# Patient Record
Sex: FEMALE | Race: BLACK OR AFRICAN AMERICAN | NOT HISPANIC OR LATINO | Employment: OTHER | URBAN - METROPOLITAN AREA
[De-identification: names, ages, dates, MRNs, and addresses within clinical notes are randomized per-mention and may not be internally consistent; named-entity substitution may affect disease eponyms.]

---

## 2017-01-26 ENCOUNTER — TELEPHONE (OUTPATIENT)
Dept: RHEUMATOLOGY | Facility: CLINIC | Age: 77
End: 2017-01-26

## 2017-01-26 RX ORDER — PREDNISONE 10 MG/1
10 TABLET ORAL DAILY
Qty: 30 TABLET | Refills: 0 | Status: SHIPPED | OUTPATIENT
Start: 2017-01-26 | End: 2017-03-27 | Stop reason: SDUPTHER

## 2017-01-26 NOTE — TELEPHONE ENCOUNTER
Refill for prednisone sent.  Called patient to discuss addition of Arava to control symptoms.  Prednisone is helping.  Will mail handout Arava

## 2017-01-26 NOTE — TELEPHONE ENCOUNTER
----- Message from Gudelia Riddle MA sent at 1/26/2017  9:19 AM CST -----  Contact: self@home      ----- Message -----     From: Abigail Mcmahon     Sent: 1/26/2017   9:00 AM       To: [facility] Staff    Patient needs a refill on prednisone.

## 2017-01-30 ENCOUNTER — TELEPHONE (OUTPATIENT)
Dept: RHEUMATOLOGY | Facility: CLINIC | Age: 77
End: 2017-01-30

## 2017-01-30 DIAGNOSIS — M05.79 RHEUMATOID ARTHRITIS INVOLVING MULTIPLE SITES WITH POSITIVE RHEUMATOID FACTOR: Primary | ICD-10-CM

## 2017-01-30 NOTE — TELEPHONE ENCOUNTER
Staff to inform patient to have labs updated prior to starting leflunomide.  We'll send the prescription once the labs are reviewed.

## 2017-01-31 ENCOUNTER — LAB VISIT (OUTPATIENT)
Dept: LAB | Facility: OTHER | Age: 77
End: 2017-01-31
Attending: INTERNAL MEDICINE
Payer: MEDICARE

## 2017-01-31 DIAGNOSIS — M05.79 RHEUMATOID ARTHRITIS INVOLVING MULTIPLE SITES WITH POSITIVE RHEUMATOID FACTOR: ICD-10-CM

## 2017-01-31 LAB
ALBUMIN SERPL BCP-MCNC: 3.6 G/DL
ALP SERPL-CCNC: 64 U/L
ALT SERPL W/O P-5'-P-CCNC: 11 U/L
ANION GAP SERPL CALC-SCNC: 15 MMOL/L
AST SERPL-CCNC: 16 U/L
BASOPHILS # BLD AUTO: 0.02 K/UL
BASOPHILS NFR BLD: 0.2 %
BILIRUB SERPL-MCNC: 0.5 MG/DL
BUN SERPL-MCNC: 12 MG/DL
CALCIUM SERPL-MCNC: 9.6 MG/DL
CHLORIDE SERPL-SCNC: 100 MMOL/L
CK SERPL-CCNC: 44 U/L
CO2 SERPL-SCNC: 25 MMOL/L
CREAT SERPL-MCNC: 1.3 MG/DL
CRP SERPL-MCNC: 24.2 MG/L
DIFFERENTIAL METHOD: ABNORMAL
EOSINOPHIL # BLD AUTO: 0.3 K/UL
EOSINOPHIL NFR BLD: 3.1 %
ERYTHROCYTE [DISTWIDTH] IN BLOOD BY AUTOMATED COUNT: 13.7 %
ERYTHROCYTE [SEDIMENTATION RATE] IN BLOOD BY WESTERGREN METHOD: 28 MM/HR
EST. GFR  (AFRICAN AMERICAN): 46.4 ML/MIN/1.73 M^2
EST. GFR  (NON AFRICAN AMERICAN): 40.2 ML/MIN/1.73 M^2
GLUCOSE SERPL-MCNC: 114 MG/DL
HCT VFR BLD AUTO: 41 %
HGB BLD-MCNC: 12.5 G/DL
LYMPHOCYTES # BLD AUTO: 3.1 K/UL
LYMPHOCYTES NFR BLD: 29.7 %
MCH RBC QN AUTO: 30 PG
MCHC RBC AUTO-ENTMCNC: 30.5 %
MCV RBC AUTO: 99 FL
MONOCYTES # BLD AUTO: 0.4 K/UL
MONOCYTES NFR BLD: 4.3 %
NEUTROPHILS # BLD AUTO: 6.4 K/UL
NEUTROPHILS NFR BLD: 62.3 %
PLATELET # BLD AUTO: 250 K/UL
PMV BLD AUTO: 10.5 FL
POTASSIUM SERPL-SCNC: 3.9 MMOL/L
PROT SERPL-MCNC: 7.2 G/DL
RBC # BLD AUTO: 4.16 M/UL
SODIUM SERPL-SCNC: 140 MMOL/L
WBC # BLD AUTO: 10.26 K/UL

## 2017-01-31 PROCEDURE — 86480 TB TEST CELL IMMUN MEASURE: CPT

## 2017-01-31 PROCEDURE — 82550 ASSAY OF CK (CPK): CPT

## 2017-01-31 PROCEDURE — 85651 RBC SED RATE NONAUTOMATED: CPT

## 2017-01-31 PROCEDURE — 36415 COLL VENOUS BLD VENIPUNCTURE: CPT

## 2017-01-31 PROCEDURE — 85025 COMPLETE CBC W/AUTO DIFF WBC: CPT

## 2017-01-31 PROCEDURE — 80053 COMPREHEN METABOLIC PANEL: CPT

## 2017-01-31 PROCEDURE — 86140 C-REACTIVE PROTEIN: CPT

## 2017-01-31 PROCEDURE — 82085 ASSAY OF ALDOLASE: CPT

## 2017-02-01 ENCOUNTER — TELEPHONE (OUTPATIENT)
Dept: RHEUMATOLOGY | Facility: CLINIC | Age: 77
End: 2017-02-01

## 2017-02-01 DIAGNOSIS — M05.79 RHEUMATOID ARTHRITIS INVOLVING MULTIPLE SITES WITH POSITIVE RHEUMATOID FACTOR: Primary | ICD-10-CM

## 2017-02-01 LAB
ALDOLASE SERPL-CCNC: 2.9 U/L
MITOGEN NIL: 2.5 IU/ML
NIL: 0.03 IU/ML
TB ANTIGEN NIL: 0 IU/ML
TB ANTIGEN: 0.03 IU/ML
TB GOLD: NEGATIVE

## 2017-02-01 RX ORDER — LEFLUNOMIDE 10 MG/1
10 TABLET ORAL DAILY
Qty: 30 TABLET | Refills: 2 | Status: SHIPPED | OUTPATIENT
Start: 2017-02-01 | End: 2017-04-20 | Stop reason: CLARIF

## 2017-02-01 NOTE — TELEPHONE ENCOUNTER
Labs reviewed.  Will send Arava.  Patient informed.   Labs 3/1/17 [facility] at 11 am

## 2017-02-08 ENCOUNTER — OFFICE VISIT (OUTPATIENT)
Dept: INTERNAL MEDICINE | Facility: CLINIC | Age: 77
End: 2017-02-08
Attending: INTERNAL MEDICINE
Payer: MEDICARE

## 2017-02-08 VITALS
HEART RATE: 108 BPM | HEIGHT: 62 IN | SYSTOLIC BLOOD PRESSURE: 120 MMHG | WEIGHT: 165.56 LBS | DIASTOLIC BLOOD PRESSURE: 70 MMHG | OXYGEN SATURATION: 95 % | BODY MASS INDEX: 30.47 KG/M2

## 2017-02-08 DIAGNOSIS — J10.1 INFLUENZA A: Primary | ICD-10-CM

## 2017-02-08 LAB
CTP QC/QA: YES
FLUAV AG NPH QL: POSITIVE
FLUBV AG NPH QL: NEGATIVE

## 2017-02-08 PROCEDURE — 1125F AMNT PAIN NOTED PAIN PRSNT: CPT | Mod: S$GLB,,, | Performed by: INTERNAL MEDICINE

## 2017-02-08 PROCEDURE — 99999 PR PBB SHADOW E&M-EST. PATIENT-LVL III: CPT | Mod: PBBFAC,,, | Performed by: INTERNAL MEDICINE

## 2017-02-08 PROCEDURE — 1157F ADVNC CARE PLAN IN RCRD: CPT | Mod: S$GLB,,, | Performed by: INTERNAL MEDICINE

## 2017-02-08 PROCEDURE — 87804 INFLUENZA ASSAY W/OPTIC: CPT | Mod: QW,S$GLB,, | Performed by: INTERNAL MEDICINE

## 2017-02-08 PROCEDURE — 99213 OFFICE O/P EST LOW 20 MIN: CPT | Mod: 25,S$GLB,, | Performed by: INTERNAL MEDICINE

## 2017-02-08 PROCEDURE — 1160F RVW MEDS BY RX/DR IN RCRD: CPT | Mod: S$GLB,,, | Performed by: INTERNAL MEDICINE

## 2017-02-08 PROCEDURE — 1159F MED LIST DOCD IN RCRD: CPT | Mod: S$GLB,,, | Performed by: INTERNAL MEDICINE

## 2017-02-08 PROCEDURE — 3078F DIAST BP <80 MM HG: CPT | Mod: S$GLB,,, | Performed by: INTERNAL MEDICINE

## 2017-02-08 PROCEDURE — 3074F SYST BP LT 130 MM HG: CPT | Mod: S$GLB,,, | Performed by: INTERNAL MEDICINE

## 2017-02-08 RX ORDER — FAMOTIDINE 40 MG/1
TABLET, FILM COATED ORAL
Refills: 3 | COMMUNITY
Start: 2016-12-28 | End: 2017-04-20 | Stop reason: CLARIF

## 2017-02-08 RX ORDER — PREDNISONE 10 MG/1
TABLET ORAL
Qty: 36 TABLET | Refills: 0 | Status: SHIPPED | OUTPATIENT
Start: 2017-02-08 | End: 2017-03-28 | Stop reason: SDUPTHER

## 2017-02-08 NOTE — MR AVS SNAPSHOT
[facility]  [address]  [place] [address]  Phone: [phone]  Fax: [phone]                  Betzaida Neumann   2017 1:20 PM   Office Visit    Description:  Female : 1940   Provider:  Moustapha English MD   Department:  [facility]           Reason for Visit     URI           Diagnoses this Visit        Comments    Upper respiratory tract infection, unspecified type    -  Primary            To Do List           Future Appointments        Provider Department Dept Phone    2/15/2017 3:30 PM MD Barry Zavala [facility] - Otorhinolaryngology [phone]    2017 1:15 PM Liudmila Huizar OD [facility] [phone]    3/1/2017 11:00 AM LAB, APPOINTMENT [place] [facility] [phone]    3/24/2017 10:20 AM [facility], DEXA1 Barry [facility] [phone]    3/24/2017 11:30 AM MD Barry Vergara [facility] - Rheumatology [phone]      Goals (5 Years of Data)     None       These Medications        Disp Refills Start End    predniSONE (DELTASONE) 10 MG tablet 36 tablet 0 2017     5 tabs/day for 3 days then 4 tabs/day for 3 days then 3 tabs/day for 3 days    Pharmacy: [facility] [address] [place] [address] [address] AT [facility] [address]ip Ph #: [phone]         [facility]     [facility] Care [facility] -  Assistance  Registered nurses in the [facility] provide clinical advisement, health education, appointment booking, and other advisory services.  Call for this free service at [phone].             Medications           Message regarding Medications     Verify the changes and/or additions to your medication regime listed below are the same as discussed with your clinician today.  If any of these changes or additions are incorrect, please notify your healthcare provider.        START taking these NEW medications        Refills     "predniSONE (DELTASONE) 10 MG tablet 0    Si tabs/day for 3 days then 4 tabs/day for 3 days then 3 tabs/day for 3 days    Class: Normal      STOP taking these medications     ipratropium (ATROVENT) 0.06 % nasal spray SPRAY TWICE IEN BID.           Verify that the below list of medications is an accurate representation of the medications you are currently taking.  If none reported, the list may be blank. If incorrect, please contact your healthcare provider. Carry this list with you in case of emergency.           Current Medications     estradiol (ESTRACE) 2 MG tablet Take 2 mg by mouth once daily.    fluticasone (FLONASE) 50 mcg/actuation nasal spray     montelukast (SINGULAIR) 10 mg tablet     predniSONE (DELTASONE) 10 MG tablet Take 1 tablet (10 mg total) by mouth once daily. Contact office to discuss further treatment plan    PROAIR HFA 90 mcg/actuation inhaler INHALE 2 PUFFS INTO LUNGS Q 4 H PRF WHEEZING OR SOB    ranitidine (ZANTAC) 150 MG tablet TK 1 T PO BID.    famotidine (PEPCID) 40 MG tablet TK 1 T PO  QAM    hydrochlorothiazide (HYDRODIURIL) 25 MG tablet TAKE 1 TABLET BY MOUTH EVERY DAY    leflunomide (ARAVA) 10 MG Tab Take 1 tablet (10 mg total) by mouth once daily.    levocetirizine (XYZAL) 5 MG tablet     predniSONE (DELTASONE) 10 MG tablet 5 tabs/day for 3 days then 4 tabs/day for 3 days then 3 tabs/day for 3 days           Clinical Reference Information           Your Vitals Were     BP Pulse Height Weight SpO2 BMI    120/70 108 5' 2" (1.575 m) 75.1 kg (165 lb 9.1 oz) 95% 30.28 kg/m2      Blood Pressure          Most Recent Value    BP  120/70      Allergies as of 2017     Sulfa (Sulfonamide Antibiotics)      Immunizations Administered on Date of Encounter - 2017     None      Orders Placed During Today's Visit      Normal Orders This Visit    POCT Influenza A/B          2017  2:05 PM - Mary Lou Menjivar MA      Component Results     Component Value Flag Ref Range Units Status    Rapid " Influenza A Ag Positive (A) Negative  Final    Rapid Influenza B Ag Negative  Negative  Final     Acceptable Yes    Final            [facility] Sign-Up     Activating your [facility] account is as easy as 1-2-3!     1) Visit [email or web address], select Sign Up Now, enter this activation code and your date of birth, then select Next.  Activation code not generated  Current Patient Portal Status: Account disabled      2) Create a username and password to use when you visit [facility] in the future and select a security question in case you lose your password and select Next.    3) Enter your e-mail address and click Sign Up!    Additional Information  If you have questions, please e-mail [email or web address] or call [phone] to talk to our [facility] staff. Remember, [facility] is NOT to be used for urgent needs. For medical emergencies, dial 911.         Language Assistance Services     ATTENTION: Language assistance services are available, free of charge. Please call [phone].      ATENCIÓN: Si habla español, tiene a weinberg disposición servicios gratuitos de asistencia lingüística. Llame al [phone].     CHÚ Ý: N?u b?n nói Ti?ng Vi?t, có các d?ch v? h? tr? ngôn ng? mi?n phí dành cho b?n. G?i s? [phone].         [facility] complies with applicable Federal civil rights laws and does not discriminate on the basis of race, color, national origin, age, disability, or sex.

## 2017-02-08 NOTE — PROGRESS NOTES
"Subjective:       Patient ID: Betzaida Neumann is a 76 y.o. female.    Chief Complaint: URI    HPI Comments: Here for urgent visit    3 day hx of nasal congestion, productive cough, sore throat, ear pain, eye pain, myalgias, HA. Taking flonase, singulair, taking a combination decongestant.         Review of Systems    Objective:      Vitals:    02/08/17 1324   BP: 120/70   Pulse: 108   SpO2: 95%   Weight: 75.1 kg (165 lb 9.1 oz)   Height: 5' 2" (1.575 m)      Physical Exam   Constitutional: She is oriented to person, place, and time. She appears well-developed and well-nourished. She does not have a sickly appearance. No distress.   HENT:   Head: Normocephalic and atraumatic.   Right Ear: Tympanic membrane, external ear and ear canal normal.   Left Ear: Tympanic membrane, external ear and ear canal normal.   Nose: No mucosal edema or rhinorrhea.   Mouth/Throat: No oropharyngeal exudate, posterior oropharyngeal edema or posterior oropharyngeal erythema.   Eyes: Conjunctivae and EOM are normal. Right eye exhibits no discharge. Left eye exhibits no discharge. No scleral icterus.   Pulmonary/Chest: Effort normal. No tachypnea. No respiratory distress. She has no decreased breath sounds. She has wheezes (mid expiratory) in the right upper field, the right middle field, the right lower field, the left upper field, the left middle field and the left lower field.   Abdominal: Normal appearance. She exhibits no distension.   Musculoskeletal: She exhibits no edema.   Lymphadenopathy:     She has no cervical adenopathy.   Neurological: She is alert and oriented to person, place, and time.   Skin: Skin is warm and dry. No rash noted. She is not diaphoretic.   Psychiatric: She has a normal mood and affect. Her speech is normal.       Assessment:       1. Influenza A        Plan:       Betzaida was seen today for uri.    Diagnoses and all orders for this visit:    Upper respiratory tract infection, unspecified type  -f/u A +. " Outside of treatment window. O2-95-97% without respiratory distress. ED prompts discussed.   -     POCT Influenza A/B  -     predniSONE (DELTASONE) 10 MG tablet; 5 tabs/day for 3 days then 4 tabs/day for 3 days then 3 tabs/day for 3 days             Side effects of medication(s) were discussed in detail and patient voiced understanding.  Patient will call back for any issues or complications.

## 2017-02-15 ENCOUNTER — CLINICAL SUPPORT (OUTPATIENT)
Dept: AUDIOLOGY | Facility: CLINIC | Age: 77
End: 2017-02-15
Payer: MEDICARE

## 2017-02-15 ENCOUNTER — OFFICE VISIT (OUTPATIENT)
Dept: OTOLARYNGOLOGY | Facility: CLINIC | Age: 77
End: 2017-02-15
Payer: MEDICARE

## 2017-02-15 VITALS
WEIGHT: 169.06 LBS | SYSTOLIC BLOOD PRESSURE: 174 MMHG | DIASTOLIC BLOOD PRESSURE: 90 MMHG | HEART RATE: 82 BPM | HEIGHT: 62 IN | BODY MASS INDEX: 31.11 KG/M2

## 2017-02-15 DIAGNOSIS — H90.3 SENSORINEURAL HEARING LOSS, BILATERAL: Primary | ICD-10-CM

## 2017-02-15 DIAGNOSIS — H90.3 SENSORINEURAL HEARING LOSS OF BOTH EARS: Primary | ICD-10-CM

## 2017-02-15 DIAGNOSIS — J31.0 CHRONIC RHINITIS: ICD-10-CM

## 2017-02-15 PROCEDURE — 1157F ADVNC CARE PLAN IN RCRD: CPT | Mod: S$GLB,,, | Performed by: OTOLARYNGOLOGY

## 2017-02-15 PROCEDURE — 3080F DIAST BP >= 90 MM HG: CPT | Mod: S$GLB,,, | Performed by: OTOLARYNGOLOGY

## 2017-02-15 PROCEDURE — 3077F SYST BP >= 140 MM HG: CPT | Mod: S$GLB,,, | Performed by: OTOLARYNGOLOGY

## 2017-02-15 PROCEDURE — 1160F RVW MEDS BY RX/DR IN RCRD: CPT | Mod: S$GLB,,, | Performed by: OTOLARYNGOLOGY

## 2017-02-15 PROCEDURE — 1126F AMNT PAIN NOTED NONE PRSNT: CPT | Mod: S$GLB,,, | Performed by: OTOLARYNGOLOGY

## 2017-02-15 PROCEDURE — 92567 TYMPANOMETRY: CPT | Mod: S$GLB,,, | Performed by: AUDIOLOGIST-HEARING AID FITTER

## 2017-02-15 PROCEDURE — 99999 PR PBB SHADOW E&M-EST. PATIENT-LVL I: CPT | Mod: PBBFAC,,,

## 2017-02-15 PROCEDURE — 92557 COMPREHENSIVE HEARING TEST: CPT | Mod: S$GLB,,, | Performed by: AUDIOLOGIST-HEARING AID FITTER

## 2017-02-15 PROCEDURE — 1159F MED LIST DOCD IN RCRD: CPT | Mod: S$GLB,,, | Performed by: OTOLARYNGOLOGY

## 2017-02-15 PROCEDURE — 99999 PR PBB SHADOW E&M-EST. PATIENT-LVL III: CPT | Mod: PBBFAC,,, | Performed by: OTOLARYNGOLOGY

## 2017-02-15 PROCEDURE — 99213 OFFICE O/P EST LOW 20 MIN: CPT | Mod: S$GLB,,, | Performed by: OTOLARYNGOLOGY

## 2017-02-15 NOTE — PROGRESS NOTES
"  Subjective:      Betzaida is a 76 y.o. female who comes for follow-up of rhinitis.  No change since last visit, still feels reduced hearing bilaterally.  Denies ear pain, otorrhea, vertigo.  Occasional aural fullness when she has a cold as she does today.  Using flonase to control rhinitis, only mildly congested.    QOL assessment deferred.    The patient's medications, allergies, past medical, surgical, social and family histories were reviewed and updated as appropriate.    A detailed review of systems was obtained with pertinent positives as per the above HPI, and otherwise negative.        Objective:     Visit Vitals    BP (!) 174/90    Pulse 82    Ht 5' 2" (1.575 m)    Wt 76.7 kg (169 lb 1.5 oz)    BMI 30.93 kg/m2          Constitutional:   She appears well-developed. She is cooperative. Normal speech.  No hoarse voice.      Head:  Normocephalic. Salivary glands normal.  Facial strength is normal.      Ears:    Right Ear: No drainage or tenderness. Tympanic membrane is not perforated. Tympanic membrane mobility is normal. No middle ear effusion. No decreased hearing is noted.   Left Ear: No drainage or tenderness. Tympanic membrane is not perforated. Tympanic membrane mobility is normal.  No middle ear effusion. No decreased hearing is noted.     Nose:  No mucosal edema, rhinorrhea, septal deviation or polyps. No epistaxis. Turbinates normal, no turbinate masses and no turbinate hypertrophy.  Right sinus exhibits no maxillary sinus tenderness and no frontal sinus tenderness. Left sinus exhibits no maxillary sinus tenderness and no frontal sinus tenderness.     Mouth/Throat  Oropharynx clear and moist without lesions or asymmetry and normal uvula midline. She does not have dentures. Normal dentition. No oral lesions or mucous membrane lesions. No oropharyngeal exudate or posterior oropharyngeal erythema. Mirror exam not performed due to patient tolerance.  Mirror exam not performed due to patient tolerance.  "     Neck:  Neck normal without thyromegaly masses, asymmetry, normal tracheal structure, crepitus, and tenderness, thyroid normal, trachea normal and no adenopathy. Normal range of motion present.     She has no cervical adenopathy.     Cardiovascular:   Regular rhythm.      Pulmonary/Chest:   Effort normal.     Psychiatric:   She has a normal mood and affect. Her speech is normal and behavior is normal.     Neurological:   No cranial nerve deficit.     Skin:   No rash noted.       Procedure    None        Data Reviewed    WBC (K/uL)   Date Value   01/31/2017 10.26     Eosinophil% (%)   Date Value   01/31/2017 3.1     Eos # (K/uL)   Date Value   01/31/2017 0.3     Platelets (K/uL)   Date Value   01/31/2017 250     Glucose (mg/dL)   Date Value   01/31/2017 114 (H)     No results found for: IGE     I independently reviewed the tracings of the complete audiometric evaluation performed today.  I reviewed the audiogram with the patient as well.  Pertinent findings include binaural sloping HF sensorineural hearing loss with normal tymps.          Assessment:     1. Sensorineural hearing loss of both ears    2. Chronic rhinitis         Plan:     She is medically cleared for a hearing aid evaluation.  She is interested in this option.  Continue flonase per allergist.  Return if symptoms worsen or fail to improve.

## 2017-02-15 NOTE — PROGRESS NOTES
Betzaida Neumann was seen in the clinic today for a hearing evaluation.      Audiological testing revealed a mild to severe sensorineural hearing loss in the right ear and a moderate to moderately severe sensorineural hearing loss in the left ear. A speech reception threshold was obtained at 40 dBHL in both ears. Speech discrimination was 76%, bilaterally.    Tympanometry revealed rounded Type A tympanograms in both ears.    Recommendations:  1. Otologic evaluation  2. Annual hearing evaluation  3. Hearing Aid Consult

## 2017-02-21 ENCOUNTER — TELEPHONE (OUTPATIENT)
Dept: OPTOMETRY | Facility: CLINIC | Age: 77
End: 2017-02-21

## 2017-02-22 ENCOUNTER — OFFICE VISIT (OUTPATIENT)
Dept: OPTOMETRY | Facility: CLINIC | Age: 77
End: 2017-02-22
Payer: MEDICARE

## 2017-02-22 DIAGNOSIS — H25.11 NUCLEAR SCLEROSIS, RIGHT: Primary | ICD-10-CM

## 2017-02-22 PROCEDURE — 99499 UNLISTED E&M SERVICE: CPT | Mod: S$GLB,,, | Performed by: OPTOMETRIST

## 2017-02-22 PROCEDURE — 99999 PR PBB SHADOW E&M-EST. PATIENT-LVL II: CPT | Mod: PBBFAC,,, | Performed by: OPTOMETRIST

## 2017-02-22 PROCEDURE — 92004 COMPRE OPH EXAM NEW PT 1/>: CPT | Mod: S$GLB,,, | Performed by: OPTOMETRIST

## 2017-02-22 NOTE — PROGRESS NOTES
HPI     Pt states: says she was told in 2014 she had a cataract in her right eye,   pt is s/p pciol os 2015 by Dr. Cedeño. Unsure if she has had any changes   in her vision. Floaters ou  PATIENT DENIES FLASHES,  PAIN OR DIPLOPIA     PATIENT'S LAST DFE WAS APPROXIMATELY 2015       Last edited by Jamia Hampton, PCT on 2/22/2017  1:18 PM.     ROS     Negative for: Constitutional, Gastrointestinal, Neurological, Skin,   Genitourinary, Musculoskeletal, HENT, Endocrine, Cardiovascular, Eyes,   Respiratory, Psychiatric, Allergic/Imm, Heme/Lymph    Last edited by Liudmila Huizar, OD on 2/22/2017  3:03 PM. (History)        Assessment /Plan     For exam results, see Encounter Report.    Nuclear sclerosis, right            1.  Educated on cataracts and affects on vision.  Consult Dr. Sena for cataract surgery

## 2017-03-01 ENCOUNTER — LAB VISIT (OUTPATIENT)
Dept: LAB | Facility: HOSPITAL | Age: 77
End: 2017-03-01
Attending: INTERNAL MEDICINE
Payer: MEDICARE

## 2017-03-01 ENCOUNTER — CLINICAL SUPPORT (OUTPATIENT)
Dept: AUDIOLOGY | Facility: CLINIC | Age: 77
End: 2017-03-01

## 2017-03-01 DIAGNOSIS — H90.3 SENSORINEURAL HEARING LOSS, BILATERAL: Primary | ICD-10-CM

## 2017-03-01 DIAGNOSIS — M05.79 RHEUMATOID ARTHRITIS INVOLVING MULTIPLE SITES WITH POSITIVE RHEUMATOID FACTOR: ICD-10-CM

## 2017-03-01 LAB
ALBUMIN SERPL BCP-MCNC: 3.4 G/DL
ALP SERPL-CCNC: 64 U/L
ALT SERPL W/O P-5'-P-CCNC: 13 U/L
ANION GAP SERPL CALC-SCNC: 9 MMOL/L
AST SERPL-CCNC: 15 U/L
BASOPHILS # BLD AUTO: 0.03 K/UL
BASOPHILS NFR BLD: 0.3 %
BILIRUB SERPL-MCNC: 0.5 MG/DL
BUN SERPL-MCNC: 12 MG/DL
CALCIUM SERPL-MCNC: 9.3 MG/DL
CHLORIDE SERPL-SCNC: 102 MMOL/L
CO2 SERPL-SCNC: 29 MMOL/L
CREAT SERPL-MCNC: 1.2 MG/DL
CRP SERPL-MCNC: 23.3 MG/L
DIFFERENTIAL METHOD: ABNORMAL
EOSINOPHIL # BLD AUTO: 0.4 K/UL
EOSINOPHIL NFR BLD: 4.4 %
ERYTHROCYTE [DISTWIDTH] IN BLOOD BY AUTOMATED COUNT: 13.5 %
ERYTHROCYTE [SEDIMENTATION RATE] IN BLOOD BY WESTERGREN METHOD: 44 MM/HR
EST. GFR  (AFRICAN AMERICAN): 51.1 ML/MIN/1.73 M^2
EST. GFR  (NON AFRICAN AMERICAN): 44.3 ML/MIN/1.73 M^2
GLUCOSE SERPL-MCNC: 93 MG/DL
HCT VFR BLD AUTO: 39 %
HGB BLD-MCNC: 12.3 G/DL
LYMPHOCYTES # BLD AUTO: 4.3 K/UL
LYMPHOCYTES NFR BLD: 46 %
MCH RBC QN AUTO: 29.5 PG
MCHC RBC AUTO-ENTMCNC: 31.5 %
MCV RBC AUTO: 94 FL
MONOCYTES # BLD AUTO: 0.5 K/UL
MONOCYTES NFR BLD: 5.7 %
NEUTROPHILS # BLD AUTO: 4.1 K/UL
NEUTROPHILS NFR BLD: 43.3 %
PLATELET # BLD AUTO: 279 K/UL
PMV BLD AUTO: 9.4 FL
POTASSIUM SERPL-SCNC: 4 MMOL/L
PROT SERPL-MCNC: 7.4 G/DL
RBC # BLD AUTO: 4.17 M/UL
SODIUM SERPL-SCNC: 140 MMOL/L
WBC # BLD AUTO: 9.41 K/UL

## 2017-03-01 PROCEDURE — 86140 C-REACTIVE PROTEIN: CPT

## 2017-03-01 PROCEDURE — 36415 COLL VENOUS BLD VENIPUNCTURE: CPT

## 2017-03-01 PROCEDURE — 85025 COMPLETE CBC W/AUTO DIFF WBC: CPT

## 2017-03-01 PROCEDURE — 80053 COMPREHEN METABOLIC PANEL: CPT

## 2017-03-01 PROCEDURE — 99499 UNLISTED E&M SERVICE: CPT | Mod: S$GLB,,, | Performed by: OTOLARYNGOLOGY

## 2017-03-01 PROCEDURE — 85651 RBC SED RATE NONAUTOMATED: CPT

## 2017-03-01 NOTE — PROGRESS NOTES
Mrs. Neumann was seen for a hearing aid consultation.  We discussed styles and technology in hearing aids.  She was interested in a  in the ear model since it looked liked it was high tech (bluetooth).  She has to check with her insurance company to see what may be covered in her insurance plan.  She will call when she is ready to order.

## 2017-03-07 ENCOUNTER — TELEPHONE (OUTPATIENT)
Dept: RHEUMATOLOGY | Facility: CLINIC | Age: 77
End: 2017-03-07

## 2017-03-07 NOTE — TELEPHONE ENCOUNTER
Inflammatory markers still elevated.  Patient started Arava 10 mg one month ago.  Unable to reach patient by phone.  Left message.  Will consider increase to 20 mg Arava.

## 2017-03-08 ENCOUNTER — TELEPHONE (OUTPATIENT)
Dept: RHEUMATOLOGY | Facility: CLINIC | Age: 77
End: 2017-03-08

## 2017-03-08 NOTE — TELEPHONE ENCOUNTER
Patient reports having flu in February. Patient only took Arava for week.  Patient reports Arava upset her stomach and she since ulcers. Will repeat labs on 3/24 and consider other options.

## 2017-03-08 NOTE — TELEPHONE ENCOUNTER
----- Message from Gudelia Riddle MA sent at 3/8/2017  8:08 AM CST -----  Contact: self/home      ----- Message -----     From: Indu Marx     Sent: 3/7/2017   4:51 PM       To: [facility] Staff    Pt was returning your call.

## 2017-03-14 ENCOUNTER — TELEPHONE (OUTPATIENT)
Dept: OPHTHALMOLOGY | Facility: CLINIC | Age: 77
End: 2017-03-14

## 2017-03-14 ENCOUNTER — OFFICE VISIT (OUTPATIENT)
Dept: OPHTHALMOLOGY | Facility: CLINIC | Age: 77
End: 2017-03-14
Attending: OPHTHALMOLOGY
Payer: MEDICARE

## 2017-03-14 DIAGNOSIS — H25.12 NUCLEAR SCLEROTIC CATARACT OF LEFT EYE: Primary | ICD-10-CM

## 2017-03-14 DIAGNOSIS — H25.11 NUCLEAR SCLEROSIS, RIGHT: Primary | ICD-10-CM

## 2017-03-14 PROCEDURE — 92014 COMPRE OPH EXAM EST PT 1/>: CPT | Mod: S$GLB,,, | Performed by: OPHTHALMOLOGY

## 2017-03-14 PROCEDURE — 99499 UNLISTED E&M SERVICE: CPT | Mod: S$GLB,,, | Performed by: OPHTHALMOLOGY

## 2017-03-14 PROCEDURE — 99999 PR PBB SHADOW E&M-EST. PATIENT-LVL II: CPT | Mod: PBBFAC,,, | Performed by: OPHTHALMOLOGY

## 2017-03-14 RX ORDER — LIDOCAINE HYDROCHLORIDE 10 MG/ML
1 INJECTION, SOLUTION EPIDURAL; INFILTRATION; INTRACAUDAL; PERINEURAL ONCE
Status: CANCELLED | OUTPATIENT
Start: 2017-03-14 | End: 2017-03-14

## 2017-03-14 RX ORDER — TETRACAINE HYDROCHLORIDE 5 MG/ML
1 SOLUTION OPHTHALMIC
Status: CANCELLED | OUTPATIENT
Start: 2017-03-14

## 2017-03-14 RX ORDER — MOXIFLOXACIN 5 MG/ML
1 SOLUTION/ DROPS OPHTHALMIC
Status: CANCELLED | OUTPATIENT
Start: 2017-03-14

## 2017-03-14 RX ORDER — TROPICAMIDE 10 MG/ML
1 SOLUTION/ DROPS OPHTHALMIC
Status: CANCELLED | OUTPATIENT
Start: 2017-03-14

## 2017-03-14 RX ORDER — PHENYLEPHRINE HYDROCHLORIDE 25 MG/ML
1 SOLUTION/ DROPS OPHTHALMIC
Status: CANCELLED | OUTPATIENT
Start: 2017-03-14

## 2017-03-14 NOTE — MR AVS SNAPSHOT
[facility]  [address]  [place] [address]  Phone: [phone]  Fax: [phone]                  Betzaida Neumann   3/14/2017 1:00 PM   Office Visit    Description:  Female : 1940   Provider:  Shania Sena MD   Department:  [facility]           Reason for Visit     Eye Problem           Diagnoses this Visit        Comments    Nuclear sclerosis, right    -  Primary            To Do List           Future Appointments        Provider Department Dept Phone    3/24/2017 10:20 AM [facility], DEXA1 Barry [facility] [phone]    3/24/2017 11:30 AM MD Barry Vergara [facility] - Rheumatology [phone]      Goals (5 Years of Data)     None      [facility]     [facility] Nurse Care Line -  Assistance  Registered nurses in the [facility] Call Center provide clinical advisement, health education, appointment booking, and other advisory services.  Call for this free service at [phone].             Medications           Message regarding Medications     Verify the changes and/or additions to your medication regime listed below are the same as discussed with your clinician today.  If any of these changes or additions are incorrect, please notify your healthcare provider.             Verify that the below list of medications is an accurate representation of the medications you are currently taking.  If none reported, the list may be blank. If incorrect, please contact your healthcare provider. Carry this list with you in case of emergency.           Current Medications     estradiol (ESTRACE) 2 MG tablet Take 2 mg by mouth once daily.    famotidine (PEPCID) 40 MG tablet TK 1 T PO  QAM    fluticasone (FLONASE) 50 mcg/actuation nasal spray     hydrochlorothiazide (HYDRODIURIL) 25 MG tablet TAKE 1 TABLET BY MOUTH EVERY DAY    leflunomide (ARAVA) 10 MG Tab Take 1 tablet (10 mg total) by mouth once daily.    levocetirizine (XYZAL) 5 MG tablet      montelukast (SINGULAIR) 10 mg tablet     predniSONE (DELTASONE) 10 MG tablet Take 1 tablet (10 mg total) by mouth once daily. Contact office to discuss further treatment plan    predniSONE (DELTASONE) 10 MG tablet 5 tabs/day for 3 days then 4 tabs/day for 3 days then 3 tabs/day for 3 days    PROAIR HFA 90 mcg/actuation inhaler INHALE 2 PUFFS INTO LUNGS Q 4 H PRF WHEEZING OR SOB    ranitidine (ZANTAC) 150 MG tablet TK 1 T PO BID.           Clinical Reference Information           Allergies as of 3/14/2017     Sulfa (Sulfonamide Antibiotics)      Immunizations Administered on Date of Encounter - 3/14/2017     None      [facility] Sign-Up     Activating your [facility] account is as easy as 1-2-3!     1) Visit [email or web address], select Sign Up Now, enter this activation code and your date of birth, then select Next.  Activation code not generated  Current Patient Portal Status: Account disabled      2) Create a username and password to use when you visit [facility] in the future and select a security question in case you lose your password and select Next.    3) Enter your e-mail address and click Sign Up!    Additional Information  If you have questions, please e-mail [email or web address] or call [phone] to talk to our [facility] staff. Remember, [facility] is NOT to be used for urgent needs. For medical emergencies, dial 911.         Language Assistance Services     ATTENTION: Language assistance services are available, free of charge. Please call [phone].      ATENCIÓN: Si habla español, tiene a weinberg disposición servicios gratuitos de asistencia lingüística. Llame al [phone].     [facility] Ý: N?u b?n nói Ti?ng Vi?t, có các d?ch v? h? tr? ngôn ng? mi?n phí dành cho b?n. G?i s? [phone].         [facility] complies with applicable Federal civil rights laws and does not discriminate on the basis of race, color, national origin, age, disability, or sex.

## 2017-03-14 NOTE — PROGRESS NOTES
HPI     DLS 2/22/17  Dr. Huizar    Referred by Dr. Huizar for cataract eval. States she is having blurred [place]   OD.  After about 30 mins of reading, she has to stop. The words run   together.  The car lights and street lights have halos and rings around   them.  Lots of glare that is worsening.   + Tearing a lot.     S/P PC IOL OD Dr. Cedeño 2 yrs ago.   SN 60WF  20.0D  ON oral Prednisone for Arthritis.        Last edited by Kamala Jeter on 3/14/2017  1:34 PM.         Assessment /Plan     For exam results, see Encounter Report.    Nuclear sclerosis, right      Visually Significant Cataract: Patient reports decreased vision consistent with the clinical amount of lenticular opacity, which reaches the level of visual significance and affects activities of daily living. Risks, benefits, and alternatives to cataract surgery were discussed and the consent reviewed. IOL options were discussed, including ATIOLs and the associated side effects and additional patient cost associated with them.   IOL Selections:   Right eye  IOL: WF 20.0     Left eye  IOL: na    Pt wishes to have right eye done first.

## 2017-03-24 ENCOUNTER — OFFICE VISIT (OUTPATIENT)
Dept: RHEUMATOLOGY | Facility: CLINIC | Age: 77
End: 2017-03-24
Payer: MEDICARE

## 2017-03-24 ENCOUNTER — HOSPITAL ENCOUNTER (OUTPATIENT)
Dept: RADIOLOGY | Facility: CLINIC | Age: 77
Discharge: HOME | End: 2017-03-24
Attending: INTERNAL MEDICINE
Payer: MEDICARE

## 2017-03-24 VITALS
SYSTOLIC BLOOD PRESSURE: 168 MMHG | BODY MASS INDEX: 31.06 KG/M2 | DIASTOLIC BLOOD PRESSURE: 84 MMHG | TEMPERATURE: 98 F | WEIGHT: 168.81 LBS | HEIGHT: 62 IN | HEART RATE: 85 BPM

## 2017-03-24 DIAGNOSIS — R70.0 ELEVATED SED RATE: ICD-10-CM

## 2017-03-24 DIAGNOSIS — R79.82 ELEVATED C-REACTIVE PROTEIN (CRP): ICD-10-CM

## 2017-03-24 DIAGNOSIS — M35.3 PMR (POLYMYALGIA RHEUMATICA): ICD-10-CM

## 2017-03-24 DIAGNOSIS — J32.9 RECURRENT SINUS INFECTIONS: ICD-10-CM

## 2017-03-24 DIAGNOSIS — E66.1 NON MORBID DRUG-INDUCED OBESITY: ICD-10-CM

## 2017-03-24 DIAGNOSIS — M05.79 RHEUMATOID ARTHRITIS INVOLVING MULTIPLE SITES WITH POSITIVE RHEUMATOID FACTOR: Primary | ICD-10-CM

## 2017-03-24 DIAGNOSIS — M79.7 FIBROMYALGIA: ICD-10-CM

## 2017-03-24 DIAGNOSIS — M05.79 RHEUMATOID ARTHRITIS INVOLVING MULTIPLE SITES WITH POSITIVE RHEUMATOID FACTOR: ICD-10-CM

## 2017-03-24 DIAGNOSIS — Z79.52 CURRENT USE OF STEROID MEDICATION: ICD-10-CM

## 2017-03-24 DIAGNOSIS — R53.83 FATIGUE, UNSPECIFIED TYPE: ICD-10-CM

## 2017-03-24 PROCEDURE — 99999 PR PBB SHADOW E&M-EST. PATIENT-LVL III: CPT | Mod: PBBFAC,,, | Performed by: INTERNAL MEDICINE

## 2017-03-24 PROCEDURE — 1159F MED LIST DOCD IN RCRD: CPT | Mod: S$GLB,,, | Performed by: INTERNAL MEDICINE

## 2017-03-24 PROCEDURE — 99214 OFFICE O/P EST MOD 30 MIN: CPT | Mod: S$GLB,,, | Performed by: INTERNAL MEDICINE

## 2017-03-24 PROCEDURE — 1157F ADVNC CARE PLAN IN RCRD: CPT | Mod: S$GLB,,, | Performed by: INTERNAL MEDICINE

## 2017-03-24 PROCEDURE — 3077F SYST BP >= 140 MM HG: CPT | Mod: S$GLB,,, | Performed by: INTERNAL MEDICINE

## 2017-03-24 PROCEDURE — 1160F RVW MEDS BY RX/DR IN RCRD: CPT | Mod: S$GLB,,, | Performed by: INTERNAL MEDICINE

## 2017-03-24 PROCEDURE — 77080 DXA BONE DENSITY AXIAL: CPT | Mod: 26,,, | Performed by: INTERNAL MEDICINE

## 2017-03-24 PROCEDURE — 3079F DIAST BP 80-89 MM HG: CPT | Mod: S$GLB,,, | Performed by: INTERNAL MEDICINE

## 2017-03-24 PROCEDURE — 1125F AMNT PAIN NOTED PAIN PRSNT: CPT | Mod: S$GLB,,, | Performed by: INTERNAL MEDICINE

## 2017-03-24 ASSESSMENT — ROUTINE ASSESSMENT OF PATIENT INDEX DATA (RAPID3)
MDHAQ FUNCTION SCORE: .2
FATIGUE SCORE: 10
TOTAL RAPID3 SCORE: 6.22
PAIN SCORE: 9
AM STIFFNESS SCORE: 1, YES
PATIENT GLOBAL ASSESSMENT SCORE: 9
PSYCHOLOGICAL DISTRESS SCORE: 5.5
WHEN YOU AWAKENED IN THE MORNING OVER THE LAST WEEK, PLEASE INDICATE THE AMOUNT OF TIME IT TAKES UNTIL YOU ARE AS LIMBER AS YOU WILL BE FOR THE DAY: 2 HOURS

## 2017-03-24 NOTE — MR AVS SNAPSHOT
[facility]  [address] [address]  Phone: [phone]  Fax: [phone]                  Betzaida Neumann   3/24/2017 11:30 AM   Office Visit    Description:  Female : 1940   Provider:  Augustina Manzanares MD   Department:  [facility]           Reason for Visit     Rheumatoid Arthritis           Diagnoses this Visit        Comments    Rheumatoid arthritis involving multiple sites with positive rheumatoid factor    -  Primary     PMR (polymyalgia rheumatica)         Fibromyalgia         Elevated C-reactive protein (CRP)         Elevated sed rate         Recurrent sinus infections                To Do List           Future Appointments        Provider Department Dept Phone    3/24/2017 11:30 AM Augustina Manzanares MD [facility] [phone]    3/24/2017 12:10 PM LAB, APPOINTMENT [place] [facility] [phone]    3/24/2017 12:25 PM LAB, APPOINTMENT [place] [facility] [phone]    2017 11:30 AM Augustina Manzanares MD [facility] [phone]      Your Future Surgeries/Procedures     2017   Surgery with Shania Sena MD   [facility]    [address] [place] [id number] [phone]              Goals (5 Years of Data)     None      Follow-Up and Disposition     Return in about 4 months (around 2017).      [facility]     [facility] Care [facility] -  Assistance  Registered nurses in the [facility] provide clinical advisement, health education, appointment booking, and other advisory services.  Call for this free service at [phone].             Medications           Message regarding Medications     Verify the changes and/or additions to your medication regime listed below are the same as discussed with your clinician today.  If any of these changes or additions are  "incorrect, please notify your healthcare provider.             Verify that the below list of medications is an accurate representation of the medications you are currently taking.  If none reported, the list may be blank. If incorrect, please contact your healthcare provider. Carry this list with you in case of emergency.           Current Medications     estradiol (ESTRACE) 2 MG tablet Take 2 mg by mouth once daily.    famotidine (PEPCID) 40 MG tablet TK 1 T PO  QAM    fluticasone (FLONASE) 50 mcg/actuation nasal spray     hydrochlorothiazide (HYDRODIURIL) 25 MG tablet TAKE 1 TABLET BY MOUTH EVERY DAY    leflunomide (ARAVA) 10 MG Tab Take 1 tablet (10 mg total) by mouth once daily.    levocetirizine (XYZAL) 5 MG tablet     montelukast (SINGULAIR) 10 mg tablet     predniSONE (DELTASONE) 10 MG tablet Take 1 tablet (10 mg total) by mouth once daily. Contact office to discuss further treatment plan    predniSONE (DELTASONE) 10 MG tablet 5 tabs/day for 3 days then 4 tabs/day for 3 days then 3 tabs/day for 3 days    PROAIR HFA 90 mcg/actuation inhaler INHALE 2 PUFFS INTO LUNGS Q 4 H PRF WHEEZING OR SOB    ranitidine (ZANTAC) 150 MG tablet TK 1 T PO BID.           Clinical Reference Information           Your Vitals Were     BP Pulse Temp Height Weight BMI    168/84 (BP Location: Left arm, Patient Position: Sitting, BP Method: Automatic) 85 97.6 °F (36.4 °C) (Oral) 5' 2" (1.575 m) 76.6 kg (168 lb 12.8 oz) 30.87 kg/m2      Blood Pressure          Most Recent Value    BP  (!)  168/84      Allergies as of 3/24/2017     Sulfa (Sulfonamide Antibiotics)      Immunizations Administered on Date of Encounter - 3/24/2017     None      Orders Placed During Today's Visit     Future Labs/Procedures Expected by Expires    Aldolase  3/24/2017 3/24/2018    ANTI-NEUTROPHILIC CYTOPLASMIC ANTIBODY  3/24/2017 5/23/2018    C-reactive protein  3/24/2017 3/24/2018    C3 complement  3/24/2017 3/24/2018    C4 complement  3/24/2017 3/24/2018    " CBC auto differential  3/24/2017 3/24/2018    CK  3/24/2017 3/24/2018    Comprehensive metabolic panel  3/24/2017 3/24/2018    Protein / creatinine ratio, urine  3/24/2017 5/23/2018    Urinalysis  3/24/2017 5/23/2018      [facility] Sign-Up     Activating your [facility] account is as easy as 1-2-3!     1) Visit [email or web address], select Sign Up Now, enter this activation code and your date of birth, then select Next.  Activation code not generated  Current Patient Portal Status: Account disabled      2) Create a username and password to use when you visit [facility] in the future and select a security question in case you lose your password and select Next.    3) Enter your e-mail address and click Sign Up!    Additional Information  If you have questions, please e-mail [email or web address] or call [phone] to talk to our [employer] staff. Remember, [employer] is NOT to be used for urgent needs. For medical emergencies, dial 911.         Language Assistance Services     ATTENTION: Language assistance services are available, free of charge. Please call [phone].      ATENCIÓN: Si habla español, tiene a weinberg disposición servicios gratuitos de asistencia lingüística. Llame al [phone].     CHÚ Ý: N?u b?n nói Ti?ng Vi?t, có các d?ch v? h? tr? ngôn ng? mi?n phí dành cho b?n. G?i s? [phone].         [facility] complies with applicable Federal civil rights laws and does not discriminate on the basis of race, color, national origin, age, disability, or sex.

## 2017-03-24 NOTE — PROGRESS NOTES
"Subjective:       Patient ID: Betzaida Neumann is a 77 y.o. female.    Chief Complaint: Rheumatoid Arthritis      HPI:  Betzaida Neumann is a 77 y.o. female with history of joint pain all over since 2008. She has seen several rheumatologists. First was Dr. Lerma who thought she had bursitis.  This she saw Dr. [facility] who diagnosed rheumatoid arthritis and gave her Humira x 3 years which did not help. Humira caused her to feel like a zombie. She then went [facility] list Dr. Woo.   He diagnosed a combination of rheumatoid arthritis, fibromyalgia and PMR.  She recalls taking methotrexate but it didn't help her. Her last rheumatologist was at [facility] and was giving her mainly pain pills.  She has been on prednisone since May 2016 due to ALLERGIES as prescribed by her ALLERGY doctor.   She also took tramadol and percocet but did not help.       Today she reports aching all over. Pain is in the muscles of arms and legs.  Hold Arava due to stomach upset.  Reports sweating at night since 2008.  She denies joint pain.  Pain improves with prednisone.  Has been on it for 1 year.  Currently on 10 mg prednisone which makes pain tolerable.  Highest dose she was on 30 mg prednisone one week.  Pain improves with higher dose and takes it all away.      Nothing worsens pain.     Review of Systems   Constitutional: Positive for fatigue.   HENT: Negative.    Eyes: Negative.    Respiratory: Negative.    Cardiovascular: Negative.    Gastrointestinal: Negative.    Endocrine: Negative.    Genitourinary: Negative.    Musculoskeletal: Positive for myalgias.   Skin: Negative.    Allergic/Immunologic: Negative.    Neurological: Negative.    Hematological: Negative.    Psychiatric/Behavioral: Negative.          Objective:   BP (!) 168/84 (BP Location: Left arm, Patient Position: Sitting, BP Method: Automatic)  Pulse 85  Temp 97.6 °F (36.4 °C) (Oral)   Ht 5' 2" (1.575 m)  Wt 76.6 kg (168 lb 12.8 oz)  BMI 30.87 kg/m2     Physical Exam "   Constitutional: She is oriented to person, place, and time and well-developed, well-nourished, and in no distress.   HENT:   Head: Normocephalic and atraumatic.   Eyes: Conjunctivae and EOM are normal.   Neck: Neck supple.   Cardiovascular: Normal rate, regular rhythm and normal heart sounds.    Pulmonary/Chest: Effort normal and breath sounds normal.   Abdominal: Soft. Bowel sounds are normal.   Neurological: She is alert and oriented to person, place, and time. Gait normal.   Skin: Skin is warm and dry.     Psychiatric: Mood and affect normal.   Musculoskeletal: She exhibits tenderness. She exhibits no edema or deformity.   28 joint count: 0 swollen and 0 tender  2 out of 18 fibromyalgia tender points painful            LABS    Component      Latest Ref Rng & Units 3/1/2017   WBC      3.90 - 12.70 K/uL 9.41   RBC      4.00 - 5.40 M/uL 4.17   Hemoglobin      12.0 - 16.0 g/dL 12.3   Hematocrit      37.0 - 48.5 % 39.0   MCV      82 - 98 fL 94   MCH      27.0 - 31.0 pg 29.5   MCHC      32.0 - 36.0 % 31.5 (L)   RDW      11.5 - 14.5 % 13.5   Platelets      150 - 350 K/uL 279   MPV      9.2 - 12.9 fL 9.4   Gran #      1.8 - 7.7 K/uL 4.1   Lymph #      1.0 - 4.8 K/uL 4.3   Mono #      0.3 - 1.0 K/uL 0.5   Eos #      0.0 - 0.5 K/uL 0.4   Baso #      0.00 - 0.20 K/uL 0.03   Gran%      38.0 - 73.0 % 43.3   Lymph%      18.0 - 48.0 % 46.0   Mono%      4.0 - 15.0 % 5.7   Eosinophil%      0.0 - 8.0 % 4.4   Basophil%      0.0 - 1.9 % 0.3   Differential Method       Automated   Sodium      136 - 145 mmol/L 140   Potassium      3.5 - 5.1 mmol/L 4.0   Chloride      95 - 110 mmol/L 102   CO2      23 - 29 mmol/L 29   Glucose      70 - 110 mg/dL 93   BUN, Bld      8 - 23 mg/dL 12   Creatinine      0.5 - 1.4 mg/dL 1.2   Calcium      8.7 - 10.5 mg/dL 9.3   Total Protein      6.0 - 8.4 g/dL 7.4   Albumin      3.5 - 5.2 g/dL 3.4 (L)   Total Bilirubin      0.1 - 1.0 mg/dL 0.5   Alkaline Phosphatase      55 - 135 U/L 64   AST      10 - 40  U/L 15   ALT      10 - 44 U/L 13   Anion Gap      8 - 16 mmol/L 9   eGFR if African American      >60 mL/min/1.73 m:2 51.1 (A)   eGFR if non African American      >60 mL/min/1.73 m:2 44.3 (A)   CRP      0.0 - 8.2 mg/L 23.3 (H)   Sed Rate      0 - 20 mm/Hr 44 (H)        Assessment:       1. History of rheumatoid arthritis. Treated in the past with Humira and methotrexate without improvement.   Positive rheumatoid factor in our lab and an elevated sedimentation rate. Patient without swelling or joint tenderness on exam today.  2. History of polymyalgia rheumatica. Patient describes mainly all over body ache and notes that prednisone has helped a great deal.  The response to prednisone is consistent with polymyalgia rheumatica.  3. Fibromyalgia.  4. Fatigue  5. Obesity. Patient with 30 pound weight gain since starting prednisone.  6. Recurrent URI now with congestion.  Recurrent sinus infections.  7. Night sweats  Plan:       1. Check labs  2 Hold Arava due to stomach upset  3. Check DEXA scan. Risk of osteoporosis with long-term steroid use discussed. Patient given a calcium diet. Patient also will have a vitamin D level. Patient encouraged to have 1000 mg of calcium a day and 1000 units of vitamin D a day  4. RTO  3 months.

## 2017-03-27 ENCOUNTER — TELEPHONE (OUTPATIENT)
Dept: RHEUMATOLOGY | Facility: CLINIC | Age: 77
End: 2017-03-27

## 2017-03-27 RX ORDER — PREDNISONE 10 MG/1
10 TABLET ORAL DAILY
Qty: 30 TABLET | Refills: 0 | Status: SHIPPED | OUTPATIENT
Start: 2017-03-27 | End: 2017-11-02

## 2017-03-27 RX ORDER — HYDROCHLOROTHIAZIDE 25 MG/1
TABLET ORAL
Qty: 90 TABLET | Refills: 0 | Status: SHIPPED | OUTPATIENT
Start: 2017-03-27 | End: 2017-06-25 | Stop reason: SDUPTHER

## 2017-03-27 NOTE — TELEPHONE ENCOUNTER
Refill sent of prednisone.  Next week decrease to 10 mg alternating with 5 mg on Mon/Wed/Fri.  She will do this for two weeks then contact office.

## 2017-03-27 NOTE — TELEPHONE ENCOUNTER
Pt called requesting a call. Pt states its concerning her health condition. Pt informed of needing an appt to discuss request. Pt informed appt doesn't guarantee letter approval.Pt scheduled accordingly. Patient has no further questions or concerns.

## 2017-03-27 NOTE — TELEPHONE ENCOUNTER
----- Message from Gudelia [facility] MA sent at 3/27/2017 10:27 AM CDT -----      ----- Message -----     From: Juli Veronica     Sent: 3/27/2017  10:21 AM       To: [facility] Staff    _  1st Request  _  2nd Request  _  3rd Request    Please refill the medication(s) listed below. Please call the patient when the prescription(s) is ready for  at the phone number ([phone]  Medication #1predniSONE (DELTASONE) 10 MG tablet    Medication #2      Preferred Pharmacy:[facility] [address] [facility]  Telephone Fax  [phone]

## 2017-03-27 NOTE — TELEPHONE ENCOUNTER
----- Message from Juli Veronica sent at 3/27/2017 10:19 AM CDT -----  _  1st Request  _  2nd Request  _  3rd Request        Who: patient    Why: please call pt concerning getting a letter from your office stating her health condition, she will explain.     What Number to Call Back:[phone]    When to Expect a call back: (Before the end of the day)   -- if the call is after 12:00, the call back will be tomorrow.

## 2017-03-28 ENCOUNTER — OFFICE VISIT (OUTPATIENT)
Dept: INTERNAL MEDICINE | Facility: CLINIC | Age: 77
End: 2017-03-28
Attending: FAMILY MEDICINE
Payer: MEDICARE

## 2017-03-28 VITALS
DIASTOLIC BLOOD PRESSURE: 80 MMHG | SYSTOLIC BLOOD PRESSURE: 160 MMHG | BODY MASS INDEX: 31.16 KG/M2 | WEIGHT: 169.31 LBS | HEART RATE: 110 BPM | HEIGHT: 62 IN

## 2017-03-28 DIAGNOSIS — K21.00 GASTROESOPHAGEAL REFLUX DISEASE WITH ESOPHAGITIS: ICD-10-CM

## 2017-03-28 DIAGNOSIS — J30.9 CHRONIC ALLERGIC RHINITIS: ICD-10-CM

## 2017-03-28 DIAGNOSIS — N18.30 CKD (CHRONIC KIDNEY DISEASE) STAGE 3, GFR 30-59 ML/MIN: ICD-10-CM

## 2017-03-28 DIAGNOSIS — M35.3 PMR (POLYMYALGIA RHEUMATICA): Primary | ICD-10-CM

## 2017-03-28 DIAGNOSIS — I10 HYPERTENSION, ESSENTIAL: ICD-10-CM

## 2017-03-28 PROCEDURE — 99499 UNLISTED E&M SERVICE: CPT | Mod: S$GLB,,, | Performed by: FAMILY MEDICINE

## 2017-03-28 PROCEDURE — 99214 OFFICE O/P EST MOD 30 MIN: CPT | Mod: S$GLB,,, | Performed by: FAMILY MEDICINE

## 2017-03-28 PROCEDURE — 1160F RVW MEDS BY RX/DR IN RCRD: CPT | Mod: S$GLB,,, | Performed by: FAMILY MEDICINE

## 2017-03-28 PROCEDURE — 1126F AMNT PAIN NOTED NONE PRSNT: CPT | Mod: S$GLB,,, | Performed by: FAMILY MEDICINE

## 2017-03-28 PROCEDURE — 1159F MED LIST DOCD IN RCRD: CPT | Mod: S$GLB,,, | Performed by: FAMILY MEDICINE

## 2017-03-28 PROCEDURE — 3079F DIAST BP 80-89 MM HG: CPT | Mod: S$GLB,,, | Performed by: FAMILY MEDICINE

## 2017-03-28 PROCEDURE — 3077F SYST BP >= 140 MM HG: CPT | Mod: S$GLB,,, | Performed by: FAMILY MEDICINE

## 2017-03-28 PROCEDURE — 1157F ADVNC CARE PLAN IN RCRD: CPT | Mod: S$GLB,,, | Performed by: FAMILY MEDICINE

## 2017-03-28 PROCEDURE — 99999 PR PBB SHADOW E&M-EST. PATIENT-LVL III: CPT | Mod: PBBFAC,,, | Performed by: FAMILY MEDICINE

## 2017-03-28 NOTE — PROGRESS NOTES
CHIEF COMPLAINT: Discuss employment options    HISTORY OF PRESENT ILLNESS: The patient is a generally healthy 76 year-old BF.  She continues to be bothered with a lot of pain.  A suggestion has been made that if she would return to work it might get her mind off of her continual pain issues.  She needs a letter detailing her current medical conditions in hopes that the state will be able to find employer that can work with her needed accommodations.  Letter is provided today.    She was seen in [facility].  Her urgent care visit ended up being a diagnosis of polymyalgia rheumatica.  Apparently she's had this in the past.  She is seen with rheumatology.    She changed primary care physicians and specialists as she was unclear as to what is going on with her health situation.  She previously had been told she was on the verge of dialysis or kidney transplant. We could find no evidence of abnormal liver or kidney function only did her blood work recently. She is also curious about some other portions of her blood work. Nephrology seems to feel like she is doing quite well. Baseline creatinine appears to be 1.2 and this is stable.    I also note that she has diffuse complaints of myalgias and arthralgias. She is under the impression that she has either rheumatoid arthritis or fibromyalgia. We recently ruled out  the possibility of a connective tissue disease.    The patient has a history of stable hypertension on current medications.  Patient denies chest pain or shortness of breath today.    REVIEW OF SYSTEMS:  GENERAL: No fever, chills, fatigability or weight loss.  SKIN: No rashes, itching or changes in color or texture of skin.  HEAD: No headaches or recent head trauma.  EYES: Visual acuity fine. No photophobia, ocular pain or diplopia.  EARS: Denies ear pain, discharge or vertigo.  NOSE: No loss of smell, no epistaxis or postnasal drip.  MOUTH & THROAT: No hoarseness or change in voice. No excessive gum  "bleeding.  NODES: Denies swollen glands.  CHEST: Denies COSBY, cyanosis, wheezing, cough and sputum production.  CARDIOVASCULAR: Denies chest pain, PND, orthopnea or reduced exercise tolerance.  ABDOMEN: Appetite fine. No weight loss. Denies diarrhea, abdominal pain, hematemesis or blood in stool.  URINARY: No flank pain, dysuria or hematuria.  PERIPHERAL VASCULAR: No claudication or cyanosis.  MUSCULOSKELETAL: She has diffuse myalgias and arthralgias consistent with PMR.  NEUROLOGIC: No history of seizures, paralysis, alteration of gait or coordination.    SOCIAL HISTORY: The patient does not smoke.  The patient consumes alcohol socially.  The patient is retired.     PHYSICAL EXAMINATION:     Blood pressure (!) 160/80, pulse 110, height 5' 2" (1.575 m), weight 76.8 kg (169 lb 5 oz).    APPEARANCE: Well nourished, well developed, in no acute distress.    HEAD: Normocephalic, atraumatic.  EYES: PERRL. EOMI.  Conjunctivae without injection and  anicteric  EARS: TM's intact. Light reflex normal. No retraction or perforation.    NOSE: Mucosa pink. Airway clear.  MOUTH & THROAT: No tonsillar enlargement. No pharyngeal erythema or exudate. No stridor.  NECK: Supple.   NODES: No cervical, axillary or inguinal lymph node enlargement.  CHEST: Lungs clear to auscultation.  No retractions are noted.  No rales or rhonchi are present.  CARDIOVASCULAR: Normal S1, S2. No rubs, murmurs or gallops.  ABDOMEN: Bowel sounds normal. Not distended. Soft. No tenderness or masses.  No ascites is noted.  MUSCULOSKELETAL:  There is no clubbing, cyanosis, or edema of the extremities x4.  There is full range of motion of the lumbar spine.  There is full range of motion of the extremities x4.  There is no deformity noted.    NEUROLOGIC:       Normal speech development.      Hearing normal.      Normal gait.      Motor and sensory exams grossly normal.      DTR's normal.  PSYCHIATRIC: Patient is alert and oriented x3.  Thought processes are all " normal.  There is no homicidality.  There is no suicidality.  There is no evidence of psychosis.    LABORATORY/RADIOLOGY:   Chart reviewed.      ASSESSMENT:   Hypertension, condition is well controlled on current medication regimen  CK D3  GERD  Chronic ALLERGIC rhinitis  Chronic kidney disease, mild well-controlled with current regimen,   Diffuse myalgias and arthralgias, ruled out a connective tissue disorder previously with blood work     PLAN:   She will return to clinic in 2 months    I gave her a letter discussing what her chronic medical conditions are.  I hope she is able to get an appointment.    She will continue current medications as things appear to be well controlled at this time.

## 2017-03-28 NOTE — MR AVS SNAPSHOT
[facility]  [address]  [place] [address]  Phone: [phone]  Fax: [phone]                  Betzaida Neumann   3/28/2017 2:40 PM   Office Visit    Description:  Female : 1940   Provider:  Ben Leavitt MD   Department:  [facility]                To Do List           Future Appointments        Provider Department Dept Phone    2017 11:30 AM Augustina Manzanares MD [facility] - Rhe[facility] [phone]      Your Future Surgeries/Procedures     2017   Surgery with Shania Sena MD   [facility]    [address] [facility] [id number] [phone]              Goals (5 Years of Data)     None      [facility] On [facility]     [facility] Care Line -  Assistance  Registered nurses in the [facility] provide clinical advisement, health education, appointment booking, and other advisory services.  Call for this free service at [phone].             Medications           Message regarding Medications     Verify the changes and/or additions to your medication regime listed below are the same as discussed with your clinician today.  If any of these changes or additions are incorrect, please notify your healthcare provider.             Verify that the below list of medications is an accurate representation of the medications you are currently taking.  If none reported, the list may be blank. If incorrect, please contact your healthcare provider. Carry this list with you in case of emergency.           Current Medications     estradiol (ESTRACE) 2 MG tablet Take 2 mg by mouth once daily.    famotidine (PEPCID) 40 MG tablet TK 1 T PO  QAM    fluticasone (FLONASE) 50 mcg/actuation nasal spray     hydrochlorothiazide (HYDRODIURIL) 25 MG tablet TAKE 1 TABLET BY MOUTH EVERY DAY.    leflunomide (ARAVA) 10 MG Tab Take 1 tablet (10 mg total) by mouth once daily.     "levocetirizine (XYZAL) 5 MG tablet     montelukast (SINGULAIR) 10 mg tablet     predniSONE (DELTASONE) 10 MG tablet Take 1 tablet (10 mg total) by mouth once daily. Contact office to discuss further treatment plan    PROAIR HFA 90 mcg/actuation inhaler INHALE 2 PUFFS INTO LUNGS Q 4 H PRF WHEEZING OR SOB    ranitidine (ZANTAC) 150 MG tablet TK 1 T PO BID.           Clinical Reference Information           Your Vitals Were     BP Pulse Height Weight BMI    160/80 110 5' 2" (1.575 m) 76.8 kg (169 lb 5 oz) 30.97 kg/m2      Blood Pressure          Most Recent Value    BP  (!)  160/80      Allergies as of 3/28/2017     Sulfa (Sulfonamide Antibiotics)      Immunizations Administered on Date of Encounter - 3/28/2017     None      [facility] Sign-Up     Activating your [facility] account is as easy as 1-2-3!     1) Visit [email or web address], select Sign Up Now, enter this activation code and your date of birth, then select Next.  Activation code not generated  Current Patient Portal Status: Account disabled      2) Create a username and password to use when you visit [facility] in the future and select a security question in case you lose your password and select Next.    3) Enter your e-mail address and click Sign Up!    Additional Information  If you have questions, please e-mail [email or web address] or call [phone] to talk to our [facility] staff. Remember, [facility] is NOT to be used for urgent needs. For medical emergencies, dial 911.         Instructions    Your test results will be communicated to you via: [facility], Telephone or Letter.  If you have not received your test results within one week. Please contact the clinic.           Language Assistance Services     ATTENTION: Language assistance services are available, free of charge. Please call [phone].      ATENCIÓN: Si habla español, tiene a weinberg disposición servicios gratuitos de asistencia lingüística. Llame al [phone].     CHÚ Ý: N?u b?n nói Ti?ng " Vi?t, có các d?ch v? h? tr? ngôn ng? mi?n phí sunshineh cho b?n. G?i s? [phone].         [facility] complies with applicable Federal civil rights laws and does not discriminate on the basis of race, color, national origin, age, disability, or sex.

## 2017-04-19 ENCOUNTER — TELEPHONE (OUTPATIENT)
Dept: OPHTHALMOLOGY | Facility: CLINIC | Age: 77
End: 2017-04-19

## 2017-04-19 ENCOUNTER — TELEPHONE (OUTPATIENT)
Dept: OPTOMETRY | Facility: CLINIC | Age: 77
End: 2017-04-19

## 2017-04-19 NOTE — TELEPHONE ENCOUNTER
----- Message from Lea Lucio sent at 4/19/2017  9:42 AM CDT -----  Contact: Patient   Patient needs a call back in regards to eye drops and upcoming surgery

## 2017-04-19 NOTE — TELEPHONE ENCOUNTER
I spoke to patient.  She had not yet ordered the drops for her upcoming surgery.  I advised her to contact the pharmacy to arrange payment/delivery of medication today.  If she cannot locate the number she will call me back today to get the information.

## 2017-04-20 RX ORDER — FLUTICASONE FUROATE AND VILANTEROL 200; 25 UG/1; UG/1
1 POWDER RESPIRATORY (INHALATION) DAILY
COMMUNITY
End: 2022-11-29

## 2017-04-20 RX ORDER — AZELASTINE HYDROCHLORIDE, FLUTICASONE PROPIONATE 137; 50 UG/1; UG/1
1 SPRAY, METERED NASAL 2 TIMES DAILY
COMMUNITY
End: 2018-08-21

## 2017-04-24 ENCOUNTER — HOSPITAL ENCOUNTER (OUTPATIENT)
Facility: OTHER | Age: 77
Discharge: HOME | End: 2017-04-24
Attending: OPHTHALMOLOGY | Admitting: OPHTHALMOLOGY
Payer: MEDICARE

## 2017-04-24 ENCOUNTER — ANESTHESIA (OUTPATIENT)
Dept: SURGERY | Facility: OTHER | Age: 77
End: 2017-04-24
Payer: MEDICARE

## 2017-04-24 ENCOUNTER — ANESTHESIA EVENT (OUTPATIENT)
Dept: SURGERY | Facility: OTHER | Age: 77
End: 2017-04-24
Payer: MEDICARE

## 2017-04-24 VITALS
SYSTOLIC BLOOD PRESSURE: 160 MMHG | DIASTOLIC BLOOD PRESSURE: 74 MMHG | BODY MASS INDEX: 30.36 KG/M2 | RESPIRATION RATE: 18 BRPM | OXYGEN SATURATION: 98 % | HEART RATE: 82 BPM | HEIGHT: 62 IN | TEMPERATURE: 98 F | WEIGHT: 165 LBS

## 2017-04-24 DIAGNOSIS — H25.11 NUCLEAR SCLEROSIS, RIGHT: ICD-10-CM

## 2017-04-24 PROBLEM — H25.10 NUCLEAR SCLEROSIS: Status: ACTIVE | Noted: 2017-04-24

## 2017-04-24 PROCEDURE — 36000706: Performed by: OPHTHALMOLOGY

## 2017-04-24 PROCEDURE — 66984 XCAPSL CTRC RMVL W/O ECP: CPT | Mod: LT,,, | Performed by: OPHTHALMOLOGY

## 2017-04-24 PROCEDURE — 36000707: Performed by: OPHTHALMOLOGY

## 2017-04-24 PROCEDURE — 25000003 PHARM REV CODE 250: Performed by: OPHTHALMOLOGY

## 2017-04-24 PROCEDURE — 37000009 HC ANESTHESIA EA ADD 15 MINS: Performed by: OPHTHALMOLOGY

## 2017-04-24 PROCEDURE — 37000008 HC ANESTHESIA 1ST 15 MINUTES: Performed by: OPHTHALMOLOGY

## 2017-04-24 PROCEDURE — 71000015 HC POSTOP RECOV 1ST HR: Performed by: OPHTHALMOLOGY

## 2017-04-24 PROCEDURE — 63600175 PHARM REV CODE 636 W HCPCS: Performed by: NURSE ANESTHETIST, CERTIFIED REGISTERED

## 2017-04-24 PROCEDURE — V2632 POST CHMBR INTRAOCULAR LENS: HCPCS | Performed by: OPHTHALMOLOGY

## 2017-04-24 DEVICE — LENS 20.0: Type: IMPLANTABLE DEVICE | Site: EYE | Status: FUNCTIONAL

## 2017-04-24 RX ORDER — MOXIFLOXACIN 5 MG/ML
1 SOLUTION/ DROPS OPHTHALMIC
Status: COMPLETED | OUTPATIENT
Start: 2017-04-24 | End: 2017-04-24

## 2017-04-24 RX ORDER — MIDAZOLAM HYDROCHLORIDE 1 MG/ML
INJECTION INTRAMUSCULAR; INTRAVENOUS
Status: DISCONTINUED | OUTPATIENT
Start: 2017-04-24 | End: 2017-04-24

## 2017-04-24 RX ORDER — LIDOCAINE HYDROCHLORIDE 40 MG/ML
INJECTION, SOLUTION RETROBULBAR
Status: DISCONTINUED | OUTPATIENT
Start: 2017-04-24 | End: 2017-04-24 | Stop reason: HOSPADM

## 2017-04-24 RX ORDER — TETRACAINE HYDROCHLORIDE 5 MG/ML
SOLUTION OPHTHALMIC
Status: DISCONTINUED | OUTPATIENT
Start: 2017-04-24 | End: 2017-04-24 | Stop reason: HOSPADM

## 2017-04-24 RX ORDER — TROPICAMIDE 10 MG/ML
1 SOLUTION/ DROPS OPHTHALMIC
Status: COMPLETED | OUTPATIENT
Start: 2017-04-24 | End: 2017-04-24

## 2017-04-24 RX ORDER — TETRACAINE HYDROCHLORIDE 5 MG/ML
1 SOLUTION OPHTHALMIC
Status: COMPLETED | OUTPATIENT
Start: 2017-04-24 | End: 2017-04-24

## 2017-04-24 RX ORDER — PHENYLEPHRINE HYDROCHLORIDE 25 MG/ML
1 SOLUTION/ DROPS OPHTHALMIC
Status: COMPLETED | OUTPATIENT
Start: 2017-04-24 | End: 2017-04-24

## 2017-04-24 RX ORDER — MOXIFLOXACIN 5 MG/ML
SOLUTION/ DROPS OPHTHALMIC
Status: DISCONTINUED | OUTPATIENT
Start: 2017-04-24 | End: 2017-04-24 | Stop reason: HOSPADM

## 2017-04-24 RX ORDER — PROPARACAINE HYDROCHLORIDE 5 MG/ML
1 SOLUTION/ DROPS OPHTHALMIC
Status: DISCONTINUED | OUTPATIENT
Start: 2017-04-24 | End: 2017-04-24 | Stop reason: HOSPADM

## 2017-04-24 RX ORDER — ACETAMINOPHEN 325 MG/1
650 TABLET ORAL EVERY 4 HOURS PRN
Status: DISCONTINUED | OUTPATIENT
Start: 2017-04-24 | End: 2017-04-24 | Stop reason: HOSPADM

## 2017-04-24 RX ORDER — LIDOCAINE HYDROCHLORIDE 10 MG/ML
1 INJECTION, SOLUTION EPIDURAL; INFILTRATION; INTRACAUDAL; PERINEURAL ONCE
Status: DISCONTINUED | OUTPATIENT
Start: 2017-04-24 | End: 2017-04-24 | Stop reason: HOSPADM

## 2017-04-24 RX ADMIN — PHENYLEPHRINE HYDROCHLORIDE 1 DROP: 25 SOLUTION/ DROPS OPHTHALMIC at 09:04

## 2017-04-24 RX ADMIN — TETRACAINE HYDROCHLORIDE 1 DROP: 5 SOLUTION OPHTHALMIC at 09:04

## 2017-04-24 RX ADMIN — MOXIFLOXACIN HYDROCHLORIDE 1 DROP: 5 SOLUTION/ DROPS OPHTHALMIC at 12:04

## 2017-04-24 RX ADMIN — MOXIFLOXACIN HYDROCHLORIDE 1 DROP: 5 SOLUTION/ DROPS OPHTHALMIC at 09:04

## 2017-04-24 RX ADMIN — TROPICAMIDE 1 DROP: 10 SOLUTION/ DROPS OPHTHALMIC at 09:04

## 2017-04-24 RX ADMIN — MIDAZOLAM HYDROCHLORIDE 2 MG: 1 INJECTION, SOLUTION INTRAMUSCULAR; INTRAVENOUS at 11:04

## 2017-04-24 NOTE — OP NOTE
SURGEON:  Shania Sena M.D.    PREOPERATIVE DIAGNOSIS:    Nuclear Sclerotic Cataract Right Eye    POSTOPERATIVE DIAGNOSIS:    Nuclear Sclerotic Cataract Right Eye    PROCEDURES:    Phacoemulsification with  intraocular lens, Right eye (49019)    DATE OF SURGERY: 04/24/2017    IMPLANT: WF 20.0    ANESTHESIA:  MAC with topical Lidocaine    COMPLICATIONS:  None    ESTIMATED BLOOD LOSS: None    SPECIMENS: None    INDICATIONS:    The patient has a history of painless progressive visual loss and  difficulty with activities of daily living secondary to cataract formation.  After a thorough discussion of the risks, benefits, and alternatives to cataract surgery, including, but not limited to, the rare risks of infection, retinal detachment, hemorrhage, need for additional surgery, loss of vision, and even loss of the eye, the patient voices understanding and desires to proceed.    DESCRIPTION OF PROCEDURE:    The patients IOL calculations were reviewed, and the lens selection confirmed.   After verification and marking of the proper eye in the preop holding area, the patient was brought to the operating room in supine position where the eye was prepped and draped in standard sterile fashion with 5% Betadine and a lid speculum placed in the eye.   Topical 4% Lidocaine was used in addition to the preoperative anesthesia and the procedure was begun by the creation of a paracentesis incision through which viscoelastic was used to fill the anterior chamber.  Next, a keratome blade was used to create a triplanar temporal clear corneal incision and a cystotome and Utrata forceps used to fashion a continuous curvilinear capsulorrhexis.  Hydrodissection was carried out using the Goldman hydrodissection cannula and the nucleus was found to be mobile.  Phacoemulsification of the nucleus was carried out using a quick chop technique, and all remaining epinuclear and cortical material was removed.  The eye was then reformed with  Viscoelastic and the  intraocular lens was implanted into the capsular bag.  All remaining viscoelastics were removed from the eye and at the end of the case the pupil was round, the lens was well-centered within the capsular bag and all wounds were found to be water tight.  Drops of Vigamox and Pred Forte were instilled and a shield was placed over the eye. The patient will follow up with Dr. Sena in the morning.

## 2017-04-24 NOTE — IP AVS SNAPSHOT
[facility] Location ([facility])  [address] [place], [place] [address]  Phone: [phone]           Patient Discharge Instructions   Our goal is to set you up for success. This packet includes information on your condition, medications, and your home care.  It will help you care for yourself to prevent having to return to the hospital.     Please ask your nurse if you have any questions.      There are many details to remember when preparing to leave the hospital. Here is what you will need to do:    1. Take your medicine. If you are prescribed medications, review your Medication List on the following pages. You may have new medications to  at the pharmacy and others that you'll need to stop taking. Review the instructions for how and when to take your medications. Talk with your doctor or nurses if you are unsure of what to do.     2. Go to your follow-up appointments. Specific follow-up information is listed in the following pages. Your may be contacted by a nurse or clinical provider about future appointments. Be sure we have all of the phone numbers to reach you. Please contact your provider's office if you are unable to make an appointment.     3. Watch for warning signs. Your doctor or nurse will give you detailed warning signs to watch for and when to call for assistance. These instructions may also include educational information about your condition. If you experience any of warning signs to your health, call your doctor.           [facility]  Unless otherwise directed by your provider, please   contact [facility], our nurse care line   that is available for 24/7 assistance.     [phone] (toll-free)     Registered nurses in the [facility]   provide: appointment scheduling, clinical advisement, health education, and other advisory services.                  ** Verify the list of medication(s) below is accurate and up to date. Carry this with you in case of  emergency. If your medications have changed, please notify your healthcare provider.             Medication List      ASK your doctor about these medications        Additional Info                      DYMISTA 137-50 mcg/spray [place] nassal spray   Refills:  0   Dose:  1 spray   Generic drug:  azelastine-fluticasone    Instructions:  1 spray by Each Nare route 2 (two) times daily.     Begin Date    AM    Noon    PM    Bedtime       estradiol 2 MG tablet   Commonly known as:  ESTRACE   Refills:  0   Dose:  2 mg    Instructions:  Take 2 mg by mouth once daily.     Begin Date    AM    Noon    PM    Bedtime       fluticasone-vilanterol 200-25 mcg/dose Dsdv diskus inhaler   Commonly known as:  BREO   Refills:  0   Dose:  1 puff    Instructions:  Inhale 1 puff into the lungs once daily. Controller     Begin Date    AM    Noon    PM    Bedtime       hydrochlorothiazide 25 MG tablet   Commonly known as:  HYDRODIURIL   Quantity:  90 tablet   Refills:  0    Instructions:  TAKE 1 TABLET BY MOUTH EVERY DAY.     Begin Date    AM    Noon    PM    Bedtime       predniSONE 10 MG tablet   Commonly known as:  DELTASONE   Quantity:  30 tablet   Refills:  0   Dose:  10 mg    Instructions:  Take 1 tablet (10 mg total) by mouth once daily. Contact office to discuss further treatment plan     Begin Date    AM    Noon    PM    Bedtime                  Please bring to all follow up appointments:    1. A copy of your discharge instructions.  2. All medicines you are currently taking in their original bottles.  3. Identification and insurance card.    Please arrive 15 minutes ahead of scheduled appointment time.    Please call 24 hours in advance if you must reschedule your appointment and/or time.        Your Scheduled Appointments     Apr 25, 2017  9:15 AM CDT   Post OP with Shania Sena MD   [facility] ([facility])    [address]  [place] [address]   [phone]            Jul 26, 2017 11:30 AM CDT    Established Patient Visit with MD Barry Vergara - Rheumatology ([facility] )    [address]  [facility] [address] [phone]                  Discharge Instructions       Home Care Instructions for Eye Surgeries    1. ACTIVITY:   Limit your activity today. Increase activity gradually. You may return to work or school as directed by your physician.    2. DIET:   Drink plenty of fluids. Resume your normal diet unless instructed otherwise.  3. PAIN:   Expect a moderate amount of pain. If a prescription for pain is not sent home with you, you may take your commonly used pain reliever as directed. If this is not sufficient, call your physician. You may resume any other prescription medication unless otherwise directed by your physician.     4. DRESSING:   Keep your dressing dry and intact.  5. COMMENTS:   No driving, operating heavy machinery or singing legal documents for 24 hours.    No bending forward at the waist.   See attached schedule of eye drops.    Discuss any problem with your physician as soon as it arises. Do not Delay.      EMERGENCY- If you are unable to contact your physician, please go to the nearest Emergency Room.       Anesthesia: Monitored Anesthesia Care (MAC)    Anesthesia Safety  · Have an adult family member or friend drive you home after the procedure.  · For the first 24 hours after your surgery:  ¨ Do not drive or use heavy equipment.  ¨ Do not make important decisions or sign documents.  ¨ Avoid alcohol.  ¨ Have someone stay with you, if possible. They can watch for problems and help keep you safe.          Admission Information     Date & Time Provider Department [facility]    4/24/2017  8:51 AM Shania Sena MD [facility] [id number]      Care Providers     Provider Role Specialty Primary office phone    Shania Sena MD Attending Provider Ophthalmology [phone]    Shania Sena MD Surgeon  Ophthalmology [phone]     "  Your Vitals Were     BP Pulse Temp Resp Height Weight    159/69 (BP Location: Left arm, Patient Position: Sitting, BP Method: Automatic) 89 97.5 °F (36.4 °C) (Oral) 18 5' 2" (1.575 m) 74.8 kg (165 lb)    SpO2 BMI             97% 30.18 kg/m2         Recent Lab Values        1/14/2016                           3:01 PM           A1C 5.2                       Allergies as of 4/24/2017        Reactions    Sulfa (Sulfonamide Antibiotics)       Advance Directives     An advance directive is a document which, in the event you are no longer able to make decisions for yourself, tells your healthcare team what kind of treatment you do or do not want to receive, or who you would like to make those decisions for you.  If you do not currently have an advance directive, [facility] encourages you to create one.  For more information call:  [phone]), [phone] ([phone]),  or log on to [email or web address].        Smoking Cessation     If you would like to quit smoking:   You may be eligible for free services if you are a [place] resident and started smoking cigarettes before September 1, 1988.  Call the Smoking Cessation Trust ([facility]) toll free at (353) 060-4163 or (184) 659-1730.   Call 8-800-QUIT-NOW if you do not meet the above criteria.   Contact us via email: [email or web address]   View our website for more information: [email or web address]        Language Assistance Services     ATTENTION: Language assistance services are available, free of charge. Please call [phone].      ATENCIÓN: Si habla español, tiene a weinberg disposición servicios gratuitos de asistencia lingüística. Llame al [phone].     CHÚ Ý: N?u b?n nói Ti?ng Vi?t, có các d?ch v? h? tr? ngôn ng? mi?n phí dành cho b?n. G?i s? [phone].        Chronic Kindey Disease Education             [facility] Sign-Up     Activating your [facility] account is as easy as 1-2-3!     1) Visit [email or web address], select Sign Up " Now, enter this activation code and your date of birth, then select Next.  Activation code not generated  Current Patient Portal Status: Account disabled      2) Create a username and password to use when you visit [facility] in the future and select a security question in case you lose your password and select Next.    3) Enter your e-mail address and click Sign Up!    Additional Information  If you have questions, please e-mail [email or web address] or call [phone] to talk to our [facility] staff. Remember, [facility] is NOT to be used for urgent needs. For medical emergencies, dial 911.          [facility] complies with applicable Federal civil rights laws and does not discriminate on the basis of race, color, national origin, age, disability, or sex.

## 2017-04-24 NOTE — DISCHARGE SUMMARY
Outcome: Successful outpatient ophthalmic surgical procedure  Preprinted Instructions given to patient.  Regular diet.  Activity: No restrictions  Meds: see Med Rec  Condition: stable  Follow up: 1 day with Dr Sena  Disposition: Home  Diagnosis: s/p eye surgery

## 2017-04-24 NOTE — ANESTHESIA PREPROCEDURE EVALUATION
04/24/2017  Betzaida Neumann is a 77 y.o., female.    Anesthesia Evaluation    I have reviewed the Patient Summary Reports.    I have reviewed the Nursing Notes.   I have reviewed the Medications.     Review of Systems  Cardiovascular:   Exercise tolerance: good Hypertension    Renal/:   Chronic Renal Disease, CRI    Hepatic/GI:   GERD    Musculoskeletal:  Muscle Disorders: Fibromyalgia  Joint Disease:     Neurological:  Neurology Normal        Physical Exam  General:  Obesity                 Anesthesia Plan  Type of Anesthesia, risks & benefits discussed:  Anesthesia Type:  MAC  Patient's Preference:   Intra-op Monitoring Plan:   Intra-op Monitoring Plan Comments:   Post Op Pain Control Plan:   Post Op Pain Control Plan Comments:   Induction:   IV  Beta Blocker:         Informed Consent: Patient understands risks and agrees with Anesthesia plan.  Questions answered. Anesthesia consent signed with patient.  ASA Score: 3     Day of Surgery Review of History & Physical:    H&P update referred to the surgeon.         Ready For Surgery From Anesthesia Perspective.

## 2017-04-24 NOTE — ANESTHESIA POSTPROCEDURE EVALUATION
"Anesthesia Post Evaluation    Patient: Betzaida Neumann    Procedure(s) Performed: Procedure(s) (LRB):  PHACOEMULSIFICATION-ASPIRATION-CATARACT (Right)  INSERTION-INTRAOCULAR LENS (IOL) (Right)    Final Anesthesia Type: MAC  Patient location during evaluation: [facility]  Patient participation: Yes- Able to Participate  Level of consciousness: awake and alert  Post-procedure vital signs: reviewed and stable  Pain management: adequate  Airway patency: patent  PONV status at discharge: No PONV  Anesthetic complications: no      Cardiovascular status: blood pressure returned to baseline  Respiratory status: unassisted, spontaneous ventilation and room air  Hydration status: euvolemic  Follow-up not needed.        Visit Vitals    BP (!) 159/69 (BP Location: Left arm, Patient Position: Sitting, BP Method: Automatic)    Pulse 89    Temp 36.4 °C (97.5 °F) (Oral)    Resp 18    Ht 5' 2" (1.575 m)    Wt 74.8 kg (165 lb)    SpO2 97%    Breastfeeding No    BMI 30.18 kg/m2       Pain/Jeanine Score: Pain Assessment Performed: Yes (4/24/2017  9:00 AM)  Presence of Pain: denies (4/24/2017  9:54 AM)  Jeanine Score: 10 (4/24/2017  9:00 AM)      "

## 2017-04-25 ENCOUNTER — OFFICE VISIT (OUTPATIENT)
Dept: OPHTHALMOLOGY | Facility: CLINIC | Age: 77
End: 2017-04-25
Attending: OPHTHALMOLOGY
Payer: MEDICARE

## 2017-04-25 DIAGNOSIS — Z98.890 POST-OPERATIVE STATE: Primary | ICD-10-CM

## 2017-04-25 DIAGNOSIS — H25.11 NUCLEAR SCLEROSIS, RIGHT: ICD-10-CM

## 2017-04-25 PROCEDURE — 99024 POSTOP FOLLOW-UP VISIT: CPT | Mod: S$GLB,,, | Performed by: OPHTHALMOLOGY

## 2017-04-25 PROCEDURE — 99999 PR PBB SHADOW E&M-EST. PATIENT-LVL I: CPT | Mod: PBBFAC,,, | Performed by: OPHTHALMOLOGY

## 2017-04-25 NOTE — MR AVS SNAPSHOT
[facility]  [address]  [place] [address]  Phone: [phone]  Fax: [phone]                  Betzaida Neumann   2017 9:15 AM   Office Visit    Description:  Female : 1940   Provider:  Shania Sena MD   Department:  [facility]           Reason for Visit     Post-op Evaluation           Diagnoses this Visit        Comments    Post-operative state    -  Primary     Nuclear sclerosis, right                To Do List           Future Appointments        Provider Department Dept Phone    2017 11:30 AM Augustina Manzanares MD [facility] - Rheumatology [phone]      Goals (5 Years of Data)     None      [facility]     [facility] On Call Nurse Care Line -  Assistance  Unless otherwise directed by your provider, please contact [facility], our nurse care line that is available for  assistance.     Registered nurses in the [facility] Center provide: appointment scheduling, clinical advisement, health education, and other advisory services.  Call: [phone] (toll free)               Medications           Message regarding Medications     Verify the changes and/or additions to your medication regime listed below are the same as discussed with your clinician today.  If any of these changes or additions are incorrect, please notify your healthcare provider.             Verify that the below list of medications is an accurate representation of the medications you are currently taking.  If none reported, the list may be blank. If incorrect, please contact your healthcare provider. Carry this list with you in case of emergency.           Current Medications     azelastine-fluticasone (DYMISTA) 137-50 mcg/spray [place] nassal spray 1 spray by Each Nare route 2 (two) times daily.    estradiol (ESTRACE) 2 MG tablet Take 2 mg by mouth once daily.    fluticasone-vilanterol (BREO) 200-25 mcg/dose DsDv diskus inhaler Inhale 1 puff into the lungs  once daily. Controller    hydrochlorothiazide (HYDRODIURIL) 25 MG tablet TAKE 1 TABLET BY MOUTH EVERY DAY.    predniSONE (DELTASONE) 10 MG tablet Take 1 tablet (10 mg total) by mouth once daily. Contact office to discuss further treatment plan           Clinical Reference Information           Allergies as of 4/25/2017     Sulfa (Sulfonamide Antibiotics)      Immunizations Administered on Date of Encounter - 4/25/2017     None      [facility] Sign-Up     Activating your [facility] account is as easy as 1-2-3!     1) Visit [email or web address], select Sign Up Now, enter this activation code and your date of birth, then select Next.  Activation code not generated  Current Patient Portal Status: Account disabled      2) Create a username and password to use when you visit [facility] in the future and select a security question in case you lose your password and select Next.    3) Enter your e-mail address and click Sign Up!    Additional Information  If you have questions, please e-mail [email or web address] or call [phone] to talk to our [facility] staff. Remember, [facility] is NOT to be used for urgent needs. For medical emergencies, dial 911.         Language Assistance Services     ATTENTION: Language assistance services are available, free of charge. Please call [phone].      ATENCIÓN: Si habla caron, tiene a weinberg disposición servicios gratuitos de asistencia lingüística. Llame al [phone].     [facility] Ý: N?u b?n nói Ti?ng Vi?t, có các d?ch v? h? tr? ngôn ng? mi?n phí dành cho b?n. G?i s? [phone].         [facility] complies with applicable Federal civil rights laws and does not discriminate on the basis of race, color, national origin, age, disability, or sex.

## 2017-04-25 NOTE — PROGRESS NOTES
HPI     S/P Phaco w/ IOL OD  4/24/17  Dr. Sena    Pt states she is doing really well. No probs with OD>      Eye meds: P/M/B  TID        Last edited by Kamala Jeter on 4/25/2017  9:24 AM.         Assessment /Plan     For exam results, see Encounter Report.    Post-operative state    Nuclear sclerosis, right      Slit lamp exam:  L/L: nl  K: clear, wound sealed  AC: 1+ cell  Lens: IOL centered and stable    POD1 s/p Phaco/IOL  Appropriate precautions and post op medications reviewed.  Patient instructed to call or come in if symptoms of redness, decreased vision, or pain are experienced.

## 2017-06-09 ENCOUNTER — OFFICE VISIT (OUTPATIENT)
Dept: OPTOMETRY | Facility: CLINIC | Age: 77
End: 2017-06-09
Payer: MEDICARE

## 2017-06-09 DIAGNOSIS — Z98.41 S/P CATARACT EXTRACTION, RIGHT: Primary | ICD-10-CM

## 2017-06-09 PROCEDURE — 99999 PR PBB SHADOW E&M-EST. PATIENT-LVL II: CPT | Mod: PBBFAC,,, | Performed by: OPTOMETRIST

## 2017-06-09 PROCEDURE — 99024 POSTOP FOLLOW-UP VISIT: CPT | Mod: S$GLB,,, | Performed by: OPTOMETRIST

## 2017-06-09 NOTE — PROGRESS NOTES
HPI     Post-op Evaluation    Additional comments: 6 wk phaco OD           Comments   Last eye exam was 4/25/17 with Dr. Sena.  Patient happy with vision since cataract sx OD.  Patient denies diplopia, headaches, flashes/floaters, and pain.    04/24/2017 IMPLANT: WF 20.0 OD       Last edited by Candice Montejo on 6/9/2017 10:08 AM. (History)        ROS     Negative for: Constitutional, Gastrointestinal, Neurological, Skin,   Genitourinary, Musculoskeletal, HENT, Endocrine, Cardiovascular, Eyes,   Respiratory, Psychiatric, Allergic/Imm, Heme/Lymph    Last edited by Liudmila Huizar, OD on 6/9/2017 10:48 AM. (History)        Assessment /Plan     For exam results, see Encounter Report.    S/P cataract extraction, right  -     OCT- Retina            1.  Pt doing well.  Bifocal rx given.  Eye health normal.   RTC 1 year for routine exam.

## 2017-06-23 ENCOUNTER — OFFICE VISIT (OUTPATIENT)
Dept: OPHTHALMOLOGY | Facility: CLINIC | Age: 77
End: 2017-06-23
Payer: MEDICARE

## 2017-06-23 DIAGNOSIS — H26.493 POSTERIOR CAPSULAR OPACIFICATION, BILATERAL: Primary | ICD-10-CM

## 2017-06-23 PROCEDURE — 66821 AFTER CATARACT LASER SURGERY: CPT | Mod: 78,50,S$GLB, | Performed by: OPHTHALMOLOGY

## 2017-06-23 PROCEDURE — 92012 INTRM OPH EXAM EST PATIENT: CPT | Mod: 24,57,S$GLB, | Performed by: OPHTHALMOLOGY

## 2017-06-23 PROCEDURE — 99999 PR PBB SHADOW E&M-EST. PATIENT-LVL II: CPT | Mod: PBBFAC,,, | Performed by: OPHTHALMOLOGY

## 2017-06-23 NOTE — PROGRESS NOTES
HPI     DLS:  6/9/17    S/P Phaco w/ IOL OD  4/24/17  Dr. Sena    Pt here for YAG eval OS per Dr Huizar. Pt states she has difficulty with   glare OU.     No eyedrops    Last edited by Tari Marin MA on 6/23/2017  2:20 PM.   (History)            Assessment /Plan     For exam results, see Encounter Report.    Posterior capsular opacification, bilateral      Visually significant posterior capsular opacity present.  Discussed risks, benefits, and alternatives to laser surgery.  YAG laser capsulotomy Procedure Note:   Informed consent obtained and correct eye(s) verified with patient.  1 drop of topical Proparacaine and Iopidine instilled, and eye(s) dilated with 1% Tropicamide 2.5% Phenylephrine.  YAG laser applied to posterior capsule in cruciate pattern OU  Patient tolerated procedure well. No complications. Follow up in 1 month/PRN.

## 2017-06-26 RX ORDER — HYDROCHLOROTHIAZIDE 25 MG/1
TABLET ORAL
Qty: 90 TABLET | Refills: 0 | Status: SHIPPED | OUTPATIENT
Start: 2017-06-26 | End: 2017-09-24 | Stop reason: SDUPTHER

## 2017-06-26 RX ORDER — PREDNISONE 10 MG/1
TABLET ORAL
Qty: 30 TABLET | Refills: 0 | OUTPATIENT
Start: 2017-06-26

## 2017-06-26 RX ORDER — PREDNISONE 5 MG/1
5 TABLET ORAL DAILY
Qty: 30 TABLET | Refills: 0 | Status: SHIPPED | OUTPATIENT
Start: 2017-06-26 | End: 2017-07-26 | Stop reason: SDUPTHER

## 2017-07-26 ENCOUNTER — OFFICE VISIT (OUTPATIENT)
Dept: RHEUMATOLOGY | Facility: CLINIC | Age: 77
End: 2017-07-26
Payer: MEDICARE

## 2017-07-26 VITALS
TEMPERATURE: 98 F | HEIGHT: 62 IN | HEART RATE: 62 BPM | BODY MASS INDEX: 30.69 KG/M2 | DIASTOLIC BLOOD PRESSURE: 62 MMHG | SYSTOLIC BLOOD PRESSURE: 168 MMHG | WEIGHT: 166.81 LBS

## 2017-07-26 DIAGNOSIS — M05.79 RHEUMATOID ARTHRITIS INVOLVING MULTIPLE SITES WITH POSITIVE RHEUMATOID FACTOR: Primary | ICD-10-CM

## 2017-07-26 DIAGNOSIS — R70.0 ELEVATED SED RATE: ICD-10-CM

## 2017-07-26 DIAGNOSIS — M35.3 PMR (POLYMYALGIA RHEUMATICA): ICD-10-CM

## 2017-07-26 DIAGNOSIS — M79.7 FIBROMYALGIA: ICD-10-CM

## 2017-07-26 DIAGNOSIS — R79.82 ELEVATED C-REACTIVE PROTEIN (CRP): ICD-10-CM

## 2017-07-26 DIAGNOSIS — R53.83 FATIGUE, UNSPECIFIED TYPE: ICD-10-CM

## 2017-07-26 PROCEDURE — 99214 OFFICE O/P EST MOD 30 MIN: CPT | Mod: S$GLB,,, | Performed by: INTERNAL MEDICINE

## 2017-07-26 PROCEDURE — 99999 PR PBB SHADOW E&M-EST. PATIENT-LVL III: CPT | Mod: PBBFAC,,, | Performed by: INTERNAL MEDICINE

## 2017-07-26 PROCEDURE — 1159F MED LIST DOCD IN RCRD: CPT | Mod: S$GLB,,, | Performed by: INTERNAL MEDICINE

## 2017-07-26 PROCEDURE — 1125F AMNT PAIN NOTED PAIN PRSNT: CPT | Mod: S$GLB,,, | Performed by: INTERNAL MEDICINE

## 2017-07-26 PROCEDURE — 99499 UNLISTED E&M SERVICE: CPT | Mod: S$GLB,,, | Performed by: INTERNAL MEDICINE

## 2017-07-26 RX ORDER — PREDNISONE 2.5 MG/1
TABLET ORAL
Qty: 60 TABLET | Refills: 1 | Status: SHIPPED | OUTPATIENT
Start: 2017-07-26 | End: 2017-09-29 | Stop reason: SDUPTHER

## 2017-07-26 ASSESSMENT — ROUTINE ASSESSMENT OF PATIENT INDEX DATA (RAPID3)
PSYCHOLOGICAL DISTRESS SCORE: 4.4
PATIENT GLOBAL ASSESSMENT SCORE: 5
TOTAL RAPID3 SCORE: 3.33
AM STIFFNESS SCORE: 0, NO
PAIN SCORE: 5
MDHAQ FUNCTION SCORE: 0
FATIGUE SCORE: 7.5

## 2017-07-26 NOTE — PROGRESS NOTES
Subjective:       Patient ID: Betzaida Neumann is a 77 y.o. female.    Chief Complaint: Rheumatoid Arthritis      HPI:  Betzaida Neumann is a 77 y.o. female with history of joint pain all over since 2008. She has seen several rheumatologists. First was Dr. Leram who thought she had bursitis.  This she saw Dr. Riddle who diagnosed rheumatoid arthritis and gave her Humira x 3 years which did not help. Humira caused her to feel like a zombie. She then went [facility] list Dr. Woo and Dr. Reeves (last rheumatologist).   He diagnosed a combination of rheumatoid arthritis, fibromyalgia and PMR.  She recalls taking methotrexate but it didn't help her. Her last rheumatologist was at [facility] and was giving her mainly pain pills.  She has been on prednisone since May 2016 due to ALLERGIES as prescribed by her ALLERGY doctor.   She also took tramadol and percocet but did not help.        Today she reports aching all over. Pain is in the muscles of arms and legs.  Hold Arava due to stomach upset.  Reports sweating at night since 2008.  She denies joint pain.  Pain improves with prednisone.  Has been on it for 1 year.  Currently on 5 mg prednisone.  No difference between 10 mg and 5 mg.  Highest dose she was on 30 mg prednisone one week.  Pain improves with higher dose and takes it all away.     In past pain medication fentanyl did not help     Nothing worsens pain.        Review of Systems   Constitutional: Positive for diaphoresis, fatigue and unexpected weight change (gaining).   HENT: Negative.    Eyes: Negative.    Respiratory: Negative.    Cardiovascular: Negative.    Gastrointestinal: Negative.    Endocrine: Negative.    Genitourinary: Negative.    Musculoskeletal: Positive for arthralgias and myalgias.   Skin: Negative.    Allergic/Immunologic: Negative.    Neurological: Negative.    Hematological: Negative.    Psychiatric/Behavioral: Negative.          Objective:   BP (!) 168/62 (BP Location: Right arm, Patient Position:  "Sitting, BP Method: Automatic)   Pulse 62   Temp 98.2 °F (36.8 °C) (Oral)   Ht 5' 2" (1.575 m)   Wt 75.7 kg (166 lb 12.8 oz)   BMI 30.51 kg/m²      Physical Exam   Constitutional: She is oriented to person, place, and time and well-developed, well-nourished, and in no distress.   HENT:   Head: Normocephalic and atraumatic.   Eyes: Conjunctivae and EOM are normal.   Neck: Neck supple.   Cardiovascular: Normal rate, regular rhythm and normal heart sounds.    Pulmonary/Chest: Effort normal and breath sounds normal.   Abdominal: Soft. Bowel sounds are normal.   Neurological: She is alert and oriented to person, place, and time. Gait normal.   Skin: Skin is warm and dry.     Psychiatric: Mood and affect normal.   Musculoskeletal:   28 joint count: 0 swollen and 0 tender  0/18 FM nontender            LABS    Component      Latest Ref Rng & Units 3/24/2017 3/24/2017 3/24/2017          11:45 AM 11:45 AM 11:33 AM   WBC      3.90 - 12.70 K/uL  10.58    RBC      4.00 - 5.40 M/uL  4.17    Hemoglobin      12.0 - 16.0 g/dL  12.5    Hematocrit      37.0 - 48.5 %  39.4    MCV      82 - 98 fL  95    MCH      27.0 - 31.0 pg  30.0    MCHC      32.0 - 36.0 %  31.7 (L)    RDW      11.5 - 14.5 %  14.2    Platelets      150 - 350 K/uL  260    MPV      9.2 - 12.9 fL  9.5    Gran #      1.8 - 7.7 K/uL  6.3    Lymph #      1.0 - 4.8 K/uL  3.2    Mono #      0.3 - 1.0 K/uL  0.5    Eos #      0.0 - 0.5 K/uL  0.5    Baso #      0.00 - 0.20 K/uL  0.05    Gran%      38.0 - 73.0 %  59.8    Lymph%      18.0 - 48.0 %  30.6    Mono%      4.0 - 15.0 %  4.3    Eosinophil%      0.0 - 8.0 %  4.5    Basophil%      0.0 - 1.9 %  0.5    Differential Method        Automated    Sodium      136 - 145 mmol/L 141 141    Potassium      3.5 - 5.1 mmol/L 3.8 3.8    Chloride      95 - 110 mmol/L 102 102    CO2      23 - 29 mmol/L 29 29    Glucose      70 - 110 mg/dL 112 (H) 112 (H)    BUN, Bld      8 - 23 mg/dL 13 13    Creatinine      0.5 - 1.4 mg/dL 1.3 1.3  "   Calcium      8.7 - 10.5 mg/dL 9.7 9.7    Total Protein      6.0 - 8.4 g/dL 7.5 7.5    Albumin      3.5 - 5.2 g/dL 3.6 3.6    Total Bilirubin      0.1 - 1.0 mg/dL 0.4 0.4    Alkaline Phosphatase      55 - 135 U/L 66 66    AST      10 - 40 U/L 16 16    ALT      10 - 44 U/L 13 13    Anion Gap      8 - 16 mmol/L 10 10    eGFR if African American      >60 mL/min/1.73 m:2 46.0 (A) 46.0 (A)    eGFR if non African American      >60 mL/min/1.73 m:2 39.9 (A) 39.9 (A)    Specimen UA         Urine, Unspecified   Color, UA      Yellow, Straw, Lilia   Yellow   Appearance, UA      Clear   Clear   pH, UA      5.0 - 8.0   6.0   Specific Gravity, UA      1.005 - 1.030   1.020   Protein, UA      Negative   Negative   Glucose, UA      Negative   Negative   Ketones, UA      Negative   Negative   Bilirubin (UA)      Negative   Negative   Occult Blood UA      Negative   Negative   Nitrite, UA      Negative   Negative   Urobilinogen, UA      <2.0 EU/dL   Negative   Leukocytes, UA      Negative   Negative   Protein, Urine Random      0 - 15 mg/dL   10   Creatinine, Random Ur      15.0 - 325.0 mg/dL   181.0   Prot/Creat Ratio, Ur      0.00 - 0.20   0.06   Cytoplasmic Neutrophilic Ab      <1:20 Titer  <1:20    Perinuclear (P-ANCA)      <1:20 Titer  <1:20    CRP      0.0 - 8.2 mg/L 23.6 (H) 23.6 (H)    Sed Rate      0 - 20 mm/Hr  34 (H)    Complement (C-4)      11 - 44 mg/dL  57 (H)    Complement (C-3)      50 - 180 mg/dL  165    Aldolase      1.2 - 7.6 U/L  3.5    CPK      20 - 180 U/L  51      Component      Latest Ref Rng & Units 3/1/2017             WBC      3.90 - 12.70 K/uL 9.41   RBC      4.00 - 5.40 M/uL 4.17   Hemoglobin      12.0 - 16.0 g/dL 12.3   Hematocrit      37.0 - 48.5 % 39.0   MCV      82 - 98 fL 94   MCH      27.0 - 31.0 pg 29.5   MCHC      32.0 - 36.0 % 31.5 (L)   RDW      11.5 - 14.5 % 13.5   Platelets      150 - 350 K/uL 279   MPV      9.2 - 12.9 fL 9.4   Gran #      1.8 - 7.7 K/uL 4.1   Lymph #      1.0 - 4.8 K/uL 4.3    Mono #      0.3 - 1.0 K/uL 0.5   Eos #      0.0 - 0.5 K/uL 0.4   Baso #      0.00 - 0.20 K/uL 0.03   Gran%      38.0 - 73.0 % 43.3   Lymph%      18.0 - 48.0 % 46.0   Mono%      4.0 - 15.0 % 5.7   Eosinophil%      0.0 - 8.0 % 4.4   Basophil%      0.0 - 1.9 % 0.3   Differential Method       Automated   Sodium      136 - 145 mmol/L 140   Potassium      3.5 - 5.1 mmol/L 4.0   Chloride      95 - 110 mmol/L 102   CO2      23 - 29 mmol/L 29   Glucose      70 - 110 mg/dL 93   BUN, Bld      8 - 23 mg/dL 12   Creatinine      0.5 - 1.4 mg/dL 1.2   Calcium      8.7 - 10.5 mg/dL 9.3   Total Protein      6.0 - 8.4 g/dL 7.4   Albumin      3.5 - 5.2 g/dL 3.4 (L)   Total Bilirubin      0.1 - 1.0 mg/dL 0.5   Alkaline Phosphatase      55 - 135 U/L 64   AST      10 - 40 U/L 15   ALT      10 - 44 U/L 13   Anion Gap      8 - 16 mmol/L 9   eGFR if African American      >60 mL/min/1.73 m:2 51.1 (A)   eGFR if non African American      >60 mL/min/1.73 m:2 44.3 (A)   CRP      0.0 - 8.2 mg/L 23.3 (H)   Sed Rate      0 - 20 mm/Hr 44 (H)     Assessment:       1. History of rheumatoid arthritis. Treated in the past with Humira and methotrexate without improvement.   Positive rheumatoid factor in our lab and an elevated sedimentation rate. Patient still without swelling or joint tenderness on exam today.  2. History of polymyalgia rheumatica. Patient describes mainly all over body ache and notes that prednisone has helped a great deal.  The response to prednisone is consistent with polymyalgia rheumatica.  3. Fibromyalgia.  4. Fatigue  5. Obesity. Patient with 30 pound weight gain since starting prednisone.  6. Recurrent URI now with congestion.  Recurrent sinus infections.  Now under control since 2016  7. Night sweats  8. HTN.  Elevated in office but patient notes normal BP at home    Plan:       1. Check labs  2. Hold Arava due to stomach upset  3. Decrease prednisone to 2.5 mg daily  4. RTO  3 months.

## 2017-07-27 ENCOUNTER — TELEPHONE (OUTPATIENT)
Dept: RHEUMATOLOGY | Facility: CLINIC | Age: 77
End: 2017-07-27

## 2017-07-27 NOTE — TELEPHONE ENCOUNTER
Patient informed of labs. Patient informed of increase CRP or CPK.  Continue with plan for prednisone at visit.  Will send for second opinion in rheumatology.

## 2017-07-28 ENCOUNTER — OFFICE VISIT (OUTPATIENT)
Dept: NEPHROLOGY | Facility: CLINIC | Age: 77
End: 2017-07-28
Payer: MEDICARE

## 2017-07-28 VITALS
DIASTOLIC BLOOD PRESSURE: 70 MMHG | HEIGHT: 62 IN | WEIGHT: 168.63 LBS | SYSTOLIC BLOOD PRESSURE: 140 MMHG | BODY MASS INDEX: 31.03 KG/M2 | OXYGEN SATURATION: 96 % | HEART RATE: 97 BPM

## 2017-07-28 DIAGNOSIS — N18.30 CHRONIC KIDNEY DISEASE, STAGE III (MODERATE): Primary | ICD-10-CM

## 2017-07-28 DIAGNOSIS — I12.9 BENIGN HYPERTENSIVE RENAL DISEASE WITHOUT RENAL FAILURE: ICD-10-CM

## 2017-07-28 PROCEDURE — 1159F MED LIST DOCD IN RCRD: CPT | Mod: S$GLB,,, | Performed by: INTERNAL MEDICINE

## 2017-07-28 PROCEDURE — 1126F AMNT PAIN NOTED NONE PRSNT: CPT | Mod: S$GLB,,, | Performed by: INTERNAL MEDICINE

## 2017-07-28 PROCEDURE — 99999 PR PBB SHADOW E&M-EST. PATIENT-LVL III: CPT | Mod: PBBFAC,,, | Performed by: INTERNAL MEDICINE

## 2017-07-28 PROCEDURE — 99499 UNLISTED E&M SERVICE: CPT | Mod: S$GLB,,, | Performed by: INTERNAL MEDICINE

## 2017-07-28 PROCEDURE — 99213 OFFICE O/P EST LOW 20 MIN: CPT | Mod: S$GLB,,, | Performed by: INTERNAL MEDICINE

## 2017-07-30 NOTE — PROGRESS NOTES
Subjective:       Patient ID: Betzaida Neumann is a 77 y.o. Patient Refused female who presents for follow up of Chronic Kidney Disease    HPI     Ms. Neumann is a 77 year old woman with past medical history of hypertension, RA/PMR presenting for follow up of chronic kidney disease.  Patient reports more adequate daily fluid intake, denies any NSAID use (though previously used occasionally prior to appt in October 2015).  She reports chronic joint/muscle aches, otherwise denies any fever, chest pain, shortness of breath, abdominal pain, diarrhea, dysuria/hematuria.  She reports blood pressure well-controlled.     Review of Systems   Constitutional: Negative for appetite change, fatigue and fever.   Respiratory: Negative for chest tightness and shortness of breath.    Cardiovascular: Negative for chest pain and leg swelling.   Gastrointestinal: Negative for abdominal pain, constipation, diarrhea, nausea and vomiting.   Genitourinary: Negative for difficulty urinating, dysuria, flank pain, frequency, hematuria and urgency.   Musculoskeletal: Positive for arthralgias. Negative for joint swelling and myalgias.   Skin: Negative for rash and wound.   Neurological: Negative for dizziness, weakness and light-headedness.   All other systems reviewed and are negative.      Objective:      Physical Exam   Constitutional: She appears well-developed and well-nourished.   Cardiovascular: Normal rate, regular rhythm and normal heart sounds.  Exam reveals no gallop and no friction rub.    No murmur heard.  Pulmonary/Chest: Effort normal and breath sounds normal. No respiratory distress. She has no wheezes. She has no rales.   Abdominal: Soft. Bowel sounds are normal. There is no tenderness.   Musculoskeletal: She exhibits no edema.   Neurological: She is alert.   Skin: Skin is warm and dry. No rash noted. No erythema.   Psychiatric: She has a normal mood and affect.       Assessment:       1. Chronic kidney disease, stage III  (moderate)    2. Benign hypertensive renal disease without renal failure        Plan:     Ms. Neumann is a 77 year old woman with past medical history of hypertension presenting for follow up of chronic kidney disease stage III, likely age-related renal nephron loss along with hypertensive nephrosclerosis v. secondary to prior NSAID use.  Creatinine relatively stable since last visit, will continue to trend, encouraged fluid intake; stressed importance of blood pressure control to prevent any further progression of kidney disease, patient voiced understanding.    - Anemia: Hgb at goal, no indication for erythropoetin therapy  - Bone/mineral metabolism: will check PTH/VitD    Return to clinic in 6 months with renal/heme panel, iron/TIBC/ferritin, urinalysis/culture, urine protein/creatinine ratio, PTH/VitD prior to next visit

## 2017-09-25 RX ORDER — HYDROCHLOROTHIAZIDE 25 MG/1
TABLET ORAL
Qty: 90 TABLET | Refills: 0 | Status: SHIPPED | OUTPATIENT
Start: 2017-09-25 | End: 2017-12-26 | Stop reason: SDUPTHER

## 2017-09-29 RX ORDER — PREDNISONE 2.5 MG/1
TABLET ORAL
Qty: 60 TABLET | Refills: 0 | Status: SHIPPED | OUTPATIENT
Start: 2017-09-29 | End: 2017-10-13 | Stop reason: SDUPTHER

## 2017-10-10 ENCOUNTER — LAB VISIT (OUTPATIENT)
Dept: LAB | Facility: HOSPITAL | Age: 77
End: 2017-10-10
Payer: MEDICARE

## 2017-10-10 ENCOUNTER — OFFICE VISIT (OUTPATIENT)
Dept: RHEUMATOLOGY | Facility: CLINIC | Age: 77
End: 2017-10-10
Payer: MEDICARE

## 2017-10-10 VITALS
HEIGHT: 62 IN | WEIGHT: 169.88 LBS | DIASTOLIC BLOOD PRESSURE: 94 MMHG | BODY MASS INDEX: 31.26 KG/M2 | HEART RATE: 90 BPM | SYSTOLIC BLOOD PRESSURE: 186 MMHG | RESPIRATION RATE: 18 BRPM

## 2017-10-10 DIAGNOSIS — M79.7 FIBROMYALGIA: ICD-10-CM

## 2017-10-10 DIAGNOSIS — Z87.39 H/O RHEUMATOID ARTHRITIS: ICD-10-CM

## 2017-10-10 DIAGNOSIS — R52 TOTAL BODY PAIN: Primary | ICD-10-CM

## 2017-10-10 DIAGNOSIS — M35.3 PMR (POLYMYALGIA RHEUMATICA): ICD-10-CM

## 2017-10-10 DIAGNOSIS — R61 CHRONIC NIGHT SWEATS: ICD-10-CM

## 2017-10-10 PROCEDURE — 99999 PR PBB SHADOW E&M-EST. PATIENT-LVL III: CPT | Mod: PBBFAC,GC,, | Performed by: INTERNAL MEDICINE

## 2017-10-10 PROCEDURE — 36415 COLL VENOUS BLD VENIPUNCTURE: CPT

## 2017-10-10 PROCEDURE — 86480 TB TEST CELL IMMUN MEASURE: CPT

## 2017-10-10 PROCEDURE — 99214 OFFICE O/P EST MOD 30 MIN: CPT | Mod: GC,S$GLB,, | Performed by: INTERNAL MEDICINE

## 2017-10-10 NOTE — PROGRESS NOTES
Subjective:       Patient ID: Betzaida Neumann is a 77 y.o. female.    Chief Complaint: pain all over my body  HPI Pt is a 77 year old female with PMH of seropositive RA ( +CCP and +RF )  HTN, CKD, fibromylagia and PMR who presented today for a second opinion from Dr. Moore who has been following the patient for complete body pain, PMR and h/o Ra.  Pt stated that she started to have the body pain from head to toe even making her hair hurt at times in 2008 when she moved back to [place] from [place] (pt had moved away from [place] during [place]).  Pt saw numerous rheumatologists in the past. At that time she saw Dr. Lerma who dx her with bursitis and then was seen Dr. Riddle who diagnosed her with rheumatoid arthritis and she was treated with humira and methotrexate for about 3 years but didn't notice any improvement in her pain.  She stated the Humira made her feel like a zombie.  Pt then was seen by Dr. Villafana and Dr. Reeves and was diagnosed with rheumatoid arthritis, fibromylagia and PMR.    Pt was getting pain medications mostly from her prior rheumatologist which included percocet and hydrocodone which didn't help .  Pt had also previously tried muscle relaxers and was on cymbalta at one time but neither of them helped either.  In addition to ongoing pain all over her body, the patient admits to night sweats drenching her sheets, causing her to wake up at night 4-5 times also since 2008.   She denied weight loss.  Stated she had a hysterectomy in 1980 for fibroids and has been on estradiol since by her gynecologist as she was told it was likely hormonal.  The patient has never seen an endocrinologist prior.She admits to getting poor sleep only about 4 hours per night.  She has tried walking on treadmill and stretching the past without relief in her pain. Pt does admit since she recently started working at [employer] that her pain has improved.     In May 2016 pt was placed on prednisone by her  allergist Dr. Neumann.  The highest dose she was on was 30mg prednisone and is currently taking prednisone 5mg.  Pt stated when she started taking the prednisone her body pains improved.  She was tapered down from prednisone 10mg to prednisone 5mg without difficulty however did not tolerate tapering down from 5mg to 2.5mg prior.  She stated when she does not take her prednisone her body pains and muscle pains are a 10/10, she rates her pain today as a 5/10. Pt denied joint pain, no joint swelling, no illiciting factors for the pain, no morning stiffness, no chest pain , no n/v/d, no tomas/sob, no mouth sores, no n/v/d, no eye pain, no eye redness, no dysphagia, no parasthesias, no weakness, no dysuria, no hematuria, no weakness, no raynauds, no rash, no adbominal pain, no recent illness or hospitalizations, no nail pitting, no dactylitis, no weight loss, no vision changes, no digital or vaginal ulcers, no dry eyes or dry mouth, no dizziness, no new medications, no cough.    Of note pt was previously on Arava as well however that is being held due to GI upset.    Pt's history is pertinent for h/o miscarriage (6 months into the pregnancy).  No family history of lupus, rheumatoid arthritis, psoriasis.    Family History   Problem Relation Age of Onset    Other Mother     Other Father      accident    HIV Son     Inflammatory bowel disease Neg Hx     Kidney disease Neg Hx     Lupus Neg Hx     Psoriasis Neg Hx     Rheum arthritis Neg Hx     Blindness Neg Hx     Glaucoma Neg Hx     Macular degeneration Neg Hx     Retinal detachment Neg Hx      Social History     Social History    Marital status: Single     Spouse name: N/A    Number of children: N/A    Years of education: N/A     Social History Main Topics    Smoking status: Former Smoker     Packs/day: 1.00     Start date: 8/22/1970     Quit date: 8/22/1999    Smokeless tobacco: Never Used      Comment: Retired ; ; 4 children  "healthy    Alcohol use 0.6 oz/week     1 Glasses of wine per week      Comment: social    Drug use: No    Sexual activity: Yes     Partners: Male     Other Topics Concern    None     Social History Narrative    None     Past Surgical History:   Procedure Laterality Date    APPENDECTOMY      CATARACT EXTRACTION W/  INTRAOCULAR LENS IMPLANT Left     Dr. Cedeño     CATARACT EXTRACTION W/  INTRAOCULAR LENS IMPLANT Right 2017    Dr. Sena     SECTION      x2    CHOLECYSTECTOMY      COSMETIC SURGERY      Rhinoplasty    EYE SURGERY      left eye Lens Placed    HYSTERECTOMY      RHINOPLASTY TIP      TONSILLECTOMY      TUBAL LIGATION           Review of Systems   Constitutional: Positive for fatigue. Negative for activity change, appetite change and fever.   HENT: Positive for congestion. Negative for mouth sores and trouble swallowing.    Eyes: Negative for pain and redness.   Respiratory: Negative for chest tightness and shortness of breath.    Cardiovascular: Negative for chest pain.   Gastrointestinal: Negative for abdominal pain, constipation, diarrhea, nausea and vomiting.   Genitourinary: Negative for dysuria, genital sores and hematuria.   Musculoskeletal: Positive for myalgias. Negative for arthralgias, back pain and joint swelling.   Skin: Negative for color change, pallor, rash and wound.   Neurological: Positive for headaches. Negative for dizziness, weakness and numbness.   Psychiatric/Behavioral: The patient is not nervous/anxious.          Objective:   BP (!) 186/94 Comment: pt didnt take b/p medication  Pulse 90   Resp 18   Ht 5' 2" (1.575 m)   Wt 77.1 kg (169 lb 14.4 oz)   BMI 31.08 kg/m²      Physical Exam   Vitals reviewed.  Constitutional: She is oriented to person, place, and time and well-developed, well-nourished, and in no distress. No distress.   HENT:   Head: Normocephalic and atraumatic.   Right Ear: External ear normal.   Left Ear: External ear normal. "   Mouth/Throat: Oropharynx is clear and moist. No oropharyngeal exudate.   No oral ulcerations, no hair thinning or patches of hair loss   Eyes: Conjunctivae and EOM are normal. Pupils are equal, round, and reactive to light. Right eye exhibits no discharge. Left eye exhibits no discharge. No scleral icterus.   Neck: Normal range of motion. Neck supple.   Cardiovascular: Normal rate, regular rhythm and normal heart sounds.  Exam reveals no gallop and no friction rub.    No murmur heard.  Pulmonary/Chest: Effort normal and breath sounds normal. No respiratory distress. She has no wheezes. She has no rales.   Abdominal: Soft. Bowel sounds are normal. She exhibits no distension. There is no tenderness. There is no rebound.   Neurological: She is alert and oriented to person, place, and time.   5/5 muscle strength b/l upper and lower extremities.   Skin: Skin is warm and dry. No rash noted. She is not diaphoretic. No erythema. No pallor.     Psychiatric: Mood and affect normal.   Musculoskeletal: Normal range of motion. She exhibits no edema or deformity.   Cspine: no tenderness  Tspine: no tenderness  Lspine:  no tenderness.  Shoulders: FROM; no synovitis;  Elbows: FROM; no synovitis; no tophi or nodules  Wrists: FROM; no synovitis;   MCPs: FROM; no synovitis; no metacarpalgia;  ok;  PIPs:FROM; no synovitis;   DIPs: FROM; no synovitis;   HIPS: FROM  Knees: FROM; no synovitis; no instability;  Ankles: FROM: no synovitis   Toes: ok; no metatarsalgia      Pt does not have tenderness over biceps/triceps/ quads to palpation.    6/18 tenderpoints present                                        Widespread pain index  Note the areas which the patient has had pain over the last week:                   Shoulder-girdle, left 0               Shoulder-girdle, right 0                         Upper arm left  1                       Upper arm right 1                         Lower arm left 1                       Lower arm right 1     Hip (buttock, trochanter) left 1  Hip (buttock, trochanter) right 1                           Upper leg, left 1                         Upper leg, right 1                           Lower leg, left 1                         Lower leg, right 1                                     Jaw, left 1                                   Jaw, right 1                                        Chest 0                                  Abdomen 0                               Upper back 0                              Lower back 1                                        Neck 1  Score will be from 0-19: 14                                         Symptom severity score  Fatigue 3  Waking Unrefreshed 2  Cognitive Symptoms 3   0 = no problem, 1=slight or mild problem 2= moderate; considerable problems often present and/or at a moderate level, 3 = severe, pervasive, continuous, life disturbing problem  For each of the 3 symptoms, indicate the level of severity over the past week using the Scale.  The symptom severity score is the sum of the severity of the 3 symptoms (fatigue, waking unrefreshed, and cognitive symptoms) plus the number of the following symptoms occurring during the previous 6 months:   Headaches 2  Pain or cramps in the lower abdomen 0  Depression 2  The final score is between 0 and 18:  Total score: 12                                      Criteria  Patient has fibromyalgia if the following 3 conditions are met:  1.  Widespread pain index greater than or equal to 7 and symptom severity score greater than or equal to 5 or widespread pain index between 3- 6, and symptom severity score greater than or equal to 9.    2.  Symptoms have been present in a similar level for at least 3 months  3.  The patient does not have a disorder that would otherwise sufficiently explain the pain.    Results for MOISES WORTHY (MRN [id number]) as of 10/10/2017 15:11   Ref. Range 8/22/2016 10:50 12/20/2016 12:08 3/24/2017 11:45 10/10/2017 11:40    Anti-SSA Antibody Latest Ref Range: 0.00 - 19.99 EU  0.88     Anti-SSA Interpretation Latest Ref Range: Negative   Negative     Anti-SSB Antibody Latest Ref Range: 0.00 - 19.99 EU  0.64     Anti-SSB Interpretation Latest Ref Range: Negative   Negative     Cytoplasmic Neutrophilic Ab Latest Ref Range: <1:20 Titer   <1:20    Perinuclear (P-ANCA) Latest Ref Range: <1:20 Titer   <1:20    Complement (C-3) Latest Ref Range: 50 - 180 mg/dL   165    Complement (C-4) Latest Ref Range: 11 - 44 mg/dL   57 (H)    Protein, Serum Latest Ref Range: 6.0 - 8.4 g/dL    7.1   CCP Antibodies Latest Ref Range: <5.0 U/mL  124.4 (H)     Rheumatoid Factor Latest Ref Range: 0.0 - 15.0 IU/mL 82.0 (H)          Results for MOISES WORTHY (MRN [id number]) as of 10/10/2017 15:11   Ref. Range 7/26/2017 12:32   WBC Latest Ref Range: 3.90 - 12.70 K/uL 8.54   RBC Latest Ref Range: 4.00 - 5.40 M/uL 4.25   Hemoglobin Latest Ref Range: 12.0 - 16.0 g/dL 12.5   Hematocrit Latest Ref Range: 37.0 - 48.5 % 39.1   MCV Latest Ref Range: 82 - 98 fL 92   MCH Latest Ref Range: 27.0 - 31.0 pg 29.4   MCHC Latest Ref Range: 32.0 - 36.0 g/dL 32.0   RDW Latest Ref Range: 11.5 - 14.5 % 14.1   Platelets Latest Ref Range: 150 - 350 K/uL 233   MPV Latest Ref Range: 9.2 - 12.9 fL 9.8   Gran% Latest Ref Range: 38.0 - 73.0 % 64.0   Gran # Latest Ref Range: 1.8 - 7.7 K/uL 5.5   Lymph% Latest Ref Range: 18.0 - 48.0 % 28.7   Lymph # Latest Ref Range: 1.0 - 4.8 K/uL 2.5   Mono% Latest Ref Range: 4.0 - 15.0 % 3.9 (L)   Mono # Latest Ref Range: 0.3 - 1.0 K/uL 0.3   Eosinophil% Latest Ref Range: 0.0 - 8.0 % 3.0   Eos # Latest Ref Range: 0.0 - 0.5 K/uL 0.3   Basophil% Latest Ref Range: 0.0 - 1.9 % 0.2   Baso # Latest Ref Range: 0.00 - 0.20 K/uL 0.02   Sed Rate Latest Ref Range: 0 - 20 mm/Hr 38 (H)   Sodium Latest Ref Range: 136 - 145 mmol/L 139   Potassium Latest Ref Range: 3.5 - 5.1 mmol/L 3.8   Chloride Latest Ref Range: 95 - 110 mmol/L 101   CO2 Latest Ref Range: 23 - 29  mmol/L 27   Anion Gap Latest Ref Range: 8 - 16 mmol/L 11   BUN, Bld Latest Ref Range: 8 - 23 mg/dL 14   Creatinine Latest Ref Range: 0.5 - 1.4 mg/dL 1.4   eGFR if non African American Latest Ref Range: >60 mL/min/1.73 m^2 36.5 (A)   eGFR if African American Latest Ref Range: >60 mL/min/1.73 m^2 42.1 (A)   Glucose Latest Ref Range: 70 - 110 mg/dL 110   Calcium Latest Ref Range: 8.7 - 10.5 mg/dL 9.4   Alkaline Phosphatase Latest Ref Range: 55 - 135 U/L 65   Total Protein Latest Ref Range: 6.0 - 8.4 g/dL 7.5   Albumin Latest Ref Range: 3.5 - 5.2 g/dL 3.8   Total Bilirubin Latest Ref Range: 0.1 - 1.0 mg/dL 0.6   AST Latest Ref Range: 10 - 40 U/L 26   ALT Latest Ref Range: 10 - 44 U/L 12   CRP Latest Ref Range: 0.0 - 8.2 mg/L 46.6 (H)   CPK Latest Ref Range: 20 - 180 U/L 362 (H)   Differential Method Unknown Automated         Assessment:       1. Total body pain    2. Chronic night sweats    3. PMR (polymyalgia rheumatica)    4. Fibromyalgia    5. H/O rheumatoid arthritis        Pt is a complex 77 year old female with PMH of seropositive RA ( +CCP and +RF )  HTN, CKD, fibromylagia and PMR who presented today for a second opinion from Dr. Moore who has been following the patient for complete body pain, PMR and h/o Ra, pt currently taking prednisone 5mg however still with diffuse body aches and pain, however no joint swelling or joint pain and no evidence of arthritis on exam, 0 TJC and 0 SJC on exam, concerning for underlying component of fibromyalgia pain in setting of elevated WSPI and symptom severity score with component of pain from underlying PMR in setting of response to steroids, even in setting of +CCP and +RF and elevated inflammatory markers, pt doesn't appear to have clinically significant RA findings on exam.    Plan:     -would recommend slower taper of prednisone 5mg to 4mg prednisone then 3mg and so on as tolerated in attempt to get patient off of steroids, however patient has not had good response to other  treatments and is hesitant to try other treatment modalities at this time as prior treatments have been ineffective.  -would recommend monitoring xrays for subclinical rheumatoid arthritis disease activity in setting of no active arthritis noted on exam.  - recommend aerobic daily exercise as tolerated.  -will check Vitamin D, SPEP, quantiferon gold, TSH and free T4.  -could consider trying lyrica in the future if patient would be amenable.  -pt informed she needs to be taking her blood pressure medication daily, had not taken her BP medication yet today.      78 yo F with PMH of HTN, CKD, fibromyalgia, seropositive RA and PMR that presents here for second opinion. Patient with no synovitis on exam but she reports she is not able to function without prednisone 5mg a day due to pain and stiffness.   She reports MTX and Humira did not work for in the past which is unusual for RA. She has diffuse body pain which I think is consistent with fibromyalgia. That being said she reports a response to steroids which favors PMR.  I would recommend attempting a very slow wean on the prednisone. Consider going down by 1 mg every month to 2 months and see if she can be weaned off.

## 2017-10-12 ENCOUNTER — TELEPHONE (OUTPATIENT)
Dept: RHEUMATOLOGY | Facility: CLINIC | Age: 77
End: 2017-10-12

## 2017-10-12 LAB
MITOGEN NIL: >10 IU/ML
NIL: 0.05 IU/ML
TB ANTIGEN NIL: 0.17 IU/ML
TB ANTIGEN: 0.21 IU/ML
TB GOLD: NEGATIVE

## 2017-10-13 ENCOUNTER — OFFICE VISIT (OUTPATIENT)
Dept: INTERNAL MEDICINE | Facility: CLINIC | Age: 77
End: 2017-10-13
Payer: MEDICARE

## 2017-10-13 ENCOUNTER — TELEPHONE (OUTPATIENT)
Dept: RHEUMATOLOGY | Facility: CLINIC | Age: 77
End: 2017-10-13

## 2017-10-13 ENCOUNTER — IMMUNIZATION (OUTPATIENT)
Dept: INTERNAL MEDICINE | Facility: CLINIC | Age: 77
End: 2017-10-13
Payer: MEDICARE

## 2017-10-13 VITALS
HEIGHT: 62 IN | DIASTOLIC BLOOD PRESSURE: 87 MMHG | HEART RATE: 84 BPM | WEIGHT: 167 LBS | BODY MASS INDEX: 30.73 KG/M2 | SYSTOLIC BLOOD PRESSURE: 172 MMHG

## 2017-10-13 DIAGNOSIS — R73.9 HYPERGLYCEMIA: ICD-10-CM

## 2017-10-13 DIAGNOSIS — I25.10 CVD (CARDIOVASCULAR DISEASE): ICD-10-CM

## 2017-10-13 DIAGNOSIS — E78.2 HYPERLIPIDEMIA, MIXED: ICD-10-CM

## 2017-10-13 DIAGNOSIS — I10 HYPERTENSION, ESSENTIAL: Primary | ICD-10-CM

## 2017-10-13 DIAGNOSIS — Z87.891 HX OF TOBACCO USE, PRESENTING HAZARDS TO HEALTH: ICD-10-CM

## 2017-10-13 DIAGNOSIS — N39.0 URINARY TRACT INFECTION WITHOUT HEMATURIA, SITE UNSPECIFIED: ICD-10-CM

## 2017-10-13 DIAGNOSIS — Z92.89 H/O MAMMOGRAM: ICD-10-CM

## 2017-10-13 DIAGNOSIS — Z01.419 ENCOUNTER FOR GYNECOLOGICAL EXAMINATION WITHOUT ABNORMAL FINDING: ICD-10-CM

## 2017-10-13 DIAGNOSIS — J45.40 MODERATE PERSISTENT ASTHMA WITHOUT COMPLICATION: ICD-10-CM

## 2017-10-13 DIAGNOSIS — M05.79 RHEUMATOID ARTHRITIS INVOLVING MULTIPLE SITES WITH POSITIVE RHEUMATOID FACTOR: ICD-10-CM

## 2017-10-13 DIAGNOSIS — M35.3 PMR (POLYMYALGIA RHEUMATICA): ICD-10-CM

## 2017-10-13 DIAGNOSIS — K21.00 GASTROESOPHAGEAL REFLUX DISEASE WITH ESOPHAGITIS: ICD-10-CM

## 2017-10-13 DIAGNOSIS — M79.7 FIBROMYALGIA: ICD-10-CM

## 2017-10-13 LAB
BACTERIA #/AREA URNS AUTO: NORMAL /HPF
BILIRUB UR QL STRIP: NEGATIVE
CLARITY UR REFRACT.AUTO: ABNORMAL
COLOR UR AUTO: YELLOW
GLUCOSE UR QL STRIP: NEGATIVE
HGB UR QL STRIP: NEGATIVE
HYALINE CASTS UR QL AUTO: 1 /LPF
KETONES UR QL STRIP: NEGATIVE
LEUKOCYTE ESTERASE UR QL STRIP: NEGATIVE
MICROSCOPIC COMMENT: NORMAL
NITRITE UR QL STRIP: NEGATIVE
PH UR STRIP: 7 [PH] (ref 5–8)
PROT UR QL STRIP: NEGATIVE
RBC #/AREA URNS AUTO: 1 /HPF (ref 0–4)
SP GR UR STRIP: 1.01 (ref 1–1.03)
SQUAMOUS #/AREA URNS AUTO: 6 /HPF
URN SPEC COLLECT METH UR: ABNORMAL
UROBILINOGEN UR STRIP-ACNC: NEGATIVE EU/DL
WBC #/AREA URNS AUTO: 0 /HPF (ref 0–5)

## 2017-10-13 PROCEDURE — 99214 OFFICE O/P EST MOD 30 MIN: CPT | Mod: S$GLB,,, | Performed by: INTERNAL MEDICINE

## 2017-10-13 PROCEDURE — 90732 PPSV23 VACC 2 YRS+ SUBQ/IM: CPT | Mod: S$GLB,,, | Performed by: INTERNAL MEDICINE

## 2017-10-13 PROCEDURE — G0008 ADMIN INFLUENZA VIRUS VAC: HCPCS | Mod: S$GLB,,, | Performed by: INTERNAL MEDICINE

## 2017-10-13 PROCEDURE — 99499 UNLISTED E&M SERVICE: CPT | Mod: S$GLB,,, | Performed by: INTERNAL MEDICINE

## 2017-10-13 PROCEDURE — 99999 PR PBB SHADOW E&M-EST. PATIENT-LVL IV: CPT | Mod: PBBFAC,,, | Performed by: INTERNAL MEDICINE

## 2017-10-13 PROCEDURE — G0009 ADMIN PNEUMOCOCCAL VACCINE: HCPCS | Mod: S$GLB,,, | Performed by: INTERNAL MEDICINE

## 2017-10-13 PROCEDURE — 90662 IIV NO PRSV INCREASED AG IM: CPT | Mod: S$GLB,,, | Performed by: INTERNAL MEDICINE

## 2017-10-13 PROCEDURE — 81001 URINALYSIS AUTO W/SCOPE: CPT

## 2017-10-13 PROCEDURE — 87086 URINE CULTURE/COLONY COUNT: CPT

## 2017-10-13 RX ORDER — PREDNISONE 2.5 MG/1
2.5 TABLET ORAL 2 TIMES DAILY
Qty: 60 TABLET | Refills: 0
Start: 2017-10-13 | End: 2017-11-02

## 2017-10-13 NOTE — TELEPHONE ENCOUNTER
----- Message from Perry Wylie MD sent at 10/12/2017  7:10 PM CDT -----  Please tell pt the TB blood test is negative. KEYON

## 2017-10-14 LAB — BACTERIA UR CULT: NO GROWTH

## 2017-10-15 ENCOUNTER — TELEPHONE (OUTPATIENT)
Dept: INTERNAL MEDICINE | Facility: CLINIC | Age: 77
End: 2017-10-15

## 2017-10-20 ENCOUNTER — HOSPITAL ENCOUNTER (OUTPATIENT)
Dept: RADIOLOGY | Facility: OTHER | Age: 77
Discharge: HOME | End: 2017-10-20
Attending: INTERNAL MEDICINE
Payer: MEDICARE

## 2017-10-20 DIAGNOSIS — Z87.891 HX OF TOBACCO USE, PRESENTING HAZARDS TO HEALTH: ICD-10-CM

## 2017-10-20 DIAGNOSIS — I25.10 CVD (CARDIOVASCULAR DISEASE): ICD-10-CM

## 2017-10-20 PROCEDURE — 71250 CT THORAX DX C-: CPT | Mod: 26,,, | Performed by: RADIOLOGY

## 2017-10-20 PROCEDURE — 93880 EXTRACRANIAL BILAT STUDY: CPT | Mod: 26,,, | Performed by: RADIOLOGY

## 2017-10-20 PROCEDURE — 93880 EXTRACRANIAL BILAT STUDY: CPT | Mod: TC

## 2017-10-20 PROCEDURE — 71250 CT THORAX DX C-: CPT | Mod: TC

## 2017-10-23 NOTE — PROGRESS NOTES
Subjective:       Patient ID: Betzaida Neumann is a 77 y.o. female.    Chief Complaint: Follow-up    HPIPt here to establish care - follows closely with Rheum.  Pt reports constant fatigue.  She states she has white coat HTN.  No CP or SOB.  Joints are feeling okay for now.  Wants to feel better.  Review of Systems   Constitutional: Positive for fatigue.   Respiratory: Negative for shortness of breath (PND or orthopnea).    Cardiovascular: Negative for chest pain (arm pain or jaw pain).   Gastrointestinal: Negative for abdominal pain, diarrhea, nausea and vomiting.   Genitourinary: Negative for dysuria.   Neurological: Negative for seizures, syncope and headaches.       Objective:      Physical Exam   Constitutional: She is oriented to person, place, and time. She appears well-developed and well-nourished. No distress.   HENT:   Head: Normocephalic.   Mouth/Throat: Oropharynx is clear and moist.   Neck: Neck supple. No JVD present. No thyromegaly present.   Cardiovascular: Normal rate, regular rhythm, normal heart sounds and intact distal pulses.  Exam reveals no gallop and no friction rub.    No murmur heard.  Pulmonary/Chest: Effort normal and breath sounds normal. She has no wheezes. She has no rales.   Abdominal: Soft. Bowel sounds are normal. She exhibits no distension and no mass. There is no tenderness. There is no rebound and no guarding.   Musculoskeletal: She exhibits no edema.   Lymphadenopathy:     She has no cervical adenopathy.   Neurological: She is alert and oriented to person, place, and time. She has normal reflexes.   Skin: Skin is warm and dry.   Psychiatric: She has a normal mood and affect. Her behavior is normal. Judgment and thought content normal.       Assessment:       1. Hypertension, essential    2. PMR (polymyalgia rheumatica)    3. Gastroesophageal reflux disease with esophagitis    4. Rheumatoid arthritis involving multiple sites with positive rheumatoid factor    5. Fibromyalgia     6. H/O mammogram    7. Moderate persistent asthma without complication    8. Encounter for gynecological examination without abnormal finding    9. Hyperlipidemia, mixed    10. Hyperglycemia    11. Urinary tract infection without hematuria, site unspecified    12. Hx of tobacco use, presenting hazards to health    13. CVD (cardiovascular disease)        Plan:   Hypertension, essential  Questionable control - recheck in a few weeks at appoint consider digital medicine  PMR (polymyalgia rheumatica)  stable    Gastroesophageal reflux disease with esophagitis  Controlled - continue current meds    Rheumatoid arthritis involving multiple sites with positive rheumatoid factor  Per Rheum  Fibromyalgia  ? Fatigue symptoms from this or RA - will look for others  H/O mammogram    Moderate persistent asthma without complication  Controlled - continue current meds    Encounter for gynecological examination without abnormal finding    Hyperlipidemia, mixed  -     Lipid panel; Future; Expected date: 10/13/2017    Hyperglycemia  -     Hemoglobin A1c; Future; Expected date: 10/13/2017    Urinary tract infection without hematuria, site unspecified  -     Urinalysis  -     Urine culture    Hx of tobacco use, presenting hazards to health  -     CT Chest Without Contrast; Future; Expected date: 10/13/2017    CVD (cardiovascular disease)  -     US Carotid Bilateral; Future; Expected date: 10/13/2017    Other orders  -     predniSONE (DELTASONE) 2.5 MG tablet; Take 1 tablet (2.5 mg total) by mouth 2 (two) times daily.  Dispense: 60 tablet; Refill: 0  -     Pneumococcal Polysaccharide Vaccine (23 Valent) (SQ/IM)  -     Urinalysis Microscopic

## 2017-10-31 ENCOUNTER — LAB VISIT (OUTPATIENT)
Dept: LAB | Facility: HOSPITAL | Age: 77
End: 2017-10-31
Attending: INTERNAL MEDICINE
Payer: MEDICARE

## 2017-10-31 ENCOUNTER — OFFICE VISIT (OUTPATIENT)
Dept: RHEUMATOLOGY | Facility: CLINIC | Age: 77
End: 2017-10-31
Payer: MEDICARE

## 2017-10-31 VITALS
DIASTOLIC BLOOD PRESSURE: 92 MMHG | SYSTOLIC BLOOD PRESSURE: 193 MMHG | BODY MASS INDEX: 31.45 KG/M2 | HEART RATE: 85 BPM | TEMPERATURE: 99 F | WEIGHT: 170.88 LBS | HEIGHT: 62 IN

## 2017-10-31 DIAGNOSIS — R53.83 FATIGUE, UNSPECIFIED TYPE: ICD-10-CM

## 2017-10-31 DIAGNOSIS — R70.0 ELEVATED SED RATE: ICD-10-CM

## 2017-10-31 DIAGNOSIS — R79.82 ELEVATED C-REACTIVE PROTEIN (CRP): ICD-10-CM

## 2017-10-31 DIAGNOSIS — M35.3 PMR (POLYMYALGIA RHEUMATICA): Primary | ICD-10-CM

## 2017-10-31 DIAGNOSIS — M79.7 FIBROMYALGIA: ICD-10-CM

## 2017-10-31 DIAGNOSIS — M05.79 RHEUMATOID ARTHRITIS INVOLVING MULTIPLE SITES WITH POSITIVE RHEUMATOID FACTOR: ICD-10-CM

## 2017-10-31 LAB
ALBUMIN SERPL BCP-MCNC: 3.3 G/DL
ALP SERPL-CCNC: 69 U/L
ALT SERPL W/O P-5'-P-CCNC: 9 U/L
ANION GAP SERPL CALC-SCNC: 11 MMOL/L
AST SERPL-CCNC: 17 U/L
BASOPHILS # BLD AUTO: 0.04 K/UL
BASOPHILS NFR BLD: 0.5 %
BILIRUB SERPL-MCNC: 0.5 MG/DL
BUN SERPL-MCNC: 14 MG/DL
CALCIUM SERPL-MCNC: 9.7 MG/DL
CHLORIDE SERPL-SCNC: 97 MMOL/L
CO2 SERPL-SCNC: 30 MMOL/L
CREAT SERPL-MCNC: 1.2 MG/DL
CRP SERPL-MCNC: 32.7 MG/L
DIFFERENTIAL METHOD: ABNORMAL
EOSINOPHIL # BLD AUTO: 0.2 K/UL
EOSINOPHIL NFR BLD: 2.4 %
ERYTHROCYTE [DISTWIDTH] IN BLOOD BY AUTOMATED COUNT: 13.3 %
ERYTHROCYTE [SEDIMENTATION RATE] IN BLOOD BY WESTERGREN METHOD: 34 MM/HR
EST. GFR  (AFRICAN AMERICAN): 50.7 ML/MIN/1.73 M^2
EST. GFR  (NON AFRICAN AMERICAN): 44 ML/MIN/1.73 M^2
GLUCOSE SERPL-MCNC: 90 MG/DL
HCT VFR BLD AUTO: 39.3 %
HGB BLD-MCNC: 12.5 G/DL
IMM GRANULOCYTES # BLD AUTO: 0.02 K/UL
IMM GRANULOCYTES NFR BLD AUTO: 0.2 %
LYMPHOCYTES # BLD AUTO: 2 K/UL
LYMPHOCYTES NFR BLD: 22.7 %
MCH RBC QN AUTO: 30.1 PG
MCHC RBC AUTO-ENTMCNC: 31.8 G/DL
MCV RBC AUTO: 95 FL
MONOCYTES # BLD AUTO: 0.4 K/UL
MONOCYTES NFR BLD: 4.8 %
NEUTROPHILS # BLD AUTO: 6.2 K/UL
NEUTROPHILS NFR BLD: 69.4 %
NRBC BLD-RTO: 0 /100 WBC
PLATELET # BLD AUTO: 243 K/UL
PMV BLD AUTO: 9.6 FL
POTASSIUM SERPL-SCNC: 3.7 MMOL/L
PROT SERPL-MCNC: 7.5 G/DL
RBC # BLD AUTO: 4.15 M/UL
SODIUM SERPL-SCNC: 138 MMOL/L
WBC # BLD AUTO: 8.88 K/UL

## 2017-10-31 PROCEDURE — 86140 C-REACTIVE PROTEIN: CPT

## 2017-10-31 PROCEDURE — 99999 PR PBB SHADOW E&M-EST. PATIENT-LVL III: CPT | Mod: PBBFAC,,, | Performed by: INTERNAL MEDICINE

## 2017-10-31 PROCEDURE — 99214 OFFICE O/P EST MOD 30 MIN: CPT | Mod: S$GLB,,, | Performed by: INTERNAL MEDICINE

## 2017-10-31 PROCEDURE — 85025 COMPLETE CBC W/AUTO DIFF WBC: CPT

## 2017-10-31 PROCEDURE — 85651 RBC SED RATE NONAUTOMATED: CPT

## 2017-10-31 PROCEDURE — 99499 UNLISTED E&M SERVICE: CPT | Mod: S$GLB,,, | Performed by: INTERNAL MEDICINE

## 2017-10-31 PROCEDURE — 80053 COMPREHEN METABOLIC PANEL: CPT

## 2017-10-31 PROCEDURE — 36415 COLL VENOUS BLD VENIPUNCTURE: CPT

## 2017-10-31 NOTE — PROGRESS NOTES
Subjective:       Patient ID: Betzaida Neumann is a 77 y.o. female.    Chief Complaint: Rheumatoid Arthritis      HPI:  Betzaida Neumann is a 77 y.o. female with history of joint pain all over since 2008. She has seen several rheumatologists. First was Dr. Lerma who thought she had bursitis.  This she saw Dr. Riddle who diagnosed rheumatoid arthritis and gave her Humira x 3 years which did not help. Humira caused her to feel like a zombie. She then went [facility] list Dr. Woo and Dr. Reeves (last rheumatologist).   He diagnosed a combination of rheumatoid arthritis, fibromyalgia and PMR.  She recalls taking methotrexate but it didn't help her. Her last rheumatologist was at [facility] and was giving her mainly pain pills.  She has been on prednisone since May 2016 due to ALLERGIES as prescribed by her ALLERGY doctor.   She also took tramadol and percocet but did not help.     Held Arava due to stomach upset.  Night sweats since 2008.      She went for second opinion which suggested that she be treated as PMR.   She denies joint pain but has diffuse muscle aches.  Pain improves with prednisone.  Has been on it for over1 year.  Currently on 2.5 mg prednisone bid for past 3 months.  No difference between 10 mg and 5 mg.  Highest dose she was on 30 mg prednisone one week.  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         In past pain medication fentanyl did not help.  Nothing worsens pain.        Review of Systems   Constitutional: Positive for diaphoresis and fatigue.   HENT: Negative.    Eyes: Negative.    Respiratory: Negative.    Cardiovascular: Negative.    Gastrointestinal:        Weight gain 5 pounds since last visit   Endocrine: Negative.    Genitourinary: Negative.    Musculoskeletal: Positive for myalgias.   Skin: Negative.    Allergic/Immunologic: Negative.    Neurological: Negative.    Hematological: Negative.    Psychiatric/Behavioral: Negative.      "     Objective:   BP (!) 193/92 (BP Location: Left arm, Patient Position: Sitting, BP Method: Small (Automatic))   Pulse 85   Temp 99.3 °F (37.4 °C) (Oral)   Ht 5' 2" (1.575 m)   Wt 77.5 kg (170 lb 14.4 oz)   BMI 31.26 kg/m²      Physical Exam   Constitutional: She is oriented to person, place, and time and well-developed, well-nourished, and in no distress.   HENT:   Head: Normocephalic and atraumatic.   2+ temporal artery pulses bilaterally   Eyes: Conjunctivae and EOM are normal.   Neck: Neck supple.   Cardiovascular: Normal rate, regular rhythm and normal heart sounds.    Pulmonary/Chest: Effort normal and breath sounds normal.   Abdominal: Soft. Bowel sounds are normal.   Neurological: She is alert and oriented to person, place, and time. Gait normal.   Skin: Skin is warm and dry.     Psychiatric: Mood and affect normal.   Musculoskeletal:   Fullness around shoulder/neck area (suspect steroid relate)          LABS    Component      Latest Ref Rng & Units 10/20/2017 10/13/2017 10/10/2017   Specimen UA        Urine, Clean Catch    Color, UA      Yellow, Straw, Lilia  Yellow    Appearance, UA      Clear  Hazy (A)    pH, UA      5.0 - 8.0  7.0    Specific Gravity, UA      1.005 - 1.030  1.010    Protein, UA      Negative  Negative    Glucose, UA      Negative  Negative    Ketones, UA      Negative  Negative    Bilirubin (UA)      Negative  Negative    Occult Blood UA      Negative  Negative    Nitrite, UA      Negative  Negative    Urobilinogen, UA      <2.0 EU/dL  Negative    Leukocytes, UA      Negative  Negative    Cholesterol      120 - 199 mg/dL 282 (H)     Triglycerides      30 - 150 mg/dL 113     HDL      40 - 75 mg/dL 101 (H)     LDL Cholesterol      63.0 - 159.0 mg/dL 158.4     HDL/Chol Ratio      20.0 - 50.0 % 35.8     Total Cholesterol/HDL Ratio      2.0 - 5.0 2.8     Non-HDL Cholesterol      mg/dL 181     Protein, Serum      6.0 - 8.4 g/dL   7.1   Albumin grams/dl      3.35 - 5.55 g/dL   3.91 "   Alpha-1 grams/dl      0.17 - 0.41 g/dL   0.49 (H)   Alpha-2 grams/dl      0.43 - 0.99 g/dL   0.80   Beta grams/dl      0.50 - 1.10 g/dL   0.97   Gamma grams/dl      0.67 - 1.58 g/dL   0.94   RBC, UA      0 - 4 /hpf  1    WBC, UA      0 - 5 /hpf  0    Bacteria, UA      None-Occ /hpf  Rare    Squam Epithel, UA      /hpf  6    Hyaline Casts, UA      0-1/lpf /lpf  1    Microscopic Comment        SEE COMMENT    NIL      See text IU/mL   0.047   TB Antigen      See text IU/mL   0.215   TB Antigen - Nil      See Text IU/mL   0.168   Mitogen - Nil      See text IU/mL   >10.000   TB Gold         Negative   Hemoglobin A1C      4.0 - 5.6 % 5.3     Estimated Avg Glucose      68 - 131 mg/dL 105     TSH      0.400 - 4.000 uIU/mL   3.010   Free T4      0.71 - 1.51 ng/dL   0.85   Vit D, 25-Hydroxy      30 - 96 ng/mL   53   Pathologist Interpretation SPE         REVIEWED     Assessment:       1. History of rheumatoid arthritis. Treated in the past with Humira and methotrexate without improvement.   Positive rheumatoid factor in our lab and an elevated sedimentation rate. Patient still without swelling or joint tenderness on exam today.  2. History of polymyalgia rheumatica. Patient describes mainly all over body ache and notes that prednisone has helped a great deal.  The response to prednisone is consistent with polymyalgia rheumatica.  3. Fibromyalgia.  4. Fatigue  5. Obesity. Patient with 30 pound weight gain since starting prednisone.  6. Recurrent URI now with congestion.  Recurrent sinus infections.  Now under control since 2016  7. Night sweats  8. HTN.  Asymptomatic.   Elevated in office but patient notes normal BP at home  Plan:       1. Check labs  2. Hold Arava due to stomach upset  3. Decrease prednisone to 2.5 mg in morning and 1.25 mg in evening  4. Follow with primary doctor regarding elevated BP.  Repeat 175/85.  Patient to see primary doctor on 11/2/17.  Discussed symptoms of stroke and need to go to ER or call  ambulance if develop symptoms.   She notes taking sinus medicine that may be elevating pressure.      RTO  3 months.

## 2017-11-02 RX ORDER — PREDNISONE 2.5 MG/1
2.5 TABLET ORAL 2 TIMES DAILY
Qty: 60 TABLET | Refills: 1 | Status: SHIPPED | OUTPATIENT
Start: 2017-11-02 | End: 2017-12-31 | Stop reason: SDUPTHER

## 2017-11-02 NOTE — TELEPHONE ENCOUNTER
----- Message from Gudelia Riddle MA sent at 11/2/2017  1:05 PM CDT -----  Contact: self/home      ----- Message -----  From: Indu Marx  Sent: 11/2/2017   1:00 PM  To: [facility] Staff    Pt would like a refill on her prednisone 2.5 mg.

## 2017-11-02 NOTE — TELEPHONE ENCOUNTER
Left message for patient to call the office regarding her prednisone dose and labs.  Labs show elevated CRP still which is slightly decreased from before.  We'll consider continuing at 2.5 mg of prednisone twice daily for another month and then repeating the labs.  Since she is feeling well I am hesitant to increase the dose but we'll monitor her closely.  We'll await the patient's call back to discuss the plan.

## 2017-11-10 ENCOUNTER — OFFICE VISIT (OUTPATIENT)
Dept: INTERNAL MEDICINE | Facility: CLINIC | Age: 77
End: 2017-11-10
Payer: MEDICARE

## 2017-11-10 VITALS
HEIGHT: 62 IN | HEART RATE: 80 BPM | WEIGHT: 169.56 LBS | SYSTOLIC BLOOD PRESSURE: 175 MMHG | DIASTOLIC BLOOD PRESSURE: 83 MMHG | BODY MASS INDEX: 31.2 KG/M2

## 2017-11-10 DIAGNOSIS — R93.89 ABNORMAL CT OF THE CHEST: ICD-10-CM

## 2017-11-10 DIAGNOSIS — I10 HYPERTENSION, ESSENTIAL: Primary | ICD-10-CM

## 2017-11-10 PROCEDURE — 99499 UNLISTED E&M SERVICE: CPT | Mod: S$GLB,,, | Performed by: INTERNAL MEDICINE

## 2017-11-10 PROCEDURE — 99999 PR PBB SHADOW E&M-EST. PATIENT-LVL III: CPT | Mod: PBBFAC,,, | Performed by: INTERNAL MEDICINE

## 2017-11-10 PROCEDURE — 99213 OFFICE O/P EST LOW 20 MIN: CPT | Mod: S$GLB,,, | Performed by: INTERNAL MEDICINE

## 2017-11-10 RX ORDER — LORATADINE 10 MG/1
10 TABLET ORAL DAILY
COMMUNITY
End: 2018-08-21

## 2017-11-10 RX ORDER — OXYBUTYNIN CHLORIDE 5 MG/1
5 TABLET ORAL 2 TIMES DAILY
Qty: 60 TABLET | Refills: 2 | Status: SHIPPED | OUTPATIENT
Start: 2017-11-10 | End: 2018-08-21

## 2017-11-13 ENCOUNTER — TELEPHONE (OUTPATIENT)
Dept: INTERNAL MEDICINE | Facility: CLINIC | Age: 77
End: 2017-11-13

## 2017-11-13 NOTE — TELEPHONE ENCOUNTER
----- Message from Kayla Martinez sent at 11/13/2017  2:20 PM CST -----  Contact: pt [phone]  RX request - refill or new RX.  Is this a refill or new RX:  refill  RX name and strength: oxybutynin (DITROPAN) 5 MG Tab  Directions:   Is this a 30 day or 90 day RX:    Pharmacy name and phone # [facility] [phone]   Comments:  Pt insurance do not cover this script,pt need another medication to replace this

## 2017-11-17 ENCOUNTER — TELEPHONE (OUTPATIENT)
Dept: RHEUMATOLOGY | Facility: CLINIC | Age: 77
End: 2017-11-17

## 2017-11-17 NOTE — TELEPHONE ENCOUNTER
----- Message from Gudelia Riddle MA sent at 11/13/2017  3:16 PM CST -----  Contact: self      ----- Message -----  From: Rosemarie Ba  Sent: 11/13/2017   2:15 PM  To: [facility] Staff    Patient states missed a call from Dr Moore in regards to her medication changes. Patient states had a flare up and had to take her meds. Ask for a call back at

## 2017-11-17 NOTE — TELEPHONE ENCOUNTER
Patient reports with change in weather the pain increased.  She would like to stay at 2.5 mg bid.  She will increase to 5 mg if worsens.

## 2017-11-19 NOTE — PROGRESS NOTES
Subjective:       Patient ID: Betzaida Neumann is a 77 y.o. female.    Chief Complaint: Follow-up (4wk)    HPIPt returns for review - pt with fatigue and night sweats.  Pt with neg quantiferon gold but abnormal CT chest (evidence of old mycobacterial disease).  Review of Systems   Constitutional: Positive for fatigue.        Sweats for about 20 years per patient   Respiratory: Negative for shortness of breath (PND or orthopnea).    Cardiovascular: Negative for chest pain (arm pain or jaw pain).   Gastrointestinal: Negative for abdominal pain, diarrhea, nausea and vomiting.   Genitourinary: Negative for dysuria.   Neurological: Negative for seizures, syncope and headaches.       Objective:      Physical Exam   Constitutional: She is oriented to person, place, and time. She appears well-developed and well-nourished. No distress.   HENT:   Head: Normocephalic.   Mouth/Throat: Oropharynx is clear and moist.   Neck: Neck supple. No JVD present. No thyromegaly present.   Cardiovascular: Normal rate, regular rhythm, normal heart sounds and intact distal pulses.  Exam reveals no gallop and no friction rub.    No murmur heard.  Pulmonary/Chest: Effort normal and breath sounds normal. She has no wheezes. She has no rales.   Abdominal: Soft. Bowel sounds are normal. She exhibits no distension and no mass. There is no tenderness. There is no rebound and no guarding.   Musculoskeletal: She exhibits no edema.   Lymphadenopathy:     She has no cervical adenopathy.   Neurological: She is alert and oriented to person, place, and time. She has normal reflexes.   Skin: Skin is warm and dry.   Psychiatric: She has a normal mood and affect. Her behavior is normal. Judgment and thought content normal.       Assessment:       1. Hypertension, essential    2. Abnormal CT of the chest        Plan:   Hypertension, essential  Reports white coat HTN that is unchanged  Abnormal CT of the chest  -     Ambulatory consult to Pulmonology    Other  orders  -     oxybutynin (DITROPAN) 5 MG Tab; Take 1 tablet (5 mg total) by mouth 2 (two) times daily.  Dispense: 60 tablet; Refill: 2 - see if it helps with night sweats

## 2017-12-04 ENCOUNTER — OFFICE VISIT (OUTPATIENT)
Dept: PULMONOLOGY | Facility: CLINIC | Age: 77
End: 2017-12-04
Payer: MEDICARE

## 2017-12-04 VITALS
HEIGHT: 62 IN | SYSTOLIC BLOOD PRESSURE: 190 MMHG | BODY MASS INDEX: 31.08 KG/M2 | WEIGHT: 168.88 LBS | RESPIRATION RATE: 14 BRPM | DIASTOLIC BLOOD PRESSURE: 110 MMHG | HEART RATE: 82 BPM | OXYGEN SATURATION: 98 %

## 2017-12-04 DIAGNOSIS — I89.8 CALCIFIED LYMPH NODES: ICD-10-CM

## 2017-12-04 DIAGNOSIS — J43.9 BULLA OF LUNG: ICD-10-CM

## 2017-12-04 DIAGNOSIS — M35.3 PMR (POLYMYALGIA RHEUMATICA): ICD-10-CM

## 2017-12-04 DIAGNOSIS — J45.40 MODERATE PERSISTENT ASTHMA WITHOUT COMPLICATION: Primary | ICD-10-CM

## 2017-12-04 DIAGNOSIS — R03.0 ELEVATED BLOOD PRESSURE READING: ICD-10-CM

## 2017-12-04 DIAGNOSIS — M05.79 RHEUMATOID ARTHRITIS INVOLVING MULTIPLE SITES WITH POSITIVE RHEUMATOID FACTOR: ICD-10-CM

## 2017-12-04 PROCEDURE — 99999 PR PBB SHADOW E&M-EST. PATIENT-LVL IV: CPT | Mod: PBBFAC,,, | Performed by: INTERNAL MEDICINE

## 2017-12-04 PROCEDURE — 99499 UNLISTED E&M SERVICE: CPT | Mod: S$GLB,,, | Performed by: INTERNAL MEDICINE

## 2017-12-04 PROCEDURE — 99204 OFFICE O/P NEW MOD 45 MIN: CPT | Mod: S$GLB,,, | Performed by: INTERNAL MEDICINE

## 2017-12-04 NOTE — LETTER
December 4, 2017      Latricia Flores MD  [address]  [place] [address]           [facility]  [address]  [facility] [address]  Phone: [phone]          Patient: Betzaida Neumann   MR Number: [id number]   YOB: 1940   Date of Visit: 12/4/2017       Dear Dr. Latricia Flores:    Thank you for referring Betzaida Neumann to me for evaluation. Attached you will find relevant portions of my assessment and plan of care.    If you have questions, please do not hesitate to call me. I look forward to following Betzaida Neumann along with you.    Sincerely,    BK Carnes MD    Enclosure  CC:  No Recipients    If you would like to receive this communication electronically, please contact [email or web address] or [phone] to request more information on [facility] access.    For providers and/or their staff who would like to refer a patient to [facility], please contact us through our one-stop-shop provider referral line, [facility], at [phone].    If you feel you have received this communication in error or would no longer like to receive these types of communications, please e-mail [email or web address]

## 2017-12-05 NOTE — PROGRESS NOTES
Subjective:       Patient ID: Betzaida Neumann is a 77 y.o. female.    Chief Complaint: Abnormal Ct Scan    HPI HISTORY OF PRESENT ILLNESS:  Mrs. Neumann is a 77-year-old former smoker who   comes to follow up abnormalities seen in a recent CT scan of her chest.  She had   this scan in October and there were findings of calcified mediastinal lymph   nodes and small lung nodules in a pattern suggestive of past granulomatous   inflammation.  She has the history of asthma, which was initially diagnosed in   1980s.  She is monitored by an allergist (outside [facility]), and feels that   her airway symptoms are currently well controlled with Breo.  She has also been   having ongoing night sweats, and has been unable to determine the cause for   these.  She has the history of rheumatoid disease and polymyalgia rheumatica.    She takes a 2.5 mg maintenance dose of prednisone for control of symptoms related   to these conditions.    Mrs. Neumann does not know of any past exposures to tuberculosis.  Review of her   electronic medical record shows that she has had multiple QuantiFERON gold   studies, all of which have been negative.  She has no previous CT scans for   comparison with the recent studies.    Mrs. Neumann is a former smoker.  She estimates smoking approximately one   pack of cigarettes per day for 20 years.  She discontinued smoking in 1999.  She   believes that her family history is negative for lung diseases.  She does not   know of any important past occupational exposures.    DATA:  I have reviewed the images from a chest x-ray done in December 2016.  The   cardiac silhouette is not enlarged.  The lungs appeared clear.  There are no   pleural abnormalities.  This x-ray appears unchanged in comparison to a previous   film from August of that same year.    The CT scan done in October of this year shows no acute cardiovascular   abnormalities.  As noted above, there are calcified mediastinal lymph nodes  and   small pulmonary nodules.  There are no findings to suggest active parenchymal   lung disease.  There is the additional finding of a localized bullous abnormality   within the left lung adjacent to the heart border.      CB/HN  dd: 12/04/2017 19:40:22 (CST)  td: 12/05/2017 16:29:12 (CST)  Doc ID   #[id number]  Job ID #[id number]    CC:       Review of Systems   Constitutional: Positive for weight gain, fatigue and night sweats. Negative for fever.   HENT: Negative for postnasal drip, sinus pressure, voice change and congestion.    Respiratory: Negative for cough, sputum production, shortness of breath, wheezing and dyspnea on extertion.    Cardiovascular: Negative for chest pain and leg swelling.   Genitourinary: Negative for difficulty urinating.   Musculoskeletal: Positive for arthralgias, back pain and myalgias.   Skin: Negative for rash.   Gastrointestinal: Negative for abdominal pain and acid reflux.   Neurological: Negative for dizziness and weakness.   Hematological: Negative for adenopathy.       Objective:      Physical Exam   Constitutional: She is oriented to person, place, and time. She appears well-developed and well-nourished.   HENT:   Head: Normocephalic.   Nose: Nose normal.   Mouth/Throat: No oropharyngeal exudate.   Neck: Normal range of motion. No JVD present. No tracheal deviation present. No thyromegaly present.   Cardiovascular: Normal rate, regular rhythm and normal heart sounds.    No murmur heard.  Pulmonary/Chest: Normal expansion. No stridor. She has no wheezes. She has no rhonchi. She has no rales. She exhibits no tenderness.   Abdominal: Soft. There is no tenderness.   Musculoskeletal: She exhibits no edema.   Lymphadenopathy:     She has no cervical adenopathy.   Neurological: She is alert and oriented to person, place, and time.   Skin: Skin is warm and dry. No rash noted. No erythema. Nails show no clubbing.   Psychiatric: She has a normal mood and affect.   Vitals  reviewed.    Personal Diagnostic Review    No flowsheet data found.      Assessment:       1. Moderate persistent asthma without complication    2. Bulla of lung    3. Calcified lymph nodes    4. PMR (polymyalgia rheumatica)    5. Rheumatoid arthritis involving multiple sites with positive rheumatoid factor    6. Elevated blood pressure reading        Outpatient Encounter Prescriptions as of 12/4/2017   Medication Sig Dispense Refill    azelastine-fluticasone (DYMISTA) 137-50 mcg/spray Spry nassal spray 1 spray by Each Nare route 2 (two) times daily.      estradiol (ESTRACE) 2 MG tablet Take 2 mg by mouth once daily.      fluticasone-vilanterol (BREO) 200-25 mcg/dose DsDv diskus inhaler Inhale 1 puff into the lungs once daily. Controller      hydrochlorothiazide (HYDRODIURIL) 25 MG tablet TAKE 1 TABLET BY MOUTH EVERY DAY. 90 tablet 0    loratadine (CLARITIN) 10 mg tablet Take 10 mg by mouth once daily.      oxybutynin (DITROPAN) 5 MG Tab Take 1 tablet (5 mg total) by mouth 2 (two) times daily. 60 tablet 2    predniSONE (DELTASONE) 2.5 MG tablet Take 1 tablet (2.5 mg total) by mouth 2 (two) times daily. 60 tablet 1     No facility-administered encounter medications on file as of 12/4/2017.      Orders Placed This Encounter   Procedures    CT Chest Without Contrast     Standing Status:   Future     Standing Expiration Date:   12/4/2018     Plan:     No treatment recommendations at present.  Continue meds for control of asthma per outside physician.  Repeat CT Chest in 10/2018 to document stability.

## 2017-12-05 NOTE — PATIENT INSTRUCTIONS
No treatment recommendations at present.  Continue meds for control of asthma per outside physician.  Repeat CT Chest in 10/2018 to document stability.

## 2017-12-19 RX ORDER — HYDROCHLOROTHIAZIDE 25 MG/1
TABLET ORAL
Qty: 90 TABLET | Refills: 0 | OUTPATIENT
Start: 2017-12-19

## 2017-12-26 RX ORDER — HYDROCHLOROTHIAZIDE 25 MG/1
25 TABLET ORAL DAILY
Qty: 90 TABLET | Refills: 0 | Status: SHIPPED | OUTPATIENT
Start: 2017-12-26 | End: 2018-03-28 | Stop reason: SDUPTHER

## 2017-12-26 NOTE — TELEPHONE ENCOUNTER
"----- Message from Ignacia Giron sent at 12/26/2017 10:06 AM CST -----  Contact: Pt [phone]  RX request - refill or new RX.  Is this a refill or new RX:  Refill  RX name and strength: hydrochlorothiazide (HYDRODIURIL) 25 MG tablet  Directions:   Is this a 30 day or 90 day RX:    Pharmacy name and phone # (DON'T enter "on file" or "in chart"):    [facility] [address] [place] [address] [address] AT [facility] [phone] (Phone)  [phone] (Fax)  Comments:        "

## 2017-12-29 DIAGNOSIS — N18.30 CHRONIC KIDNEY DISEASE, STAGE III (MODERATE): Primary | ICD-10-CM

## 2017-12-31 RX ORDER — PREDNISONE 2.5 MG/1
TABLET ORAL
Qty: 60 TABLET | Refills: 0 | Status: SHIPPED | OUTPATIENT
Start: 2017-12-31 | End: 2018-02-05 | Stop reason: SDUPTHER

## 2018-02-02 ENCOUNTER — LAB VISIT (OUTPATIENT)
Dept: LAB | Facility: OTHER | Age: 78
End: 2018-02-02
Payer: MEDICARE

## 2018-02-02 DIAGNOSIS — N18.30 CHRONIC KIDNEY DISEASE, STAGE III (MODERATE): ICD-10-CM

## 2018-02-02 LAB
BILIRUB UR QL STRIP: NEGATIVE
CLARITY UR: CLEAR
COLOR UR: YELLOW
CREAT UR-MCNC: 75 MG/DL
GLUCOSE UR QL STRIP: NEGATIVE
HGB UR QL STRIP: NEGATIVE
KETONES UR QL STRIP: NEGATIVE
LEUKOCYTE ESTERASE UR QL STRIP: NEGATIVE
NITRITE UR QL STRIP: NEGATIVE
PH UR STRIP: 7 [PH] (ref 5–8)
PROT UR QL STRIP: NEGATIVE
PROT UR-MCNC: <7 MG/DL
PROT/CREAT RATIO, UR: NORMAL
SP GR UR STRIP: 1.01 (ref 1–1.03)
URN SPEC COLLECT METH UR: NORMAL
UROBILINOGEN UR STRIP-ACNC: NEGATIVE EU/DL

## 2018-02-02 PROCEDURE — 84156 ASSAY OF PROTEIN URINE: CPT

## 2018-02-02 PROCEDURE — 81003 URINALYSIS AUTO W/O SCOPE: CPT

## 2018-02-05 DIAGNOSIS — M05.79 RHEUMATOID ARTHRITIS INVOLVING MULTIPLE SITES WITH POSITIVE RHEUMATOID FACTOR: ICD-10-CM

## 2018-02-05 DIAGNOSIS — M35.3 PMR (POLYMYALGIA RHEUMATICA): Primary | ICD-10-CM

## 2018-02-05 RX ORDER — PREDNISONE 2.5 MG/1
TABLET ORAL
Qty: 60 TABLET | Refills: 0 | Status: SHIPPED | OUTPATIENT
Start: 2018-02-05 | End: 2018-03-04 | Stop reason: SDUPTHER

## 2018-02-09 ENCOUNTER — OFFICE VISIT (OUTPATIENT)
Dept: NEPHROLOGY | Facility: CLINIC | Age: 78
End: 2018-02-09
Payer: MEDICARE

## 2018-02-09 VITALS
BODY MASS INDEX: 30.51 KG/M2 | OXYGEN SATURATION: 97 % | DIASTOLIC BLOOD PRESSURE: 80 MMHG | HEART RATE: 101 BPM | WEIGHT: 165.81 LBS | HEIGHT: 62 IN | SYSTOLIC BLOOD PRESSURE: 160 MMHG

## 2018-02-09 DIAGNOSIS — N18.30 CHRONIC KIDNEY DISEASE, STAGE III (MODERATE): Primary | ICD-10-CM

## 2018-02-09 DIAGNOSIS — I12.9 BENIGN HYPERTENSIVE RENAL DISEASE WITHOUT RENAL FAILURE: ICD-10-CM

## 2018-02-09 PROCEDURE — 99213 OFFICE O/P EST LOW 20 MIN: CPT | Mod: S$GLB,,, | Performed by: INTERNAL MEDICINE

## 2018-02-09 PROCEDURE — 3008F BODY MASS INDEX DOCD: CPT | Mod: S$GLB,,, | Performed by: INTERNAL MEDICINE

## 2018-02-09 PROCEDURE — 1126F AMNT PAIN NOTED NONE PRSNT: CPT | Mod: S$GLB,,, | Performed by: INTERNAL MEDICINE

## 2018-02-09 PROCEDURE — 1159F MED LIST DOCD IN RCRD: CPT | Mod: S$GLB,,, | Performed by: INTERNAL MEDICINE

## 2018-02-09 PROCEDURE — 99999 PR PBB SHADOW E&M-EST. PATIENT-LVL III: CPT | Mod: PBBFAC,,, | Performed by: INTERNAL MEDICINE

## 2018-02-20 ENCOUNTER — LAB VISIT (OUTPATIENT)
Dept: LAB | Facility: HOSPITAL | Age: 78
End: 2018-02-20
Attending: INTERNAL MEDICINE
Payer: MEDICARE

## 2018-02-20 ENCOUNTER — OFFICE VISIT (OUTPATIENT)
Dept: RHEUMATOLOGY | Facility: CLINIC | Age: 78
End: 2018-02-20
Payer: MEDICARE

## 2018-02-20 VITALS
TEMPERATURE: 98 F | SYSTOLIC BLOOD PRESSURE: 166 MMHG | DIASTOLIC BLOOD PRESSURE: 87 MMHG | HEART RATE: 86 BPM | WEIGHT: 165.5 LBS | HEIGHT: 62 IN | BODY MASS INDEX: 30.46 KG/M2

## 2018-02-20 DIAGNOSIS — R79.82 ELEVATED C-REACTIVE PROTEIN (CRP): ICD-10-CM

## 2018-02-20 DIAGNOSIS — M79.89 SWELLING OF RIGHT HAND: ICD-10-CM

## 2018-02-20 DIAGNOSIS — M05.79 RHEUMATOID ARTHRITIS INVOLVING MULTIPLE SITES WITH POSITIVE RHEUMATOID FACTOR: ICD-10-CM

## 2018-02-20 DIAGNOSIS — R53.83 FATIGUE, UNSPECIFIED TYPE: ICD-10-CM

## 2018-02-20 DIAGNOSIS — M79.7 FIBROMYALGIA: ICD-10-CM

## 2018-02-20 DIAGNOSIS — M79.641 RIGHT HAND PAIN: ICD-10-CM

## 2018-02-20 DIAGNOSIS — M05.79 RHEUMATOID ARTHRITIS INVOLVING MULTIPLE SITES WITH POSITIVE RHEUMATOID FACTOR: Primary | ICD-10-CM

## 2018-02-20 LAB
ALBUMIN SERPL BCP-MCNC: 3.2 G/DL
ALP SERPL-CCNC: 73 U/L
ALT SERPL W/O P-5'-P-CCNC: 14 U/L
ANION GAP SERPL CALC-SCNC: 11 MMOL/L
AST SERPL-CCNC: 15 U/L
BASOPHILS # BLD AUTO: 0.04 K/UL
BASOPHILS NFR BLD: 0.4 %
BILIRUB SERPL-MCNC: 0.6 MG/DL
BUN SERPL-MCNC: 14 MG/DL
CALCIUM SERPL-MCNC: 9.4 MG/DL
CHLORIDE SERPL-SCNC: 103 MMOL/L
CO2 SERPL-SCNC: 28 MMOL/L
CREAT SERPL-MCNC: 1.2 MG/DL
CRP SERPL-MCNC: 93.1 MG/L
DIFFERENTIAL METHOD: ABNORMAL
EOSINOPHIL # BLD AUTO: 0.2 K/UL
EOSINOPHIL NFR BLD: 2.4 %
ERYTHROCYTE [DISTWIDTH] IN BLOOD BY AUTOMATED COUNT: 13.2 %
ERYTHROCYTE [SEDIMENTATION RATE] IN BLOOD BY WESTERGREN METHOD: 63 MM/HR
EST. GFR  (AFRICAN AMERICAN): 50.7 ML/MIN/1.73 M^2
EST. GFR  (NON AFRICAN AMERICAN): 44 ML/MIN/1.73 M^2
GLUCOSE SERPL-MCNC: 85 MG/DL
HCT VFR BLD AUTO: 38.6 %
HGB BLD-MCNC: 12.1 G/DL
IMM GRANULOCYTES # BLD AUTO: 0.06 K/UL
IMM GRANULOCYTES NFR BLD AUTO: 0.6 %
LYMPHOCYTES # BLD AUTO: 2.5 K/UL
LYMPHOCYTES NFR BLD: 24.6 %
MCH RBC QN AUTO: 29.6 PG
MCHC RBC AUTO-ENTMCNC: 31.3 G/DL
MCV RBC AUTO: 94 FL
MONOCYTES # BLD AUTO: 0.7 K/UL
MONOCYTES NFR BLD: 6.7 %
NEUTROPHILS # BLD AUTO: 6.6 K/UL
NEUTROPHILS NFR BLD: 65.3 %
NRBC BLD-RTO: 0 /100 WBC
PLATELET # BLD AUTO: 288 K/UL
PMV BLD AUTO: 9.7 FL
POTASSIUM SERPL-SCNC: 4.3 MMOL/L
PROT SERPL-MCNC: 7.6 G/DL
RBC # BLD AUTO: 4.09 M/UL
SODIUM SERPL-SCNC: 142 MMOL/L
WBC # BLD AUTO: 10.12 K/UL

## 2018-02-20 PROCEDURE — 99999 PR PBB SHADOW E&M-EST. PATIENT-LVL III: CPT | Mod: PBBFAC,,, | Performed by: INTERNAL MEDICINE

## 2018-02-20 PROCEDURE — 3008F BODY MASS INDEX DOCD: CPT | Mod: S$GLB,,, | Performed by: INTERNAL MEDICINE

## 2018-02-20 PROCEDURE — 36415 COLL VENOUS BLD VENIPUNCTURE: CPT

## 2018-02-20 PROCEDURE — 99214 OFFICE O/P EST MOD 30 MIN: CPT | Mod: S$GLB,,, | Performed by: INTERNAL MEDICINE

## 2018-02-20 PROCEDURE — 85651 RBC SED RATE NONAUTOMATED: CPT

## 2018-02-20 PROCEDURE — 1125F AMNT PAIN NOTED PAIN PRSNT: CPT | Mod: S$GLB,,, | Performed by: INTERNAL MEDICINE

## 2018-02-20 PROCEDURE — 85025 COMPLETE CBC W/AUTO DIFF WBC: CPT

## 2018-02-20 PROCEDURE — 86140 C-REACTIVE PROTEIN: CPT

## 2018-02-20 PROCEDURE — 1159F MED LIST DOCD IN RCRD: CPT | Mod: S$GLB,,, | Performed by: INTERNAL MEDICINE

## 2018-02-20 PROCEDURE — 80053 COMPREHEN METABOLIC PANEL: CPT

## 2018-02-20 ASSESSMENT — ROUTINE ASSESSMENT OF PATIENT INDEX DATA (RAPID3)
PSYCHOLOGICAL DISTRESS SCORE: 1.1
AM STIFFNESS SCORE: 0, NO
FATIGUE SCORE: 10
MDHAQ FUNCTION SCORE: 0
PAIN SCORE: 9.5
TOTAL RAPID3 SCORE: 4.83
PATIENT GLOBAL ASSESSMENT SCORE: 5

## 2018-02-20 NOTE — PROGRESS NOTES
Subjective:       Patient ID: Betzaida Neumann is a 77 y.o. female.    Chief Complaint: Rheumatoid Arthritis and Disease Management      HPI:  Betzaida Neumann is a 77 y.o. female with history of joint pain all over since 2008. She has seen several rheumatologists. First was Dr. Lerma who thought she had bursitis.  This she saw Dr. Riddle who diagnosed rheumatoid arthritis and gave her Humira x 3 years which did not help. Humira caused her to feel like a zombie. She then went [facility] list Dr. Woo and Dr. Reeves (last rheumatologist).   He diagnosed a combination of rheumatoid arthritis, fibromyalgia and PMR.  She recalls taking methotrexate but it didn't help her. Her last rheumatologist was at [facility] and was giving her mainly pain pills.  She has been on prednisone since May 2016 due to ALLERGIES as prescribed by her ALLERGY doctor.   She also took tramadol and percocet but did not help.     Held Arava due to stomach upset.  Night sweats since 2008.        She went for second opinion which suggested that she be treated as PMR.       Currently taking 2.5 mg bid prednisone which controls pain.  Friday had severe pain.  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   Pain was in hands.  Did not improve Tylenol.  Improved with extra 2.5 mg prednisone.   Night sweats worsened with right hand swelling and pain.  Now 50% improved    Review of Systems   Constitutional: Positive for fatigue and unexpected weight change (5 pound weight loss since working).   HENT: Negative.    Eyes: Negative.    Respiratory: Negative.    Cardiovascular: Negative.    Gastrointestinal:        Heartburn   Endocrine: Negative.    Genitourinary: Negative.    Musculoskeletal: Positive for arthralgias and joint swelling.   Skin: Negative.    Allergic/Immunologic: Negative.    Neurological: Negative.    Hematological: Negative.    Psychiatric/Behavioral: Negative.          Objective:   BP (!) 166/87 (BP Location: Left arm, Patient Position: Sitting, BP  "Method: Small (Automatic))   Pulse 86   Temp 98.1 °F (36.7 °C) (Oral)   Ht 5' 2" (1.575 m)   Wt 75.1 kg (165 lb 8 oz)   BMI 30.27 kg/m²      Physical Exam   Constitutional: She is oriented to person, place, and time and well-developed, well-nourished, and in no distress.   HENT:   Head: Normocephalic and atraumatic.   Eyes: Conjunctivae and EOM are normal.   Neck: Neck supple.   Cardiovascular: Normal rate, regular rhythm and normal heart sounds.    Pulmonary/Chest: Effort normal and breath sounds normal.   Abdominal: Soft. Bowel sounds are normal.   Neurological: She is alert and oriented to person, place, and time.   Skin: Skin is warm and dry.     Psychiatric: Mood and affect normal.   Musculoskeletal: She exhibits edema (dorsum right hand).            LABS    Component      Latest Ref Rng & Units 2/2/2018 10/31/2017   WBC      3.90 - 12.70 K/uL  8.88   RBC      4.00 - 5.40 M/uL  4.15   Hemoglobin      12.0 - 16.0 g/dL  12.5   Hematocrit      37.0 - 48.5 %  39.3   MCV      82 - 98 fL  95   MCH      27.0 - 31.0 pg  30.1   MCHC      32.0 - 36.0 g/dL  31.8 (L)   RDW      11.5 - 14.5 %  13.3   Platelets      150 - 350 K/uL  243   MPV      9.2 - 12.9 fL  9.6   Immature Granulocytes      0.0 - 0.5 %  0.2   Gran # (ANC)      1.8 - 7.7 K/uL  6.2   Immature Grans (Abs)      0.00 - 0.04 K/uL  0.02   Lymph #      1.0 - 4.8 K/uL  2.0   Mono #      0.3 - 1.0 K/uL  0.4   Eos #      0.0 - 0.5 K/uL  0.2   Baso #      0.00 - 0.20 K/uL  0.04   nRBC      0 /100 WBC  0   Gran%      38.0 - 73.0 %  69.4   Lymph%      18.0 - 48.0 %  22.7   Mono%      4.0 - 15.0 %  4.8   Eosinophil%      0.0 - 8.0 %  2.4   Basophil%      0.0 - 1.9 %  0.5   Differential Method        Automated   Sodium      136 - 145 mmol/L 140 138   Potassium      3.5 - 5.1 mmol/L 3.9 3.7   Chloride      95 - 110 mmol/L 99 97   CO2      23 - 29 mmol/L 28 30 (H)   Glucose      70 - 110 mg/dL 78 90   BUN, Bld      8 - 23 mg/dL 13 14   Creatinine      0.5 - 1.4 mg/dL " 1.2 1.2   Calcium      8.7 - 10.5 mg/dL 9.9 9.7   Total Protein      6.0 - 8.4 g/dL  7.5   Albumin      3.5 - 5.2 g/dL 3.6 3.3 (L)   Total Bilirubin      0.1 - 1.0 mg/dL  0.5   Alkaline Phosphatase      55 - 135 U/L  69   AST      10 - 40 U/L  17   ALT      10 - 44 U/L  9 (L)   Anion Gap      8 - 16 mmol/L 13 11   eGFR if African American      >60 mL/min/1.73 m:2 51 (A) 50.7 (A)   eGFR if non African American      >60 mL/min/1.73 m:2 44 (A) 44.0 (A)   Phosphorus      2.7 - 4.5 mg/dL 2.6 (L)    Specimen UA       Urine, Clean Catch    Color, UA      Yellow, Straw, Lilia Yellow    Appearance, UA      Clear Clear    pH, UA      5.0 - 8.0 7.0    Specific Gravity, UA      1.005 - 1.030 1.010    Protein, UA      Negative Negative    Glucose, UA      Negative Negative    Ketones, UA      Negative Negative    Bilirubin (UA)      Negative Negative    Occult Blood UA      Negative Negative    Nitrite, UA      Negative Negative    Urobilinogen, UA      <2.0 EU/dL Negative    Leukocytes, UA      Negative Negative    Protein, Urine Random      0 - 15 mg/dL <7    Creatinine, Random Ur      15.0 - 325.0 mg/dL 75.0    Prot/Creat Ratio, Ur      0.00 - 0.20 Unable to calculate    CRP      0.0 - 8.2 mg/L  32.7 (H)   Sed Rate      0 - 20 mm/Hr  34 (H)     Assessment:       1. History of rheumatoid arthritis. Treated in the past with Humira and methotrexate without improvement.   Positive rheumatoid factor in our lab and an elevated sedimentation rate. Patient now with right hand pain and swelling (DDX RA, gout and PMR)  2. History of polymyalgia rheumatica. Patient describes mainly all over body ache and notes that prednisone has helped a great deal.  The response to prednisone is consistent with polymyalgia rheumatica.  3. Fibromyalgia.  4. Fatigue  5. Obesity. Patient with 30 pound weight gain since starting prednisone.  6. Recurrent URI now with congestion.  Recurrent sinus infections.  Now under control since 2016  7. Night sweats.   Persistent  8. HTN.   9. Hyperuricemia  Plan:       1. Check labs; next lab check uric acid.  2. Continue hold Arava due to stomach upset  3. Continue prednisone 2.5 mg bid.  In one month decrease prednisone to 2 mg bid   4. Follow with primary doctor regarding elevated BP.  5. Ice hand.  Monitor symptoms.  Consider treating as gout in future since hyperuricemic with asymetrical episodic events        RTO  4 months.

## 2018-02-22 ENCOUNTER — PATIENT MESSAGE (OUTPATIENT)
Dept: RHEUMATOLOGY | Facility: CLINIC | Age: 78
End: 2018-02-22

## 2018-02-22 NOTE — TELEPHONE ENCOUNTER
Labs show very elevated ESR and CRP.  DDX gout versus RA flare.  Emailed patient and left message on cell phone for her to contact office.  If symptoms persist will increase prednisone.

## 2018-03-04 DIAGNOSIS — M35.3 PMR (POLYMYALGIA RHEUMATICA): ICD-10-CM

## 2018-03-05 RX ORDER — PREDNISONE 2.5 MG/1
TABLET ORAL
Qty: 60 TABLET | Refills: 0 | Status: SHIPPED | OUTPATIENT
Start: 2018-03-05 | End: 2018-03-22 | Stop reason: SDUPTHER

## 2018-03-05 NOTE — TELEPHONE ENCOUNTER
----- Message from Gudelia Riddle MA sent at 3/5/2018  3:41 PM CST -----  Contact: self      ----- Message -----  From: Rosemarie Ba  Sent: 3/5/2018   3:39 PM  To: Tiburcio [facility] Staff    Patient states she is having a gout flare up and ask for something to be called in to the pharmacy Patient can be reached at

## 2018-03-05 NOTE — TELEPHONE ENCOUNTER
Spoke with patient who reports flare in left shoulder that has moved to right arm.  She took 7.5 mg prednisone Saturday.  Now on 2.5 mg prednisone daily.  Will send refill.  She was instructed to continue 2.5 mg but can increase to 5 mg if pain return. She will send update in one week.

## 2018-03-22 DIAGNOSIS — M35.3 PMR (POLYMYALGIA RHEUMATICA): ICD-10-CM

## 2018-03-23 RX ORDER — PREDNISONE 2.5 MG/1
TABLET ORAL
Qty: 60 TABLET | Refills: 0 | Status: SHIPPED | OUTPATIENT
Start: 2018-03-23 | End: 2018-03-23 | Stop reason: SDUPTHER

## 2018-03-23 RX ORDER — PREDNISONE 2.5 MG/1
TABLET ORAL
Qty: 60 TABLET | Refills: 2 | Status: SHIPPED | OUTPATIENT
Start: 2018-03-23 | End: 2018-09-17

## 2018-03-23 NOTE — TELEPHONE ENCOUNTER
----- Message from Gudelia Riddle MA sent at 3/23/2018  9:33 AM CDT -----  Contact: self      ----- Message -----  From: Elissa Best  Sent: 3/23/2018   9:26 AM  To: Tiburcio [facility] Staff    Pt is requesting for a refill for predniSONE (DELTASONE) 2.5 MG tablet to be sent [facility] [place] [address] AT [facility] [address] [phone] (phone) [phone] (Fax)

## 2018-03-28 RX ORDER — HYDROCHLOROTHIAZIDE 25 MG/1
TABLET ORAL
Qty: 90 TABLET | Refills: 0 | Status: SHIPPED | OUTPATIENT
Start: 2018-03-28 | End: 2018-05-22 | Stop reason: SDUPTHER

## 2018-04-24 ENCOUNTER — OFFICE VISIT (OUTPATIENT)
Dept: INTERNAL MEDICINE | Facility: CLINIC | Age: 78
End: 2018-04-24
Payer: MEDICARE

## 2018-04-24 VITALS
HEIGHT: 62 IN | OXYGEN SATURATION: 96 % | TEMPERATURE: 98 F | WEIGHT: 165.13 LBS | SYSTOLIC BLOOD PRESSURE: 160 MMHG | BODY MASS INDEX: 30.39 KG/M2 | DIASTOLIC BLOOD PRESSURE: 84 MMHG | HEART RATE: 88 BPM

## 2018-04-24 DIAGNOSIS — R61 UNEXPLAINED NIGHT SWEATS: Primary | ICD-10-CM

## 2018-04-24 PROCEDURE — 3079F DIAST BP 80-89 MM HG: CPT | Mod: CPTII,S$GLB,, | Performed by: INTERNAL MEDICINE

## 2018-04-24 PROCEDURE — 99214 OFFICE O/P EST MOD 30 MIN: CPT | Mod: S$GLB,,, | Performed by: INTERNAL MEDICINE

## 2018-04-24 PROCEDURE — 3077F SYST BP >= 140 MM HG: CPT | Mod: CPTII,S$GLB,, | Performed by: INTERNAL MEDICINE

## 2018-04-24 PROCEDURE — 99999 PR PBB SHADOW E&M-EST. PATIENT-LVL IV: CPT | Mod: PBBFAC,,, | Performed by: INTERNAL MEDICINE

## 2018-04-24 RX ORDER — LOSARTAN POTASSIUM 25 MG/1
25 TABLET ORAL DAILY
Qty: 30 TABLET | Refills: 3 | Status: SHIPPED | OUTPATIENT
Start: 2018-04-24 | End: 2018-07-17

## 2018-04-24 RX ORDER — MONTELUKAST SODIUM 4 MG/1
4 TABLET, CHEWABLE ORAL NIGHTLY
Qty: 30 TABLET | Refills: 3
Start: 2018-04-24 | End: 2018-05-24

## 2018-04-30 RX ORDER — PREDNISONE 10 MG/1
10 TABLET ORAL DAILY
Qty: 30 TABLET | Refills: 0 | Status: SHIPPED | OUTPATIENT
Start: 2018-04-30 | End: 2018-05-31 | Stop reason: SDUPTHER

## 2018-04-30 NOTE — PROGRESS NOTES
Subjective:       Patient ID: Betzaida Neumann is a 78 y.o. female.    Chief Complaint: Follow-up    HPIPt still with night sweats.she describes as drenching at least a few times per week for a few years.  BP is elevated.  Allergy is worse. No Cp or SOB.  Review of Systems   Respiratory: Negative for shortness of breath (PND or orthopnea).    Cardiovascular: Negative for chest pain (arm pain or jaw pain).   Gastrointestinal: Negative for abdominal pain, diarrhea, nausea and vomiting.   Genitourinary: Negative for dysuria.   Neurological: Negative for seizures, syncope and headaches.       Objective:      Physical Exam   Constitutional: She is oriented to person, place, and time. She appears well-developed and well-nourished. No distress.   HENT:   Head: Normocephalic.   Mouth/Throat: Oropharynx is clear and moist.   Neck: Neck supple. No JVD present. No thyromegaly present.   Cardiovascular: Normal rate, regular rhythm, normal heart sounds and intact distal pulses.  Exam reveals no gallop and no friction rub.    No murmur heard.  Pulmonary/Chest: Effort normal and breath sounds normal. She has no wheezes. She has no rales.   Abdominal: Soft. Bowel sounds are normal. She exhibits no distension and no mass. There is no tenderness. There is no rebound and no guarding.   Musculoskeletal: She exhibits no edema.   Lymphadenopathy:     She has no cervical adenopathy.   Neurological: She is alert and oriented to person, place, and time. She has normal reflexes.   Skin: Skin is warm and dry.   Psychiatric: She has a normal mood and affect. Her behavior is normal. Judgment and thought content normal.       Assessment:       1. Unexplained night sweats        Plan:   Unexplained night sweats  -     CBC auto differential; Future; Expected date: 04/24/2018  -     Comprehensive metabolic panel; Future; Expected date: 04/24/2018  -     Sedimentation rate, manual; Future; Expected date: 04/24/2018  -     C-reactive protein; Future;  Expected date: 04/24/2018  -     HIV-1 and HIV-2 antibodies; Future; Expected date: 04/24/2018  -     CT Renal Stone Study ABD Pelvis WO; Future; Expected date: 04/24/2018    HTN  -     losartan (COZAAR) 25 MG tablet; Take 1 tablet (25 mg total) by mouth once daily.  Dispense: 30 tablet; Refill: 3  Chronic rhinitis  -     montelukast 4 MG chewable tablet; Take 1 tablet (4 mg total) by mouth every evening.  Dispense: 30 tablet; Refill: 3

## 2018-05-08 ENCOUNTER — HOSPITAL ENCOUNTER (OUTPATIENT)
Dept: RADIOLOGY | Facility: OTHER | Age: 78
Discharge: HOME | End: 2018-05-08
Attending: INTERNAL MEDICINE
Payer: MEDICARE

## 2018-05-08 DIAGNOSIS — R61 UNEXPLAINED NIGHT SWEATS: ICD-10-CM

## 2018-05-08 PROCEDURE — 74176 CT ABD & PELVIS W/O CONTRAST: CPT | Mod: 26,,, | Performed by: RADIOLOGY

## 2018-05-08 PROCEDURE — 74176 CT ABD & PELVIS W/O CONTRAST: CPT | Mod: TC

## 2018-05-22 ENCOUNTER — OFFICE VISIT (OUTPATIENT)
Dept: INTERNAL MEDICINE | Facility: CLINIC | Age: 78
End: 2018-05-22
Payer: MEDICARE

## 2018-05-22 VITALS
DIASTOLIC BLOOD PRESSURE: 80 MMHG | HEIGHT: 62 IN | BODY MASS INDEX: 30.91 KG/M2 | SYSTOLIC BLOOD PRESSURE: 150 MMHG | TEMPERATURE: 98 F | WEIGHT: 168 LBS | HEART RATE: 88 BPM | OXYGEN SATURATION: 98 %

## 2018-05-22 DIAGNOSIS — R61 CHRONIC NIGHT SWEATS: ICD-10-CM

## 2018-05-22 DIAGNOSIS — M35.3 PMR (POLYMYALGIA RHEUMATICA): ICD-10-CM

## 2018-05-22 DIAGNOSIS — I10 HYPERTENSION, ESSENTIAL: Primary | ICD-10-CM

## 2018-05-22 PROCEDURE — 99999 PR PBB SHADOW E&M-EST. PATIENT-LVL IV: CPT | Mod: PBBFAC,,, | Performed by: INTERNAL MEDICINE

## 2018-05-22 PROCEDURE — 3077F SYST BP >= 140 MM HG: CPT | Mod: CPTII,S$GLB,, | Performed by: INTERNAL MEDICINE

## 2018-05-22 PROCEDURE — 99213 OFFICE O/P EST LOW 20 MIN: CPT | Mod: S$GLB,,, | Performed by: INTERNAL MEDICINE

## 2018-05-22 PROCEDURE — 3079F DIAST BP 80-89 MM HG: CPT | Mod: CPTII,S$GLB,, | Performed by: INTERNAL MEDICINE

## 2018-05-22 RX ORDER — HYDROCHLOROTHIAZIDE 25 MG/1
TABLET ORAL
Qty: 90 TABLET | Refills: 1 | Status: SHIPPED | OUTPATIENT
Start: 2018-05-22 | End: 2018-08-21

## 2018-05-28 NOTE — PROGRESS NOTES
Subjective:       Patient ID: Betzaida Neumann is a 78 y.o. female.    Chief Complaint: Follow-up    HPIPt here to discuss CT abd pelvis results which show nothing concerning - no enlarged LN.  SHe is tolerating losartan but stopped the HCTZ - advised she needs to do both for BP control.  Review of Systems   Respiratory: Negative for shortness of breath (PND or orthopnea).    Cardiovascular: Negative for chest pain (arm pain or jaw pain).   Gastrointestinal: Negative for abdominal pain, diarrhea, nausea and vomiting.   Genitourinary: Negative for dysuria.   Neurological: Negative for seizures, syncope and headaches.       Objective:      Physical Exam   Constitutional: She is oriented to person, place, and time. She appears well-developed and well-nourished. No distress.   HENT:   Head: Normocephalic.   Mouth/Throat: Oropharynx is clear and moist.   Neck: Neck supple. No JVD present. No thyromegaly present.   Cardiovascular: Normal rate, regular rhythm, normal heart sounds and intact distal pulses.  Exam reveals no gallop and no friction rub.    No murmur heard.  Pulmonary/Chest: Effort normal and breath sounds normal. She has no wheezes. She has no rales.   Abdominal: Soft. Bowel sounds are normal. She exhibits no distension and no mass. There is no tenderness. There is no rebound and no guarding.   Musculoskeletal: She exhibits no edema.   Lymphadenopathy:     She has no cervical adenopathy.   Neurological: She is alert and oriented to person, place, and time. She has normal reflexes.   Skin: Skin is warm and dry.   Psychiatric: She has a normal mood and affect. Her behavior is normal. Judgment and thought content normal.       Assessment:       1. Hypertension, essential    2. PMR (polymyalgia rheumatica)    3. Chronic night sweats        Plan:   Hypertension, essential  Uncontrolled continue losartan then restart HCTZ  PMR (polymyalgia rheumatica)  On chronic prednisone  Chronic night sweats  Ongoing for 10  years - seen by Rheum and ID - lab and scans unrevealing - offered appt with hematology she declined as she is in a good period in which they are not as frequent now  Other orders  -     hydroCHLOROthiazide (HYDRODIURIL) 25 MG tablet; TAKE 1 TABLET(25 MG) BY MOUTH EVERY DAY  Dispense: 90 tablet; Refill: 1  -     (In Office Administered) Zoster Recombinant Vaccine  -     (In Office Administered) Zoster Recombinant Vaccine

## 2018-05-31 DIAGNOSIS — M35.3 PMR (POLYMYALGIA RHEUMATICA): ICD-10-CM

## 2018-06-01 RX ORDER — PREDNISONE 10 MG/1
TABLET ORAL
Qty: 30 TABLET | Refills: 2 | Status: SHIPPED | OUTPATIENT
Start: 2018-06-01 | End: 2018-08-21

## 2018-06-01 NOTE — TELEPHONE ENCOUNTER
Patient reports she is taking 5 mg twice daily.  She reports being sick after mother passed away and requiring her to increase from 2.5 mg bid.  June 12 at [facility] at 10 am.

## 2018-06-01 NOTE — TELEPHONE ENCOUNTER
----- Message from Aminah Mishra sent at 6/1/2018  9:59 AM CDT -----  Contact: Pt  Pt says ab need to get a refill on her medication says the prescription should be 10MG days she called yesterday and no response says she is out of medication now    predniSONE (DELTASONE) 2.5 MG tablet    Pt asked that the medication is sent to the [facility] on [address]     Pt can be reached at [phone]    Thanks

## 2018-06-12 ENCOUNTER — LAB VISIT (OUTPATIENT)
Dept: LAB | Facility: OTHER | Age: 78
End: 2018-06-12
Attending: INTERNAL MEDICINE
Payer: MEDICARE

## 2018-06-12 DIAGNOSIS — M05.79 RHEUMATOID ARTHRITIS INVOLVING MULTIPLE SITES WITH POSITIVE RHEUMATOID FACTOR: ICD-10-CM

## 2018-06-12 LAB
ALBUMIN SERPL BCP-MCNC: 3.3 G/DL
ALP SERPL-CCNC: 64 U/L
ALT SERPL W/O P-5'-P-CCNC: 13 U/L
ANION GAP SERPL CALC-SCNC: 13 MMOL/L
AST SERPL-CCNC: 17 U/L
BASOPHILS # BLD AUTO: 0.02 K/UL
BASOPHILS NFR BLD: 0.2 %
BILIRUB SERPL-MCNC: 0.5 MG/DL
BUN SERPL-MCNC: 14 MG/DL
CALCIUM SERPL-MCNC: 9.7 MG/DL
CHLORIDE SERPL-SCNC: 103 MMOL/L
CO2 SERPL-SCNC: 25 MMOL/L
CREAT SERPL-MCNC: 1.2 MG/DL
CRP SERPL-MCNC: 26.3 MG/L
DIFFERENTIAL METHOD: ABNORMAL
EOSINOPHIL # BLD AUTO: 0.3 K/UL
EOSINOPHIL NFR BLD: 3.2 %
ERYTHROCYTE [DISTWIDTH] IN BLOOD BY AUTOMATED COUNT: 14.5 %
ERYTHROCYTE [SEDIMENTATION RATE] IN BLOOD: 29 MM/HR
EST. GFR  (AFRICAN AMERICAN): 50 ML/MIN/1.73 M^2
EST. GFR  (NON AFRICAN AMERICAN): 44 ML/MIN/1.73 M^2
GLUCOSE SERPL-MCNC: 115 MG/DL
HCT VFR BLD AUTO: 38.5 %
HGB BLD-MCNC: 11.8 G/DL
LYMPHOCYTES # BLD AUTO: 3.6 K/UL
LYMPHOCYTES NFR BLD: 34.2 %
MCH RBC QN AUTO: 30.1 PG
MCHC RBC AUTO-ENTMCNC: 30.6 G/DL
MCV RBC AUTO: 98 FL
MONOCYTES # BLD AUTO: 0.6 K/UL
MONOCYTES NFR BLD: 5.6 %
NEUTROPHILS # BLD AUTO: 6 K/UL
NEUTROPHILS NFR BLD: 56.5 %
PLATELET # BLD AUTO: 274 K/UL
PMV BLD AUTO: 9.8 FL
POTASSIUM SERPL-SCNC: 3.5 MMOL/L
PROT SERPL-MCNC: 6.8 G/DL
RBC # BLD AUTO: 3.92 M/UL
SODIUM SERPL-SCNC: 141 MMOL/L
WBC # BLD AUTO: 10.61 K/UL

## 2018-06-12 PROCEDURE — 85651 RBC SED RATE NONAUTOMATED: CPT

## 2018-06-12 PROCEDURE — 86140 C-REACTIVE PROTEIN: CPT

## 2018-06-12 PROCEDURE — 80053 COMPREHEN METABOLIC PANEL: CPT

## 2018-06-12 PROCEDURE — 36415 COLL VENOUS BLD VENIPUNCTURE: CPT

## 2018-06-12 PROCEDURE — 85025 COMPLETE CBC W/AUTO DIFF WBC: CPT

## 2018-06-13 ENCOUNTER — PATIENT MESSAGE (OUTPATIENT)
Dept: RHEUMATOLOGY | Facility: CLINIC | Age: 78
End: 2018-06-13

## 2018-07-17 ENCOUNTER — OFFICE VISIT (OUTPATIENT)
Dept: INTERNAL MEDICINE | Facility: CLINIC | Age: 78
End: 2018-07-17
Payer: MEDICARE

## 2018-07-17 VITALS
OXYGEN SATURATION: 99 % | BODY MASS INDEX: 30.83 KG/M2 | SYSTOLIC BLOOD PRESSURE: 152 MMHG | TEMPERATURE: 98 F | HEIGHT: 62 IN | HEART RATE: 81 BPM | DIASTOLIC BLOOD PRESSURE: 82 MMHG | WEIGHT: 167.56 LBS

## 2018-07-17 DIAGNOSIS — M35.3 PMR (POLYMYALGIA RHEUMATICA): ICD-10-CM

## 2018-07-17 DIAGNOSIS — I10 HYPERTENSION, ESSENTIAL: Primary | ICD-10-CM

## 2018-07-17 PROCEDURE — 99999 PR PBB SHADOW E&M-EST. PATIENT-LVL IV: CPT | Mod: PBBFAC,,, | Performed by: INTERNAL MEDICINE

## 2018-07-17 PROCEDURE — 3079F DIAST BP 80-89 MM HG: CPT | Mod: CPTII,S$GLB,, | Performed by: INTERNAL MEDICINE

## 2018-07-17 PROCEDURE — 99213 OFFICE O/P EST LOW 20 MIN: CPT | Mod: S$GLB,,, | Performed by: INTERNAL MEDICINE

## 2018-07-17 PROCEDURE — 3077F SYST BP >= 140 MM HG: CPT | Mod: CPTII,S$GLB,, | Performed by: INTERNAL MEDICINE

## 2018-07-17 RX ORDER — AMOXICILLIN 875 MG/1
875 TABLET, FILM COATED ORAL EVERY 12 HOURS
Qty: 20 TABLET | Refills: 0 | Status: SHIPPED | OUTPATIENT
Start: 2018-07-17 | End: 2018-08-21

## 2018-07-17 RX ORDER — LOSARTAN POTASSIUM 50 MG/1
50 TABLET ORAL DAILY
Qty: 90 TABLET | Refills: 3 | Status: SHIPPED | OUTPATIENT
Start: 2018-07-17 | End: 2018-08-21

## 2018-07-23 NOTE — PROGRESS NOTES
Subjective:       Patient ID: Betzaida Neumann is a 78 y.o. female.    Chief Complaint: Follow-up    HPIPt feeling okay but Bp is still elevated. Denies any Cp or SOB.  No HA.  Pt with sinus congestion, scratchy throat and ear pain.  Review of Systems   Respiratory: Negative for shortness of breath (PND or orthopnea).    Cardiovascular: Negative for chest pain (arm pain or jaw pain).   Gastrointestinal: Negative for abdominal pain, diarrhea, nausea and vomiting.   Genitourinary: Negative for dysuria.   Neurological: Negative for seizures, syncope and headaches.       Objective:      Physical Exam   Constitutional: She is oriented to person, place, and time. She appears well-developed and well-nourished. No distress.   HENT:   Head: Normocephalic.   Oropharynx with cobblestoning    R TM with some fluid   Neck: Neck supple. No JVD present. No thyromegaly present.   Cardiovascular: Normal rate, regular rhythm, normal heart sounds and intact distal pulses.  Exam reveals no gallop and no friction rub.    No murmur heard.  Pulmonary/Chest: Effort normal and breath sounds normal. She has no wheezes. She has no rales.   Abdominal: Soft. Bowel sounds are normal. She exhibits no distension and no mass. There is no tenderness. There is no rebound and no guarding.   Musculoskeletal: She exhibits no edema.   Lymphadenopathy:     She has no cervical adenopathy.   Neurological: She is alert and oriented to person, place, and time. She has normal reflexes.   Skin: Skin is warm and dry.   Psychiatric: She has a normal mood and affect. Her behavior is normal. Judgment and thought content normal.       Assessment:       1. Hypertension, essential    2. PMR (polymyalgia rheumatica)        Plan:   Hypertension, essential  Uncontrolled increase losartan  PMR (polymyalgia rheumatica)  On low dose steroids - stable  Other orders  -     losartan (COZAAR) 50 MG tablet; Take 1 tablet (50 mg total) by mouth once daily.  Dispense: 90 tablet;  Refill: 3  Sinusitis  -     amoxicillin (AMOXIL) 875 MG tablet; Take 1 tablet (875 mg total) by mouth every 12 (twelve) hours.  Dispense: 20 tablet; Refill: 0

## 2018-08-14 ENCOUNTER — TELEPHONE (OUTPATIENT)
Dept: RHEUMATOLOGY | Facility: CLINIC | Age: 78
End: 2018-08-14

## 2018-08-14 NOTE — TELEPHONE ENCOUNTER
----- Message from Sarah Jin LPN sent at 8/13/2018  8:21 PM CDT -----  Contact: Self @ [phone]   Ophelia-    Linda to email you bunches. Plese add a reminder for this, once you send to Dr. Moore..    Thanks,Saarh  ----- Message -----  From: Homero Sawyer  Sent: 8/13/2018   2:34 PM  To: Tiburcio [facility] Staff    Pt states that she would like her appointment on the 8/21 to be any time after 11 am pt states she will not be able to drive and make it on time that early

## 2018-08-15 NOTE — TELEPHONE ENCOUNTER
Staff to inform patient that I do not have any availability on the day she is requesting.  If she is unable to come at the times I am available in clinic she may switch to a different rheumatologist in our practice with more availability.

## 2018-08-16 ENCOUNTER — TELEPHONE (OUTPATIENT)
Dept: RHEUMATOLOGY | Facility: CLINIC | Age: 78
End: 2018-08-16

## 2018-08-21 ENCOUNTER — OFFICE VISIT (OUTPATIENT)
Dept: RHEUMATOLOGY | Facility: CLINIC | Age: 78
End: 2018-08-21
Payer: MEDICARE

## 2018-08-21 ENCOUNTER — LAB VISIT (OUTPATIENT)
Dept: LAB | Facility: HOSPITAL | Age: 78
End: 2018-08-21
Attending: INTERNAL MEDICINE
Payer: MEDICARE

## 2018-08-21 VITALS
BODY MASS INDEX: 31.1 KG/M2 | HEIGHT: 62 IN | HEART RATE: 89 BPM | WEIGHT: 169 LBS | DIASTOLIC BLOOD PRESSURE: 89 MMHG | SYSTOLIC BLOOD PRESSURE: 170 MMHG

## 2018-08-21 DIAGNOSIS — M35.3 PMR (POLYMYALGIA RHEUMATICA): Primary | ICD-10-CM

## 2018-08-21 DIAGNOSIS — M79.7 FIBROMYALGIA: ICD-10-CM

## 2018-08-21 DIAGNOSIS — M05.79 RHEUMATOID ARTHRITIS INVOLVING MULTIPLE SITES WITH POSITIVE RHEUMATOID FACTOR: ICD-10-CM

## 2018-08-21 DIAGNOSIS — M35.3 PMR (POLYMYALGIA RHEUMATICA): ICD-10-CM

## 2018-08-21 DIAGNOSIS — G47.9 SLEEP DISORDER: ICD-10-CM

## 2018-08-21 LAB
CRP SERPL-MCNC: 50.3 MG/L
ERYTHROCYTE [SEDIMENTATION RATE] IN BLOOD BY WESTERGREN METHOD: 32 MM/HR
TSH SERPL DL<=0.005 MIU/L-ACNC: 1.12 UIU/ML
URATE SERPL-MCNC: 11.4 MG/DL

## 2018-08-21 PROCEDURE — 84443 ASSAY THYROID STIM HORMONE: CPT

## 2018-08-21 PROCEDURE — 99214 OFFICE O/P EST MOD 30 MIN: CPT | Mod: S$GLB,,, | Performed by: INTERNAL MEDICINE

## 2018-08-21 PROCEDURE — 84550 ASSAY OF BLOOD/URIC ACID: CPT

## 2018-08-21 PROCEDURE — 85652 RBC SED RATE AUTOMATED: CPT

## 2018-08-21 PROCEDURE — 86140 C-REACTIVE PROTEIN: CPT

## 2018-08-21 PROCEDURE — 3079F DIAST BP 80-89 MM HG: CPT | Mod: CPTII,S$GLB,, | Performed by: INTERNAL MEDICINE

## 2018-08-21 PROCEDURE — 3077F SYST BP >= 140 MM HG: CPT | Mod: CPTII,S$GLB,, | Performed by: INTERNAL MEDICINE

## 2018-08-21 PROCEDURE — 99499 UNLISTED E&M SERVICE: CPT | Mod: S$GLB,,, | Performed by: INTERNAL MEDICINE

## 2018-08-21 PROCEDURE — 36415 COLL VENOUS BLD VENIPUNCTURE: CPT

## 2018-08-21 PROCEDURE — 99999 PR PBB SHADOW E&M-EST. PATIENT-LVL III: CPT | Mod: PBBFAC,,, | Performed by: INTERNAL MEDICINE

## 2018-08-21 RX ORDER — ALBUTEROL SULFATE 0.83 MG/ML
2.5 SOLUTION RESPIRATORY (INHALATION) EVERY 6 HOURS PRN
COMMUNITY

## 2018-08-21 RX ORDER — ESTRADIOL 2 MG/1
TABLET ORAL
COMMUNITY
End: 2022-11-29

## 2018-08-21 RX ORDER — LOSARTAN POTASSIUM 25 MG/1
25 TABLET ORAL DAILY
COMMUNITY
End: 2018-10-12

## 2018-08-21 RX ORDER — MONTELUKAST SODIUM 10 MG/1
10 TABLET ORAL NIGHTLY
COMMUNITY

## 2018-08-21 RX ORDER — HYDROCODONE BITARTRATE AND ACETAMINOPHEN 5; 325 MG/1; MG/1
TABLET ORAL
COMMUNITY
End: 2018-08-21

## 2018-08-21 RX ORDER — GABAPENTIN 100 MG/1
100 CAPSULE ORAL 3 TIMES DAILY
Qty: 90 CAPSULE | Refills: 3 | Status: SHIPPED | OUTPATIENT
Start: 2018-08-21 | End: 2018-09-21

## 2018-08-21 RX ORDER — HYDROCHLOROTHIAZIDE 25 MG/1
TABLET ORAL
COMMUNITY
End: 2018-08-21

## 2018-08-21 RX ORDER — CETIRIZINE HYDROCHLORIDE 10 MG/1
10 TABLET ORAL DAILY
COMMUNITY
End: 2021-03-30

## 2018-08-21 RX ORDER — PREDNISONE 20 MG/1
TABLET ORAL
COMMUNITY
Start: 2018-07-18 | End: 2018-08-21

## 2018-08-21 RX ORDER — FLUTICASONE PROPIONATE 50 MCG
SPRAY, SUSPENSION (ML) NASAL
COMMUNITY

## 2018-08-21 RX ORDER — HYDROCODONE BITARTRATE AND ACETAMINOPHEN 7.5; 325 MG/1; MG/1
TABLET ORAL
COMMUNITY
End: 2018-10-17

## 2018-08-21 ASSESSMENT — ROUTINE ASSESSMENT OF PATIENT INDEX DATA (RAPID3)
TOTAL RAPID3 SCORE: 7.66
PAIN SCORE: 6
FATIGUE SCORE: 7.5
AM STIFFNESS SCORE: 0, NO
PATIENT GLOBAL ASSESSMENT SCORE: 7
MDHAQ FUNCTION SCORE: 3

## 2018-08-21 NOTE — PROGRESS NOTES
Chief Complaint   Patient presents with    Disease Management    Pain       Patient used TO see  and she is here for a follow up    History of presenting illness    78 year ol black female has  Joint pain since 2008    She has seen several rheumatologists. First was Dr. Lerma who thought she had bursitis.Then she saw Dr. Riddle who diagnosed rheumatoid arthritis and gave her Humira x 3 years which did not help. Humira caused her to feel like a zombie. She then went to [facility] : saw Dr. Woo and Dr. Reeves   He diagnosed a combination of rheumatoid arthritis, fibromyalgia and PMR.  She recalls taking methotrexate but it didn't help her. At one point she only took pain pills.She took mtx for 4 years  She has been on prednisone since May 2016 due to ALLERGIES as prescribed by her ALLERGY doctor.   She also took tramadol and percocet but did not help.     Held Arava due to stomach upset.  Night sweats since 2008.    Then we were treating her as PMR.     She has been on prednisone 2.5 mg BID    She took lyrica in the past and that didn't help    June 2018  ESR 29  CRP 26.3  CBC : grossly normal  CMP : GFR 50    Complaints today    Fatigue  Pain all over  No swelling  Once she had right hand pain and swelling,then she had left ankle pain and swelling    Now gets muscle spasms     Past history : HTN,allergic rhinitis,CKD stage 3,GERD,PMR,RA,FMS,elevated inflammatory markers,asthma     Family history : no autoimmune illness    Social history : former smoker 1999      Review of Systems   Constitutional: Negative for activity change, appetite change, chills, diaphoresis, fatigue, fever and unexpected weight change.   HENT: Negative for congestion, dental problem, drooling, ear discharge, ear pain, facial swelling, hearing loss, mouth sores, nosebleeds, postnasal drip, rhinorrhea, sinus pressure, sinus pain, sneezing, sore throat, tinnitus, trouble swallowing and voice change.    Eyes: Negative for photophobia,  pain, discharge, redness, itching and visual disturbance.   Respiratory: Negative for apnea, cough, choking, chest tightness, shortness of breath, wheezing and stridor.    Cardiovascular: Negative for chest pain, palpitations and leg swelling.   Gastrointestinal: Negative for abdominal distention, abdominal pain, anal bleeding, blood in stool, constipation, diarrhea, nausea, rectal pain and vomiting.   Endocrine: Negative for cold intolerance, heat intolerance, polydipsia, polyphagia and polyuria.   Genitourinary: Negative for decreased urine volume, difficulty urinating, dysuria, enuresis, flank pain, frequency, genital sores, hematuria and urgency.   Musculoskeletal: Positive for arthralgias. Negative for back pain, gait problem, joint swelling, myalgias, neck pain and neck stiffness.   Skin: Negative for color change, pallor, rash and wound.   Allergic/Immunologic: Negative for environmental allergies, food allergies and immunocompromised state.   Neurological: Negative for dizziness, tremors, seizures, syncope, facial asymmetry, speech difficulty, weakness, light-headedness, numbness and headaches.   Hematological: Negative for adenopathy. Does not bruise/bleed easily.   Psychiatric/Behavioral: Negative for agitation, behavioral problems, confusion, decreased concentration, dysphoric mood, hallucinations, self-injury, sleep disturbance and suicidal ideas. The patient is not nervous/anxious and is not hyperactive.      Physical Exam     ARENAS-28 tender joint count: 0  ARENAS-28 swollen joint count: 0    Physical Exam   Constitutional: She is oriented to person, place, and time and well-developed, well-nourished, and in no distress. No distress.   HENT:   Head: Normocephalic.   Mouth/Throat: Oropharynx is clear and moist.   Eyes: Conjunctivae are normal. Pupils are equal, round, and reactive to light. Right eye exhibits no discharge. Left eye exhibits no discharge. No scleral icterus.   Neck: Normal range of motion. No  thyromegaly present.   Cardiovascular: Normal rate, regular rhythm, normal heart sounds and intact distal pulses.    Pulmonary/Chest: Effort normal and breath sounds normal. No stridor.   Abdominal: Soft. Bowel sounds are normal.   Lymphadenopathy:     She has no cervical adenopathy.   Neurological: She is alert and oriented to person, place, and time.   Skin: Skin is warm. No rash noted. She is not diaphoretic.     Psychiatric: Affect and judgment normal.   Musculoskeletal: Normal range of motion.           Assessment     78 year old black female with     HTN,allergic rhinitis,CKD stage 3,GERD,PMR,RA,FMS,elevated inflammatory markers,asthma     She wants prednisone to survive with the joint pain    Does she have PMR  Does she have RA  Does she have fibromyalgia is the question    We have witnessed synovitis in the past,this made us think she has RA  We thought she rapidly responded to prednisone,hence comes the PMR  She always complains of overall body pain,hence she gets the fibromyalgia diagnosis    We have positive RF in the past  We have high uric acid in the past    TODAY HER COMPLAINTS : ALL OVER BODY PAIN,EVERYTHING HURTS AND ACHES,SHE FEELS LIKE SHE HAS THE FLU    1. PMR (polymyalgia rheumatica)    2. Fibromyalgia    3. Rheumatoid arthritis involving multiple sites with positive rheumatoid factor        Reviewed labs/xrays  Reviewed medications    F/u problem    Plan:        Keep the prednisone 2.5 mg bid    Gabapentin : 100 mg tid    Uric acid  Inflammatory markers    Check TSH : she says she has hair loss and she has splitting nails    Sleep study    Betzaida was seen today for disease management and pain.    Diagnoses and all orders for this visit:    PMR (polymyalgia rheumatica)  -     Sedimentation rate; Future  -     C-reactive protein; Standing  -     Uric acid; Future  -     TSH; Future    Fibromyalgia    Rheumatoid arthritis involving multiple sites with positive rheumatoid factor    Other orders  -      gabapentin (NEURONTIN) 100 MG capsule; Take 1 capsule (100 mg total) by mouth 3 (three) times daily.        Will see her every month : make sure : is there inflammatory arthritis? Does she need a steroid sparing agent? Does she need pain management?    Taper prednisone once I know more

## 2018-08-27 ENCOUNTER — DOCUMENTATION ONLY (OUTPATIENT)
Dept: RHEUMATOLOGY | Facility: CLINIC | Age: 78
End: 2018-08-27

## 2018-08-29 ENCOUNTER — TELEPHONE (OUTPATIENT)
Dept: RHEUMATOLOGY | Facility: CLINIC | Age: 78
End: 2018-08-29

## 2018-08-29 NOTE — TELEPHONE ENCOUNTER
----- Message from Macy Huntley sent at 8/29/2018 10:35 AM CDT -----  Contact: Self  Pt is calling about the sleep studies, can be reached @ home#

## 2018-09-13 RX ORDER — PREDNISONE 10 MG/1
TABLET ORAL
Qty: 30 TABLET | Refills: 0 | OUTPATIENT
Start: 2018-09-13

## 2018-09-17 ENCOUNTER — TELEPHONE (OUTPATIENT)
Dept: RHEUMATOLOGY | Facility: CLINIC | Age: 78
End: 2018-09-17

## 2018-09-17 DIAGNOSIS — M35.3 PMR (POLYMYALGIA RHEUMATICA): ICD-10-CM

## 2018-09-17 RX ORDER — PREDNISONE 2.5 MG/1
TABLET ORAL
Qty: 60 TABLET | Refills: 2 | Status: SHIPPED | OUTPATIENT
Start: 2018-09-17 | End: 2018-10-17 | Stop reason: SDUPTHER

## 2018-09-17 NOTE — TELEPHONE ENCOUNTER
----- Message from Ophelia Darnell MA sent at 9/14/2018  4:55 PM CDT -----  Contact: Self      ----- Message -----  From: Carolyn Lozano  Sent: 9/14/2018  12:03 PM  To: Tiburcio BERMEO Staff    Rx Refill/Request     Is this a Refill or New Rx:  Refill  Rx Name and Strength:  predniSONE (DELTASONE) 2.5 MG tablet  Preferred Pharmacy with phone number: [facility] [address] [facility] [address] PH: [phone]  Communication Preference: [phone]  Additional Information: Patient is completely out of medication

## 2018-09-21 ENCOUNTER — OFFICE VISIT (OUTPATIENT)
Dept: RHEUMATOLOGY | Facility: CLINIC | Age: 78
End: 2018-09-21
Payer: MEDICARE

## 2018-09-21 VITALS
WEIGHT: 168 LBS | DIASTOLIC BLOOD PRESSURE: 86 MMHG | HEART RATE: 90 BPM | HEIGHT: 62 IN | SYSTOLIC BLOOD PRESSURE: 173 MMHG | BODY MASS INDEX: 30.91 KG/M2

## 2018-09-21 DIAGNOSIS — M79.7 FIBROMYALGIA: ICD-10-CM

## 2018-09-21 DIAGNOSIS — M05.79 RHEUMATOID ARTHRITIS INVOLVING MULTIPLE SITES WITH POSITIVE RHEUMATOID FACTOR: ICD-10-CM

## 2018-09-21 DIAGNOSIS — M1A.09X0 IDIOPATHIC CHRONIC GOUT OF MULTIPLE SITES WITHOUT TOPHUS: Primary | ICD-10-CM

## 2018-09-21 PROCEDURE — 3077F SYST BP >= 140 MM HG: CPT | Mod: CPTII,,, | Performed by: INTERNAL MEDICINE

## 2018-09-21 PROCEDURE — 3079F DIAST BP 80-89 MM HG: CPT | Mod: CPTII,,, | Performed by: INTERNAL MEDICINE

## 2018-09-21 PROCEDURE — 1101F PT FALLS ASSESS-DOCD LE1/YR: CPT | Mod: CPTII,,, | Performed by: INTERNAL MEDICINE

## 2018-09-21 PROCEDURE — 99999 PR PBB SHADOW E&M-EST. PATIENT-LVL IV: CPT | Mod: PBBFAC,,, | Performed by: INTERNAL MEDICINE

## 2018-09-21 PROCEDURE — 99214 OFFICE O/P EST MOD 30 MIN: CPT | Mod: S$PBB,,, | Performed by: INTERNAL MEDICINE

## 2018-09-21 PROCEDURE — 99214 OFFICE O/P EST MOD 30 MIN: CPT | Mod: PBBFAC | Performed by: INTERNAL MEDICINE

## 2018-09-21 RX ORDER — COLCHICINE 0.6 MG/1
0.6 TABLET ORAL DAILY
Qty: 90 TABLET | Refills: 2 | Status: SHIPPED | OUTPATIENT
Start: 2018-09-21 | End: 2018-12-12

## 2018-09-21 RX ORDER — PREGABALIN 50 MG/1
CAPSULE ORAL
Qty: 180 CAPSULE | Refills: 2 | Status: SHIPPED | OUTPATIENT
Start: 2018-09-21 | End: 2018-10-17

## 2018-09-21 RX ORDER — ALLOPURINOL 100 MG/1
50 TABLET ORAL DAILY
Qty: 45 TABLET | Refills: 2 | Status: SHIPPED | OUTPATIENT
Start: 2018-09-21 | End: 2018-11-19 | Stop reason: SDUPTHER

## 2018-09-21 ASSESSMENT — ROUTINE ASSESSMENT OF PATIENT INDEX DATA (RAPID3)
MDHAQ FUNCTION SCORE: .8
AM STIFFNESS SCORE: 1, YES
WHEN YOU AWAKENED IN THE MORNING OVER THE LAST WEEK, PLEASE INDICATE THE AMOUNT OF TIME IT TAKES UNTIL YOU ARE AS LIMBER AS YOU WILL BE FOR THE DAY: 24HRS
TOTAL RAPID3 SCORE: 7.22
PATIENT GLOBAL ASSESSMENT SCORE: 9
PAIN SCORE: 10
FATIGUE SCORE: 10

## 2018-09-21 NOTE — PROGRESS NOTES
Chief Complaint   Patient presents with    Disease Management     patient with high inflammatory markers for a follow up       Patient with joint pains and high inflammatory markers for a follow up    History of presenting illness    78 year ol black female has    Joint pain since 2008    She has seen several rheumatologists. First was Dr. Lerma who thought she had bursitis.Then she saw Dr. Riddle who diagnosed rheumatoid arthritis and gave her Humira x 3 years which did not help. Humira caused her to feel like a zombie. She then went to [facility] : saw Dr. Woo and Dr. Reeves   He diagnosed a combination of rheumatoid arthritis, fibromyalgia and PMR.  She recalls taking methotrexate but it didn't help her. At one point she only took pain pills.She took mtx for 4 years  She has been on prednisone since May 2016 due to ALLERGIES as prescribed by her ALLERGY doctor.   She also took tramadol and percocet but did not help.     Held Arava due to stomach upset.  Night sweats since 2008.    Then we were treating her as PMR.     She has been on prednisone 2.5 mg BID    She took lyrica in the past and that didn't help    June 2018    ESR 29  CRP 26.3  CBC : grossly normal  CMP : GFR 50    Then her complaints were :     Fatigue  Pain all over  No swelling  Once she had right hand pain and swelling,then she had left ankle pain and swelling    Now gets muscle spasms     I checked her uric acid and it was 11.4  She had CRP of 50.3  Her ESR was normal    I offered her gabapentin and she didn't like it  She has stayed on prednisone 2.5 mg bid  She says she cannot stay for a day without the medication    Past history : HTN,allergic rhinitis,CKD stage 3,GERD,PMR,RA,FMS,elevated inflammatory markers,asthma     Family history : no autoimmune illness    Social history : former smoker 1999      Review of Systems   Constitutional: Negative for activity change, appetite change, chills, diaphoresis, fatigue, fever and unexpected weight  change.   HENT: Negative for congestion, dental problem, drooling, ear discharge, ear pain, facial swelling, hearing loss, mouth sores, nosebleeds, postnasal drip, rhinorrhea, sinus pressure, sinus pain, sneezing, sore throat, tinnitus, trouble swallowing and voice change.    Eyes: Negative for photophobia, pain, discharge, redness, itching and visual disturbance.   Respiratory: Negative for apnea, cough, choking, chest tightness, shortness of breath, wheezing and stridor.    Cardiovascular: Negative for chest pain, palpitations and leg swelling.   Gastrointestinal: Negative for abdominal distention, abdominal pain, anal bleeding, blood in stool, constipation, diarrhea, nausea, rectal pain and vomiting.   Endocrine: Negative for cold intolerance, heat intolerance, polydipsia, polyphagia and polyuria.   Genitourinary: Negative for decreased urine volume, difficulty urinating, dysuria, enuresis, flank pain, frequency, genital sores, hematuria and urgency.   Musculoskeletal: Positive for arthralgias. Negative for back pain, gait problem, joint swelling, myalgias, neck pain and neck stiffness.   Skin: Negative for color change, pallor, rash and wound.   Allergic/Immunologic: Negative for environmental allergies, food allergies and immunocompromised state.   Neurological: Negative for dizziness, tremors, seizures, syncope, facial asymmetry, speech difficulty, weakness, light-headedness, numbness and headaches.   Hematological: Negative for adenopathy. Does not bruise/bleed easily.   Psychiatric/Behavioral: Negative for agitation, behavioral problems, confusion, decreased concentration, dysphoric mood, hallucinations, self-injury, sleep disturbance and suicidal ideas. The patient is not nervous/anxious and is not hyperactive.      Physical Exam     ARENAS-28 tender joint count: 0  ARENAS-28 swollen joint count: 0    Physical Exam   Constitutional: She is oriented to person, place, and time and well-developed, well-nourished,  and in no distress. No distress.   HENT:   Head: Normocephalic.   Mouth/Throat: Oropharynx is clear and moist.   Eyes: Conjunctivae are normal. Pupils are equal, round, and reactive to light. Right eye exhibits no discharge. Left eye exhibits no discharge. No scleral icterus.   Neck: Normal range of motion. No thyromegaly present.   Cardiovascular: Normal rate, regular rhythm, normal heart sounds and intact distal pulses.    Pulmonary/Chest: Effort normal and breath sounds normal. No stridor.   Abdominal: Soft. Bowel sounds are normal.   Lymphadenopathy:     She has no cervical adenopathy.   Neurological: She is alert and oriented to person, place, and time.   Skin: Skin is warm. No rash noted. She is not diaphoretic.     Psychiatric: Affect and judgment normal.   Musculoskeletal: Normal range of motion.           Assessment     78 year old black female with     HTN,allergic rhinitis,CKD stage 3,GERD,PMR,RA,FMS,elevated inflammatory markers,asthma     She wants prednisone to survive with the joint pain    Does she have PMR  Does she have RA  Does she have fibromyalgia is the question    We have witnessed synovitis in the past,this made us think she has RA  We thought she rapidly responded to prednisone,hence comes the PMR  She always complains of overall body pain,hence she gets the fibromyalgia diagnosis    We have positive RF in the past  We have high uric acid    TODAY HER COMPLAINTS : ALL OVER BODY PAIN,EVERYTHING HURTS AND ACHES,SHE FEELS LIKE SHE HAS THE FLU  IN ADDITION SHE ALSO MENTIONS OF HAVING AN INFLAMMATORY ARTHRITIS     IS THIS GOUT + FIBROMYALGIA ?    1. Idiopathic chronic gout of multiple sites without tophus    2. Fibromyalgia    3. Rheumatoid arthritis involving multiple sites with positive rheumatoid factor        Reviewed labs/xrays  Reviewed medications    F/u problem    Plan:        Keep the prednisone 2.5 mg bid    Offer allopurinol 50 mg with colchicine 0.6 mg daily  Then titrate depending on  uric acid levels    Add lyrica 50 mg capsule and start with bid and titration  Max can be 450 mg only if tolerated    Uric acid  Inflammatory markers periodic check with CBC,CMP check    Sleep study    Betzaida was seen today for disease management.    Diagnoses and all orders for this visit:    Idiopathic chronic gout of multiple sites without tophus  -     Uric acid; Standing  -     CBC auto differential; Future  -     Comprehensive metabolic panel; Future  -     Sedimentation rate; Future  -     C-reactive protein; Standing    Fibromyalgia    Rheumatoid arthritis involving multiple sites with positive rheumatoid factor    Other orders  -     allopurinol (ZYLOPRIM) 100 MG tablet; Take 0.5 tablets (50 mg total) by mouth once daily.  -     colchicine (COLCRYS) 0.6 mg tablet; Take 1 tablet (0.6 mg total) by mouth once daily.  -     pregabalin (LYRICA) 50 MG capsule; 50 mg bid and patient is asked to titrate by adding 50 mg every few days if she needs        Will see her every month : make sure : is there inflammatory arthritis? Does she need a steroid sparing agent? Does she need pain management?  Does managing gout help?    Taper prednisone once I know more

## 2018-09-27 RX ORDER — PREDNISONE 20 MG/1
20 TABLET ORAL DAILY
Qty: 10 TABLET | Refills: 0 | Status: SHIPPED | OUTPATIENT
Start: 2018-09-27 | End: 2018-10-14 | Stop reason: SDUPTHER

## 2018-10-03 RX ORDER — COLCHICINE 0.6 MG/1
0.6 TABLET ORAL DAILY
Qty: 30 TABLET | Refills: 3 | Status: SHIPPED | OUTPATIENT
Start: 2018-10-03 | End: 2018-10-17 | Stop reason: SDUPTHER

## 2018-10-05 ENCOUNTER — TELEPHONE (OUTPATIENT)
Dept: SLEEP MEDICINE | Facility: OTHER | Age: 78
End: 2018-10-05

## 2018-10-12 ENCOUNTER — TELEPHONE (OUTPATIENT)
Dept: SLEEP MEDICINE | Facility: OTHER | Age: 78
End: 2018-10-12

## 2018-10-12 ENCOUNTER — OFFICE VISIT (OUTPATIENT)
Dept: NEPHROLOGY | Facility: CLINIC | Age: 78
End: 2018-10-12
Payer: MEDICARE

## 2018-10-12 VITALS
DIASTOLIC BLOOD PRESSURE: 82 MMHG | HEART RATE: 91 BPM | OXYGEN SATURATION: 98 % | WEIGHT: 167.56 LBS | SYSTOLIC BLOOD PRESSURE: 180 MMHG | BODY MASS INDEX: 30.83 KG/M2 | HEIGHT: 62 IN

## 2018-10-12 DIAGNOSIS — I12.9 BENIGN HYPERTENSIVE RENAL DISEASE WITHOUT RENAL FAILURE: ICD-10-CM

## 2018-10-12 DIAGNOSIS — N18.30 CHRONIC KIDNEY DISEASE, STAGE III (MODERATE): Primary | ICD-10-CM

## 2018-10-12 PROCEDURE — 99214 OFFICE O/P EST MOD 30 MIN: CPT | Mod: S$PBB,,, | Performed by: INTERNAL MEDICINE

## 2018-10-12 PROCEDURE — 99499 UNLISTED E&M SERVICE: CPT | Mod: S$GLB,,, | Performed by: INTERNAL MEDICINE

## 2018-10-12 PROCEDURE — 3079F DIAST BP 80-89 MM HG: CPT | Mod: CPTII,,, | Performed by: INTERNAL MEDICINE

## 2018-10-12 PROCEDURE — 99999 PR PBB SHADOW E&M-EST. PATIENT-LVL III: CPT | Mod: PBBFAC,,, | Performed by: INTERNAL MEDICINE

## 2018-10-12 PROCEDURE — 99213 OFFICE O/P EST LOW 20 MIN: CPT | Mod: PBBFAC | Performed by: INTERNAL MEDICINE

## 2018-10-12 PROCEDURE — 1101F PT FALLS ASSESS-DOCD LE1/YR: CPT | Mod: CPTII,,, | Performed by: INTERNAL MEDICINE

## 2018-10-12 PROCEDURE — 3077F SYST BP >= 140 MM HG: CPT | Mod: CPTII,,, | Performed by: INTERNAL MEDICINE

## 2018-10-12 RX ORDER — LOSARTAN POTASSIUM 50 MG/1
50 TABLET ORAL DAILY
Qty: 30 TABLET | Refills: 11 | Status: SHIPPED | OUTPATIENT
Start: 2018-10-12 | End: 2019-05-27 | Stop reason: SDUPTHER

## 2018-10-12 NOTE — PROGRESS NOTES
Subjective:       Patient ID: Betzaida Neumann is a 78 y.o. Patient Refused female who presents for follow up of Chronic Kidney Disease    HPI     Ms. Neumann is a 78 year old woman with past medical history of hypertension, RA/PMR presenting for follow up of chronic kidney disease.  Patient reports more adequate daily fluid intake, denies any NSAID use (though previously used occasionally prior to appt in October 2015).  She reports recent gout attack, improved with initial steroid therapy, now slightly lingering on allopurinol/colchicine (she is still taking HCTZ).  She reports chronic joint/muscle aches, otherwise denies any fever, chest pain, shortness of breath, abdominal pain, diarrhea, dysuria/hematuria.  She reports blood pressure has been elevated for the past couple of months.       Review of Systems   Constitutional: Negative for appetite change, fatigue and fever.   Respiratory: Negative for chest tightness and shortness of breath.    Cardiovascular: Negative for chest pain and leg swelling.   Gastrointestinal: Negative for abdominal pain, constipation, diarrhea, nausea and vomiting.   Genitourinary: Negative for difficulty urinating, dysuria, flank pain, frequency, hematuria and urgency.   Musculoskeletal: Positive for arthralgias. Negative for joint swelling and myalgias.   Skin: Negative for rash and wound.   Neurological: Negative for dizziness, weakness and light-headedness.   All other systems reviewed and are negative.      Objective:      Physical Exam   Constitutional: She appears well-developed and well-nourished.   Cardiovascular: Normal rate, regular rhythm and normal heart sounds. Exam reveals no gallop and no friction rub.   No murmur heard.  Pulmonary/Chest: Effort normal and breath sounds normal. No respiratory distress. She has no wheezes. She has no rales.   Abdominal: Soft. Bowel sounds are normal. There is no tenderness.   Musculoskeletal: She exhibits no edema.   Neurological: She  is alert.   Skin: Skin is warm and dry. No rash noted. No erythema.   Psychiatric: She has a normal mood and affect.       Assessment:       1. Chronic kidney disease, stage III (moderate)    2. Benign hypertensive renal disease without renal failure        Plan:     Ms. Neumann is a 78 year old woman with past medical history of hypertension presenting for follow up of chronic kidney disease stage III, likely age-related renal nephron loss along with hypertensive nephrosclerosis v. secondary to prior NSAID use.  Creatinine relatively stable since last visit, will continue to trend, encouraged fluid intake; stressed importance of blood pressure control to prevent any further progression of kidney disease, patient voiced understanding.    - Hypertension: will hold HCTZ given recent gout attack, will increase losartan to 50mg daily, labs next week  - Anemia: Hgb at goal, no indication for erythropoetin therapy  - Bone/mineral metabolism: will check PTH/VitD    Return to clinic in 6 months pending CMP next week, then with renal/heme panel, iron/TIBC/ferritin, urinalysis/culture, urine protein/creatinine ratio, PTH/VitD prior to next visit

## 2018-10-15 ENCOUNTER — LAB VISIT (OUTPATIENT)
Dept: LAB | Facility: OTHER | Age: 78
End: 2018-10-15
Payer: MEDICARE

## 2018-10-15 DIAGNOSIS — N18.30 CHRONIC KIDNEY DISEASE, STAGE III (MODERATE): ICD-10-CM

## 2018-10-15 DIAGNOSIS — M1A.09X0 IDIOPATHIC CHRONIC GOUT OF MULTIPLE SITES WITHOUT TOPHUS: ICD-10-CM

## 2018-10-15 LAB
25(OH)D3+25(OH)D2 SERPL-MCNC: 59 NG/ML
ALBUMIN SERPL BCP-MCNC: 3.4 G/DL
ALP SERPL-CCNC: 66 U/L
ALT SERPL W/O P-5'-P-CCNC: 24 U/L
ANION GAP SERPL CALC-SCNC: 11 MMOL/L
AST SERPL-CCNC: 17 U/L
BASOPHILS # BLD AUTO: 0.01 K/UL
BASOPHILS NFR BLD: 0.1 %
BILIRUB SERPL-MCNC: 0.4 MG/DL
BUN SERPL-MCNC: 16 MG/DL
CALCIUM SERPL-MCNC: 10.2 MG/DL
CHLORIDE SERPL-SCNC: 106 MMOL/L
CO2 SERPL-SCNC: 30 MMOL/L
CREAT SERPL-MCNC: 1.2 MG/DL
CRP SERPL-MCNC: 9.4 MG/L
DIFFERENTIAL METHOD: ABNORMAL
EOSINOPHIL # BLD AUTO: 0.3 K/UL
EOSINOPHIL NFR BLD: 2.5 %
ERYTHROCYTE [DISTWIDTH] IN BLOOD BY AUTOMATED COUNT: 14.2 %
ERYTHROCYTE [SEDIMENTATION RATE] IN BLOOD: 25 MM/HR
EST. GFR  (AFRICAN AMERICAN): 50 ML/MIN/1.73 M^2
EST. GFR  (NON AFRICAN AMERICAN): 44 ML/MIN/1.73 M^2
GLUCOSE SERPL-MCNC: 123 MG/DL
HCT VFR BLD AUTO: 37.8 %
HGB BLD-MCNC: 11.4 G/DL
LYMPHOCYTES # BLD AUTO: 2.6 K/UL
LYMPHOCYTES NFR BLD: 22.6 %
MCH RBC QN AUTO: 29.7 PG
MCHC RBC AUTO-ENTMCNC: 30.2 G/DL
MCV RBC AUTO: 98 FL
MONOCYTES # BLD AUTO: 0.4 K/UL
MONOCYTES NFR BLD: 3.6 %
NEUTROPHILS # BLD AUTO: 8 K/UL
NEUTROPHILS NFR BLD: 70.8 %
PLATELET # BLD AUTO: 300 K/UL
PMV BLD AUTO: 9.8 FL
POTASSIUM SERPL-SCNC: 3.8 MMOL/L
PROT SERPL-MCNC: 6.7 G/DL
PTH-INTACT SERPL-MCNC: 36.2 PG/ML
RBC # BLD AUTO: 3.84 M/UL
SODIUM SERPL-SCNC: 147 MMOL/L
URATE SERPL-MCNC: 5.8 MG/DL
WBC # BLD AUTO: 11.3 K/UL

## 2018-10-15 PROCEDURE — 80053 COMPREHEN METABOLIC PANEL: CPT

## 2018-10-15 PROCEDURE — 36415 COLL VENOUS BLD VENIPUNCTURE: CPT

## 2018-10-15 PROCEDURE — 82306 VITAMIN D 25 HYDROXY: CPT

## 2018-10-15 PROCEDURE — 83970 ASSAY OF PARATHORMONE: CPT

## 2018-10-15 PROCEDURE — 86140 C-REACTIVE PROTEIN: CPT

## 2018-10-15 PROCEDURE — 85025 COMPLETE CBC W/AUTO DIFF WBC: CPT

## 2018-10-15 PROCEDURE — 85651 RBC SED RATE NONAUTOMATED: CPT

## 2018-10-15 PROCEDURE — 84550 ASSAY OF BLOOD/URIC ACID: CPT

## 2018-10-15 RX ORDER — PREDNISONE 20 MG/1
TABLET ORAL
Qty: 10 TABLET | Refills: 0 | Status: SHIPPED | OUTPATIENT
Start: 2018-10-15 | End: 2018-11-19 | Stop reason: SDUPTHER

## 2018-10-17 ENCOUNTER — HOSPITAL ENCOUNTER (OUTPATIENT)
Dept: RADIOLOGY | Facility: HOSPITAL | Age: 78
Discharge: HOME | End: 2018-10-17
Attending: INTERNAL MEDICINE
Payer: MEDICARE

## 2018-10-17 ENCOUNTER — OFFICE VISIT (OUTPATIENT)
Dept: RHEUMATOLOGY | Facility: CLINIC | Age: 78
End: 2018-10-17
Payer: MEDICARE

## 2018-10-17 VITALS
DIASTOLIC BLOOD PRESSURE: 71 MMHG | HEART RATE: 96 BPM | HEIGHT: 62 IN | BODY MASS INDEX: 31.56 KG/M2 | WEIGHT: 171.5 LBS | SYSTOLIC BLOOD PRESSURE: 151 MMHG

## 2018-10-17 DIAGNOSIS — M05.79 RHEUMATOID ARTHRITIS INVOLVING MULTIPLE SITES WITH POSITIVE RHEUMATOID FACTOR: ICD-10-CM

## 2018-10-17 DIAGNOSIS — M19.90 INFLAMMATORY ARTHRITIS: Primary | ICD-10-CM

## 2018-10-17 DIAGNOSIS — M19.90 INFLAMMATORY ARTHRITIS: ICD-10-CM

## 2018-10-17 DIAGNOSIS — Z79.52 CURRENT USE OF STEROID MEDICATION: ICD-10-CM

## 2018-10-17 DIAGNOSIS — M10.09 IDIOPATHIC GOUT OF MULTIPLE SITES, UNSPECIFIED CHRONICITY: ICD-10-CM

## 2018-10-17 PROCEDURE — 3078F DIAST BP <80 MM HG: CPT | Mod: CPTII,,, | Performed by: INTERNAL MEDICINE

## 2018-10-17 PROCEDURE — 99213 OFFICE O/P EST LOW 20 MIN: CPT | Mod: PBBFAC,25 | Performed by: INTERNAL MEDICINE

## 2018-10-17 PROCEDURE — 99499 UNLISTED E&M SERVICE: CPT | Mod: S$GLB,,, | Performed by: INTERNAL MEDICINE

## 2018-10-17 PROCEDURE — 1101F PT FALLS ASSESS-DOCD LE1/YR: CPT | Mod: CPTII,,, | Performed by: INTERNAL MEDICINE

## 2018-10-17 PROCEDURE — 71046 X-RAY EXAM CHEST 2 VIEWS: CPT | Mod: 26,,, | Performed by: RADIOLOGY

## 2018-10-17 PROCEDURE — 99999 PR PBB SHADOW E&M-EST. PATIENT-LVL III: CPT | Mod: PBBFAC,,, | Performed by: INTERNAL MEDICINE

## 2018-10-17 PROCEDURE — 99214 OFFICE O/P EST MOD 30 MIN: CPT | Mod: S$PBB,,, | Performed by: INTERNAL MEDICINE

## 2018-10-17 PROCEDURE — 3077F SYST BP >= 140 MM HG: CPT | Mod: CPTII,,, | Performed by: INTERNAL MEDICINE

## 2018-10-17 PROCEDURE — 71046 X-RAY EXAM CHEST 2 VIEWS: CPT | Mod: TC

## 2018-10-17 ASSESSMENT — ROUTINE ASSESSMENT OF PATIENT INDEX DATA (RAPID3)
PATIENT GLOBAL ASSESSMENT SCORE: 5
PAIN SCORE: 10
AM STIFFNESS SCORE: 0, NO
FATIGUE SCORE: 8.5
MDHAQ FUNCTION SCORE: 3
TOTAL RAPID3 SCORE: 8.33

## 2018-10-17 NOTE — PROGRESS NOTES
Chief Complaint   Patient presents with    Disease Management     FOLLOW UP ON INFLAMMATORY ARTHRITIS       Patient with joint pains and high inflammatory markers for a follow up    History of presenting illness    78 year ol black female has    Joint pain since 2008     She has seen several rheumatologists. First was Dr. Lerma who thought she had bursitis.Then she saw Dr. Riddle who diagnosed rheumatoid arthritis and gave her Humira x 3 years which did not help. Humira caused her to feel like a zombie. She then went to [facility] : saw Dr. Woo and Dr. Reeves   He diagnosed a combination of rheumatoid arthritis, fibromyalgia and PMR.  She recalls taking methotrexate but it didn't help her. At one point she only took pain pills.She took mtx for 4 years  She has been on prednisone since May 2016 due to ALLERGIES as prescribed by her ALLERGY doctor.   She also took tramadol and percocet but did not help.     Held Arava due to stomach upset.  Night sweats since 2008.    Then we were treating her as PMR.     She has been on prednisone 2.5 mg BID    She took lyrica in the past and that didn't help    June 2018    ESR 29  CRP 26.3  CBC : grossly normal  CMP : GFR 50    Then her complaints were :     Fatigue  Pain all over  No swelling  Once she had right hand pain and swelling,then she had left ankle pain and swelling  Muscle spasms     I checked her uric acid and it was 11.4  She had CRP of 50.3  Her ESR was normal    I offered her gabapentin and she didn't like it  She has stayed on prednisone 2.5 mg bid    She didn't like the lyrica    She is now on 20 mg prednisone  She takes allopurinol 50 mg and colchicine 0.6 mg   Uric acid dropped to 5.8    She has done really well today    Her CRP dropped to 9.4  ESR 25  UA looks abnormal but she has no symptoms  Mild stable anemia  gfr 50  Vit d 59  No significant proteinuria    Past history : HTN,allergic rhinitis,CKD stage 3,GERD,PMR,RA,FMS,elevated inflammatory markers,asthma      Family history : no autoimmune illness    Social history : former smoker 1999      Review of Systems   Constitutional: Negative for activity change, appetite change, chills, diaphoresis, fatigue, fever and unexpected weight change.   HENT: Negative for congestion, dental problem, drooling, ear discharge, ear pain, facial swelling, hearing loss, mouth sores, nosebleeds, postnasal drip, rhinorrhea, sinus pressure, sinus pain, sneezing, sore throat, tinnitus, trouble swallowing and voice change.    Eyes: Negative for photophobia, pain, discharge, redness, itching and visual disturbance.   Respiratory: Negative for apnea, cough, choking, chest tightness, shortness of breath, wheezing and stridor.    Cardiovascular: Negative for chest pain, palpitations and leg swelling.   Gastrointestinal: Negative for abdominal distention, abdominal pain, anal bleeding, blood in stool, constipation, diarrhea, nausea, rectal pain and vomiting.   Endocrine: Negative for cold intolerance, heat intolerance, polydipsia, polyphagia and polyuria.   Genitourinary: Negative for decreased urine volume, difficulty urinating, dysuria, enuresis, flank pain, frequency, genital sores, hematuria and urgency.   Musculoskeletal: Positive for arthralgias. Negative for back pain, gait problem, joint swelling, myalgias, neck pain and neck stiffness.   Skin: Negative for color change, pallor, rash and wound.   Allergic/Immunologic: Negative for environmental allergies, food allergies and immunocompromised state.   Neurological: Negative for dizziness, tremors, seizures, syncope, facial asymmetry, speech difficulty, weakness, light-headedness, numbness and headaches.   Hematological: Negative for adenopathy. Does not bruise/bleed easily.   Psychiatric/Behavioral: Negative for agitation, behavioral problems, confusion, decreased concentration, dysphoric mood, hallucinations, self-injury, sleep disturbance and suicidal ideas. The patient is not  nervous/anxious and is not hyperactive.      Physical Exam     ARENAS-28 tender joint count: 0  ARENAS-28 swollen joint count: 0    Physical Exam   Constitutional: She is oriented to person, place, and time and well-developed, well-nourished, and in no distress. No distress.   HENT:   Head: Normocephalic.   Mouth/Throat: Oropharynx is clear and moist.   Eyes: Conjunctivae are normal. Pupils are equal, round, and reactive to light. Right eye exhibits no discharge. Left eye exhibits no discharge. No scleral icterus.   Neck: Normal range of motion. No thyromegaly present.   Cardiovascular: Normal rate, regular rhythm, normal heart sounds and intact distal pulses.    Pulmonary/Chest: Effort normal and breath sounds normal. No stridor.   Abdominal: Soft. Bowel sounds are normal.   Lymphadenopathy:     She has no cervical adenopathy.   Neurological: She is alert and oriented to person, place, and time.   Skin: Skin is warm. No rash noted. She is not diaphoretic.     Psychiatric: Affect and judgment normal.   Musculoskeletal: Normal range of motion.           Assessment     78 year old black female with     HTN,allergic rhinitis,CKD stage 3,GERD,PMR,RA,FMS,elevated inflammatory markers,asthma     She wants prednisone to survive with the joint pain    Does she have PMR  Does she have RA  Does she have fibromyalgia is the question    We have witnessed synovitis in the past,this made us think she has RA  We thought she rapidly responded to prednisone,hence comes the PMR  She always complains of overall body pain,hence she gets the fibromyalgia diagnosis    We have positive RF in the past  We have high uric acid    She has responded really well to prednisone 20 mg  Her CRP dropped a great deal    We have her allopurinol and colchicine and her uric acid dropped to less than 6    1. Inflammatory arthritis    2. Rheumatoid arthritis involving multiple sites with positive rheumatoid factor    3. Current use of steroid medication    4.  Idiopathic gout of multiple sites, unspecified chronicity        Reviewed labs/xrays  Reviewed medications    F/u problem    Plan:        Keep the prednisone 20 mg  Continue allopurinol 50 mg and colchicine 0.6 mg daily    Introduce actemra weekly sc injections  Discussed side effects  Predmard panel and fast trak ID    Once she goes on actemra will wean down the prednisone       Betzaida was seen today for disease management.    Diagnoses and all orders for this visit:    Inflammatory arthritis  -     HIV-1 and HIV-2 antibodies; Future  -     Hepatitis B surface antigen; Future  -     HBcAB; Future  -     Hepatitis B surface antibody; Future  -     Hepatitis C antibody; Future  -     Hepatitis A antibody, IgG; Future  -     Strongyloides IgG Antibodies; Future  -     Quantiferon Gold TB; Future  -     RPR; Future  -     Ambulatory referral to Infectious Disease  -     X-Ray Chest PA And Lateral; Future  -     tocilizumab (ACTEMRA) 162 mg/0.9 mL Syrg; Inject 0.9 mLs into the skin once a week.    Rheumatoid arthritis involving multiple sites with positive rheumatoid factor    Current use of steroid medication    Idiopathic gout of multiple sites, unspecified chronicity        rtc in 2 months

## 2018-10-18 ENCOUNTER — TELEPHONE (OUTPATIENT)
Dept: PHARMACY | Facility: CLINIC | Age: 78
End: 2018-10-18

## 2018-10-22 ENCOUNTER — CLINICAL SUPPORT (OUTPATIENT)
Dept: INFECTIOUS DISEASES | Facility: CLINIC | Age: 78
End: 2018-10-22
Payer: MEDICARE

## 2018-10-22 ENCOUNTER — OFFICE VISIT (OUTPATIENT)
Dept: INFECTIOUS DISEASES | Facility: CLINIC | Age: 78
End: 2018-10-22
Payer: MEDICARE

## 2018-10-22 VITALS
DIASTOLIC BLOOD PRESSURE: 84 MMHG | HEART RATE: 90 BPM | WEIGHT: 173.06 LBS | SYSTOLIC BLOOD PRESSURE: 174 MMHG | HEIGHT: 62 IN | TEMPERATURE: 98 F | BODY MASS INDEX: 31.85 KG/M2

## 2018-10-22 DIAGNOSIS — D84.9 IMMUNOSUPPRESSION: Primary | ICD-10-CM

## 2018-10-22 PROCEDURE — 99213 OFFICE O/P EST LOW 20 MIN: CPT | Mod: PBBFAC,25 | Performed by: INTERNAL MEDICINE

## 2018-10-22 PROCEDURE — 99213 OFFICE O/P EST LOW 20 MIN: CPT | Mod: S$PBB,,, | Performed by: INTERNAL MEDICINE

## 2018-10-22 PROCEDURE — 90662 IIV NO PRSV INCREASED AG IM: CPT | Mod: PBBFAC

## 2018-10-22 PROCEDURE — 3079F DIAST BP 80-89 MM HG: CPT | Mod: CPTII,,, | Performed by: INTERNAL MEDICINE

## 2018-10-22 PROCEDURE — 1101F PT FALLS ASSESS-DOCD LE1/YR: CPT | Mod: CPTII,,, | Performed by: INTERNAL MEDICINE

## 2018-10-22 PROCEDURE — 3077F SYST BP >= 140 MM HG: CPT | Mod: CPTII,,, | Performed by: INTERNAL MEDICINE

## 2018-10-22 PROCEDURE — 99999 PR PBB SHADOW E&M-EST. PATIENT-LVL III: CPT | Mod: PBBFAC,,, | Performed by: INTERNAL MEDICINE

## 2018-10-22 NOTE — PROGRESS NOTES
Infectious Diseases Clinic Note    Subjective:       Patient ID: Betzaida Neumann is a 78 y.o. female.    Chief Complaint: No chief complaint on file.    HPI    77 y/o F h/o CKD, RA dx  as well as PMR, fibromyalgia, stable  On prednisone 2.5, has had humira x 4 years in past but pt states didn't help her pain here for vaccine eval    From [place]  Works in financial planning, worked in home health  Has been to [place]  No animal contact or pets  Likes to garden for fun  No well water      Past Medical History:   Diagnosis Date    Allergy     Arthritis     Cataract     CKD (chronic kidney disease) stage 3, GFR 30-59 ml/min     Fibromyalgia     Hypertension     Polymyalgia rheumatica     RA (rheumatoid arthritis)        Social History     Socioeconomic History    Marital status: Single     Spouse name: Not on file    Number of children: Not on file    Years of education: Not on file    Highest education level: Not on file   Social Needs    Financial resource strain: Not on file    Food insecurity - worry: Not on file    Food insecurity - inability: Not on file    Transportation needs - medical: Not on file    Transportation needs - non-medical: Not on file   Occupational History    Not on file   Tobacco Use    Smoking status: Former Smoker     Packs/day: 1.00     Years: 20.00     Pack years: 20.00     Start date: 1970     Last attempt to quit: 1999     Years since quittin.1    Smokeless tobacco: Never Used    Tobacco comment: Retired ; ; 4 children healthy   Substance and Sexual Activity    Alcohol use: Yes     Alcohol/week: 0.6 oz     Types: 1 Glasses of wine per week     Comment: social    Drug use: No    Sexual activity: Yes     Partners: Male   Other Topics Concern    Not on file   Social History Narrative    Not on file         Current Outpatient Medications:     albuterol (PROVENTIL) 2.5 mg /3 mL (0.083 %) nebulizer solution, Take 2.5 mg  by nebulization every 6 (six) hours as needed for Wheezing. Rescue, Disp: , Rfl:     cetirizine (ZYRTEC) 10 MG tablet, Take 10 mg by mouth once daily., Disp: , Rfl:     colchicine (COLCRYS) 0.6 mg tablet, Take 1 tablet (0.6 mg total) by mouth once daily., Disp: 90 tablet, Rfl: 2    estradiol (ESTRACE) 2 MG tablet, estradiol 2 mg tablet  Take 1 tablet every day by oral route., Disp: , Rfl:     fluticasone (FLONASE) 50 mcg/actuation nasal spray, fluticasone 50 mcg/actuation nasal spray,suspension, Disp: , Rfl:     losartan (COZAAR) 50 MG tablet, Take 1 tablet (50 mg total) by mouth once daily., Disp: 30 tablet, Rfl: 11    montelukast (SINGULAIR) 10 mg tablet, Take 10 mg by mouth every evening., Disp: , Rfl:     predniSONE (DELTASONE) 20 MG tablet, TAKE 1 TABLET(20 MG) BY MOUTH EVERY DAY FOR 10 DAYS, Disp: 10 tablet, Rfl: 0    allopurinol (ZYLOPRIM) 100 MG tablet, Take 0.5 tablets (50 mg total) by mouth once daily., Disp: 45 tablet, Rfl: 2    fluticasone-vilanterol (BREO) 200-25 mcg/dose DsDv diskus inhaler, Inhale 1 puff into the lungs once daily. Controller, Disp: , Rfl:     tocilizumab (ACTEMRA) 162 mg/0.9 mL Syrg, Inject 1 syringe (0.9 mLs) into the skin once a week., Disp: 3.6 mL, Rfl: 11    Review of Systems   Constitutional: Negative for activity change, chills and fever.   HENT: Negative for congestion, mouth sores, rhinorrhea, sinus pressure and sore throat.    Eyes: Negative for photophobia, pain and redness.   Respiratory: Negative for cough, chest tightness, shortness of breath and wheezing.    Cardiovascular: Negative for chest pain and leg swelling.   Gastrointestinal: Negative for abdominal distention, abdominal pain, diarrhea, nausea and vomiting.   Endocrine: Negative for polyuria.   Genitourinary: Negative for decreased urine volume, dysuria and flank pain.   Musculoskeletal: Negative for joint swelling and neck pain.   Skin: Negative for color change.   Allergic/Immunologic: Negative for  food allergies.   Neurological: Negative for dizziness, weakness and headaches.   Hematological: Negative for adenopathy.   Psychiatric/Behavioral: Negative for agitation and confusion. The patient is not nervous/anxious.            Objective:      Vitals:    10/22/18 1432   BP: (!) 174/84   Pulse: 90   Temp: 98.1 °F (36.7 °C)     Physical Exam        Assessment/Plan:       No diagnosis found.    79 y/o F h/o CKD, RA dx 2008 as well as PMR, fibromyalgia, stable  On prednisone 2.5, has had humira x 4 years in past but pt states didn't help her pain here for vaccine eval  High dose flu  shingrix waitlist  Counseled on safe living

## 2018-10-22 NOTE — PROGRESS NOTES
Patient arrives for immunization injection of Influenza Vaccine Fluzone High-Dose [id number] Formula for 65 years of age and older - administered per order - patient understands need to remain on campus for at least 15 minutes and report any reaction to staff - left in no apparent distress

## 2018-10-22 NOTE — LETTER
October 22, 2018      Gagan Alvarez MD  [address] [address]           [facility] - Infectious Diseases  [address]  [place] [address]  Phone: [phone]  Fax: [phone]          Patient: Betzaida Neumann   MR Number: [id number]   YOB: 1940   Date of Visit: 10/22/2018       Dear Dr. Gagan Alvarez:    Thank you for referring Betzaida Neumann to me for evaluation. Attached you will find relevant portions of my assessment and plan of care.    If you have questions, please do not hesitate to call me. I look forward to following Betzaida Neumann along with you.    Sincerely,    Jae Ceron MD    Enclosure  CC:  No Recipients    If you would like to receive this communication electronically, please contact [email or web address] or [phone] to request more information on [employer] Link access.    For providers and/or their staff who would like to refer a patient to [facility], please contact us through our one-stop-shop provider referral line, [facility], at [phone].    If you feel you have received this communication in error or would no longer like to receive these types of communications, please e-mail [email or web address]

## 2018-10-23 ENCOUNTER — TELEPHONE (OUTPATIENT)
Dept: PHARMACY | Facility: CLINIC | Age: 78
End: 2018-10-23

## 2018-10-23 NOTE — TELEPHONE ENCOUNTER
DOCUMENTATION ONLY:  Actrema approved via appeals process.   Approval dates: 10/23/18 through 12/31/18  Auth #: [id number]  $8.35 co pay   Forwarded to clinical pharmacist for consultation & shipment.

## 2018-10-23 NOTE — TELEPHONE ENCOUNTER
Initial Actemra Syringe consult completed on 10/23. Actemra will be shipped on 10/29 to arrive at patient's home on 10/30 via [employer]. $8.35 copay.  Patient plans to start Actemra on . Address confirmed, CC on file. Confirmed 2 patient identifiers - name and . Therapy Appropriate.     Counseled patient on administration directions:  Inject Actemra 162mg/0.9ml Syringe (1 syringe ) into the skin every 7 days.  - Take out the refrigerator 30-60 minutes prior to injection.   - Wash hands before and after injection.  - Monthly RX will come with gauze, bandaids, and alcohol swabs.  - Patient may inject in either the tops of the thighs, abdomen- but at least 2 inches away from her belly button, or the outer part of her upper arm. Patient was instructed to rotate injections sites.  - Patient is to wipe down the injection site with the alcohol pad, wait to dry. Gently squeeze the area of the cleaned skin and hold it firmly. Insert the needle in to the skin between a 45 and 90 degree angle, release the skin and push down on the plunger to administer the dose.   - Place the entire syringe into the sharps container. Once the container is full, per [place] law, she may lock the sharps container and place in her trash. She can then contact the Pharmacy and we will replace the sharps at no additional charge.     Patient was counseled on possible side effects:  - Injection site reaction: redness, soreness, itching, bruising, which should resolve within 3-5 days.  - Increased risk for infections. If patient becomes sick, patient is to hold Actemra use until she is better.      DDI: none, medication list reviewed.     Patient verbalized understanding. Compliance stressed. Patient advised to keep a calendar marking dates of injections to ensure better compliance. Patient advised to call myself or provider should any questions arise. Patient plans to start Actemra on . Consultation included: indication; goals of treatment;  administration; storage and handling; side effects; how to handle side effects; the importance of compliance; how to handle missed doses; the importance of laboratory monitoring; the importance of keeping all follow up appointments. Patient understands to report any medication changes to [facility] and provider. All questions answered and addressed to patients satisfaction. I will f/u with her in 1 week from start, [facility] to contact patient in 3 weeks for refills.

## 2018-10-23 NOTE — TELEPHONE ENCOUNTER
Actemra prior authorization denied- JUDE Mueller preferred. Appeal submitted. [facility] will update when a decision is received.

## 2018-10-24 ENCOUNTER — TELEPHONE (OUTPATIENT)
Dept: SLEEP MEDICINE | Facility: OTHER | Age: 78
End: 2018-10-24

## 2018-10-25 ENCOUNTER — HOSPITAL ENCOUNTER (OUTPATIENT)
Dept: SLEEP MEDICINE | Facility: OTHER | Age: 78
Discharge: HOME | End: 2018-10-25
Attending: INTERNAL MEDICINE
Payer: MEDICARE

## 2018-10-25 DIAGNOSIS — G47.33 OSA (OBSTRUCTIVE SLEEP APNEA): ICD-10-CM

## 2018-10-25 DIAGNOSIS — G47.9 SLEEP DISORDER: ICD-10-CM

## 2018-10-25 DIAGNOSIS — M79.7 FIBROMYALGIA: ICD-10-CM

## 2018-10-25 PROCEDURE — 95811 POLYSOM 6/>YRS CPAP 4/> PARM: CPT

## 2018-10-25 PROCEDURE — 95810 POLYSOM 6/> YRS 4/> PARAM: CPT | Mod: 26,,, | Performed by: PSYCHIATRY & NEUROLOGY

## 2018-10-26 NOTE — PROGRESS NOTES
A Split night study was preformed on Betzaida Neumann on the night of 10/25/18. The procedure was explained to the patient, all questions were asked an answered prior to the start of the study. The patient did meet split night criteria.    EKG: Appeared to have shown NSR    Cpap mask: F&P Eson nasal mask small w/ a chin strap.    Cpap range: [id number], Cflex 3, Humidity used    Optimium pressure: Best control of sleep disordered breathing events appeared to be noted at [id number], as to which supine REM was noted    A number of sleep disordered breathing events were noted. Snoring was noted to be mild to moderate. PLM's appeared to be noted during position changes and arousals. Lowest spo2 seen appeared to have been 79%. The patient appearedt ohave tolerated cpap and the mask fairly well. An end of the night instruction sheet was giving to the patient upon leaving the lab. The patient's response to cpap was that it was ok.

## 2018-10-31 NOTE — TELEPHONE ENCOUNTER
----- Message from Homero Sawyer sent at 10/31/2018  9:29 AM CDT -----  Contact: Self @ [phone]  Pt would like a call back @ [phone] in regards rx tocilizumab (ACTEMRA) 162 mg/0.9 mL Syrg

## 2018-11-14 NOTE — PROCEDURES
"Dear Doctor    Antonio,          The sleep study that you ordered is complete.  You have ordered sleep LAB services to perform the sleep study for .NAME.     Please find Sleep Study result in "Chart Review" under the "Media tab."      As the ordering provider, you are responsible for reviewing the results and implementing a treatment plan with your patient.    If you need a Sleep Medicine provider to explain the sleep study findings and arrange treatment for the patient, please refer patient for consultation to our Sleep Clinic via [facility] with Ambulatory Consult Sleep.    To do that please place an order for an  "Ambulatory Consult Sleep" - it will go to our clinic work queue for our Medical Assistant to contact the patient for an appointment.     For any questions, please contact our clinic staff at [phone] to talk to clinical staff        Kortney Rios MD  Sleep Lab Medical Director  [facility]      "

## 2018-11-19 RX ORDER — PREDNISONE 20 MG/1
10 TABLET ORAL DAILY
Qty: 10 TABLET | Refills: 0 | Status: SHIPPED | OUTPATIENT
Start: 2018-11-19 | End: 2018-12-10 | Stop reason: SDUPTHER

## 2018-11-19 RX ORDER — ALLOPURINOL 100 MG/1
50 TABLET ORAL DAILY
Qty: 45 TABLET | Refills: 2 | Status: SHIPPED | OUTPATIENT
Start: 2018-11-19 | End: 2018-12-12

## 2018-11-21 ENCOUNTER — OFFICE VISIT (OUTPATIENT)
Dept: PODIATRY | Facility: CLINIC | Age: 78
End: 2018-11-21
Payer: MEDICARE

## 2018-11-21 ENCOUNTER — HOSPITAL ENCOUNTER (OUTPATIENT)
Dept: RADIOLOGY | Facility: HOSPITAL | Age: 78
Discharge: HOME | End: 2018-11-21
Attending: PODIATRIST
Payer: MEDICARE

## 2018-11-21 VITALS
HEART RATE: 91 BPM | DIASTOLIC BLOOD PRESSURE: 79 MMHG | HEIGHT: 62 IN | BODY MASS INDEX: 31.83 KG/M2 | SYSTOLIC BLOOD PRESSURE: 158 MMHG | WEIGHT: 173 LBS

## 2018-11-21 DIAGNOSIS — M79.672 FOOT PAIN, LEFT: Primary | ICD-10-CM

## 2018-11-21 DIAGNOSIS — M19.079 ARTHRITIS OF FOOT: ICD-10-CM

## 2018-11-21 DIAGNOSIS — M79.672 FOOT PAIN, LEFT: ICD-10-CM

## 2018-11-21 DIAGNOSIS — B35.1 ONYCHOMYCOSIS DUE TO DERMATOPHYTE: ICD-10-CM

## 2018-11-21 DIAGNOSIS — M24.573 EQUINUS CONTRACTURE OF ANKLE: ICD-10-CM

## 2018-11-21 PROCEDURE — 73630 X-RAY EXAM OF FOOT: CPT | Mod: TC,LT

## 2018-11-21 PROCEDURE — 99999 PR PBB SHADOW E&M-EST. PATIENT-LVL III: CPT | Mod: PBBFAC,,, | Performed by: PODIATRIST

## 2018-11-21 PROCEDURE — 1101F PT FALLS ASSESS-DOCD LE1/YR: CPT | Mod: CPTII,S$GLB,, | Performed by: PODIATRIST

## 2018-11-21 PROCEDURE — 99203 OFFICE O/P NEW LOW 30 MIN: CPT | Mod: S$GLB,,, | Performed by: PODIATRIST

## 2018-11-21 PROCEDURE — 3077F SYST BP >= 140 MM HG: CPT | Mod: CPTII,S$GLB,, | Performed by: PODIATRIST

## 2018-11-21 PROCEDURE — 73630 X-RAY EXAM OF FOOT: CPT | Mod: 26,LT,, | Performed by: RADIOLOGY

## 2018-11-21 PROCEDURE — 3078F DIAST BP <80 MM HG: CPT | Mod: CPTII,S$GLB,, | Performed by: PODIATRIST

## 2018-11-21 RX ORDER — CICLOPIROX 80 MG/ML
SOLUTION TOPICAL NIGHTLY
Qty: 6.6 ML | Refills: 11 | Status: SHIPPED | OUTPATIENT
Start: 2018-11-21 | End: 2019-01-09

## 2018-11-21 RX ORDER — LIDOCAINE HYDROCHLORIDE 20 MG/ML
JELLY TOPICAL
Qty: 30 ML | Refills: 2 | Status: SHIPPED | OUTPATIENT
Start: 2018-11-21 | End: 2019-01-08

## 2018-11-21 NOTE — PROGRESS NOTES
Subjective:      Patient ID: Betzaida Neumann is a 78 y.o. female.    Chief Complaint: Foot Pain (toe fungus)    Discolored misshapen toenails all ten toes.  Gradual onset, worsening over past several weeks, aggravated by increased weight bearing, shoe gear, pressure.  No previous medical treatment.  OTC pain med not helping. deneis trauma, surgery all nails.    Deep aching sometimes sharp pain left midfoot/arch.  Gradual onset, worsening over past several weeks, aggravated by increased weight bearing, shoe gear, pressure.  No previous medical treatment.  OTC pain med not helping. Denies trauma.  Relates prior bunion surgery and toe surgery both feet.    Review of Systems   Constitution: Negative for chills, diaphoresis, fever, malaise/fatigue and night sweats.   Cardiovascular: Negative for claudication, cyanosis, leg swelling and syncope.   Skin: Negative for color change, dry skin, nail changes, rash, suspicious lesions and unusual hair distribution.   Musculoskeletal: Positive for joint pain. Negative for falls, joint swelling, muscle cramps, muscle weakness and stiffness.   Gastrointestinal: Negative for constipation, diarrhea, nausea and vomiting.   Neurological: Negative for brief paralysis, disturbances in coordination, focal weakness, numbness, paresthesias, sensory change and tremors.           Objective:      Physical Exam   Constitutional: She is oriented to person, place, and time. She appears well-developed and well-nourished. She is cooperative. No distress.   Cardiovascular:   Pulses:       Popliteal pulses are 2+ on the right side, and 2+ on the left side.        Dorsalis pedis pulses are 1+ on the right side, and 1+ on the left side.        Posterior tibial pulses are 1+ on the right side, and 1+ on the left side.   Capillary refill 3 seconds all toes/distal feet, all toes/both feet warm to touch.      Negative lymphadenopathy bilateral popliteal fossa and tarsal tunnel.      Negavie lower  extremity edema bilateral.     Musculoskeletal:        Right ankle: She exhibits normal range of motion, no swelling, no ecchymosis, no deformity, no laceration and normal pulse. Achilles tendon normal. Achilles tendon exhibits no pain, no defect and normal Deluna's test results.   Deep aching pain with weight bearing and piano key test left 2nd tmtj without deformity or loss of function.    Ankle dorsiflexion decreased at <10 degrees bilateral with moderate increase with knee flexion bilateral.    Otherwise, Normal angle, base, station of gait. All ten toes without clubbing, cyanosis, or signs of ischemia.  No pain to palpation bilateral lower extremities.  Range of motion, stability, muscle strength, and muscle tone normal bilateral feet and legs.     Lymphadenopathy: No inguinal adenopathy noted on the right or left side.   Negative lymphadenopathy bilateral popliteal fossa and tarsal tunnel.    Negative lymphangitic streaking bilateral feet/ankles/legs.   Neurological: She is alert and oriented to person, place, and time. She has normal strength. She displays no atrophy and no tremor. No sensory deficit. She exhibits normal muscle tone. Gait normal.   Reflex Scores:       Patellar reflexes are 2+ on the right side and 2+ on the left side.       Achilles reflexes are 2+ on the right side and 2+ on the left side.  Negative tinel sign to percussion sural, superficial peroneal, deep peroneal, saphenous, and posterior tibial nerves right and left ankles and feet.     Skin: Skin is warm, dry and intact. Capillary refill takes 2 to 3 seconds. No abrasion, no bruising, no burn, no ecchymosis, no laceration, no lesion and no rash noted. She is not diaphoretic. No cyanosis or erythema. No pallor. Nails show no clubbing.     Skin is normal age and health appropriate color, turgor, texture, and temperature bilateral lower extremities without ulceration, hyperpigmentation, discoloration, masses nodules or cords palpated.   No ecchymosis, erythema, edema, or cardinal signs of infection bilateral lower extremities.    Toenails 1st, 2nd, 3rd, 4th, 5th  bilateral are hypertrophic thickened 2-3 mm, dystrophic, discolored tanish brown with tan, gray crumbly subungual debris.  Neatly trimmed and not tender to distal nail plate pressure, without periungual skin abnormality of each.     Psychiatric: She has a normal mood and affect.             Assessment:       Encounter Diagnoses   Name Primary?    Foot pain, left Yes    Arthritis of foot     Equinus contracture of ankle     Onychomycosis due to dermatophyte          Plan:       Betzaida was seen today for foot pain.    Diagnoses and all orders for this visit:    Foot pain, left  -     X-Ray Foot Complete Left; Future  -     lidocaine HCL 2% (XYLOCAINE) 2 % jelly; Apply topically as needed. Apply topically once nightly to affected part of foot/feet.    Arthritis of foot  -     X-Ray Foot Complete Left; Future  -     lidocaine HCL 2% (XYLOCAINE) 2 % jelly; Apply topically as needed. Apply topically once nightly to affected part of foot/feet.    Equinus contracture of ankle  -     X-Ray Foot Complete Left; Future    Onychomycosis due to dermatophyte  -     X-Ray Foot Complete Left; Future    Other orders  -     ciclopirox (PENLAC) 8 % Soln; Apply topically nightly.      I counseled the patient on her conditions, their implications and medical management.        Rx penlac.    Patient will stretch the tendo achilles complex three times daily as demonstrated in the office.  Literature was dispensed illustrating proper stretching technique.    Patient will obtain over the counter arch supports and wear them in shoes whenever possible.  Athletic shoes intended for walking or running are usually best.    The patient was advised that NSAID-type medications have two very important potential side effects: gastrointestinal irritation including hemorrhage and renal injuries. She was asked to take the  medication with food and to stop if she experiences any GI upset. I asked her to call for vomiting, abdominal pain or black/bloody stools. The patient expresses understanding of these issues and questions were answered.    Discussed conservative treatment with shoes of adequate dimensions, material, and style to alleviate symptoms and delay or prevent surgical intervention.    Rx xrays, lidocaine gel, penlac.          Follow-up in about 1 month (around 12/21/2018).

## 2018-12-05 ENCOUNTER — TELEPHONE (OUTPATIENT)
Dept: PODIATRY | Facility: CLINIC | Age: 78
End: 2018-12-05

## 2018-12-05 NOTE — TELEPHONE ENCOUNTER
Attempted to call Zenia with [employer] with diagnosis code but got no answer. Re-faxed prior authorization with code on paperwork.

## 2018-12-05 NOTE — TELEPHONE ENCOUNTER
----- Message from Rosemarie Ba sent at 12/5/2018  9:28 AM CST -----  Contact: [facility]  office  fax  Need a diagnosis code for the lidocaine HCL 2% (XYLOCAINE) 2 % jelly prescription

## 2018-12-10 RX ORDER — PREDNISONE 20 MG/1
TABLET ORAL
Qty: 10 TABLET | Refills: 0 | Status: SHIPPED | OUTPATIENT
Start: 2018-12-10 | End: 2019-01-08

## 2018-12-12 ENCOUNTER — PATIENT MESSAGE (OUTPATIENT)
Dept: RHEUMATOLOGY | Facility: CLINIC | Age: 78
End: 2018-12-12

## 2018-12-12 ENCOUNTER — OFFICE VISIT (OUTPATIENT)
Dept: RHEUMATOLOGY | Facility: CLINIC | Age: 78
End: 2018-12-12
Payer: MEDICARE

## 2018-12-12 VITALS
WEIGHT: 171.5 LBS | HEIGHT: 62 IN | DIASTOLIC BLOOD PRESSURE: 93 MMHG | HEART RATE: 91 BPM | SYSTOLIC BLOOD PRESSURE: 195 MMHG | BODY MASS INDEX: 31.56 KG/M2

## 2018-12-12 DIAGNOSIS — M19.90 INFLAMMATORY ARTHRITIS: Primary | ICD-10-CM

## 2018-12-12 DIAGNOSIS — Z79.52 LONG TERM (CURRENT) USE OF SYSTEMIC STEROIDS: ICD-10-CM

## 2018-12-12 PROCEDURE — 3080F DIAST BP >= 90 MM HG: CPT | Mod: CPTII,S$GLB,, | Performed by: INTERNAL MEDICINE

## 2018-12-12 PROCEDURE — 99999 PR PBB SHADOW E&M-EST. PATIENT-LVL III: CPT | Mod: PBBFAC,,, | Performed by: INTERNAL MEDICINE

## 2018-12-12 PROCEDURE — 1101F PT FALLS ASSESS-DOCD LE1/YR: CPT | Mod: CPTII,S$GLB,, | Performed by: INTERNAL MEDICINE

## 2018-12-12 PROCEDURE — 3077F SYST BP >= 140 MM HG: CPT | Mod: CPTII,S$GLB,, | Performed by: INTERNAL MEDICINE

## 2018-12-12 PROCEDURE — 99214 OFFICE O/P EST MOD 30 MIN: CPT | Mod: S$GLB,,, | Performed by: INTERNAL MEDICINE

## 2018-12-12 PROCEDURE — 99499 UNLISTED E&M SERVICE: CPT | Mod: S$GLB,,, | Performed by: INTERNAL MEDICINE

## 2018-12-12 RX ORDER — ALLOPURINOL 100 MG/1
50 TABLET ORAL DAILY
Qty: 45 TABLET | Refills: 2 | Status: SHIPPED | OUTPATIENT
Start: 2018-12-12 | End: 2019-03-12

## 2018-12-12 RX ORDER — COLCHICINE 0.6 MG/1
0.6 TABLET ORAL DAILY
Qty: 90 TABLET | Refills: 2 | Status: SHIPPED | OUTPATIENT
Start: 2018-12-12 | End: 2019-03-14

## 2018-12-12 RX ORDER — PREDNISONE 10 MG/1
10 TABLET ORAL DAILY
Qty: 60 TABLET | Refills: 3 | Status: SHIPPED | OUTPATIENT
Start: 2018-12-12 | End: 2020-04-23

## 2018-12-12 ASSESSMENT — ROUTINE ASSESSMENT OF PATIENT INDEX DATA (RAPID3)
FATIGUE SCORE: 10
PATIENT GLOBAL ASSESSMENT SCORE: 9
WHEN YOU AWAKENED IN THE MORNING OVER THE LAST WEEK, PLEASE INDICATE THE AMOUNT OF TIME IT TAKES UNTIL YOU ARE AS LIMBER AS YOU WILL BE FOR THE DAY: 2 HOURS
PAIN SCORE: 8.5
AM STIFFNESS SCORE: 1, YES
PSYCHOLOGICAL DISTRESS SCORE: 4.4
MDHAQ FUNCTION SCORE: 0
TOTAL RAPID3 SCORE: 5.83

## 2018-12-12 NOTE — PROGRESS NOTES
Chief Complaint   Patient presents with    Follow-up       Patient with joint pains and high inflammatory markers for a follow up    History of presenting illness    78 year ol black female has    Joint pain since 2008     She has seen several rheumatologists. First was Dr. Lerma who thought she had bursitis.Then she saw Dr. Riddle who diagnosed rheumatoid arthritis and gave her Humira x 3 years which did not help. Humira caused her to feel like a zombie. She then went to [facility] : saw Dr. Woo and Dr. Reeves   He diagnosed a combination of rheumatoid arthritis, fibromyalgia and PMR.  She recalls taking methotrexate but it didn't help her. At one point she only took pain pills.She took mtx for 4 years  She has been on prednisone since May 2016 due to ALLERGIES as prescribed by her ALLERGY doctor.   She also took tramadol and percocet but did not help.     Held Arava due to stomach upset.  Night sweats since 2008.    Then we were treating her as PMR.     She has been on prednisone 2.5 mg BID    She took lyrica in the past and that didn't help    June 2018    ESR 29  CRP 26.3  CBC : grossly normal  CMP : GFR 50    Then her complaints were :     Fatigue  Pain all over  No swelling  Once she had right hand pain and swelling,then she had left ankle pain and swelling  Muscle spasms     I checked her uric acid and it was 11.4  She had CRP of 50.3  Her ESR was normal    I offered her gabapentin and she didn't like it  She has stayed on prednisone 2.5 mg bid    She didn't like the lyrica    I then gave her 20 mg prednisone  She takes allopurinol 50 mg and colchicine 0.6 mg   Uric acid dropped to 5.8    She did really well    Her CRP dropped to 9.4  ESR 25  UA looks abnormal but she has no symptoms  Mild stable anemia  gfr 50  Vit d 59  No significant proteinuria    Her predmard panel was negative  Hep A ab positive     I asked her to taper prednisone  Actemra offered last time     She didn't like it     She has stayed on  20 mg prednisone     Past history : HTN,allergic rhinitis,CKD stage 3,GERD,PMR,RA,FMS,elevated inflammatory markers,asthma     Family history : no autoimmune illness    Social history : former smoker 1999      Review of Systems   Constitutional: Negative for activity change, appetite change, chills, diaphoresis, fatigue, fever and unexpected weight change.   HENT: Negative for congestion, dental problem, drooling, ear discharge, ear pain, facial swelling, hearing loss, mouth sores, nosebleeds, postnasal drip, rhinorrhea, sinus pressure, sinus pain, sneezing, sore throat, tinnitus, trouble swallowing and voice change.    Eyes: Negative for photophobia, pain, discharge, redness, itching and visual disturbance.   Respiratory: Negative for apnea, cough, choking, chest tightness, shortness of breath, wheezing and stridor.    Cardiovascular: Negative for chest pain, palpitations and leg swelling.   Gastrointestinal: Negative for abdominal distention, abdominal pain, anal bleeding, blood in stool, constipation, diarrhea, nausea, rectal pain and vomiting.   Endocrine: Negative for cold intolerance, heat intolerance, polydipsia, polyphagia and polyuria.   Genitourinary: Negative for decreased urine volume, difficulty urinating, dysuria, enuresis, flank pain, frequency, genital sores, hematuria and urgency.   Musculoskeletal: Positive for arthralgias. Negative for back pain, gait problem, joint swelling, myalgias, neck pain and neck stiffness.   Skin: Negative for color change, pallor, rash and wound.   Allergic/Immunologic: Negative for environmental allergies, food allergies and immunocompromised state.   Neurological: Negative for dizziness, tremors, seizures, syncope, facial asymmetry, speech difficulty, weakness, light-headedness, numbness and headaches.   Hematological: Negative for adenopathy. Does not bruise/bleed easily.   Psychiatric/Behavioral: Negative for agitation, behavioral problems, confusion, decreased  concentration, dysphoric mood, hallucinations, self-injury, sleep disturbance and suicidal ideas. The patient is not nervous/anxious and is not hyperactive.      Physical Exam     ARENAS-28 tender joint count: 0  ARENAS-28 swollen joint count: 0    Physical Exam   Constitutional: She is oriented to person, place, and time and well-developed, well-nourished, and in no distress. No distress.   HENT:   Head: Normocephalic.   Mouth/Throat: Oropharynx is clear and moist.   Eyes: Conjunctivae are normal. Pupils are equal, round, and reactive to light. Right eye exhibits no discharge. Left eye exhibits no discharge. No scleral icterus.   Neck: Normal range of motion. No thyromegaly present.   Cardiovascular: Normal rate, regular rhythm, normal heart sounds and intact distal pulses.    Pulmonary/Chest: Effort normal and breath sounds normal. No stridor.   Abdominal: Soft. Bowel sounds are normal.   Lymphadenopathy:     She has no cervical adenopathy.   Neurological: She is alert and oriented to person, place, and time.   Skin: Skin is warm. No rash noted. She is not diaphoretic.     Psychiatric: Affect and judgment normal.   Musculoskeletal: Normal range of motion.           Assessment     78 year old black female with     HTN,allergic rhinitis,CKD stage 3,GERD,PMR,RA,FMS,elevated inflammatory markers,asthma     She wants prednisone to survive with the joint pain    Does she have PMR  Does she have RA  Does she have fibromyalgia is the question    We have witnessed synovitis in the past,this made us think she has RA  We thought she rapidly responded to prednisone,hence comes the PMR  She always complains of overall body pain,hence she gets the fibromyalgia diagnosis    We have positive RF in the past  We have high uric acid    She has responded really well to prednisone 20 mg  Her CRP dropped a great deal    We have her allopurinol and colchicine and her uric acid dropped to less than 6    She does not like to go on actemra as  suggested     She just stays on prednisone 20 mg     1. Inflammatory arthritis    2. Long term (current) use of systemic steroids        Reviewed labs/xrays  Reviewed medications    F/u problem    Plan:      Hep A Igm    Uric acid today    Continue allopurinol 50 mg and colchicine 0.6 mg daily    SHE CHOOSES PREDNISONE 10 to 20 mg     Will get dexa  Vit d level    If she changes her mind I will rediscuss biologics     Betzaida was seen today for follow-up.    Diagnoses and all orders for this visit:    Inflammatory arthritis  -     Uric acid; Future  -     HEPATITIS A ANTIBODY, IGM; Future  -     Vitamin D; Future  -     Sedimentation rate; Future  -     C-reactive protein; Standing    Long term (current) use of systemic steroids  -     DXA Bone Density Spine And Hip; Future    Other orders  -     colchicine (COLCRYS) 0.6 mg tablet; Take 1 tablet (0.6 mg total) by mouth once daily.  -     allopurinol (ZYLOPRIM) 100 MG tablet; Take 0.5 tablets (50 mg total) by mouth once daily.  -     predniSONE (DELTASONE) 10 MG tablet; Take 1 tablet (10 mg total) by mouth once daily.        rtc in 2 months

## 2019-01-08 ENCOUNTER — HOSPITAL ENCOUNTER (OUTPATIENT)
Dept: RADIOLOGY | Facility: CLINIC | Age: 79
Discharge: HOME | End: 2019-01-08
Attending: INTERNAL MEDICINE
Payer: MEDICARE

## 2019-01-08 ENCOUNTER — OFFICE VISIT (OUTPATIENT)
Dept: OTOLARYNGOLOGY | Facility: CLINIC | Age: 79
End: 2019-01-08
Payer: MEDICARE

## 2019-01-08 VITALS — SYSTOLIC BLOOD PRESSURE: 181 MMHG | HEART RATE: 90 BPM | DIASTOLIC BLOOD PRESSURE: 93 MMHG

## 2019-01-08 DIAGNOSIS — K21.9 GASTROESOPHAGEAL REFLUX DISEASE, ESOPHAGITIS PRESENCE NOT SPECIFIED: ICD-10-CM

## 2019-01-08 DIAGNOSIS — H90.3 SENSORINEURAL HEARING LOSS OF BOTH EARS: ICD-10-CM

## 2019-01-08 DIAGNOSIS — J31.0 CHRONIC RHINITIS: Primary | ICD-10-CM

## 2019-01-08 DIAGNOSIS — Z79.52 LONG TERM (CURRENT) USE OF SYSTEMIC STEROIDS: ICD-10-CM

## 2019-01-08 DIAGNOSIS — H68.003 SALPINGITIS OF BOTH EUSTACHIAN TUBES: ICD-10-CM

## 2019-01-08 PROCEDURE — 3080F DIAST BP >= 90 MM HG: CPT | Mod: CPTII,S$GLB,, | Performed by: OTOLARYNGOLOGY

## 2019-01-08 PROCEDURE — 99499 RISK ADDL DX/OHS AUDIT: ICD-10-PCS | Mod: S$GLB,,, | Performed by: OTOLARYNGOLOGY

## 2019-01-08 PROCEDURE — 77080 DEXA BONE DENSITY SPINE HIP: ICD-10-PCS | Mod: 26,,, | Performed by: INTERNAL MEDICINE

## 2019-01-08 PROCEDURE — 99214 OFFICE O/P EST MOD 30 MIN: CPT | Mod: 25,S$GLB,, | Performed by: OTOLARYNGOLOGY

## 2019-01-08 PROCEDURE — 77080 DXA BONE DENSITY AXIAL: CPT | Mod: TC

## 2019-01-08 PROCEDURE — 99499 UNLISTED E&M SERVICE: CPT | Mod: S$GLB,,, | Performed by: OTOLARYNGOLOGY

## 2019-01-08 PROCEDURE — 99214 PR OFFICE/OUTPT VISIT, EST, LEVL IV, 30-39 MIN: ICD-10-PCS | Mod: 25,S$GLB,, | Performed by: OTOLARYNGOLOGY

## 2019-01-08 PROCEDURE — 1101F PT FALLS ASSESS-DOCD LE1/YR: CPT | Mod: CPTII,S$GLB,, | Performed by: OTOLARYNGOLOGY

## 2019-01-08 PROCEDURE — 99999 PR PBB SHADOW E&M-EST. PATIENT-LVL III: CPT | Mod: PBBFAC,,, | Performed by: OTOLARYNGOLOGY

## 2019-01-08 PROCEDURE — 3080F PR MOST RECENT DIASTOLIC BLOOD PRESSURE >= 90 MM HG: ICD-10-PCS | Mod: CPTII,S$GLB,, | Performed by: OTOLARYNGOLOGY

## 2019-01-08 PROCEDURE — 31575 DIAGNOSTIC LARYNGOSCOPY: CPT | Mod: S$GLB,,, | Performed by: OTOLARYNGOLOGY

## 2019-01-08 PROCEDURE — 99999 PR PBB SHADOW E&M-EST. PATIENT-LVL III: ICD-10-PCS | Mod: PBBFAC,,, | Performed by: OTOLARYNGOLOGY

## 2019-01-08 PROCEDURE — 31575 LARYNGOSCOPY: ICD-10-PCS | Mod: S$GLB,,, | Performed by: OTOLARYNGOLOGY

## 2019-01-08 PROCEDURE — 3077F SYST BP >= 140 MM HG: CPT | Mod: CPTII,S$GLB,, | Performed by: OTOLARYNGOLOGY

## 2019-01-08 PROCEDURE — 1101F PR PT FALLS ASSESS DOC 0-1 FALLS W/OUT INJ PAST YR: ICD-10-PCS | Mod: CPTII,S$GLB,, | Performed by: OTOLARYNGOLOGY

## 2019-01-08 PROCEDURE — 3077F PR MOST RECENT SYSTOLIC BLOOD PRESSURE >= 140 MM HG: ICD-10-PCS | Mod: CPTII,S$GLB,, | Performed by: OTOLARYNGOLOGY

## 2019-01-08 PROCEDURE — 77080 DXA BONE DENSITY AXIAL: CPT | Mod: 26,,, | Performed by: INTERNAL MEDICINE

## 2019-01-08 RX ORDER — FAMOTIDINE 20 MG/1
40 TABLET, FILM COATED ORAL NIGHTLY
Qty: 60 TABLET | Refills: 2 | Status: SHIPPED | OUTPATIENT
Start: 2019-01-08 | End: 2019-01-28

## 2019-01-08 NOTE — PROCEDURES
Laryngoscopy  Date/Time: 1/8/2019 3:16 PM  Performed by: Wilbur Gann MD  Authorized by: Wilbur Gann MD     Consent Done?:  Yes (Verbal)  Anesthesia:     Local anesthetic:  Lidocaine 4% and Gurinder-Synephrine 1/2%    Patient tolerance:  Patient tolerated the procedure well with no immediate complications  Laryngoscopy:     Areas examined:  Nasopharynx, oropharynx, hypopharynx, larynx, vocal cords and nasal cavities    Laryngoscope size:  4 mm  Nose Intranasal:      Mucosa no polyps     No mucosa lesions present     No septum gross deformity     Turbinates not enlarged  Nasopharynx:      No mucosa lesions     Adenoids not present     Posterior choanae patent     Eustachian tube patent  Larynx/hypopharynx:      No epiglottis lesions     No epiglottis edema     No AE folds lesions     No vocal cord polyps     Equal and normal bilateral     No hypopharynx lesions     No piriform sinus pooling     No piriform sinus lesions     No post cricoid edema     No post cricoid erythema     Mild diffuse mucosal edema of MT and ETs bilaterally.  Nonpurulent moderate quantity mucus posteriorly.  Larynx with mild reflux changes.

## 2019-01-08 NOTE — PROGRESS NOTES
Subjective:      Betzaida is a 78 y.o. female who comes for a new complaint of congestion.  Her last visit with me was on 2/15/2017.  Ongoing, perennial issues with runny nose, postnasal drip, which seems to turn into clogged ears, also exacerbating her reflux.  Using Flonase, Singulair and Zyrtec daily.  Also OTC meds for reflux, not fully controlled, often heartburn at night.    SNOT-22 score = 52, NOSE score = 55%, ETDQ-7 score = 5.3    The patient's medications, allergies, past medical, surgical, social and family histories were reviewed and updated as appropriate.    A detailed review of systems was obtained with pertinent positives as per the above HPI, and otherwise negative.        Objective:     BP (!) 181/93 Comment: Patient reports BP elevated lately. Advised her to tell PCP.  Pulse 90        Constitutional:   She appears well-developed. She is cooperative. Normal speech.  No hoarse voice.      Head:  Normocephalic. Salivary glands normal.  Facial strength is normal.      Ears:    Right Ear: No drainage or tenderness. Tympanic membrane is not perforated. Tympanic membrane mobility is normal. No middle ear effusion. No decreased hearing is noted.   Left Ear: No drainage or tenderness. Tympanic membrane is not perforated. Tympanic membrane mobility is normal.  No middle ear effusion. No decreased hearing is noted.     Nose:  No mucosal edema, rhinorrhea, septal deviation or polyps. No epistaxis. Turbinates normal, no turbinate masses and no turbinate hypertrophy.  Right sinus exhibits no maxillary sinus tenderness and no frontal sinus tenderness. Left sinus exhibits no maxillary sinus tenderness and no frontal sinus tenderness.     Mouth/Throat  Oropharynx clear and moist without lesions or asymmetry and normal uvula midline. She does not have dentures. Normal dentition. No oral lesions or mucous membrane lesions. No oropharyngeal exudate or posterior oropharyngeal erythema. Mirror exam not performed due to  patient tolerance.  Mirror exam not performed due to patient tolerance.      Neck:  Neck normal without thyromegaly masses, asymmetry, normal tracheal structure, crepitus, and tenderness, thyroid normal, trachea normal and no adenopathy. Normal range of motion present.     She has no cervical adenopathy.     Cardiovascular:   Regular rhythm.      Pulmonary/Chest:   Effort normal.     Psychiatric:   She has a normal mood and affect. Her speech is normal and behavior is normal.     Neurological:   No cranial nerve deficit.     Skin:   No rash noted.       Procedure    Flexible laryngoscopy performed.  See procedure note.        Data Reviewed    WBC (K/uL)   Date Value   10/15/2018 11.30     Eosinophil% (%)   Date Value   10/15/2018 2.5     Eos # (K/uL)   Date Value   10/15/2018 0.3     Platelets (K/uL)   Date Value   10/15/2018 300     Glucose (mg/dL)   Date Value   10/15/2018 123 (H)     No results found for: IGE        Assessment:     1. Chronic rhinitis    2. Sensorineural hearing loss of both ears    3. Salpingitis of both eustachian tubes    4. Gastroesophageal reflux disease, esophagitis presence not specified         Plan:     Rx pepcid 40 mg nightly to control baseline reflux and mucus.  Continue flonase and xyzal.  Urged to keep appt with allergist for further management.  Follow-up if symptoms worsen or fail to improve.

## 2019-01-09 ENCOUNTER — OFFICE VISIT (OUTPATIENT)
Dept: PODIATRY | Facility: CLINIC | Age: 79
End: 2019-01-09
Payer: MEDICARE

## 2019-01-09 VITALS
WEIGHT: 171 LBS | SYSTOLIC BLOOD PRESSURE: 166 MMHG | HEIGHT: 62 IN | DIASTOLIC BLOOD PRESSURE: 84 MMHG | BODY MASS INDEX: 31.47 KG/M2 | HEART RATE: 89 BPM

## 2019-01-09 DIAGNOSIS — M24.573 EQUINUS CONTRACTURE OF ANKLE: ICD-10-CM

## 2019-01-09 DIAGNOSIS — M79.672 FOOT PAIN, LEFT: Primary | ICD-10-CM

## 2019-01-09 DIAGNOSIS — B35.1 ONYCHOMYCOSIS DUE TO DERMATOPHYTE: ICD-10-CM

## 2019-01-09 DIAGNOSIS — M19.079 ARTHRITIS OF FOOT: ICD-10-CM

## 2019-01-09 PROCEDURE — 3077F SYST BP >= 140 MM HG: CPT | Mod: CPTII,S$GLB,, | Performed by: PODIATRIST

## 2019-01-09 PROCEDURE — 3079F PR MOST RECENT DIASTOLIC BLOOD PRESSURE 80-89 MM HG: ICD-10-PCS | Mod: CPTII,S$GLB,, | Performed by: PODIATRIST

## 2019-01-09 PROCEDURE — 3077F PR MOST RECENT SYSTOLIC BLOOD PRESSURE >= 140 MM HG: ICD-10-PCS | Mod: CPTII,S$GLB,, | Performed by: PODIATRIST

## 2019-01-09 PROCEDURE — 1101F PT FALLS ASSESS-DOCD LE1/YR: CPT | Mod: CPTII,S$GLB,, | Performed by: PODIATRIST

## 2019-01-09 PROCEDURE — 99213 OFFICE O/P EST LOW 20 MIN: CPT | Mod: S$GLB,,, | Performed by: PODIATRIST

## 2019-01-09 PROCEDURE — 1101F PR PT FALLS ASSESS DOC 0-1 FALLS W/OUT INJ PAST YR: ICD-10-PCS | Mod: CPTII,S$GLB,, | Performed by: PODIATRIST

## 2019-01-09 PROCEDURE — 99213 PR OFFICE/OUTPT VISIT, EST, LEVL III, 20-29 MIN: ICD-10-PCS | Mod: S$GLB,,, | Performed by: PODIATRIST

## 2019-01-09 PROCEDURE — 99999 PR PBB SHADOW E&M-EST. PATIENT-LVL III: CPT | Mod: PBBFAC,,, | Performed by: PODIATRIST

## 2019-01-09 PROCEDURE — 3079F DIAST BP 80-89 MM HG: CPT | Mod: CPTII,S$GLB,, | Performed by: PODIATRIST

## 2019-01-09 PROCEDURE — 99999 PR PBB SHADOW E&M-EST. PATIENT-LVL III: ICD-10-PCS | Mod: PBBFAC,,, | Performed by: PODIATRIST

## 2019-01-09 RX ORDER — CICLOPIROX 80 MG/ML
SOLUTION TOPICAL NIGHTLY
Qty: 6.6 ML | Refills: 11 | Status: SHIPPED | OUTPATIENT
Start: 2019-01-09 | End: 2021-03-30

## 2019-01-09 NOTE — PROGRESS NOTES
Subjective:      Patient ID: Betzaida Neumann is a 78 y.o. female.    Chief Complaint: Foot Pain    Discolored misshapen toenails all ten toes.  Gradual onset, worsening over past several weeks, aggravated by increased weight bearing, shoe gear, pressure.  No previous medical treatment.  OTC pain med not helping. deneis trauma, surgery all nails.    Deep aching sometimes sharp pain left midfoot/arch.  Gradual onset, worsening over past several weeks, aggravated by increased weight bearing, shoe gear, pressure.  No real improvement with inserts, stretches.  Didn't fill lidocaine gel.  xrays negative acute injury. Denies trauma.  Relates prior bunion surgery and toe surgery both feet.    Review of Systems   Constitution: Negative for chills, diaphoresis, fever, malaise/fatigue and night sweats.   Cardiovascular: Negative for claudication, cyanosis, leg swelling and syncope.   Skin: Negative for color change, dry skin, nail changes, rash, suspicious lesions and unusual hair distribution.   Musculoskeletal: Positive for joint pain. Negative for falls, joint swelling, muscle cramps, muscle weakness and stiffness.   Gastrointestinal: Negative for constipation, diarrhea, nausea and vomiting.   Neurological: Negative for brief paralysis, disturbances in coordination, focal weakness, numbness, paresthesias, sensory change and tremors.           Objective:      Physical Exam   Constitutional: She is oriented to person, place, and time. She appears well-developed and well-nourished. She is cooperative. No distress.   Cardiovascular:   Pulses:       Popliteal pulses are 2+ on the right side, and 2+ on the left side.        Dorsalis pedis pulses are 1+ on the right side, and 1+ on the left side.        Posterior tibial pulses are 1+ on the right side, and 1+ on the left side.   Capillary refill 3 seconds all toes/distal feet, all toes/both feet warm to touch.      Negative lymphadenopathy bilateral popliteal fossa and tarsal  tunnel.      Negavie lower extremity edema bilateral.     Musculoskeletal:        Right ankle: She exhibits normal range of motion, no swelling, no ecchymosis, no deformity, no laceration and normal pulse. Achilles tendon normal. Achilles tendon exhibits no pain, no defect and normal Deluna's test results.   Deep aching pain with weight bearing and piano key test left 2nd tmtj without deformity or loss of function.    Ankle dorsiflexion decreased at <10 degrees bilateral with moderate increase with knee flexion bilateral.    Otherwise, Normal angle, base, station of gait. All ten toes without clubbing, cyanosis, or signs of ischemia.  No pain to palpation bilateral lower extremities.  Range of motion, stability, muscle strength, and muscle tone normal bilateral feet and legs.     Lymphadenopathy: No inguinal adenopathy noted on the right or left side.   Negative lymphadenopathy bilateral popliteal fossa and tarsal tunnel.    Negative lymphangitic streaking bilateral feet/ankles/legs.   Neurological: She is alert and oriented to person, place, and time. She has normal strength. She displays no atrophy and no tremor. No sensory deficit. She exhibits normal muscle tone. Gait normal.   Reflex Scores:       Patellar reflexes are 2+ on the right side and 2+ on the left side.       Achilles reflexes are 2+ on the right side and 2+ on the left side.  Negative tinel sign to percussion sural, superficial peroneal, deep peroneal, saphenous, and posterior tibial nerves right and left ankles and feet.     Skin: Skin is warm, dry and intact. Capillary refill takes 2 to 3 seconds. No abrasion, no bruising, no burn, no ecchymosis, no laceration, no lesion and no rash noted. She is not diaphoretic. No cyanosis or erythema. No pallor. Nails show no clubbing.     Skin is normal age and health appropriate color, turgor, texture, and temperature bilateral lower extremities without ulceration, hyperpigmentation, discoloration, masses  nodules or cords palpated.  No ecchymosis, erythema, edema, or cardinal signs of infection bilateral lower extremities.    Toenails 1st, 2nd, 3rd, 4th, 5th  bilateral are hypertrophic thickened 2-3 mm, dystrophic, discolored tanish brown with tan, gray crumbly subungual debris.  Neatly trimmed and not tender to distal nail plate pressure, without periungual skin abnormality of each.     Psychiatric: She has a normal mood and affect.             Assessment:       Encounter Diagnoses   Name Primary?    Foot pain, left Yes    Arthritis of foot     Equinus contracture of ankle     Onychomycosis due to dermatophyte          Plan:       Betzaida was seen today for foot pain.    Diagnoses and all orders for this visit:    Foot pain, left  -     Ambulatory consult to Physical Therapy  -     ORTHOTIC DEVICE (DME)    Arthritis of foot  -     Ambulatory consult to Physical Therapy  -     ORTHOTIC DEVICE (DME)    Equinus contracture of ankle  -     Ambulatory consult to Physical Therapy  -     ORTHOTIC DEVICE (DME)    Onychomycosis due to dermatophyte    Other orders  -     ciclopirox (PENLAC) 8 % Soln; Apply topically nightly.      I counseled the patient on her conditions, their implications and medical management.    Continue stretches, inserts, athletic shoes.    Rx custom orthotics, PT.    Rx  Penlac.  Declines Rx lidocaine gel.  Continue prednisone.          Follow-up in about 6 weeks (around 2/20/2019).

## 2019-01-11 ENCOUNTER — TELEPHONE (OUTPATIENT)
Dept: PODIATRY | Facility: CLINIC | Age: 79
End: 2019-01-11

## 2019-01-11 NOTE — TELEPHONE ENCOUNTER
Amna with [facility] requested diagnosis code for Ciclopirox 8%. B35.1 given.

## 2019-01-11 NOTE — TELEPHONE ENCOUNTER
----- Message from Rolo Benz sent at 1/11/2019  2:52 PM CST -----  Contact: [employer]/ [phone]  [facility] would like a call back to speak with a nurse about the patient medication.

## 2019-01-22 ENCOUNTER — CLINICAL SUPPORT (OUTPATIENT)
Dept: REHABILITATION | Facility: HOSPITAL | Age: 79
End: 2019-01-22
Attending: PODIATRIST
Payer: MEDICARE

## 2019-01-22 DIAGNOSIS — M79.672 LEFT FOOT PAIN: ICD-10-CM

## 2019-01-22 PROCEDURE — 97161 PT EVAL LOW COMPLEX 20 MIN: CPT | Mod: PO

## 2019-01-22 PROCEDURE — G8978 MOBILITY CURRENT STATUS: HCPCS | Mod: CK,PO

## 2019-01-22 PROCEDURE — 97110 THERAPEUTIC EXERCISES: CPT | Mod: PO

## 2019-01-22 PROCEDURE — G8979 MOBILITY GOAL STATUS: HCPCS | Mod: CJ,PO

## 2019-01-22 NOTE — PLAN OF CARE
OUTPATIENT PHYSICAL THERAPY  PHYSICAL THERAPY EVALUATION    Name: Betzaida Neumann  Clinic Number: [id number]    Evaluation Date: 01/22/2019  Visit #: 1 / 1 authorized   Authorization period Expiration: 1/9/19  Plan of Care Expiration: 4/5/19  Precautions: RA     Diagnosis:   Encounter Diagnosis   Name Primary?    Left foot pain      Physician: Ad Cline DPM  Treatment Orders: PT Eval and Treat  Past Medical History:   Diagnosis Date    Allergy     Arthritis     Cataract     CKD (chronic kidney disease) stage 3, GFR 30-59 ml/min     Fibromyalgia     Hypertension     Polymyalgia rheumatica     RA (rheumatoid arthritis)      Current Outpatient Medications   Medication Sig    albuterol (PROVENTIL) 2.5 mg /3 mL (0.083 %) nebulizer solution Take 2.5 mg by nebulization every 6 (six) hours as needed for Wheezing. Rescue    allopurinol (ZYLOPRIM) 100 MG tablet Take 0.5 tablets (50 mg total) by mouth once daily.    cetirizine (ZYRTEC) 10 MG tablet Take 10 mg by mouth once daily.    ciclopirox (PENLAC) 8 % Soln Apply topically nightly.    colchicine (COLCRYS) 0.6 mg tablet Take 1 tablet (0.6 mg total) by mouth once daily.    estradiol (ESTRACE) 2 MG tablet estradiol 2 mg tablet   Take 1 tablet every day by oral route.    famotidine (PEPCID) 20 MG tablet Take 2 tablets (40 mg total) by mouth every evening.    fluticasone (FLONASE) 50 mcg/actuation nasal spray fluticasone 50 mcg/actuation nasal spray,suspension    fluticasone-vilanterol (BREO) 200-25 mcg/dose DsDv diskus inhaler Inhale 1 puff into the lungs once daily. Controller    losartan (COZAAR) 50 MG tablet Take 1 tablet (50 mg total) by mouth once daily.    montelukast (SINGULAIR) 10 mg tablet Take 10 mg by mouth every evening.     No current facility-administered medications for this visit.      Review of patient's allergies indicates:   Allergen Reactions    Sulfa (sulfonamide antibiotics)     Sulfacetamide sodium Swelling    Tomato (solanum  "lycopersicum)        History   Prior Therapy: none   Social History: she reports steps to get into her house which is painful  Previous functional status: WNL  Current functional status: limited by pain   Work:      Subjective   History of Present Illness: Patient is a 77 y/o female . She Reports L foot pain that is "swollen and aching with a pinching feeling". She reports that the pain has been present for approximately a year. She states that her MD diagnosed her with arthritis and would like her to try PT to see if it helps. She points to the dorsum of the foot in between the 1st and 2nd met as the painful area. She has a history of bunion surgery on L as well over 30 years ago.       DOI: appoximately 1 year ago   Imaging, xray  Pain: current 6/10, worst 10/10, best 6/10, Aching, intermittent  Radicular symptoms: none   Aggravating factors: she reports morning pain and late evening pain. Pain with prolonged walking   Easing factors: main meds   Pts goals: get rid of pain, get back to walking and standing without pain    Objective   Mental status: alert  Posture/ Alignment: Fair    Movement Analysis:       GAIT DEVIATIONS: Betzaida amb with patient ambulats with ER on R LR .    Ankle Range of Motion: PROM  Ankle Right Left      Dorsiflexion 0 -3   Plantarflexion 50 40   Inversion 45 35   Eversion 20  10      Great toe extension in degrees: 60 L, 65 right   Strength:  Ankle Right Left   Dorsiflexion 5/5 4+   Plantarflexion 8 reps single leg  5 reps single leg   Inversion 5/5 4/5   Eversion 5/5 5/5     Edema   Right Left   Figure 8 47 cm 48 cm     Joint Mobility: hypomobile L TMT joint, L great to hypomobile to dorsal glides     Palpation: tenderness to space between 1st and second met on L foot         CMS Impairment/Limitation/Restriction ankle per clinical judgment     Therapist reviewed FOTO scores for Betzaida Neumann on 1/22/2019.   FOTO documents entered into EPIC - see Media " section.    Limitation Score: 55%  Category: Mobility    Current : CK = at least 40% but < 60% impaired, limited or restricted  Goal: CJ = at least 20% but < 40% impaired, limited or restricted           Pt/family was provided educational information, including: role of PT, goals for PT, scheduling - pt verbalized understanding. Discussed insurance plan with pt.     TREATMENT   Time In: 1115 AM  Time Out: 1200 PM     Discussed Plan of Care with patient: Yes    Betzaida received 15 minutes of therapeutic exercises including:     Mobility  · Calf stretch with towel - 3x30 secs   · Calf stretch on wall: knee straight, knee bent - 2x30 sec each   Strengthening   · Double leg heel taises to toe raises - 20x each   · Inversion against GTB with legs crossed - x20           Written Home Exercises Provided: yes   Exercises were reviewed and Betzaida was able to demonstrate them prior to the end of the session. Pt received a written copy of exercises to perform at home. Betzaida demonstrated good  understanding of the education provided.     Assessment   Betzaida is a 78 y.o. 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 Patient presents with decreased DF and great toe ROM L>R and decreased plantarflexion and DF strength L>R which are likely altering her mechanics.  Demonstrates impairments including limitations as described in the problem list. Pt prognosis is Good. Positive prognostic factors include motivation to improve . Negative prognostic factors include arthritic changes of joint. Pt will benefit from skilled outpatient physical therapy to address the above stated deficits, provide pt/family education, and to maximize pt's level of independence.     History  Co-morbidities and personal factors that may impact the plan of care Examination  Body Structures and Functions, activity limitations and participation restrictions that may impact  the plan of care    Clinical Presentation   Co-morbidities:   RA        Personal Factors:   no deficits Body Regions:   lower extremities    Body Systems:   ROM  strength  gait        Participation Restrictions:   Work      Activity limitations:   Learning and applying knowledge  no deficits    General Tasks and Commands  no deficits    Communication  no deficits    Mobility  walking    Self care  no deficits    Domestic Life  no deficits    Interactions/Relationships  no deficits    Life Areas  no deficits    Community and Social Life  no deficits         stable and uncomplicated                      low   low  low Decision Making/ Complexity Score:  low     Pt's spiritual, cultural and educational needs considered and pt agreeable to plan of care and goals as stated below:     Anticipated Barriers for physical therapy: arthritic changes to joints    Short Term GOALS:  In 5 weeks  1. Pt will be able to improve dorsiflexion PROM to 10 degrees or better in order to decrease compensations and reduce pain with ambulation.   2. Pt will perform 8 single leg heel raises or better in order to return strength levels to even to the contralateral side and improve ambulation.   3. Pt will demonstrate 100% recall of HEP with no assistance from therapist.  Long Term GOALS:  In 10 weeks  1. Pt will be able to stand for 1 hour or longer with less than a 2/10 pain so that she can return to work as a .   2. Pt will be able to walk for 30 mins or longer with less than a 2/10 pain level so that she can navigate the grocery store pain free.  3. Patient will report a 75% improvement in painful ankle symptoms in order to return to PLOF.    Plan   Work on DF ROM in closed chain and open chain. Improve overall ankle strength then move to hip to address kinetic chain.     Outpatient physical therapy 2 times weekly to include: pt ed, HEP, therapeutic exercises, therapeutic activities, neuromuscular re-education/ balance exercises,  manual therapy, dry needling, and modalities prn. Cont PT for 10 weeks. Pt may be seen by PTA as part of the rehabilitation team.     I certify the need for these services furnished under this plan of treatment and while under my care.    Julio John, PT

## 2019-01-28 ENCOUNTER — TELEPHONE (OUTPATIENT)
Dept: ENDOSCOPY | Facility: HOSPITAL | Age: 79
End: 2019-01-28

## 2019-01-28 ENCOUNTER — OFFICE VISIT (OUTPATIENT)
Dept: GASTROENTEROLOGY | Facility: CLINIC | Age: 79
End: 2019-01-28
Payer: MEDICARE

## 2019-01-28 VITALS
BODY MASS INDEX: 31.16 KG/M2 | DIASTOLIC BLOOD PRESSURE: 90 MMHG | HEART RATE: 85 BPM | SYSTOLIC BLOOD PRESSURE: 188 MMHG | HEIGHT: 62 IN | WEIGHT: 169.31 LBS

## 2019-01-28 DIAGNOSIS — K21.9 GASTROESOPHAGEAL REFLUX DISEASE, ESOPHAGITIS PRESENCE NOT SPECIFIED: Primary | ICD-10-CM

## 2019-01-28 PROCEDURE — 99999 PR PBB SHADOW E&M-EST. PATIENT-LVL III: CPT | Mod: PBBFAC,,, | Performed by: INTERNAL MEDICINE

## 2019-01-28 PROCEDURE — 99499 UNLISTED E&M SERVICE: CPT | Mod: S$GLB,,, | Performed by: INTERNAL MEDICINE

## 2019-01-28 PROCEDURE — 99999 PR PBB SHADOW E&M-EST. PATIENT-LVL III: ICD-10-PCS | Mod: PBBFAC,,, | Performed by: INTERNAL MEDICINE

## 2019-01-28 PROCEDURE — 1101F PR PT FALLS ASSESS DOC 0-1 FALLS W/OUT INJ PAST YR: ICD-10-PCS | Mod: CPTII,S$GLB,, | Performed by: INTERNAL MEDICINE

## 2019-01-28 PROCEDURE — 3077F SYST BP >= 140 MM HG: CPT | Mod: CPTII,S$GLB,, | Performed by: INTERNAL MEDICINE

## 2019-01-28 PROCEDURE — 99204 OFFICE O/P NEW MOD 45 MIN: CPT | Mod: S$GLB,,, | Performed by: INTERNAL MEDICINE

## 2019-01-28 PROCEDURE — 3077F PR MOST RECENT SYSTOLIC BLOOD PRESSURE >= 140 MM HG: ICD-10-PCS | Mod: CPTII,S$GLB,, | Performed by: INTERNAL MEDICINE

## 2019-01-28 PROCEDURE — 99204 PR OFFICE/OUTPT VISIT, NEW, LEVL IV, 45-59 MIN: ICD-10-PCS | Mod: S$GLB,,, | Performed by: INTERNAL MEDICINE

## 2019-01-28 PROCEDURE — 1101F PT FALLS ASSESS-DOCD LE1/YR: CPT | Mod: CPTII,S$GLB,, | Performed by: INTERNAL MEDICINE

## 2019-01-28 PROCEDURE — 99499 RISK ADDL DX/OHS AUDIT: ICD-10-PCS | Mod: S$GLB,,, | Performed by: INTERNAL MEDICINE

## 2019-01-28 PROCEDURE — 3080F PR MOST RECENT DIASTOLIC BLOOD PRESSURE >= 90 MM HG: ICD-10-PCS | Mod: CPTII,S$GLB,, | Performed by: INTERNAL MEDICINE

## 2019-01-28 PROCEDURE — 3080F DIAST BP >= 90 MM HG: CPT | Mod: CPTII,S$GLB,, | Performed by: INTERNAL MEDICINE

## 2019-01-28 RX ORDER — PREDNISONE 10 MG/1
10 TABLET ORAL DAILY
COMMUNITY
End: 2019-10-14

## 2019-01-28 RX ORDER — OMEPRAZOLE 40 MG/1
40 CAPSULE, DELAYED RELEASE ORAL DAILY
Qty: 30 CAPSULE | Refills: 2 | Status: SHIPPED | OUTPATIENT
Start: 2019-01-28 | End: 2020-04-24

## 2019-01-28 NOTE — PROGRESS NOTES
REASON FOR VISIT:  Acid regurgitation.    HISTORY OF PRESENT ILLNESS:  Ms. Neumann is a 78-year-old who presents with a   longstanding history of acid reflux with regurgitation of gastric contents.  She   has been dealing with acid reflux for a long time (approximately three years);   however, for the past six months, she feels like the symptoms have worsened.    She also complains of dysphagia, not particularly to any solids, but she   mentions that she even has trouble with her saliva at times.  Denies any trouble   with liquids.  The patient denies any NSAID use.  She is on prednisone for   polymyalgia rheumatica and rheumatoid arthritis.  Her bowels move 2-3 times a   day.  No blood in the stool.  Her last endoscopy revealed a hiatal hernia, but   no peptic ulcers.  Her last colonoscopy from 2016 revealed a polyp for which she   needs a followup in the latter part of 2019.    PAST MEDICAL, SURGICAL, SOCIAL AND FAMILY HISTORY:  Reviewed.    MEDICATIONS AND ALLERGIES:  Reviewed.    REVIEW OF SYSTEMS:  CONSTITUTIONAL:  No fever, no chills, no weight loss.  Appetite is normal.  EYES:  No visual changes, no red eyes.  ENT:  No odynophagia.  No hoarseness of voice.  CARDIOVASCULAR:  No angina or palpitation.  RESPIRATORY:  No shortness of breath or wheezing.  GENITOURINARY:  No dysuria or frequency.  MUSCULOSKELETAL:  Complains of generalized myalgias.  SKIN:  No pruritus or eczema.  NEUROLOGIC:  No headache, no seizures.  PSYCHIATRIC:  No anxiety or depression.  GASTROINTESTINAL:  See HPI.    PHYSICAL EXAMINATION:  VITAL SIGNS:  See EPIC.  GENERAL:  Awake, alert and oriented x3, no acute distress.  NECK:  Supple, no carotid bruit.  No cervical adenopathy.  ABDOMEN:  Obese, soft, nondistended, mild tenderness to deep palpation in lower   abdomen.  No guarding or rigidity.  Bowel sounds are normal.  No abdominal   bruits heard.  EYES:  Conjunctivae anicteric.  ENT:  Oropharynx, mucosa moist.  Mallampati  2.  CARDIOVASCULAR:  S1, S2 normal.  RESPIRATORY:  Bilateral air entry equal.  SKIN:  No palmar erythema or spider angiomata.  NEUROLOGIC:  No asterixis.  PSYCHIATRY:  Affect appropriate.  LOWER EXTREMITY:  No pedal edema.    IMPRESSION:  Gastroesophageal reflux with intermittent dysphagia.    RECOMMENDATION:  1.  Proceed with EGD.  2.  Start omeprazole 40 mg daily, 30 minutes before breakfast.  Further   recommendation based on above.      ACT/HN  dd: 01/28/2019 15:33:44 (CST)  td: 01/29/2019 09:06:09 (CST)  Doc ID   #[id number]  Job ID #[id number]    CC:

## 2019-01-28 NOTE — H&P (VIEW-ONLY)
REASON FOR VISIT:  Acid regurgitation.    HISTORY OF PRESENT ILLNESS:  Ms. Neumann is a 78-year-old who presents with a   longstanding history of acid reflux with regurgitation of gastric contents.  She   has been dealing with acid reflux for a long time (approximately three years);   however, for the past six months, she feels like the symptoms have worsened.    She also complains of dysphagia, not particularly to any solids, but she   mentions that she even has trouble with her saliva at times.  Denies any trouble   with liquids.  The patient denies any NSAID use.  She is on prednisone for   polymyalgia rheumatica and rheumatoid arthritis.  Her bowels move 2-3 times a   day.  No blood in the stool.  Her last endoscopy revealed a hiatal hernia, but   no peptic ulcers.  Her last colonoscopy from 2016 revealed a polyp for which she   needs a followup in the latter part of 2019.    PAST MEDICAL, SURGICAL, SOCIAL AND FAMILY HISTORY:  Reviewed.    MEDICATIONS AND ALLERGIES:  Reviewed.    REVIEW OF SYSTEMS:  CONSTITUTIONAL:  No fever, no chills, no weight loss.  Appetite is normal.  EYES:  No visual changes, no red eyes.  ENT:  No odynophagia.  No hoarseness of voice.  CARDIOVASCULAR:  No angina or palpitation.  RESPIRATORY:  No shortness of breath or wheezing.  GENITOURINARY:  No dysuria or frequency.  MUSCULOSKELETAL:  Complains of generalized myalgias.  SKIN:  No pruritus or eczema.  NEUROLOGIC:  No headache, no seizures.  PSYCHIATRIC:  No anxiety or depression.  GASTROINTESTINAL:  See HPI.    PHYSICAL EXAMINATION:  VITAL SIGNS:  See EPIC.  GENERAL:  Awake, alert and oriented x3, no acute distress.  NECK:  Supple, no carotid bruit.  No cervical adenopathy.  ABDOMEN:  Obese, soft, nondistended, mild tenderness to deep palpation in lower   abdomen.  No guarding or rigidity.  Bowel sounds are normal.  No abdominal   bruits heard.  EYES:  Conjunctivae anicteric.  ENT:  Oropharynx, mucosa moist.  Mallampati  2.  CARDIOVASCULAR:  S1, S2 normal.  RESPIRATORY:  Bilateral air entry equal.  SKIN:  No palmar erythema or spider angiomata.  NEUROLOGIC:  No asterixis.  PSYCHIATRY:  Affect appropriate.  LOWER EXTREMITY:  No pedal edema.    IMPRESSION:  Gastroesophageal reflux with intermittent dysphagia.    RECOMMENDATION:  1.  Proceed with EGD.  2.  Start omeprazole 40 mg daily, 30 minutes before breakfast.  Further   recommendation based on above.      ACT/HN  dd: 01/28/2019 15:33:44 (CST)  td: 01/29/2019 09:06:09 (CST)  Doc ID   #[id number]  Job ID #[id number]    CC:

## 2019-01-29 ENCOUNTER — CLINICAL SUPPORT (OUTPATIENT)
Dept: REHABILITATION | Facility: HOSPITAL | Age: 79
End: 2019-01-29
Attending: PODIATRIST
Payer: MEDICARE

## 2019-01-29 DIAGNOSIS — M79.672 LEFT FOOT PAIN: ICD-10-CM

## 2019-01-29 PROCEDURE — 97140 MANUAL THERAPY 1/> REGIONS: CPT | Mod: PO

## 2019-01-29 PROCEDURE — 97110 THERAPEUTIC EXERCISES: CPT | Mod: PO

## 2019-01-29 NOTE — PROGRESS NOTES
Name: Betzaida Neumann  Clinic Number: [id number]  Date of Treatment: 01/29/2019   Diagnosis:   Encounter Diagnosis   Name Primary?    Left foot pain        Physician: Ad Cline DPM    Time in: 300  Time Out: 400  Total Treatment Time: 30 mins   Evaluation Date: 01/22/2019  Visit #: 2 / 20 authorized   Authorization period Expiration: 1/9/19  Plan of Care Expiration: 4/5/19  Precautions: RA    Subjective     Betzaida reports that she is feeling better overall especially when she performs the exercises.  Patient reports their pain to be 3/10 on a 0-10 scale with 0 being no pain and 10 being the worst pain imaginable.    Objective   Time In: 1115 AM  Time Out: 1200 PM      Discussed Plan of Care with patient: Yes     Betzaida received 10 mins of manual therapy including:   · Calf stretching with posterior talus glides   · Talocrural distraction   · Forefoot supination mobs     Betzaida received 30 minutes of therapeutic exercises including:      Mobility  · Calf stretch with towel - 3x30 secs   · Calf stretch on wedge: knee straight, knee bent - 2x30 sec each   Strengthening   · Double leg heel taises to toe raises - 20x each   · Inversion against GTB with legs crossed - x20   · Lateral band walks with band around met heads - YTB - x4 laps   · Heel raises - toes out, toes in, toes neutral  · Split squats with DF emphasis - 3x8               Written Home Exercises Provided: yes   Exercises were reviewed and Betzaida was able to demonstrate them prior to the end of the session. Pt received a written copy of exercises to perform at home. Betzaida demonstrated good  understanding of the education provided.    Assessment   Betzaida participated in today's treatment session without symptom provocation or adverse effects. Pt demonstrated improved DF ROM and decreased pain with exercises. She demonstrated fatigue with exercise and hd to take frequent breaks. She had good relief with manual techniques as well as there-ex      Pt will  continue to benefit from skilled PT intervention. Medical Necessity is demonstrated by:  Unable to participate fully in daily activities, Requires skilled supervision to complete and progress HEP and Weakness.    Patient is making good progress towards established goals.    New/Revised goals: none at this time      Plan       Continue with established Plan of Care towards PT goals.       Julio John, PT, DPT.

## 2019-01-31 ENCOUNTER — CLINICAL SUPPORT (OUTPATIENT)
Dept: REHABILITATION | Facility: HOSPITAL | Age: 79
End: 2019-01-31
Attending: PODIATRIST
Payer: MEDICARE

## 2019-01-31 DIAGNOSIS — M79.672 LEFT FOOT PAIN: ICD-10-CM

## 2019-01-31 PROCEDURE — 97110 THERAPEUTIC EXERCISES: CPT | Mod: PO

## 2019-01-31 NOTE — PROGRESS NOTES
"Name: Betzaida Neumann  Clinic Number: [id number]  Date of Treatment: 01/31/2019   Diagnosis:   Encounter Diagnosis   Name Primary?    Left foot pain        Physician: Ad Cline DPM    Time in: 11:23 AM  Time Out: 12:00 PM  Total Treatment Time: 35 mins   Evaluation Date: 01/22/2019  Visit #: 3 / 20 authorized   Authorization period Expiration: 1/9/19  Plan of Care Expiration: 4/5/19  Precautions: RA    Subjective     Betzaida reports she is still having pain in her foot. Patient reports she is performing her HEP. Patient reports their pain to be 6/10 on a 0-10 scale with 0 being no pain and 10 being the worst pain imaginable.    Objective      Betzaida received 0 mins of manual therapy including: NP  · Calf stretching with posterior talus glides   · Talocrural distraction   · Forefoot supination mobs     Betzaida received 30 minutes of therapeutic exercises including:      Mobility  · Calf stretch with towel - 3x30 secs   · Calf stretch on wedge: knee straight, knee bent - 2x30 sec each   Strengthening   · Double leg heel raises to toe raises - 20x each   · Inversion against GTB  - x30   · Lateral band walks with band around met heads - YTB - x4 laps   · Heel raises - toes out, toes in, toes neutral  · Split squats with half foam roll for increased DF emphasis- 3x8   · Mini squats 2 x 10  · 4" Lateral tap downs 3 x 8  · 6" step ups 2 x 10           Written Home Exercises Provided: no change   Exercises were reviewed and Betzaida was able to demonstrate them prior to the end of the session. Pt received a written copy of exercises to perform at home. Betzaida demonstrated good  understanding of the education provided.    Assessment   Betzaida tolerated session well today. Patient with no onset of increased pain throughout treatment and tolerated additional exercises well. Patient demonstrated muscle fatigue post treatment. Patient with pain relief reported post treatment as well.     Pt will continue to benefit from skilled PT " intervention. Medical Necessity is demonstrated by:  Unable to participate fully in daily activities, Requires skilled supervision to complete and progress HEP and Weakness.    Patient is making good progress towards established goals.    New/Revised goals: none at this time      Plan       Continue with established Plan of Care towards PT goals.       Claudia Strange, PTA

## 2019-02-04 RX ORDER — HYDROCHLOROTHIAZIDE 25 MG/1
TABLET ORAL
Qty: 90 TABLET | Refills: 0 | Status: SHIPPED | OUTPATIENT
Start: 2019-02-04 | End: 2019-03-14 | Stop reason: SDUPTHER

## 2019-02-04 RX ORDER — HYDROCHLOROTHIAZIDE 25 MG/1
25 TABLET ORAL DAILY
Qty: 30 TABLET | Refills: 11 | Status: SHIPPED | OUTPATIENT
Start: 2019-02-04 | End: 2019-06-27

## 2019-02-04 NOTE — TELEPHONE ENCOUNTER
----- Message from Franchesca Maravilla sent at 2/4/2019  9:30 AM CST -----  Contact: [phone]  Type: Rx    Name of medication(s):  hydroCHLOROthiazide (HYDRODIURIL) 25 MG tablet (Discontinued)    Is this a refill? New rx?  refill    Who prescribed medication? Sandra    Pharmacy Name, Phone, & Location: [facility] [address] [place] [address] [facility]     Comments:  Please advise, thank you

## 2019-02-05 ENCOUNTER — CLINICAL SUPPORT (OUTPATIENT)
Dept: REHABILITATION | Facility: HOSPITAL | Age: 79
End: 2019-02-05
Attending: PODIATRIST
Payer: MEDICARE

## 2019-02-05 DIAGNOSIS — M79.672 LEFT FOOT PAIN: ICD-10-CM

## 2019-02-05 PROCEDURE — 97140 MANUAL THERAPY 1/> REGIONS: CPT | Mod: PO

## 2019-02-05 PROCEDURE — 97110 THERAPEUTIC EXERCISES: CPT | Mod: PO

## 2019-02-05 NOTE — PROGRESS NOTES
"Name: Betzaida Neumann  Clinic Number: [id number]  Date of Treatment: 02/05/2019   Diagnosis:   Encounter Diagnosis   Name Primary?    Left foot pain        Physician: Ad Cline DPM    Time in: 11:00 AM  Time Out: 11:50 AM  Total Treatment Time: 45 mins   Evaluation Date: 01/22/2019  Visit #: 4 / 20 authorized   Authorization period Expiration: 1/9/19  Plan of Care Expiration: 4/5/19  Precautions: RA    Subjective     Betzaida reports no pain upon arrival to PT. Patient reports she has been performing her HEP. Patient reports their pain when she does have pain to be 6-7/10 on a 0-10 scale with 0 being no pain and 10 being the worst pain imaginable.    Objective      Betzaida received 12 mins of manual therapy including:  · Calf stretching with posterior talus glides   · Talocrural distraction   · Forefoot supination mobs     Betzaida received 35 minutes of therapeutic exercises including:      Mobility  · Calf stretch with towel - 3x30 secs   · Calf stretch on wedge: knee straight, knee bent - 2x30 sec each   Strengthening   · Double leg heel raises to toe raises - 20x each   · Inversion against GTB  - x20   · DF against GTB x 20   · Lateral band walks with band around met heads - OTB - x 3 laps   · Heel raises - toes out, toes in, toes neutral 10 x each  · Split squats with half foam roll for increased DF emphasis- 3x10   · Mini squats 3 x 10  · 4" Lateral tap downs 2 x 10  · 6" step ups 3 x 10  · Rocker Board A/P x 20        Written Home Exercises Provided: no change   Exercises were reviewed and Betzaida was able to demonstrate them prior to the end of the session. Pt received a written copy of exercises to perform at home. Betzaida demonstrated good  understanding of the education provided.    Assessment   Betzaida tolerated session well today. Patient with muscle fatigue throughout session and continues to be challenged by all exercises. Patent tolerated mild progression of exercises noted above in bold well today with no " onset of adverse effects.      Pt will continue to benefit from skilled PT intervention. Medical Necessity is demonstrated by:  Unable to participate fully in daily activities, Requires skilled supervision to complete and progress HEP and Weakness.    Patient is making good progress towards established goals.    New/Revised goals: none at this time      Plan     Continue with established Plan of Care towards PT goals.       Claudia Strange, PTA

## 2019-02-07 ENCOUNTER — CLINICAL SUPPORT (OUTPATIENT)
Dept: REHABILITATION | Facility: HOSPITAL | Age: 79
End: 2019-02-07
Attending: PODIATRIST
Payer: MEDICARE

## 2019-02-07 DIAGNOSIS — M79.672 LEFT FOOT PAIN: ICD-10-CM

## 2019-02-07 PROCEDURE — 97110 THERAPEUTIC EXERCISES: CPT | Mod: PO

## 2019-02-07 PROCEDURE — 97140 MANUAL THERAPY 1/> REGIONS: CPT | Mod: PO

## 2019-02-07 NOTE — PROGRESS NOTES
"Name: Betzaida Neumann  Clinic Number: [id number]  Date of Treatment: 02/07/2019   Diagnosis:   Encounter Diagnosis   Name Primary?    Left foot pain        Physician: Ad Cline DPM    Time in: 11:00 AM  Time Out: 12:00 PM  Total Treatment Time: 57 mins   Evaluation Date: 01/22/2019  Visit #: 5/ 20 authorized   Authorization period Expiration: 1/9/19  Plan of Care Expiration: 4/5/19  Precautions: RA    Subjective     Betzaida reports she is feeling a little better and reports so far this morning she has no pain and hasn't had any pain lately. Patient reports their pain when she does have pain to be 0/10 on a 0-10 scale with 0 being no pain and 10 being the worst pain imaginable.    Objective      Betzaida received 12 mins of manual therapy including:  · Calf stretching with posterior talus glides   · Talocrural distraction   · Forefoot supination mobs     Betzaida received 45 minutes of therapeutic exercises including:      Mobility  · Calf stretch with towel - 3x30 secs   · Calf stretch on wedge: knee straight, knee bent -  1 min each   Strengthening   · Double leg heel raises to toe raises - 2x20x each   · Inversion against GTB  - x30   · DF against GTB x 30   · Lateral band walks with band around met heads - OTB - x 2 laps   · Heel raises - toes out, toes in, 30 x each  · Split squats with half foam roll for increased DF emphasis- 3x10   · Mini squats 3 x 10  · 6" Lateral tap downs 3 x 10  · 6" step ups 3 x 10  · Rocker Board A/P x 20        Written Home Exercises Provided: no change   Exercises were reviewed and Betzaida was able to demonstrate them prior to the end of the session. Pt received a written copy of exercises to perform at home. Betzaida demonstrated good  understanding of the education provided.    Assessment   Betzaida tolerated all therex well today with no onset of adverse effects. Patient continues to lack DF and benefits from manual therapy. Patient continues to be challenged by exercises and tolerated " mild progression noted above in bold well.     Pt will continue to benefit from skilled PT intervention. Medical Necessity is demonstrated by:  Unable to participate fully in daily activities, Requires skilled supervision to complete and progress HEP and Weakness.    Patient is making good progress towards established goals.    New/Revised goals: none at this time      Plan   PT/PTA face to face completed to discuss patient's progress and POC.     Continue with established Plan of Care towards PT goals.       Claudia Strange, PTA

## 2019-02-15 ENCOUNTER — TELEPHONE (OUTPATIENT)
Dept: ENDOSCOPY | Facility: HOSPITAL | Age: 79
End: 2019-02-15

## 2019-02-15 NOTE — TELEPHONE ENCOUNTER
Called patient to confirm appt on 2/21/19 for EGD.    Patient did not answer and message was left for her to return call to Endoscopy Scheduling Department at [phone] to confirm or reschedule if needed.

## 2019-02-20 ENCOUNTER — OFFICE VISIT (OUTPATIENT)
Dept: PODIATRY | Facility: CLINIC | Age: 79
End: 2019-02-20
Payer: MEDICARE

## 2019-02-20 VITALS
DIASTOLIC BLOOD PRESSURE: 90 MMHG | SYSTOLIC BLOOD PRESSURE: 161 MMHG | HEART RATE: 86 BPM | HEIGHT: 62 IN | WEIGHT: 169 LBS | BODY MASS INDEX: 31.1 KG/M2

## 2019-02-20 DIAGNOSIS — M79.672 FOOT PAIN, LEFT: Primary | ICD-10-CM

## 2019-02-20 DIAGNOSIS — M19.079 ARTHRITIS OF FOOT: ICD-10-CM

## 2019-02-20 DIAGNOSIS — M24.573 EQUINUS CONTRACTURE OF ANKLE: ICD-10-CM

## 2019-02-20 PROCEDURE — 99999 PR PBB SHADOW E&M-EST. PATIENT-LVL III: ICD-10-PCS | Mod: PBBFAC,,, | Performed by: PODIATRIST

## 2019-02-20 PROCEDURE — 99212 OFFICE O/P EST SF 10 MIN: CPT | Mod: S$GLB,,, | Performed by: PODIATRIST

## 2019-02-20 PROCEDURE — 3077F PR MOST RECENT SYSTOLIC BLOOD PRESSURE >= 140 MM HG: ICD-10-PCS | Mod: CPTII,S$GLB,, | Performed by: PODIATRIST

## 2019-02-20 PROCEDURE — 99212 PR OFFICE/OUTPT VISIT, EST, LEVL II, 10-19 MIN: ICD-10-PCS | Mod: S$GLB,,, | Performed by: PODIATRIST

## 2019-02-20 PROCEDURE — 99999 PR PBB SHADOW E&M-EST. PATIENT-LVL III: CPT | Mod: PBBFAC,,, | Performed by: PODIATRIST

## 2019-02-20 PROCEDURE — 3077F SYST BP >= 140 MM HG: CPT | Mod: CPTII,S$GLB,, | Performed by: PODIATRIST

## 2019-02-20 PROCEDURE — 3080F DIAST BP >= 90 MM HG: CPT | Mod: CPTII,S$GLB,, | Performed by: PODIATRIST

## 2019-02-20 PROCEDURE — 1101F PR PT FALLS ASSESS DOC 0-1 FALLS W/OUT INJ PAST YR: ICD-10-PCS | Mod: CPTII,S$GLB,, | Performed by: PODIATRIST

## 2019-02-20 PROCEDURE — 1101F PT FALLS ASSESS-DOCD LE1/YR: CPT | Mod: CPTII,S$GLB,, | Performed by: PODIATRIST

## 2019-02-20 PROCEDURE — 3080F PR MOST RECENT DIASTOLIC BLOOD PRESSURE >= 90 MM HG: ICD-10-PCS | Mod: CPTII,S$GLB,, | Performed by: PODIATRIST

## 2019-02-20 NOTE — PROGRESS NOTES
Subjective:      Patient ID: Betzaida Neumann is a 78 y.o. female.    Chief Complaint: Foot Pain (left foot)      Deep aching sometimes sharp pain left midfoot/arch.  Gradual onset, worsening over past several weeks, aggravated by increased weight bearing, shoe gear, pressure.  Improved to resolution with PT, stretches.  Wearing flip flops today AMA.  xrays negative acute injury. Denies trauma.  Relates prior bunion surgery and toe surgery both feet.    Review of Systems   Constitution: Negative for chills, diaphoresis, fever, malaise/fatigue and night sweats.   Cardiovascular: Negative for claudication, cyanosis, leg swelling and syncope.   Skin: Negative for color change, dry skin, nail changes, rash, suspicious lesions and unusual hair distribution.   Musculoskeletal: Positive for joint pain. Negative for falls, joint swelling, muscle cramps, muscle weakness and stiffness.   Gastrointestinal: Negative for constipation, diarrhea, nausea and vomiting.   Neurological: Negative for brief paralysis, disturbances in coordination, focal weakness, numbness, paresthesias, sensory change and tremors.           Objective:      Physical Exam   Constitutional: She is oriented to person, place, and time. She appears well-developed and well-nourished. She is cooperative. No distress.   Cardiovascular:   Pulses:       Popliteal pulses are 2+ on the right side, and 2+ on the left side.        Dorsalis pedis pulses are 1+ on the right side, and 1+ on the left side.        Posterior tibial pulses are 1+ on the right side, and 1+ on the left side.   Capillary refill 3 seconds all toes/distal feet, all toes/both feet warm to touch.      Negative lymphadenopathy bilateral popliteal fossa and tarsal tunnel.      Negavie lower extremity edema bilateral.     Musculoskeletal:        Right ankle: She exhibits normal range of motion, no swelling, no ecchymosis, no deformity, no laceration and normal pulse. Achilles tendon normal. Achilles  tendon exhibits no pain, no defect and normal Deluna's test results.   No pain to palpation, loading left forefoot/midfoot today.    Ankle dorsiflexion decreased at <10 degrees bilateral with moderate increase with knee flexion bilateral.    Otherwise, Normal angle, base, station of gait. All ten toes without clubbing, cyanosis, or signs of ischemia.  No pain to palpation bilateral lower extremities.  Range of motion, stability, muscle strength, and muscle tone normal bilateral feet and legs.     Lymphadenopathy: No inguinal adenopathy noted on the right or left side.   Negative lymphadenopathy bilateral popliteal fossa and tarsal tunnel.    Negative lymphangitic streaking bilateral feet/ankles/legs.   Neurological: She is alert and oriented to person, place, and time. She has normal strength. She displays no atrophy and no tremor. No sensory deficit. She exhibits normal muscle tone. Gait normal.   Reflex Scores:       Patellar reflexes are 2+ on the right side and 2+ on the left side.       Achilles reflexes are 2+ on the right side and 2+ on the left side.  Negative tinel sign to percussion sural, superficial peroneal, deep peroneal, saphenous, and posterior tibial nerves right and left ankles and feet.     Skin: Skin is warm, dry and intact. Capillary refill takes 2 to 3 seconds. No abrasion, no bruising, no burn, no ecchymosis, no laceration, no lesion and no rash noted. She is not diaphoretic. No cyanosis or erythema. No pallor. Nails show no clubbing.     Skin is normal age and health appropriate color, turgor, texture, and temperature bilateral lower extremities without ulceration, hyperpigmentation, discoloration, masses nodules or cords palpated.  No ecchymosis, erythema, edema, or cardinal signs of infection bilateral lower extremities.    Toenails 1st, 2nd, 3rd, 4th, 5th  bilateral are hypertrophic thickened 2-3 mm, dystrophic, discolored tanish brown with tan, gray crumbly subungual debris.  Neatly  trimmed and not tender to distal nail plate pressure, without periungual skin abnormality of each.     Psychiatric: She has a normal mood and affect.             Assessment:       Encounter Diagnoses   Name Primary?    Foot pain, left Yes    Arthritis of foot     Equinus contracture of ankle          Plan:       Betzaida was seen today for foot pain.    Diagnoses and all orders for this visit:    Foot pain, left    Arthritis of foot    Equinus contracture of ankle      I counseled the patient on her conditions, their implications and medical management.    Continue stretches, inserts, athletic shoes, custom orthotics, PT.          Follow-up if symptoms worsen or fail to improve.

## 2019-02-21 ENCOUNTER — ANESTHESIA EVENT (OUTPATIENT)
Dept: ENDOSCOPY | Facility: HOSPITAL | Age: 79
End: 2019-02-21
Payer: MEDICARE

## 2019-02-21 ENCOUNTER — HOSPITAL ENCOUNTER (OUTPATIENT)
Facility: HOSPITAL | Age: 79
Discharge: HOME | End: 2019-02-21
Attending: INTERNAL MEDICINE | Admitting: INTERNAL MEDICINE
Payer: MEDICARE

## 2019-02-21 ENCOUNTER — ANESTHESIA (OUTPATIENT)
Dept: ENDOSCOPY | Facility: HOSPITAL | Age: 79
End: 2019-02-21
Payer: MEDICARE

## 2019-02-21 ENCOUNTER — TELEPHONE (OUTPATIENT)
Dept: GASTROENTEROLOGY | Facility: CLINIC | Age: 79
End: 2019-02-21

## 2019-02-21 VITALS
HEIGHT: 62 IN | DIASTOLIC BLOOD PRESSURE: 78 MMHG | HEART RATE: 86 BPM | BODY MASS INDEX: 30.36 KG/M2 | OXYGEN SATURATION: 99 % | WEIGHT: 165 LBS | RESPIRATION RATE: 16 BRPM | TEMPERATURE: 97 F | SYSTOLIC BLOOD PRESSURE: 169 MMHG

## 2019-02-21 DIAGNOSIS — R13.10 DYSPHAGIA: ICD-10-CM

## 2019-02-21 DIAGNOSIS — K21.9 GASTROESOPHAGEAL REFLUX DISEASE, ESOPHAGITIS PRESENCE NOT SPECIFIED: ICD-10-CM

## 2019-02-21 DIAGNOSIS — R13.10 DYSPHAGIA, UNSPECIFIED TYPE: Primary | ICD-10-CM

## 2019-02-21 DIAGNOSIS — R13.19 ESOPHAGEAL DYSPHAGIA: Primary | ICD-10-CM

## 2019-02-21 PROCEDURE — 88305 TISSUE EXAM BY PATHOLOGIST: CPT | Performed by: PATHOLOGY

## 2019-02-21 PROCEDURE — 43239 PR EGD, FLEX, W/BIOPSY, SGL/MULTI: ICD-10-PCS | Mod: ,,, | Performed by: INTERNAL MEDICINE

## 2019-02-21 PROCEDURE — 43239 EGD BIOPSY SINGLE/MULTIPLE: CPT | Mod: ,,, | Performed by: INTERNAL MEDICINE

## 2019-02-21 PROCEDURE — E9220 PRA ENDO ANESTHESIA: HCPCS | Mod: ,,, | Performed by: NURSE ANESTHETIST, CERTIFIED REGISTERED

## 2019-02-21 PROCEDURE — 88305 TISSUE SPECIMEN TO PATHOLOGY - SURGERY: ICD-10-PCS | Mod: 26,,, | Performed by: PATHOLOGY

## 2019-02-21 PROCEDURE — 25000003 PHARM REV CODE 250: Performed by: INTERNAL MEDICINE

## 2019-02-21 PROCEDURE — 37000009 HC ANESTHESIA EA ADD 15 MINS: Performed by: INTERNAL MEDICINE

## 2019-02-21 PROCEDURE — 27201012 HC FORCEPS, HOT/COLD, DISP: Performed by: INTERNAL MEDICINE

## 2019-02-21 PROCEDURE — 37000008 HC ANESTHESIA 1ST 15 MINUTES: Performed by: INTERNAL MEDICINE

## 2019-02-21 PROCEDURE — 63600175 PHARM REV CODE 636 W HCPCS: Performed by: NURSE ANESTHETIST, CERTIFIED REGISTERED

## 2019-02-21 PROCEDURE — E9220 PRA ENDO ANESTHESIA: ICD-10-PCS | Mod: ,,, | Performed by: NURSE ANESTHETIST, CERTIFIED REGISTERED

## 2019-02-21 PROCEDURE — 88305 TISSUE EXAM BY PATHOLOGIST: CPT | Mod: 26,,, | Performed by: PATHOLOGY

## 2019-02-21 PROCEDURE — 43239 EGD BIOPSY SINGLE/MULTIPLE: CPT | Performed by: INTERNAL MEDICINE

## 2019-02-21 RX ORDER — PROPOFOL 10 MG/ML
VIAL (ML) INTRAVENOUS
Status: DISCONTINUED | OUTPATIENT
Start: 2019-02-21 | End: 2019-02-21

## 2019-02-21 RX ORDER — SODIUM CHLORIDE 9 MG/ML
INJECTION, SOLUTION INTRAVENOUS CONTINUOUS
Status: DISCONTINUED | OUTPATIENT
Start: 2019-02-21 | End: 2019-02-21 | Stop reason: HOSPADM

## 2019-02-21 RX ORDER — LIDOCAINE HCL/PF 100 MG/5ML
SYRINGE (ML) INTRAVENOUS
Status: DISCONTINUED | OUTPATIENT
Start: 2019-02-21 | End: 2019-02-21

## 2019-02-21 RX ADMIN — PROPOFOL 30 MG: 10 INJECTION, EMULSION INTRAVENOUS at 02:02

## 2019-02-21 RX ADMIN — PROPOFOL 70 MG: 10 INJECTION, EMULSION INTRAVENOUS at 02:02

## 2019-02-21 RX ADMIN — SODIUM CHLORIDE: 0.9 INJECTION, SOLUTION INTRAVENOUS at 02:02

## 2019-02-21 RX ADMIN — LIDOCAINE HYDROCHLORIDE 75 MG: 20 INJECTION, SOLUTION INTRAVENOUS at 02:02

## 2019-02-21 NOTE — TRANSFER OF CARE
"Anesthesia Transfer of Care Note    Patient: Betzaida Neumann    Procedure(s) Performed: Procedure(s) (LRB):  EGD (ESOPHAGOGASTRODUODENOSCOPY) (N/A)    Patient location: OPS    Anesthesia Type: general    Transport from OR: Transported from OR on room air with adequate spontaneous ventilation    Post pain: adequate analgesia    Post assessment: no apparent anesthetic complications and tolerated procedure well    Post vital signs: stable    Level of consciousness: awake, alert and oriented    Nausea/Vomiting: no nausea/vomiting    Complications: none    Transfer of care protocol was followed      Last vitals:   Visit Vitals  BP (!) 177/83   Pulse 85   Temp 36.3 °C (97.3 °F)   Resp 18   Ht 5' 2" (1.575 m)   Wt 74.8 kg (165 lb)   SpO2 98%   Breastfeeding? No   BMI 30.18 kg/m²     "

## 2019-02-21 NOTE — DISCHARGE INSTRUCTIONS

## 2019-02-21 NOTE — INTERVAL H&P NOTE
The patient has been examined and the H&P has been reviewed:    There is no interval changes since last encounter.    EGD: GERD with dysphagia  Sedation: GA  ASA: Per anesthesia  Mallampati: Per anesthesia    Endoscopy risks, benefits and alternative options discussed and understood by patient/family.          Active Hospital Problems    Diagnosis  POA    Dysphagia [R13.10]  Yes      Resolved Hospital Problems   No resolved problems to display.

## 2019-02-21 NOTE — ANESTHESIA PREPROCEDURE EVALUATION
02/21/2019  Betzaida Neumann is a 78 y.o., female.  Past Medical History:   Diagnosis Date    Allergy     Arthritis     Cataract     CKD (chronic kidney disease) stage 3, GFR 30-59 ml/min     Fibromyalgia     GERD (gastroesophageal reflux disease)     Hypertension     Polymyalgia rheumatica     RA (rheumatoid arthritis)          Anesthesia Evaluation    I have reviewed the Patient Summary Reports.     I have reviewed the Medications.     Review of Systems  Anesthesia Hx:  No problems with previous Anesthesia  Denies Family Hx of Anesthesia complications.   Denies Personal Hx of Anesthesia complications.   Hematology/Oncology:  Hematology Normal   Oncology Normal     EENT/Dental:EENT/Dental Normal   Cardiovascular:   Hypertension    Pulmonary:   Asthma Sleep Apnea    Renal/:   Chronic Renal Disease    Hepatic/GI:   GERD    Musculoskeletal:  Musculoskeletal Normal    Neurological:   Neuromuscular Disease,    Endocrine:  Endocrine Normal    Dermatological:  Skin Normal    Psych:  Psychiatric Normal           Physical Exam  General:  Well nourished    Airway/Jaw/Neck:  Airway Findings: Mouth Opening: Normal General Airway Assessment: Adult  Mallampati: I  TM Distance: Normal, at least 6 cm  Jaw/Neck Findings:  Neck ROM: Normal ROM     Eyes/Ears/Nose:  EYES/EARS/NOSE FINDINGS: Normal   Dental:  Dental Findings: In tact   Chest/Lungs:  Chest/Lungs Findings: Clear to auscultation     Heart/Vascular:  Heart Findings: Rate: Normal  Heart murmur: negative Vascular Findings: Normal    Abdomen:  Abdomen Findings: Normal    Musculoskeletal:  Musculoskeletal Findings: Normal   Skin:  Skin Findings: Normal    Mental Status:  Mental Status Findings:  Alert and Oriented, Cooperative         Anesthesia Plan  Type of Anesthesia, risks & benefits discussed:  Anesthesia Type:  general  Patient's Preference:  general  Intra-op Monitoring Plan: standard ASA monitors  Intra-op Monitoring Plan Comments:   Post Op Pain Control Plan:   Post Op Pain Control Plan Comments:   Induction:   IV  Beta Blocker:  Patient is not currently on a Beta-Blocker (No further documentation required).       Informed Consent: Patient understands risks and agrees with Anesthesia plan.  Questions answered. Anesthesia consent signed with patient.  ASA Score: 3     Day of Surgery Review of History & Physical:  There are no significant changes.  H&P update referred to the provider.         Ready For Surgery From Anesthesia Perspective.

## 2019-02-21 NOTE — [employer] PATIENT INSTRUCTIONS
Discharge Summary/Instructions after an Endoscopic Procedure  Patient Name: Betzaida Neumann  Patient MRN: [id number]  Patient YOB: 1940  Thursday, February 21, 2019  Carlos Rodgers MD  RESTRICTIONS:  During your procedure today, you received medications for sedation.  These   medications may affect your judgment, balance and coordination.  Therefore,   for 24 hours, you have the following restrictions:   - DO NOT drive a car, operate machinery, make legal/financial decisions,   sign important papers or drink alcohol.    ACTIVITY:  Today: no heavy lifting, straining or running due to procedural   sedation/anesthesia.  The following day: return to full activity including work.  DIET:  Eat and drink normally unless instructed otherwise.     TREATMENT FOR COMMON SIDE EFFECTS:  - Mild abdominal pain, nausea, belching, bloating or excessive gas:  rest,   eat lightly and use a heating pad.  - Sore Throat: treat with throat lozenges and/or gargle with warm salt   water.  - Because air was used during the procedure, expelling large amounts of air   from your rectum or belching is normal.  - If a bowel prep was taken, you may not have a bowel movement for 1-3 days.    This is normal.  SYMPTOMS TO WATCH FOR AND REPORT TO YOUR PHYSICIAN:  1. Abdominal pain or bloating, other than gas cramps.  2. Chest pain.  3. Back pain.  4. Signs of infection such as: chills or fever occurring within 24 hours   after the procedure.  5. Rectal bleeding, which would show as bright red, maroon, or black stools.   (A tablespoon of blood from the rectum is not serious, especially if   hemorrhoids are present.)  6. Vomiting.  7. Weakness or dizziness.  GO DIRECTLY TO THE NEAREST EMERGENCY ROOM IF YOU HAVE ANY OF THE FOLLOWING:      Difficulty breathing              Chills and/or fever over 101 F   Persistent vomiting and/or vomiting blood   Severe abdominal pain   Severe chest pain   Black, tarry stools   Bleeding- more than one  tablespoon   Any other symptom or condition that you feel may need urgent attention  Your doctor recommends these additional instructions:  If any biopsies were taken, your doctors clinic will contact you in 1 to 2   weeks with any results.  - Patient has a contact number available for emergencies.  The signs and   symptoms of potential delayed complications were discussed with the   patient.  Return to normal activities tomorrow.  Written discharge   instructions were provided to the patient.   - Discharge patient to home.   - Resume previous diet.   - Continue present medications.   - Await pathology results.   For questions, problems or results please call your physician - Carlos Rodgers MD at Work:  [phone].  [facility], EMERGENCY ROOM PHONE NUMBER: [phone]  IF A COMPLICATION OR EMERGENCY SITUATION ARISES AND YOU ARE UNABLE TO REACH   YOUR PHYSICIAN - GO DIRECTLY TO THE EMERGENCY ROOM.  Carlos Rodgers MD  2/21/2019 2:47:11 PM  This report has been verified and signed electronically.  [employer]

## 2019-02-21 NOTE — TELEPHONE ENCOUNTER
----- Message from Carlos Rodgers MD sent at 2/21/2019  2:50 PM CST -----  Please schedule barium esophagram and a follow up with .

## 2019-02-22 ENCOUNTER — TELEPHONE (OUTPATIENT)
Dept: GASTROENTEROLOGY | Facility: CLINIC | Age: 79
End: 2019-02-22

## 2019-02-22 NOTE — ANESTHESIA POSTPROCEDURE EVALUATION
"Anesthesia Post Evaluation    Patient: Betzaida Neumann    Procedure(s) Performed: Procedure(s) (LRB):  EGD (ESOPHAGOGASTRODUODENOSCOPY) (N/A)    Final Anesthesia Type: general  Patient location during evaluation: GI PACU  Patient participation: Yes- Able to Participate  Level of consciousness: awake and alert  Pain management: adequate  Airway patency: patent  PONV status at discharge: No PONV  Anesthetic complications: no      Cardiovascular status: blood pressure returned to baseline  Respiratory status: unassisted, spontaneous ventilation and room air  Hydration status: euvolemic  Follow-up not needed.        Visit Vitals  BP (!) 169/78   Pulse 86   Temp 36.3 °C (97.3 °F)   Resp 16   Ht 5' 2" (1.575 m)   Wt 74.8 kg (165 lb)   SpO2 99%   Breastfeeding? No   BMI 30.18 kg/m²       Pain/Jeanine Score: Jeanine Score: 10 (2/21/2019  2:53 PM)        "

## 2019-02-22 NOTE — TELEPHONE ENCOUNTER
----- Message from Melanie Balbuena sent at 2/22/2019 10:05 AM CST -----  Contact: Self- [phone]  Vishal- pt states she is returning a missed from staff regarding test results- please contact pt at [phone]

## 2019-02-28 ENCOUNTER — TELEPHONE (OUTPATIENT)
Dept: ENDOSCOPY | Facility: HOSPITAL | Age: 79
End: 2019-02-28

## 2019-02-28 ENCOUNTER — TELEPHONE (OUTPATIENT)
Dept: GASTROENTEROLOGY | Facility: CLINIC | Age: 79
End: 2019-02-28

## 2019-03-13 ENCOUNTER — TELEPHONE (OUTPATIENT)
Dept: RADIOLOGY | Facility: HOSPITAL | Age: 79
End: 2019-03-13

## 2019-03-14 ENCOUNTER — OFFICE VISIT (OUTPATIENT)
Dept: RHEUMATOLOGY | Facility: CLINIC | Age: 79
End: 2019-03-14
Payer: MEDICARE

## 2019-03-14 ENCOUNTER — HOSPITAL ENCOUNTER (OUTPATIENT)
Dept: RADIOLOGY | Facility: HOSPITAL | Age: 79
Discharge: HOME | End: 2019-03-14
Attending: INTERNAL MEDICINE
Payer: MEDICARE

## 2019-03-14 VITALS
HEART RATE: 94 BPM | WEIGHT: 170.63 LBS | SYSTOLIC BLOOD PRESSURE: 170 MMHG | DIASTOLIC BLOOD PRESSURE: 76 MMHG | BODY MASS INDEX: 31.4 KG/M2 | HEIGHT: 62 IN

## 2019-03-14 DIAGNOSIS — M1A.09X0 IDIOPATHIC CHRONIC GOUT OF MULTIPLE SITES WITHOUT TOPHUS: ICD-10-CM

## 2019-03-14 DIAGNOSIS — R13.10 DYSPHAGIA, UNSPECIFIED TYPE: ICD-10-CM

## 2019-03-14 DIAGNOSIS — M13.80 SERONEGATIVE ARTHRITIS: Primary | ICD-10-CM

## 2019-03-14 PROCEDURE — 3078F DIAST BP <80 MM HG: CPT | Mod: CPTII,S$GLB,, | Performed by: INTERNAL MEDICINE

## 2019-03-14 PROCEDURE — 3077F PR MOST RECENT SYSTOLIC BLOOD PRESSURE >= 140 MM HG: ICD-10-PCS | Mod: CPTII,S$GLB,, | Performed by: INTERNAL MEDICINE

## 2019-03-14 PROCEDURE — 1101F PT FALLS ASSESS-DOCD LE1/YR: CPT | Mod: CPTII,S$GLB,, | Performed by: INTERNAL MEDICINE

## 2019-03-14 PROCEDURE — 1101F PR PT FALLS ASSESS DOC 0-1 FALLS W/OUT INJ PAST YR: ICD-10-PCS | Mod: CPTII,S$GLB,, | Performed by: INTERNAL MEDICINE

## 2019-03-14 PROCEDURE — 99499 RISK ADDL DX/OHS AUDIT: ICD-10-PCS | Mod: S$GLB,,, | Performed by: INTERNAL MEDICINE

## 2019-03-14 PROCEDURE — 99214 OFFICE O/P EST MOD 30 MIN: CPT | Mod: S$GLB,,, | Performed by: INTERNAL MEDICINE

## 2019-03-14 PROCEDURE — 99999 PR PBB SHADOW E&M-EST. PATIENT-LVL III: CPT | Mod: PBBFAC,,, | Performed by: INTERNAL MEDICINE

## 2019-03-14 PROCEDURE — 99999 PR PBB SHADOW E&M-EST. PATIENT-LVL III: ICD-10-PCS | Mod: PBBFAC,,, | Performed by: INTERNAL MEDICINE

## 2019-03-14 PROCEDURE — 3077F SYST BP >= 140 MM HG: CPT | Mod: CPTII,S$GLB,, | Performed by: INTERNAL MEDICINE

## 2019-03-14 PROCEDURE — 74220 X-RAY XM ESOPHAGUS 1CNTRST: CPT | Mod: 26,,, | Performed by: RADIOLOGY

## 2019-03-14 PROCEDURE — 99214 PR OFFICE/OUTPT VISIT, EST, LEVL IV, 30-39 MIN: ICD-10-PCS | Mod: S$GLB,,, | Performed by: INTERNAL MEDICINE

## 2019-03-14 PROCEDURE — 74220 FL ESOPHAGRAM COMPLETE: ICD-10-PCS | Mod: 26,,, | Performed by: RADIOLOGY

## 2019-03-14 PROCEDURE — 3078F PR MOST RECENT DIASTOLIC BLOOD PRESSURE < 80 MM HG: ICD-10-PCS | Mod: CPTII,S$GLB,, | Performed by: INTERNAL MEDICINE

## 2019-03-14 PROCEDURE — 74220 X-RAY XM ESOPHAGUS 1CNTRST: CPT | Mod: TC

## 2019-03-14 PROCEDURE — 99499 UNLISTED E&M SERVICE: CPT | Mod: S$GLB,,, | Performed by: INTERNAL MEDICINE

## 2019-03-14 RX ORDER — PREDNISONE 10 MG/1
TABLET ORAL
Qty: 60 TABLET | Refills: 2 | Status: SHIPPED | OUTPATIENT
Start: 2019-03-14 | End: 2019-05-27

## 2019-03-14 RX ORDER — ALLOPURINOL 100 MG/1
TABLET ORAL
Qty: 15 TABLET | Refills: 3 | Status: SHIPPED | OUTPATIENT
Start: 2019-03-14 | End: 2019-05-30

## 2019-03-14 RX ORDER — METHOTREXATE 25 MG/ML
INJECTION, SOLUTION INTRA-ARTERIAL; INTRAMUSCULAR; INTRAVENOUS
Qty: 4 ML | Refills: 3 | Status: SHIPPED | OUTPATIENT
Start: 2019-03-14 | End: 2019-05-30

## 2019-03-14 RX ORDER — COLCHICINE 0.6 MG/1
0.6 TABLET ORAL DAILY
Qty: 90 TABLET | Refills: 2 | Status: SHIPPED | OUTPATIENT
Start: 2019-03-14 | End: 2019-05-30

## 2019-03-14 RX ORDER — FOLIC ACID 1 MG/1
1 TABLET ORAL DAILY
Qty: 30 TABLET | Refills: 3 | Status: SHIPPED | OUTPATIENT
Start: 2019-03-14 | End: 2019-05-30

## 2019-03-14 ASSESSMENT — ROUTINE ASSESSMENT OF PATIENT INDEX DATA (RAPID3)
PATIENT GLOBAL ASSESSMENT SCORE: 8.5
PSYCHOLOGICAL DISTRESS SCORE: 3.3
AM STIFFNESS SCORE: 1, YES
FATIGUE SCORE: 9
MDHAQ FUNCTION SCORE: 0
PAIN SCORE: 7.5
TOTAL RAPID3 SCORE: 5.33

## 2019-03-14 NOTE — PROGRESS NOTES
No chief complaint on file.      Patient with joint pains and high inflammatory markers for a follow up    History of presenting illness    79 year old black female has    Joint pain since 2008     She has seen several rheumatologists. First was Dr. Lerma who thought she had bursitis.Then she saw Dr. Riddle who diagnosed rheumatoid arthritis and gave her Humira x 3 years which did not help. Humira caused her to feel like a zombie. She then went to [facility] : saw Dr. Woo and Dr. Reeves   He diagnosed a combination of rheumatoid arthritis, fibromyalgia and PMR.  She recalls taking methotrexate but it didn't help her. At one point she only took pain pills.She took mtx for 4 years  She has been on prednisone since May 2016 due to ALLERGIES as prescribed by her ALLERGY doctor.   She also took tramadol and percocet but did not help.     Held Arava due to stomach upset.  Night sweats since 2008.    Then we were treating her as PMR.     She has been on prednisone 2.5 mg BID    She took lyrica in the past and that didn't help    June 2018    ESR 29  CRP 26.3  CBC : grossly normal  CMP : GFR 50    Then her complaints were :     Fatigue  Pain all over  No swelling  Once she had right hand pain and swelling,then she had left ankle pain and swelling  Muscle spasms     I checked her uric acid and it was 11.4  She had CRP of 50.3  Her ESR was normal    I offered her gabapentin and she didn't like it  She has stayed on prednisone 2.5 mg bid    She didn't like the lyrica    I then gave her 20 mg prednisone  She takes allopurinol 50 mg and colchicine 0.6 mg   Uric acid dropped to 5.8    She did really well    Her CRP dropped to 9.4  ESR 25  UA looks abnormal but she has no symptoms  Mild stable anemia  gfr 50  Vit d 59  No significant proteinuria    Her predmard panel was negative  Hep A ab positive     I asked her to taper prednisone  Actemra offered last time     She didn't like it     She has stayed on 20 mg prednisone   She  takes 50 mg allopurinol and 0.6 mg colchicine  Uric acid down to 5.6  ESR nml  CRP 21.2    SHE HAS ONGOING JOINT SYMPTOMS     Past history : HTN,allergic rhinitis,CKD stage 3,GERD,PMR,RA,FMS,elevated inflammatory markers,asthma     Family history : no autoimmune illness    Social history : former smoker 1999      Review of Systems   Constitutional: Negative for activity change, appetite change, chills, diaphoresis, fatigue, fever and unexpected weight change.   HENT: Negative for congestion, dental problem, drooling, ear discharge, ear pain, facial swelling, hearing loss, mouth sores, nosebleeds, postnasal drip, rhinorrhea, sinus pressure, sinus pain, sneezing, sore throat, tinnitus, trouble swallowing and voice change.    Eyes: Negative for photophobia, pain, discharge, redness, itching and visual disturbance.   Respiratory: Negative for apnea, cough, choking, chest tightness, shortness of breath, wheezing and stridor.    Cardiovascular: Negative for chest pain, palpitations and leg swelling.   Gastrointestinal: Negative for abdominal distention, abdominal pain, anal bleeding, blood in stool, constipation, diarrhea, nausea, rectal pain and vomiting.   Endocrine: Negative for cold intolerance, heat intolerance, polydipsia, polyphagia and polyuria.   Genitourinary: Negative for decreased urine volume, difficulty urinating, dysuria, enuresis, flank pain, frequency, genital sores, hematuria and urgency.   Musculoskeletal: Positive for arthralgias. Negative for back pain, gait problem, joint swelling, myalgias, neck pain and neck stiffness.   Skin: Negative for color change, pallor, rash and wound.   Allergic/Immunologic: Negative for environmental allergies, food allergies and immunocompromised state.   Neurological: Negative for dizziness, tremors, seizures, syncope, facial asymmetry, speech difficulty, weakness, light-headedness, numbness and headaches.   Hematological: Negative for adenopathy. Does not bruise/bleed  easily.   Psychiatric/Behavioral: Negative for agitation, behavioral problems, confusion, decreased concentration, dysphoric mood, hallucinations, self-injury, sleep disturbance and suicidal ideas. The patient is not nervous/anxious and is not hyperactive.      Physical Exam     ARENAS-28 tender joint count: 0  ARENAS-28 swollen joint count: 0    Physical Exam   Constitutional: She is oriented to person, place, and time and well-developed, well-nourished, and in no distress. No distress.   HENT:   Head: Normocephalic.   Mouth/Throat: Oropharynx is clear and moist.   Eyes: Conjunctivae are normal. Pupils are equal, round, and reactive to light. Right eye exhibits no discharge. Left eye exhibits no discharge. No scleral icterus.   Neck: Normal range of motion. No thyromegaly present.   Cardiovascular: Normal rate, regular rhythm, normal heart sounds and intact distal pulses.    Pulmonary/Chest: Effort normal and breath sounds normal. No stridor.   Abdominal: Soft. Bowel sounds are normal.   Lymphadenopathy:     She has no cervical adenopathy.   Neurological: She is alert and oriented to person, place, and time.   Skin: Skin is warm. No rash noted. She is not diaphoretic.     Psychiatric: Affect and judgment normal.   Musculoskeletal: Normal range of motion.           Assessment     79 year old black female with     HTN,allergic rhinitis,CKD stage 3,GERD,PMR,RA,FMS,elevated inflammatory markers,asthma     She wants prednisone to survive with the joint pain    Does she have PMR  Does she have RA  Does she have fibromyalgia is the question    We have witnessed synovitis in the past,this made us think she has RA  We thought she rapidly responded to prednisone,hence comes the PMR  She always complains of overall body pain,hence she gets the fibromyalgia diagnosis    We have positive RF in the past  We have high uric acid    She has responded really well to prednisone 20 mg  Her CRP dropped a great deal    We have her allopurinol  "and colchicine and her uric acid dropped to less than 6    She does not like to go on actemra as suggested     She just stays on prednisone 20 mg if she has flares and if she doesn't take it she has flares    Dexa : Osteopenia of the femoral neck.  FRAX calculations do not suggest treatment      1. Seronegative arthritis    2. Idiopathic chronic gout of multiple sites without tophus        Reviewed labs/xrays  Reviewed medications    F/u problem    Plan:        Continue allopurinol 50 mg and colchicine 0.6 mg daily    Check uric acid today  Cbc/cmp today    We will start mtx : injectable and see how she does   0.5 ml sc weekly/folic acid    If she changes her mind I will rediscuss biologics     Diagnoses and all orders for this visit:    Seronegative arthritis  -     methotrexate 25 mg/mL injection; 0.5 ml weekly  -     folic acid (FOLVITE) 1 MG tablet; Take 1 tablet (1 mg total) by mouth once daily.  -     CBC auto differential; Future  -     Comprehensive metabolic panel; Future  -     Uric acid; Future  -     Sedimentation rate; Future  -     C-reactive protein; Future    Idiopathic chronic gout of multiple sites without tophus  -     CBC auto differential; Future  -     Comprehensive metabolic panel; Future  -     Uric acid; Future  -     Sedimentation rate; Future  -     C-reactive protein; Future    Other orders  -     insulin syringe-needle U-100 1 mL 25 gauge x 5/8" Syrg; 0.5 mLs by Misc.(Non-Drug; Combo Route) route once a week.  -     colchicine (COLCRYS) 0.6 mg tablet; Take 1 tablet (0.6 mg total) by mouth once daily.  -     allopurinol (ZYLOPRIM) 100 MG tablet; 0.5 tab daily  -     predniSONE (DELTASONE) 10 MG tablet; Take one to two tabs as needed for 2 to 3 days for a flare        rtc in 2 months         "

## 2019-03-21 ENCOUNTER — OFFICE VISIT (OUTPATIENT)
Dept: GASTROENTEROLOGY | Facility: CLINIC | Age: 79
End: 2019-03-21
Payer: MEDICARE

## 2019-03-21 ENCOUNTER — HOSPITAL ENCOUNTER (EMERGENCY)
Facility: HOSPITAL | Age: 79
Discharge: HOME | End: 2019-03-21
Attending: EMERGENCY MEDICINE
Payer: MEDICARE

## 2019-03-21 VITALS
OXYGEN SATURATION: 96 % | BODY MASS INDEX: 30.36 KG/M2 | WEIGHT: 165 LBS | SYSTOLIC BLOOD PRESSURE: 202 MMHG | HEIGHT: 62 IN | RESPIRATION RATE: 16 BRPM | HEART RATE: 84 BPM | TEMPERATURE: 98 F | DIASTOLIC BLOOD PRESSURE: 92 MMHG

## 2019-03-21 VITALS
HEART RATE: 88 BPM | SYSTOLIC BLOOD PRESSURE: 181 MMHG | BODY MASS INDEX: 30.36 KG/M2 | WEIGHT: 165 LBS | HEIGHT: 62 IN | DIASTOLIC BLOOD PRESSURE: 94 MMHG

## 2019-03-21 DIAGNOSIS — K22.4 ESOPHAGEAL DYSMOTILITY: ICD-10-CM

## 2019-03-21 DIAGNOSIS — K21.9 GASTROESOPHAGEAL REFLUX DISEASE WITHOUT ESOPHAGITIS: Primary | ICD-10-CM

## 2019-03-21 DIAGNOSIS — I10 HYPERTENSION: Primary | ICD-10-CM

## 2019-03-21 DIAGNOSIS — R13.19 ESOPHAGEAL DYSPHAGIA: ICD-10-CM

## 2019-03-21 PROCEDURE — 3077F PR MOST RECENT SYSTOLIC BLOOD PRESSURE >= 140 MM HG: ICD-10-PCS | Mod: CPTII,S$GLB,, | Performed by: PHYSICIAN ASSISTANT

## 2019-03-21 PROCEDURE — 99284 PR EMERGENCY DEPT VISIT,LEVEL IV: ICD-10-PCS | Mod: ,,, | Performed by: EMERGENCY MEDICINE

## 2019-03-21 PROCEDURE — 3077F SYST BP >= 140 MM HG: CPT | Mod: CPTII,S$GLB,, | Performed by: PHYSICIAN ASSISTANT

## 2019-03-21 PROCEDURE — 1101F PT FALLS ASSESS-DOCD LE1/YR: CPT | Mod: CPTII,S$GLB,, | Performed by: PHYSICIAN ASSISTANT

## 2019-03-21 PROCEDURE — 93010 ELECTROCARDIOGRAM REPORT: CPT | Mod: ,,, | Performed by: INTERNAL MEDICINE

## 2019-03-21 PROCEDURE — 99215 OFFICE O/P EST HI 40 MIN: CPT | Mod: S$GLB,,, | Performed by: PHYSICIAN ASSISTANT

## 2019-03-21 PROCEDURE — 1101F PR PT FALLS ASSESS DOC 0-1 FALLS W/OUT INJ PAST YR: ICD-10-PCS | Mod: CPTII,S$GLB,, | Performed by: PHYSICIAN ASSISTANT

## 2019-03-21 PROCEDURE — 99215 PR OFFICE/OUTPT VISIT, EST, LEVL V, 40-54 MIN: ICD-10-PCS | Mod: S$GLB,,, | Performed by: PHYSICIAN ASSISTANT

## 2019-03-21 PROCEDURE — 99999 PR PBB SHADOW E&M-EST. PATIENT-LVL V: ICD-10-PCS | Mod: PBBFAC,,, | Performed by: PHYSICIAN ASSISTANT

## 2019-03-21 PROCEDURE — 99283 EMERGENCY DEPT VISIT LOW MDM: CPT | Mod: 25

## 2019-03-21 PROCEDURE — 99999 PR PBB SHADOW E&M-EST. PATIENT-LVL V: CPT | Mod: PBBFAC,,, | Performed by: PHYSICIAN ASSISTANT

## 2019-03-21 PROCEDURE — 25000003 PHARM REV CODE 250: Performed by: EMERGENCY MEDICINE

## 2019-03-21 PROCEDURE — 3078F PR MOST RECENT DIASTOLIC BLOOD PRESSURE < 80 MM HG: ICD-10-PCS | Mod: CPTII,S$GLB,, | Performed by: PHYSICIAN ASSISTANT

## 2019-03-21 PROCEDURE — 93010 EKG 12-LEAD: ICD-10-PCS | Mod: ,,, | Performed by: INTERNAL MEDICINE

## 2019-03-21 PROCEDURE — 3078F DIAST BP <80 MM HG: CPT | Mod: CPTII,S$GLB,, | Performed by: PHYSICIAN ASSISTANT

## 2019-03-21 PROCEDURE — 93005 ELECTROCARDIOGRAM TRACING: CPT

## 2019-03-21 PROCEDURE — 99284 EMERGENCY DEPT VISIT MOD MDM: CPT | Mod: ,,, | Performed by: EMERGENCY MEDICINE

## 2019-03-21 RX ORDER — METOPROLOL TARTRATE 50 MG/1
25 TABLET ORAL 2 TIMES DAILY
Qty: 20 TABLET | Refills: 0 | Status: SHIPPED | OUTPATIENT
Start: 2019-03-21 | End: 2019-05-27 | Stop reason: SDUPTHER

## 2019-03-21 RX ORDER — METOPROLOL TARTRATE 25 MG/1
25 TABLET, FILM COATED ORAL
Status: COMPLETED | OUTPATIENT
Start: 2019-03-21 | End: 2019-03-21

## 2019-03-21 RX ADMIN — METOPROLOL TARTRATE 25 MG: 25 TABLET ORAL at 04:03

## 2019-03-21 NOTE — PATIENT INSTRUCTIONS
For GERD:    Take your PPI 30-45 minutes before your first protein containing meal (breakfast) every day.    Remain upright for at least 3 hours after eating.    Elevate the head of the bead about 6 inches.  Some patients place cinder blocks under the head of the bed for elevation.    Avoid foods that you have noticed make your symptoms worse.    Set a weight loss goal of 10% of your body weight. (For example, if you weigh 150 lbs, your goal should be to loose 15 lbs).       GERD  Worst Foods for Acid Reflux  Chocolate (milk chocolate worse than dark chocolate)  Soda (all carbonated beverages)  Alcohol (beer, liquor, wine)  Fried foods  Hamlin, sausage, ribs  Cream sauce  Fatty meats (beef)  Butter, margarine, lard, shortening  Coffee, tea  Mint   High fat nuts  Hot sauces and pepper  Citrus fruit/juices      Acidic foods (pH - 1 is MORE acidic, 5 is LESS acidic)     Do not eat or drink these (lower numbers are worse)    Induction diet - For 2 weeks eat nothing below pH 5     Lemon juice 2.3  Grape cranberry juice 2.5  Stomach Acid 2.5  Gelatin Dessert 2.6  Lemon/lime 2.9/2.7  Vinegar 2.9  Gatorade 3.0  Fruits - plums, apricots, strawberries, cherries 3.0  Vitamin C (ascorbic acid) 3.0  Iced tea, Snapple 3.1  Mustard 3.2  Soft drinks 3.3  Nectarines 3.3  Pomegranate 3.3  Applesauce 3.4  Grapefruit 3.4  Kiwi 3.4  Barbecue sauce 3.4  Caesar dressing 3.5  Thousand island dressing 3.6  Strawberries 3.5  Pineapple juice 3.5  Beer 3.5  Wine 3.5  Grape 3.6  Apples 3.6  Pineapple 3.7  Pickle 3.7  Blackberries, blueberries 3.7  [place] 3.7  Orange 3.8  Cherries 3.9  Red Bull 3.9  Tomatoes 4.2  Coffee 5.1      These are Safe foods:  Agave  Aloe Vera  Apple (only red)  Bagels  Banana (worsens reflux in 1%)  Beans - black, red, lima, lentils  Bread - whole grain, rye  Caramel  Celery  Chamomile tea  Chicken - skinless, never fried  Chicken stock or bouillon  Coffee - one cup/day with milk  Fennel  Fish  [place]ina  Green vegetables  (no green peppers)  Herbs  Honey  Melon  Milk - skin, soy, or Lactaid skim milk  Mushrooms  Oatmeal  Olive oil  Parsley  Pasta  Pears  Popcorn  Potatoes  Red bell peppers  Rice  Soups  Tofu  Turkey Breast  Turnip  Vegetables - no onion, tomatoes, peppers  Vinaigrette  Water - non carbonated  Whole grain breads, crackers, breakfast cereals      Best Foods for Acid Reflux  Whole grain breads  Oatmeal  Aloe Vera  Salad (no tomatoes, onions, cheese, or high fat dressing)  Banana  Melon  Fennel  Chicken and turkey (skinless, never fried)  Fish/seafood (never fried)  Celery  Parsley  Couscous and Rice    Maybe bad foods (Everyone is unique)  Tomatoes  Garlic  Onion  Nuts (macadamia nuts)  Apples (especially green)  Cucumber  Green peppers  Spicy food  Some herbal teas    GERD tips  Change what you eat:  Eat smaller meals  Eat slowly and chew thoroughly until food is almost liquid  Cut down on junk carbohydrates such as sugar and white flour  Use herbs in your cooking  Eat more raw foods (more than 10 ingredients is not a raw food)  Avoid trans fats and partially hydrogenated oils  Eat more fish and switch to grass fed beef  Switch your cooking oil to macadamia nut or olive oil  Watch extremes of salt intake (too high or too low is bad)    Change these habits:  Stop smoking  Eat dinner earlier (3-4 hours before lying down to sleep)  Elevate the head of your bed 6 inches (blocks under the head of the bed are better than pillows)  Exercise (but wait 2 hours after eating)  Drink more water (between meals)    Take these supplements:  Multi vitamin  Probiotic  Fish oil    Most common food allergens: milk, eggs, peanuts, tree nuts, fish, shellfish, wheat, and soy    All natural immediate relief:  Chew 2-3 soft probiotic capsules - Dr. Huggins's Probiotics 12 Plus  Chew chewable DGL licorice tablet  Chew papaya tablet with high protein meal - [employer]  Drink 2 ounces of aloe vera juice  Swedish bitters  Prelief- reduce the  acid in food to keep it form burning sensitive tissue  Iberogast  Slippery Elm  Drink Chamomile Tea  Teaspoon of baking soda in water  Spoonful of vinegar in water      All natural ulcer healers:  Zinc carnosine - 75.5 mg with food twice a day x 8 weeks   Sanjay Rojas - $8 for 60 pills  DGL (deglycyrrhizinated licorice) - 2 tablets before meals. Heals stomach lining   Natural Factors brand, Enzymatic therapy brand.  Aloe Vera juice  - 2 to 8 ounces a day   [place] or [place]

## 2019-03-21 NOTE — ED NOTES
Patient identifiers verified and correct for MS Neumann  C/C: Elevated blood pressure SEE NN  APPEARANCE: awake and alert in NAD.  SKIN: warm, dry and intact. No breakdown or bruising.  MUSCULOSKELETAL: Patient moving all extremities spontaneously, no obvious swelling or deformities noted. Ambulates independently.  RESPIRATORY: Denies shortness of breath.Respirations unlabored.   CARDIAC: Denies CP, 2+ distal pulses; no peripheral edema  ABDOMEN: S/ND/NT, Denies nausea  : voids spontaneously, denies difficulty  Neurologic: AAO x 4; follows commands equal strength in all extremities; denies numbness/tingling. Denies dizziness Denies weakness Denies headache

## 2019-03-21 NOTE — ED TRIAGE NOTES
"Patient states she went to GI today for "GERD" was told to come to ED for elevated blood pressure. Took meds today. Denies symptoms including CP, SOB, headache, dizziness. States she stopped taking Losartan due to "recall" several months ago.  "

## 2019-03-21 NOTE — PROGRESS NOTES
[facility] Consultation Note    Reason for Consult:  The primary encounter diagnosis was Gastroesophageal reflux disease without esophagitis. Diagnoses of Esophageal dysphagia and Esophageal dysmotility were also pertinent to this visit.    PCP:   Latricia Flores       Referring MD:  No referring provider defined for this encounter.    HPI:  This is a 79 y.o. female here to follow up on her GERD and dysphagia  Last seen by Dr Rodgers 6 weeks ago, Started on prilosec 40mg    19 EGD revealed- Small hiatal hernia.                        - Biopsies were taken for Helicobacter pylori                         testing. HP neg  3/14/19 esophagram - dysmotility    Taking prilosec 40mg as needed rather than daily due to kidney issues  Says the Nexium gave her kidney issues  Dysphagia occurring daily  Avoiding acidic   On prednisone    ROS:  Constitutional: No fevers, chills, No weight loss  ENT: No allergies  CV: No chest pain  Pulm: No cough, No shortness of breath  Ophtho: No vision changes  GI: see HPI  Derm: No rash  Heme: No lymphadenopathy, No bruising  MSK: No arthritis  : No dysuria, No hematuria  Endo: No hot or cold intolerance  Neuro: No syncope, No seizure  Psych: No anxiety, No depression    Medical History:  has a past medical history of Allergy, Arthritis, Cataract, CKD (chronic kidney disease) stage 3, GFR 30-59 ml/min, Fibromyalgia, GERD (gastroesophageal reflux disease), Hypertension, Polymyalgia rheumatica, and RA (rheumatoid arthritis).    Surgical History:  has a past surgical history that includes Rhinoplasty tip (); Hysterectomy;  section; Cholecystectomy; Appendectomy; Tonsillectomy; Tubal ligation; Eye surgery; Cosmetic surgery; Cataract extraction w/  intraocular lens implant (Left); Cataract extraction w/  intraocular lens implant (Right, 2017); and Esophagogastroduodenoscopy (N/A, 2019).    Family History: family history includes HIV in her  son; Heart disease in her son; Other in her father and mother..     Social History:  reports that she quit smoking about 19 years ago. She started smoking about 48 years ago. She has a 20.00 pack-year smoking history. she has never used smokeless tobacco. She reports that she drinks about 0.6 oz of alcohol per week. She reports that she does not use drugs.    Review of patient's allergies indicates:   Allergen Reactions    Shellfish containing products Anaphylaxis    Cabbage (brassica oleracea)     Nuts [tree nut]     Oranges [orange]     Sulfa (sulfonamide antibiotics)     Sulfacetamide sodium Swelling    Tomato (solanum lycopersicum)        Current Outpatient Medications on File Prior to Visit   Medication Sig Dispense Refill    albuterol (PROVENTIL) 2.5 mg /3 mL (0.083 %) nebulizer solution Take 2.5 mg by nebulization every 6 (six) hours as needed for Wheezing. Rescue      allopurinol (ZYLOPRIM) 100 MG tablet 0.5 tab daily 15 tablet 3    cetirizine (ZYRTEC) 10 MG tablet Take 10 mg by mouth once daily.      colchicine (COLCRYS) 0.6 mg tablet Take 1 tablet (0.6 mg total) by mouth once daily. 90 tablet 2    estradiol (ESTRACE) 2 MG tablet estradiol 2 mg tablet   Take 1 tablet every day by oral route.      fluticasone (FLONASE) 50 mcg/actuation nasal spray fluticasone 50 mcg/actuation nasal spray,suspension      fluticasone-vilanterol (BREO) 200-25 mcg/dose DsDv diskus inhaler Inhale 1 puff into the lungs once daily. Controller      folic acid (FOLVITE) 1 MG tablet Take 1 tablet (1 mg total) by mouth once daily. 30 tablet 3    hydroCHLOROthiazide (HYDRODIURIL) 25 MG tablet Take 1 tablet (25 mg total) by mouth once daily. 30 tablet 11    predniSONE (DELTASONE) 10 MG tablet Take 10 mg by mouth once daily.      predniSONE (DELTASONE) 10 MG tablet Take one to two tabs as needed for 2 to 3 days for a flare 60 tablet 2    ciclopirox (PENLAC) 8 % Soln Apply topically nightly. 6.6 mL 11    insulin  "syringe-needle U-100 1 mL 25 gauge x 5/8" Syrg 0.5 mLs by Misc.(Non-Drug; Combo Route) route once a week. 100 each 0    losartan (COZAAR) 50 MG tablet Take 1 tablet (50 mg total) by mouth once daily. 30 tablet 11    methotrexate 25 mg/mL injection Inject 0.5 ml into the skin weekly 4 mL 3    montelukast (SINGULAIR) 10 mg tablet Take 10 mg by mouth every evening.      omeprazole (PRILOSEC) 40 MG capsule Take 1 capsule (40 mg total) by mouth once daily. 30 capsule 2     No current facility-administered medications on file prior to visit.          Objective Findings:    Vital Signs:  BP (!) 181/94   Pulse 88   Ht 5' 2" (1.575 m)   Wt 74.8 kg (165 lb)   BMI 30.18 kg/m²   Body mass index is 30.18 kg/m².    Physical Exam:  General Appearance: Well appearing in no acute distress  Head:   Normocephalic, without obvious abnormality  Eyes:    No scleral icterus  ENT: Neck supple, Lips, mucosa, and tongue normal  Lungs: CTA bilaterally in anterior and posterior fields, no wheezes, no crackles.  Heart:  Regular rate and rhythm, S1, S2 normal, no murmurs heard  Abdomen: Soft, non tender, non distended with positive bowel sounds in all four quadrants.   Extremities: no edema  Skin: No rash  Neurologic: AAO x 3      Labs:  Lab Results   Component Value Date    WBC 9.04 03/14/2019    HGB 11.9 (L) 03/14/2019    HCT 38.1 03/14/2019     03/14/2019    CHOL 282 (H) 10/20/2017    TRIG 113 10/20/2017     (H) 10/20/2017    ALT 21 03/14/2019    AST 21 03/14/2019     03/14/2019    K 3.9 03/14/2019     03/14/2019    CREATININE 1.3 03/14/2019    BUN 13 03/14/2019    CO2 30 (H) 03/14/2019    TSH 1.117 08/21/2018    HGBA1C 5.3 10/20/2017       Imaging:    Endoscopy:    11/2016 colon - polyp x 1, f/u in 3yrs    Assessment:  1. Gastroesophageal reflux disease without esophagitis    2. Esophageal dysphagia    3. Esophageal dysmotility       80yo F with CKD presents with GERD and dysphagia. Esophagram revealed " dysmotility    Her BP is elevated today    Recommendations:  1. Zantac 300mg BID  2. Esophageal manometry to evaluate dysmotility    3. She was advise to go to the ED for BP management    No Follow-up on file.      Order summary:  Orders Placed This Encounter    ranitidine (ZANTAC) 150 MG tablet    Case request GI: MANOMETRY, ESOPHAGUS, WITH IMPEDANCE MEASUREMENT         Thank you so much for allowing me to participate in the care of Betzaida Latahm PA-C

## 2019-03-21 NOTE — ED PROVIDER NOTES
Encounter Date: 3/21/2019       History     Chief Complaint   Patient presents with    Hypertension     instructed by Gastro to come to ED for htn      78 yo W with pmhx HTN, polymyalgia rheumatica on steroids and MTX, fibromyalgia, CKD, RA presents with a chief complaint of hypertension.  Patient was seen in GI clinic today for a hiatal hernia.  Blood pressure was noted to be elevated at 192/92 and she was sent to the ED for evaluation.  Patient denies any headache, chest pain, shortness of breath, changes in vision.  She otherwise feels well. She reports taking her blood pressure home they were not elevated but she states that it always goes up here.  No recent decongestant use.  Patient reports compliance with HCTZ 25.  She has been off her Cozaar 50 for the past 2 months since the recall at the advice of her PCP          Review of patient's allergies indicates:   Allergen Reactions    Shellfish containing products Anaphylaxis    Cabbage (brassica oleracea)     Nuts [tree nut]     Oranges [orange]     Sulfa (sulfonamide antibiotics)     Sulfacetamide sodium Swelling    Tomato (solanum lycopersicum)      Past Medical History:   Diagnosis Date    Allergy     Arthritis     Cataract     CKD (chronic kidney disease) stage 3, GFR 30-59 ml/min     Fibromyalgia     GERD (gastroesophageal reflux disease)     Hypertension     Polymyalgia rheumatica     RA (rheumatoid arthritis)      Past Surgical History:   Procedure Laterality Date    APPENDECTOMY      CATARACT EXTRACTION W/  INTRAOCULAR LENS IMPLANT Left     Dr. Cedeño     CATARACT EXTRACTION W/  INTRAOCULAR LENS IMPLANT Right 2017    Dr. Sena     SECTION      x2    CHOLECYSTECTOMY      COSMETIC SURGERY      Rhinoplasty    EGD (ESOPHAGOGASTRODUODENOSCOPY) N/A 2019    [facility]    EYE SURGERY      left eye Lens Placed    HYSTERECTOMY      INSERTION-INTRAOCULAR LENS (IOL) Right  2017    [facility]    PHACOEMULSIFICATION-ASPIRATION-CATARACT Right 2017    [facility]    RHINOPLASTY TIP      TONSILLECTOMY      TUBAL LIGATION       Family History   Problem Relation Age of Onset    Other Mother     Other Father         accident    HIV Son     Heart disease Son         mi    Inflammatory bowel disease Neg Hx     Kidney disease Neg Hx     Lupus Neg Hx     Psoriasis Neg Hx     Rheum arthritis Neg Hx     Blindness Neg Hx     Glaucoma Neg Hx     Macular degeneration Neg Hx     Retinal detachment Neg Hx     Asthma Neg Hx     Emphysema Neg Hx     Colon cancer Neg Hx     Esophageal cancer Neg Hx      Social History     Tobacco Use    Smoking status: Former Smoker     Packs/day: 1.00     Years: 20.00     Pack years: 20.00     Start date: 1970     Last attempt to quit: 1999     Years since quittin.5    Smokeless tobacco: Never Used    Tobacco comment: Retired ; ; 4 children healthy   Substance Use Topics    Alcohol use: Yes     Alcohol/week: 0.6 oz     Types: 1 Glasses of wine per week     Comment: social    Drug use: No     Review of Systems   Constitutional: Negative for fever.   HENT: Negative for congestion.    Eyes: Negative for discharge.   Respiratory: Negative for shortness of breath.    Cardiovascular: Negative for chest pain.   Gastrointestinal: Negative for abdominal pain.   Endocrine: Negative for polyuria.   Genitourinary: Negative for dysuria.   Musculoskeletal: Negative for back pain.   Skin: Negative for wound.   Allergic/Immunologic: Negative for immunocompromised state.   Neurological: Negative for headaches.   Hematological: Does not bruise/bleed easily.   Psychiatric/Behavioral: Negative for confusion.       Physical Exam     Initial Vitals [19 1536]   BP Pulse Resp Temp SpO2   (!) 198/85 90 18 97.8 °F (36.6 °C) 96 %      MAP       --          Physical Exam    Nursing note and vitals reviewed.  Constitutional: She appears well-developed and well-nourished. She is not diaphoretic. No distress.   HENT:   Head: Normocephalic and atraumatic.   Eyes: EOM are normal. Pupils are equal, round, and reactive to light. Right eye exhibits no discharge. Left eye exhibits no discharge. No scleral icterus.   Neck: Normal range of motion. Neck supple. No JVD present.   Cardiovascular: Normal rate, regular rhythm, normal heart sounds and intact distal pulses. Exam reveals no gallop and no friction rub.    No murmur heard.  Pulmonary/Chest: Breath sounds normal. No respiratory distress. She has no wheezes. She has no rhonchi. She has no rales. She exhibits no tenderness.   Abdominal: Soft. Bowel sounds are normal. She exhibits no distension and no mass. There is no tenderness. There is no rebound and no guarding.   Musculoskeletal: Normal range of motion. She exhibits no edema or tenderness.   Lymphadenopathy:     She has no cervical adenopathy.   Neurological: She is alert and oriented to person, place, and time. She has normal strength. No sensory deficit.   Skin: Skin is warm and dry. Capillary refill takes less than 2 seconds.   Psychiatric: She has a normal mood and affect.         ED Course   Procedures  Labs Reviewed - No data to display  EKG Readings: (Independently Interpreted)   Rhythm: Normal Sinus Rhythm. Heart Rate: 87. ST Segments: Normal ST Segments. T Waves: Normal. Axis: Normal.       Imaging Results    None          Medical Decision Making:   History:   Old Medical Records: I decided to obtain old medical records.  Initial Assessment:   78 yo W with pmhx HTN, polymyalgia rheumatica on steroids and MTX, fibromyalgia, CKD, RA presents with a chief complaint of hypertension.  ED Management:  Patient is overall asymptomatic and has no signs of end-organ damage to suggest hypertensive emergency.  The elevated blood pressure readings are likely secondary to  white coat hypertension in addition to suboptimally controlled blood pressure.  Overall consistent with asymptomatic hypertension.  Since patient has been off Cozaar, will initiate treatment with low-dose metoprolol.  First dose in ED.  Follow up with PCP.  Return precautions.                      Clinical Impression:       ICD-10-CM ICD-9-CM   1. Hypertension I10 401.9                                Wilbert Parks MD  03/21/19 [id number]

## 2019-04-01 ENCOUNTER — TELEPHONE (OUTPATIENT)
Dept: PHARMACY | Facility: CLINIC | Age: 79
End: 2019-04-01

## 2019-04-01 NOTE — TELEPHONE ENCOUNTER
Full MTX consult declined. Pt confirmed shipping of MTX on  to arrive on 4/3. Address and  verified. $1.73 copay in 004, [facility]. Pt does not need a new sharps container. Pt plans to start MTX on . Dosage, storage, and administration reviewed with pt. Advised pt to use a calendar to keep track of injection days. Counseled pt to take folic acid daily, pt plans to  rx from [facility] today. Pt did not want to come in for injection training. Injection sites reviewed with pt, as well as injection training. Pt voiced understanding. All questions answered and addressed to patients satisfaction. I will f/u with patient in 1 week from start, [facility] to contact patient in 3 weeks for refills.

## 2019-04-29 ENCOUNTER — TELEPHONE (OUTPATIENT)
Dept: ENDOSCOPY | Facility: HOSPITAL | Age: 79
End: 2019-04-29

## 2019-04-29 NOTE — TELEPHONE ENCOUNTER
Received patients phone call regarding her appt for 5/2/19.    Returned call with no answer from patient. Left message was Endoscopy main scheduling line number [phone]

## 2019-05-23 ENCOUNTER — OFFICE VISIT (OUTPATIENT)
Dept: GASTROENTEROLOGY | Facility: CLINIC | Age: 79
End: 2019-05-23
Payer: MEDICARE

## 2019-05-23 VITALS
DIASTOLIC BLOOD PRESSURE: 91 MMHG | HEIGHT: 62 IN | SYSTOLIC BLOOD PRESSURE: 186 MMHG | BODY MASS INDEX: 30.36 KG/M2 | WEIGHT: 165 LBS | HEART RATE: 80 BPM

## 2019-05-23 DIAGNOSIS — R13.19 ESOPHAGEAL DYSPHAGIA: ICD-10-CM

## 2019-05-23 DIAGNOSIS — K21.9 GASTROESOPHAGEAL REFLUX DISEASE WITHOUT ESOPHAGITIS: Primary | ICD-10-CM

## 2019-05-23 DIAGNOSIS — K22.4 ESOPHAGEAL DYSMOTILITY: ICD-10-CM

## 2019-05-23 PROCEDURE — 3077F SYST BP >= 140 MM HG: CPT | Mod: CPTII,S$GLB,, | Performed by: PHYSICIAN ASSISTANT

## 2019-05-23 PROCEDURE — 99999 PR PBB SHADOW E&M-EST. PATIENT-LVL V: ICD-10-PCS | Mod: PBBFAC,,, | Performed by: PHYSICIAN ASSISTANT

## 2019-05-23 PROCEDURE — 3078F DIAST BP <80 MM HG: CPT | Mod: CPTII,S$GLB,, | Performed by: PHYSICIAN ASSISTANT

## 2019-05-23 PROCEDURE — 99214 OFFICE O/P EST MOD 30 MIN: CPT | Mod: S$GLB,,, | Performed by: PHYSICIAN ASSISTANT

## 2019-05-23 PROCEDURE — 3077F PR MOST RECENT SYSTOLIC BLOOD PRESSURE >= 140 MM HG: ICD-10-PCS | Mod: CPTII,S$GLB,, | Performed by: PHYSICIAN ASSISTANT

## 2019-05-23 PROCEDURE — 3078F PR MOST RECENT DIASTOLIC BLOOD PRESSURE < 80 MM HG: ICD-10-PCS | Mod: CPTII,S$GLB,, | Performed by: PHYSICIAN ASSISTANT

## 2019-05-23 PROCEDURE — 99999 PR PBB SHADOW E&M-EST. PATIENT-LVL V: CPT | Mod: PBBFAC,,, | Performed by: PHYSICIAN ASSISTANT

## 2019-05-23 PROCEDURE — 1101F PT FALLS ASSESS-DOCD LE1/YR: CPT | Mod: CPTII,S$GLB,, | Performed by: PHYSICIAN ASSISTANT

## 2019-05-23 PROCEDURE — 1101F PR PT FALLS ASSESS DOC 0-1 FALLS W/OUT INJ PAST YR: ICD-10-PCS | Mod: CPTII,S$GLB,, | Performed by: PHYSICIAN ASSISTANT

## 2019-05-23 PROCEDURE — 99214 PR OFFICE/OUTPT VISIT, EST, LEVL IV, 30-39 MIN: ICD-10-PCS | Mod: S$GLB,,, | Performed by: PHYSICIAN ASSISTANT

## 2019-05-23 NOTE — PATIENT INSTRUCTIONS
For GERD:    Remain upright for at least 3 hours after eating.    Elevate the head of the bead about 6 inches.  Some patients place cinder blocks under the head of the bed for elevation.    Avoid foods that you have noticed make your symptoms worse.    Set a weight loss goal of 10% of your body weight. (For example, if you weigh 150 lbs, your goal should be to loose 15 lbs).        GERD  Worst Foods for Acid Reflux  Chocolate (milk chocolate worse than dark chocolate)  Soda (all carbonated beverages)  Alcohol (beer, liquor, wine)  Fried foods  [place], sausage, ribs  Cream sauce  Fatty meats (beef)  Butter, margarine, lard, shortening  Coffee, tea  Mint   High fat nuts  Hot sauces and pepper  Citrus fruit/juices      Acidic foods (pH - 1 is MORE acidic, 5 is LESS acidic)     Do not eat or drink these (lower numbers are worse)    Induction diet - For 2 weeks eat nothing below pH 5     Lemon juice 2.3  Grape cranberry juice 2.5  Stomach Acid 2.5  Gelatin Dessert 2.6  Lemon/lime 2.9/2.7  Vinegar 2.9  Gatorade 3.0  Fruits - plums, apricots, strawberries, cherries 3.0  Vitamin C (ascorbic acid) 3.0  Iced tea, Snapple 3.1  Mustard 3.2  Soft drinks 3.3  Nectarines 3.3  Pomegranate 3.3  Applesauce 3.4  Grapefruit 3.4  Kiwi 3.4  Barbecue sauce 3.4  Caesar dressing 3.5  [place] dressing 3.6  Strawberries 3.5  Pineapple juice 3.5  Beer 3.5  Wine 3.5  Grape 3.6  Apples 3.6  Pineapple 3.7  Pickle 3.7  Blackberries, blueberries 3.7  [place] 3.7  Orange 3.8  Cherries 3.9  Red Bull 3.9  Tomatoes 4.2  Coffee 5.1      These are Safe foods:  Agave  Aloe Vera  Apple (only red)  Bagels  Banana (worsens reflux in 1%)  Beans - black, red, lima, lentils  Bread - whole grain, rye  Caramel  Celery  Chamomile tea  Chicken - skinless, never fried  Chicken stock or bouillon  Coffee - one cup/day with milk  Fennel  Fish  [place]  Green vegetables (no green peppers)  Herbs  Honey  Melon  Milk - skin, soy, or Lactaid skim  milk  Mushrooms  Oatmeal  Olive oil  Parsley  Pasta  Pears  Popcorn  Potatoes  Red bell peppers  Rice  Soups  Tofu  Turkey Breast  Turnip  Vegetables - no onion, tomatoes, peppers  Vinaigrette  Water - non carbonated  Whole grain breads, crackers, breakfast cereals      Best Foods for Acid Reflux  Whole grain breads  Oatmeal  Aloe Vera  Salad (no tomatoes, onions, cheese, or high fat dressing)  Banana  Melon  Fennel  Chicken and turkey (skinless, never fried)  Fish/seafood (never fried)  Celery  Parsley  Couscous and Rice    Maybe bad foods (Everyone is unique)  Tomatoes  Garlic  Onion  Nuts (macadamia nuts)  Apples (especially green)  Cucumber  Green peppers  Spicy food  Some herbal teas    GERD tips  Change what you eat:  Eat smaller meals  Eat slowly and chew thoroughly until food is almost liquid  Cut down on junk carbohydrates such as sugar and white flour  Use herbs in your cooking  Eat more raw foods (more than 10 ingredients is not a raw food)  Avoid trans fats and partially hydrogenated oils  Eat more fish and switch to grass fed beef  Switch your cooking oil to macadamia nut or olive oil  Watch extremes of salt intake (too high or too low is bad)    Change these habits:  Stop smoking  Eat dinner earlier (3-4 hours before lying down to sleep)  Elevate the head of your bed 6 inches (blocks under the head of the bed are better than pillows)  Exercise (but wait 2 hours after eating)  Drink more water (between meals)    Take these supplements:  Multi vitamin  Probiotic  Fish oil    Most common food allergens: milk, eggs, peanuts, tree nuts, fish, shellfish, wheat, and soy    All natural immediate relief:  Chew 2-3 soft probiotic capsules - Dr. Huggins's Probiotics 12 Plus  Chew chewable DGL licorice tablet  Chew papaya tablet with high protein meal - [employer]  Drink 2 ounces of aloe vera juice  Swedish bitters  Prelief- reduce the acid in food to keep it form burning sensitive tissue  Iberogast  Slippery  Elm  Drink Chamomile Tea  Teaspoon of baking soda in water  Spoonful of vinegar in water      All natural ulcer healers:  Zinc carnosine - 75.5 mg with food twice a day x 8 weeks   Sanjay Rojas - $8 for 60 pills  DGL (deglycyrrhizinated licorice) - 2 tablets before meals. Heals stomach lining   Natural Factors brand, Enzymatic therapy brand.  Aloe Vera juice  - 2 to 8 ounces a day   [place] or [place]

## 2019-05-23 NOTE — PROGRESS NOTES
[facility] Consultation Note    Reason for Consult:  The primary encounter diagnosis was Gastroesophageal reflux disease without esophagitis. Diagnoses of Esophageal dysphagia and Esophageal dysmotility were also pertinent to this visit.    PCP:   Latricia Flores       Referring MD:  No referring provider defined for this encounter.    HPI:  This is a 79 y.o. female here to follow up on her GERD and dysphagia   had to reschedule her manometry due to death in the family    Interval history  Some improvement in GERD and dysphagia  Taking Zantac 150 twice daily  Dysphagia 1-2 times a week, vs daily    2019 visit notes  Last seen by Dr Rodgers 6 weeks ago, Started on prilosec 40mg    19 EGD revealed- Small hiatal hernia.                        - Biopsies were taken for Helicobacter pylori                         testing. HP neg  3/14/19 esophagram - dysmotility    Taking prilosec 40mg as needed rather than daily due to kidney issues  Says the Nexium gave her kidney issues  Dysphagia occurring daily  Avoiding acidic   On prednisone    ROS:  Constitutional: No fevers, chills, No weight loss  ENT: No allergies  CV: No chest pain  Pulm: No cough, No shortness of breath  Ophtho: No vision changes  GI: see HPI  Derm: No rash  Heme: No lymphadenopathy, No bruising  MSK: No arthritis  : No dysuria, No hematuria  Endo: No hot or cold intolerance  Neuro: No syncope, No seizure  Psych: No anxiety, No depression    Medical History:  has a past medical history of Allergy, Arthritis, Cataract, CKD (chronic kidney disease) stage 3, GFR 30-59 ml/min, Fibromyalgia, GERD (gastroesophageal reflux disease), Hypertension, Polymyalgia rheumatica, and RA (rheumatoid arthritis).    Surgical History:  has a past surgical history that includes Rhinoplasty tip (); Hysterectomy;  section; Cholecystectomy; Appendectomy; Tonsillectomy; Tubal ligation; Eye surgery; Cosmetic surgery; Cataract  extraction w/  intraocular lens implant (Left); Cataract extraction w/  intraocular lens implant (Right, 04/24/2017); and Esophagogastroduodenoscopy (N/A, 2/21/2019).    Family History: family history includes HIV in her son; Heart disease in her son; Other in her father and mother..     Social History:  reports that she quit smoking about 19 years ago. She started smoking about 48 years ago. She has a 20.00 pack-year smoking history. She has never used smokeless tobacco. She reports that she drinks about 0.6 oz of alcohol per week. She reports that she does not use drugs.    Review of patient's allergies indicates:   Allergen Reactions    Shellfish containing products Anaphylaxis    Cabbage (brassica oleracea)     Nuts [tree nut]     Oranges [orange]     Sulfa (sulfonamide antibiotics)     Sulfacetamide sodium Swelling    Tomato (solanum lycopersicum)        Current Outpatient Medications on File Prior to Visit   Medication Sig Dispense Refill    albuterol (PROVENTIL) 2.5 mg /3 mL (0.083 %) nebulizer solution Take 2.5 mg by nebulization every 6 (six) hours as needed for Wheezing. Rescue      allopurinol (ZYLOPRIM) 100 MG tablet 0.5 tab daily 15 tablet 3    cetirizine (ZYRTEC) 10 MG tablet Take 10 mg by mouth once daily.      ciclopirox (PENLAC) 8 % Soln Apply topically nightly. 6.6 mL 11    colchicine (COLCRYS) 0.6 mg tablet Take 1 tablet (0.6 mg total) by mouth once daily. 90 tablet 2    estradiol (ESTRACE) 2 MG tablet estradiol 2 mg tablet   Take 1 tablet every day by oral route.      fluticasone-vilanterol (BREO) 200-25 mcg/dose DsDv diskus inhaler Inhale 1 puff into the lungs once daily. Controller      hydroCHLOROthiazide (HYDRODIURIL) 25 MG tablet Take 1 tablet (25 mg total) by mouth once daily. 30 tablet 11    montelukast (SINGULAIR) 10 mg tablet Take 10 mg by mouth every evening.      predniSONE (DELTASONE) 10 MG tablet Take 10 mg by mouth once daily.      predniSONE (DELTASONE) 10 MG  "tablet Take one to two tabs as needed for 2 to 3 days for a flare 60 tablet 2    fluticasone (FLONASE) 50 mcg/actuation nasal spray fluticasone 50 mcg/actuation nasal spray,suspension      folic acid (FOLVITE) 1 MG tablet Take 1 tablet (1 mg total) by mouth once daily. 30 tablet 3    losartan (COZAAR) 50 MG tablet Take 1 tablet (50 mg total) by mouth once daily. 30 tablet 11    methotrexate 25 mg/mL injection Inject 0.5 ml into the skin weekly 4 mL 3    metoprolol tartrate (LOPRESSOR) 50 MG tablet Take 0.5 tablets (25 mg total) by mouth 2 (two) times daily. for 20 days 20 tablet 0    omeprazole (PRILOSEC) 40 MG capsule Take 1 capsule (40 mg total) by mouth once daily. 30 capsule 2    ranitidine (ZANTAC) 150 MG tablet Take 2 tablets (300 mg total) by mouth 2 (two) times daily. 120 tablet 5     No current facility-administered medications on file prior to visit.          Objective Findings:    Vital Signs:  BP (!) 186/91   Pulse 80   Ht 5' 2" (1.575 m)   Wt 74.8 kg (165 lb)   BMI 30.18 kg/m²   Body mass index is 30.18 kg/m².    Physical Exam:  General Appearance: Well appearing in no acute distress  Head:   Normocephalic, without obvious abnormality  Eyes:    No scleral icterus  ENT: Neck supple, Lips, mucosa, and tongue normal  Lungs: CTA bilaterally in anterior and posterior fields, no wheezes, no crackles.  Heart:  Regular rate and rhythm, S1, S2 normal, no murmurs heard  Abdomen: Soft, non tender, non distended with positive bowel sounds in all four quadrants.   Extremities: no edema  Skin: No rash  Neurologic: AAO x 3      Labs:  Lab Results   Component Value Date    WBC 9.04 03/14/2019    HGB 11.9 (L) 03/14/2019    HCT 38.1 03/14/2019     03/14/2019    CHOL 282 (H) 10/20/2017    TRIG 113 10/20/2017     (H) 10/20/2017    ALT 21 03/14/2019    AST 21 03/14/2019     03/14/2019    K 3.9 03/14/2019     03/14/2019    CREATININE 1.3 03/14/2019    BUN 13 03/14/2019    CO2 30 (H) " 03/14/2019    TSH 1.117 08/21/2018    HGBA1C 5.3 10/20/2017       Imaging:    Endoscopy:    11/2016 colon - polyp x 1, f/u in 3yrs    Assessment:  1. Gastroesophageal reflux disease without esophagitis    2. Esophageal dysphagia    3. Esophageal dysmotility       78yo F with CKD presents with GERD and dysphagia. Esophagram revealed dysmotility.  Some improvement taking Zantac 150 twice daily.  But still having dysphagia once a week    Her BP is elevated today    Recommendations:  1.  Continue Zantac 300mg BID   2. Esophageal manometry to evaluate dysmotility    Patient blood pressure is elevated today.  I did advise that she go to the emergency room for evaluation.  She refused.  She did say that she would go to urgent care for BP treatment.  She does denied chest pain, trouble breathing, headache.    No follow-ups on file.      Order summary:         Thank you so much for allowing me to participate in the care of Betzaida Latham PA-C

## 2019-05-27 ENCOUNTER — OFFICE VISIT (OUTPATIENT)
Dept: INTERNAL MEDICINE | Facility: CLINIC | Age: 79
End: 2019-05-27
Attending: FAMILY MEDICINE
Payer: MEDICARE

## 2019-05-27 VITALS
SYSTOLIC BLOOD PRESSURE: 172 MMHG | DIASTOLIC BLOOD PRESSURE: 76 MMHG | OXYGEN SATURATION: 97 % | BODY MASS INDEX: 30.59 KG/M2 | WEIGHT: 166.25 LBS | HEIGHT: 62 IN | HEART RATE: 91 BPM

## 2019-05-27 DIAGNOSIS — I10 HYPERTENSION, ESSENTIAL: Primary | ICD-10-CM

## 2019-05-27 DIAGNOSIS — M05.79 RHEUMATOID ARTHRITIS INVOLVING MULTIPLE SITES WITH POSITIVE RHEUMATOID FACTOR: ICD-10-CM

## 2019-05-27 DIAGNOSIS — M35.3 PMR (POLYMYALGIA RHEUMATICA): ICD-10-CM

## 2019-05-27 DIAGNOSIS — K21.00 GASTROESOPHAGEAL REFLUX DISEASE WITH ESOPHAGITIS: ICD-10-CM

## 2019-05-27 DIAGNOSIS — N18.30 CKD (CHRONIC KIDNEY DISEASE) STAGE 3, GFR 30-59 ML/MIN: ICD-10-CM

## 2019-05-27 PROCEDURE — 1100F PR PT FALLS ASSESS DOC 2+ FALLS/FALL W/INJURY/YR: ICD-10-PCS | Mod: CPTII,S$GLB,, | Performed by: FAMILY MEDICINE

## 2019-05-27 PROCEDURE — 3078F PR MOST RECENT DIASTOLIC BLOOD PRESSURE < 80 MM HG: ICD-10-PCS | Mod: CPTII,S$GLB,, | Performed by: FAMILY MEDICINE

## 2019-05-27 PROCEDURE — 3077F SYST BP >= 140 MM HG: CPT | Mod: CPTII,S$GLB,, | Performed by: FAMILY MEDICINE

## 2019-05-27 PROCEDURE — 3078F DIAST BP <80 MM HG: CPT | Mod: CPTII,S$GLB,, | Performed by: FAMILY MEDICINE

## 2019-05-27 PROCEDURE — 3288F PR FALLS RISK ASSESSMENT DOCUMENTED: ICD-10-PCS | Mod: CPTII,S$GLB,, | Performed by: FAMILY MEDICINE

## 2019-05-27 PROCEDURE — 3288F FALL RISK ASSESSMENT DOCD: CPT | Mod: CPTII,S$GLB,, | Performed by: FAMILY MEDICINE

## 2019-05-27 PROCEDURE — 99999 PR PBB SHADOW E&M-EST. PATIENT-LVL III: ICD-10-PCS | Mod: PBBFAC,,, | Performed by: FAMILY MEDICINE

## 2019-05-27 PROCEDURE — 1100F PTFALLS ASSESS-DOCD GE2>/YR: CPT | Mod: CPTII,S$GLB,, | Performed by: FAMILY MEDICINE

## 2019-05-27 PROCEDURE — 99999 PR PBB SHADOW E&M-EST. PATIENT-LVL III: CPT | Mod: PBBFAC,,, | Performed by: FAMILY MEDICINE

## 2019-05-27 PROCEDURE — 99214 PR OFFICE/OUTPT VISIT, EST, LEVL IV, 30-39 MIN: ICD-10-PCS | Mod: S$GLB,,, | Performed by: FAMILY MEDICINE

## 2019-05-27 PROCEDURE — 3077F PR MOST RECENT SYSTOLIC BLOOD PRESSURE >= 140 MM HG: ICD-10-PCS | Mod: CPTII,S$GLB,, | Performed by: FAMILY MEDICINE

## 2019-05-27 PROCEDURE — 99214 OFFICE O/P EST MOD 30 MIN: CPT | Mod: S$GLB,,, | Performed by: FAMILY MEDICINE

## 2019-05-27 RX ORDER — LOSARTAN POTASSIUM 100 MG/1
100 TABLET ORAL DAILY
Qty: 90 TABLET | Refills: 3 | Status: SHIPPED | OUTPATIENT
Start: 2019-05-27 | End: 2019-08-26 | Stop reason: SDUPTHER

## 2019-05-27 RX ORDER — METOPROLOL TARTRATE 50 MG/1
50 TABLET ORAL 2 TIMES DAILY
Qty: 180 TABLET | Refills: 3 | Status: SHIPPED | OUTPATIENT
Start: 2019-05-27 | End: 2019-06-27

## 2019-05-27 NOTE — PROGRESS NOTES
CHIEF COMPLAINT: Discuss HTN    HISTORY OF PRESENT ILLNESS: The patient is a generally healthy 79 year-old BF.  She continues to have uncontrolled BP.  She is not taking 2 of 3 of her BP meds.    She changed primary care physicians and specialists as she was unclear as to what is going on with her health situation.  She previously had been told she was on the verge of dialysis or kidney transplant. We could find no evidence of abnormal liver or kidney function only did her blood work recently. She is also curious about some other portions of her blood work. Nephrology seems to feel like she is doing quite well. Baseline creatinine appears to be 1.2 and this is stable.    I also note that she has rheumatoid arthritis.    The patient has a history of stable hypertension on current medications.  Patient denies chest pain or shortness of breath today.    REVIEW OF SYSTEMS:  GENERAL: No fever, chills, fatigability or weight loss.  SKIN: No rashes, itching or changes in color or texture of skin.  HEAD: No headaches or recent head trauma.  EYES: Visual acuity fine. No photophobia, ocular pain or diplopia.  EARS: Denies ear pain, discharge or vertigo.  NOSE: No loss of smell, no epistaxis or postnasal drip.  MOUTH & THROAT: No hoarseness or change in voice. No excessive gum bleeding.  NODES: Denies swollen glands.  CHEST: Denies COSBY, cyanosis, wheezing, cough and sputum production.  CARDIOVASCULAR: Denies chest pain, PND, orthopnea or reduced exercise tolerance.  ABDOMEN: Appetite fine. No weight loss. Denies diarrhea, abdominal pain, hematemesis or blood in stool.  URINARY: No flank pain, dysuria or hematuria.  PERIPHERAL VASCULAR: No claudication or cyanosis.  MUSCULOSKELETAL: She has diffuse myalgias and arthralgias consistent with RA.  NEUROLOGIC: No history of seizures, paralysis, alteration of gait or coordination.    SOCIAL HISTORY: The patient does not smoke.  The patient consumes alcohol socially.  The patient is  "retired.     PHYSICAL EXAMINATION:     Blood pressure (!) 172/76, pulse 91, height 5' 2" (1.575 m), weight 75.4 kg (166 lb 3.6 oz), SpO2 97 %.    APPEARANCE: Well nourished, well developed, in no acute distress.    HEAD: Normocephalic, atraumatic.  EYES: PERRL. EOMI.  Conjunctivae without injection and  anicteric  EARS: TM's intact. Light reflex normal. No retraction or perforation.    NOSE: Mucosa pink. Airway clear.  MOUTH & THROAT: No tonsillar enlargement. No pharyngeal erythema or exudate. No stridor.  NECK: Supple.   NODES: No cervical, axillary or inguinal lymph node enlargement.  CHEST: Lungs clear to auscultation.  No retractions are noted.  No rales or rhonchi are present.  CARDIOVASCULAR: Normal S1, S2. No rubs, murmurs or gallops.  ABDOMEN: Bowel sounds normal. Not distended. Soft. No tenderness or masses.  No ascites is noted.  MUSCULOSKELETAL:  There is no clubbing, cyanosis, or edema of the extremities x4.  There is full range of motion of the lumbar spine.  There is full range of motion of the extremities x4.  There is no deformity noted.    NEUROLOGIC:       Normal speech development.      Hearing normal.      Normal gait.      Motor and sensory exams grossly normal.      DTR's normal.  PSYCHIATRIC: Patient is alert and oriented x3.  Thought processes are all normal.  There is no homicidality.  There is no suicidality.  There is no evidence of psychosis.    LABORATORY/RADIOLOGY:   Chart reviewed.      ASSESSMENT:   Hypertension, condition is not well controlled on current medication regimen d/t noncompliance.  CK D3  GERD  Chronic ALLERGIC rhinitis  Chronic kidney disease, mild well-controlled with current regimen,   Diffuse myalgias and arthralgias, ruled out a connective tissue disorder previously with blood work     PLAN:   She will return to clinic in 1 month w/BW prior    She will continue current medications as things appear to be well controlled at this time.  "

## 2019-05-27 NOTE — PROGRESS NOTES
CHIEF COMPLAINT: Discuss HTN    HISTORY OF PRESENT ILLNESS: The patient is a generally healthy 79 year-old BF.  She stopped taking her blood pressure medicines recently due to concerns about side effects.  Blood pressure is significantly elevated today as he would expect.    She has polymyalgia rheumatica.  Apparently she's had this in the past.  She is seen with rheumatology.  She is on prednisone daily.    She changed primary care physicians and specialists as she was unclear as to what is going on with her health situation.  She previously had been told she was on the verge of dialysis or kidney transplant. We could find no evidence of abnormal liver or kidney function only did her blood work recently. She is also curious about some other portions of her blood work. Nephrology seems to feel like she is doing quite well. Baseline creatinine appears to be 1.2 and this is stable.    The patient has a history of stable hypertension on current medications.  Patient denies chest pain or shortness of breath today.    REVIEW OF SYSTEMS:  GENERAL: No fever, chills, fatigability or weight loss.  SKIN: No rashes, itching or changes in color or texture of skin.  HEAD: No headaches or recent head trauma.  EYES: Visual acuity fine. No photophobia, ocular pain or diplopia.  EARS: Denies ear pain, discharge or vertigo.  NOSE: No loss of smell, no epistaxis or postnasal drip.  MOUTH & THROAT: No hoarseness or change in voice. No excessive gum bleeding.  NODES: Denies swollen glands.  CHEST: Denies COSBY, cyanosis, wheezing, cough and sputum production.  CARDIOVASCULAR: Denies chest pain, PND, orthopnea or reduced exercise tolerance.  ABDOMEN: Appetite fine. No weight loss. Denies diarrhea, abdominal pain, hematemesis or blood in stool.  URINARY: No flank pain, dysuria or hematuria.  PERIPHERAL VASCULAR: No claudication or cyanosis.  MUSCULOSKELETAL: She has diffuse myalgias and arthralgias consistent with PMR.  NEUROLOGIC: No  "history of seizures, paralysis, alteration of gait or coordination.    SOCIAL HISTORY: The patient does not smoke.  The patient consumes alcohol socially.  The patient is retired.     PHYSICAL EXAMINATION:     Blood pressure (!) 172/76, pulse 91, height 5' 2" (1.575 m), weight 75.4 kg (166 lb 3.6 oz), SpO2 97 %.    APPEARANCE: Well nourished, well developed, in no acute distress.    HEAD: Normocephalic, atraumatic.  EYES: PERRL. EOMI.  Conjunctivae without injection and  anicteric  EARS: TM's intact. Light reflex normal. No retraction or perforation.    NOSE: Mucosa pink. Airway clear.  MOUTH & THROAT: No tonsillar enlargement. No pharyngeal erythema or exudate. No stridor.  NECK: Supple.   NODES: No cervical, axillary or inguinal lymph node enlargement.  CHEST: Lungs clear to auscultation.  No retractions are noted.  No rales or rhonchi are present.  CARDIOVASCULAR: Normal S1, S2. No rubs, murmurs or gallops.  ABDOMEN: Bowel sounds normal. Not distended. Soft. No tenderness or masses.  No ascites is noted.  MUSCULOSKELETAL:  There is no clubbing, cyanosis, or edema of the extremities x4.  There is full range of motion of the lumbar spine.  There is full range of motion of the extremities x4.  There is no deformity noted.    NEUROLOGIC:       Normal speech development.      Hearing normal.      Normal gait.      Motor and sensory exams grossly normal.  PSYCHIATRIC: Patient is alert and oriented x3.  Thought processes are all normal.  There is no homicidality.  There is no suicidality.  There is no evidence of psychosis.    LABORATORY/RADIOLOGY:   Chart reviewed.      ASSESSMENT:   Hypertension, condition is not well controlled on current medication regimen  CKD3  GERD  Chronic ALLERGIC rhinitis  Diffuse myalgias and arthralgias, ruled out a connective tissue disorder previously with blood work     PLAN:   Resume losartan and metoprolol  She will return to clinic in 1 month for blood pressure visit    "

## 2019-05-30 ENCOUNTER — LAB VISIT (OUTPATIENT)
Dept: LAB | Facility: HOSPITAL | Age: 79
End: 2019-05-30
Attending: INTERNAL MEDICINE
Payer: MEDICARE

## 2019-05-30 ENCOUNTER — OFFICE VISIT (OUTPATIENT)
Dept: RHEUMATOLOGY | Facility: CLINIC | Age: 79
End: 2019-05-30
Payer: MEDICARE

## 2019-05-30 ENCOUNTER — TELEPHONE (OUTPATIENT)
Dept: PHARMACY | Facility: CLINIC | Age: 79
End: 2019-05-30

## 2019-05-30 VITALS
HEIGHT: 62 IN | HEART RATE: 76 BPM | SYSTOLIC BLOOD PRESSURE: 138 MMHG | BODY MASS INDEX: 31.03 KG/M2 | DIASTOLIC BLOOD PRESSURE: 76 MMHG | WEIGHT: 168.63 LBS

## 2019-05-30 DIAGNOSIS — M35.3 PMR (POLYMYALGIA RHEUMATICA): ICD-10-CM

## 2019-05-30 DIAGNOSIS — M13.80 SERONEGATIVE ARTHRITIS: Primary | ICD-10-CM

## 2019-05-30 DIAGNOSIS — M13.80 SERONEGATIVE ARTHRITIS: ICD-10-CM

## 2019-05-30 LAB
ALBUMIN SERPL BCP-MCNC: 3.8 G/DL (ref 3.5–5.2)
ALP SERPL-CCNC: 68 U/L (ref 55–135)
ALT SERPL W/O P-5'-P-CCNC: 20 U/L (ref 10–44)
ANION GAP SERPL CALC-SCNC: 9 MMOL/L (ref 8–16)
AST SERPL-CCNC: 28 U/L (ref 10–40)
BASOPHILS # BLD AUTO: 0.04 K/UL (ref 0–0.2)
BASOPHILS NFR BLD: 0.5 % (ref 0–1.9)
BILIRUB SERPL-MCNC: 0.8 MG/DL (ref 0.1–1)
BUN SERPL-MCNC: 16 MG/DL (ref 8–23)
CALCIUM SERPL-MCNC: 10 MG/DL (ref 8.7–10.5)
CHLORIDE SERPL-SCNC: 99 MMOL/L (ref 95–110)
CO2 SERPL-SCNC: 31 MMOL/L (ref 23–29)
CREAT SERPL-MCNC: 1.3 MG/DL (ref 0.5–1.4)
CRP SERPL-MCNC: 33.1 MG/L (ref 0–8.2)
DIFFERENTIAL METHOD: ABNORMAL
EOSINOPHIL # BLD AUTO: 0.2 K/UL (ref 0–0.5)
EOSINOPHIL NFR BLD: 1.7 % (ref 0–8)
ERYTHROCYTE [DISTWIDTH] IN BLOOD BY AUTOMATED COUNT: 13.9 % (ref 11.5–14.5)
ERYTHROCYTE [SEDIMENTATION RATE] IN BLOOD BY WESTERGREN METHOD: 12 MM/HR (ref 0–36)
EST. GFR  (AFRICAN AMERICAN): 45.4 ML/MIN/1.73 M^2
EST. GFR  (NON AFRICAN AMERICAN): 39.4 ML/MIN/1.73 M^2
GLUCOSE SERPL-MCNC: 123 MG/DL (ref 70–110)
HCT VFR BLD AUTO: 41.6 % (ref 37–48.5)
HGB BLD-MCNC: 12.8 G/DL (ref 12–16)
IMM GRANULOCYTES # BLD AUTO: 0.03 K/UL (ref 0–0.04)
IMM GRANULOCYTES NFR BLD AUTO: 0.3 % (ref 0–0.5)
LYMPHOCYTES # BLD AUTO: 1.9 K/UL (ref 1–4.8)
LYMPHOCYTES NFR BLD: 21.4 % (ref 18–48)
MCH RBC QN AUTO: 29.4 PG (ref 27–31)
MCHC RBC AUTO-ENTMCNC: 30.8 G/DL (ref 32–36)
MCV RBC AUTO: 95 FL (ref 82–98)
MONOCYTES # BLD AUTO: 0.3 K/UL (ref 0.3–1)
MONOCYTES NFR BLD: 3.5 % (ref 4–15)
NEUTROPHILS # BLD AUTO: 6.4 K/UL (ref 1.8–7.7)
NEUTROPHILS NFR BLD: 72.6 % (ref 38–73)
NRBC BLD-RTO: 0 /100 WBC
PLATELET # BLD AUTO: 257 K/UL (ref 150–350)
PMV BLD AUTO: 10 FL (ref 9.2–12.9)
POTASSIUM SERPL-SCNC: 4.5 MMOL/L (ref 3.5–5.1)
PROT SERPL-MCNC: 7.2 G/DL (ref 6–8.4)
RBC # BLD AUTO: 4.36 M/UL (ref 4–5.4)
SODIUM SERPL-SCNC: 139 MMOL/L (ref 136–145)
URATE SERPL-MCNC: 7.7 MG/DL (ref 2.4–5.7)
WBC # BLD AUTO: 8.75 K/UL (ref 3.9–12.7)

## 2019-05-30 PROCEDURE — 3288F FALL RISK ASSESSMENT DOCD: CPT | Mod: CPTII,S$GLB,, | Performed by: INTERNAL MEDICINE

## 2019-05-30 PROCEDURE — 86140 C-REACTIVE PROTEIN: CPT

## 2019-05-30 PROCEDURE — 85652 RBC SED RATE AUTOMATED: CPT

## 2019-05-30 PROCEDURE — 1100F PTFALLS ASSESS-DOCD GE2>/YR: CPT | Mod: CPTII,S$GLB,, | Performed by: INTERNAL MEDICINE

## 2019-05-30 PROCEDURE — 84550 ASSAY OF BLOOD/URIC ACID: CPT

## 2019-05-30 PROCEDURE — 99214 OFFICE O/P EST MOD 30 MIN: CPT | Mod: S$GLB,,, | Performed by: INTERNAL MEDICINE

## 2019-05-30 PROCEDURE — 99999 PR PBB SHADOW E&M-EST. PATIENT-LVL III: CPT | Mod: PBBFAC,,, | Performed by: INTERNAL MEDICINE

## 2019-05-30 PROCEDURE — 3078F PR MOST RECENT DIASTOLIC BLOOD PRESSURE < 80 MM HG: ICD-10-PCS | Mod: CPTII,S$GLB,, | Performed by: INTERNAL MEDICINE

## 2019-05-30 PROCEDURE — 99214 PR OFFICE/OUTPT VISIT, EST, LEVL IV, 30-39 MIN: ICD-10-PCS | Mod: S$GLB,,, | Performed by: INTERNAL MEDICINE

## 2019-05-30 PROCEDURE — 36415 COLL VENOUS BLD VENIPUNCTURE: CPT

## 2019-05-30 PROCEDURE — 3075F SYST BP GE 130 - 139MM HG: CPT | Mod: CPTII,S$GLB,, | Performed by: INTERNAL MEDICINE

## 2019-05-30 PROCEDURE — 99999 PR PBB SHADOW E&M-EST. PATIENT-LVL III: ICD-10-PCS | Mod: PBBFAC,,, | Performed by: INTERNAL MEDICINE

## 2019-05-30 PROCEDURE — 3288F PR FALLS RISK ASSESSMENT DOCUMENTED: ICD-10-PCS | Mod: CPTII,S$GLB,, | Performed by: INTERNAL MEDICINE

## 2019-05-30 PROCEDURE — 3078F DIAST BP <80 MM HG: CPT | Mod: CPTII,S$GLB,, | Performed by: INTERNAL MEDICINE

## 2019-05-30 PROCEDURE — 85025 COMPLETE CBC W/AUTO DIFF WBC: CPT

## 2019-05-30 PROCEDURE — 3075F PR MOST RECENT SYSTOLIC BLOOD PRESS GE 130-139MM HG: ICD-10-PCS | Mod: CPTII,S$GLB,, | Performed by: INTERNAL MEDICINE

## 2019-05-30 PROCEDURE — 1100F PR PT FALLS ASSESS DOC 2+ FALLS/FALL W/INJURY/YR: ICD-10-PCS | Mod: CPTII,S$GLB,, | Performed by: INTERNAL MEDICINE

## 2019-05-30 PROCEDURE — 80053 COMPREHEN METABOLIC PANEL: CPT

## 2019-05-30 RX ORDER — ALLOPURINOL 100 MG/1
100 TABLET ORAL DAILY
Qty: 30 TABLET | Refills: 3 | Status: SHIPPED | OUTPATIENT
Start: 2019-05-30 | End: 2019-06-29

## 2019-05-30 RX ORDER — COLCHICINE 0.6 MG/1
0.6 TABLET ORAL DAILY
Qty: 30 TABLET | Refills: 3 | Status: SHIPPED | OUTPATIENT
Start: 2019-05-30 | End: 2019-10-14

## 2019-05-30 RX ORDER — LEVOCETIRIZINE DIHYDROCHLORIDE 5 MG/1
TABLET, FILM COATED ORAL
COMMUNITY
Start: 2019-05-30 | End: 2021-03-30

## 2019-05-30 RX ORDER — ALLOPURINOL 100 MG/1
100 TABLET ORAL DAILY
Qty: 30 TABLET | Refills: 3 | Status: SHIPPED | OUTPATIENT
Start: 2019-05-30 | End: 2019-05-30

## 2019-05-30 RX ORDER — COLCHICINE 0.6 MG/1
0.6 TABLET ORAL DAILY
Qty: 30 TABLET | Refills: 3 | Status: SHIPPED | OUTPATIENT
Start: 2019-05-30 | End: 2019-05-30

## 2019-05-30 ASSESSMENT — ROUTINE ASSESSMENT OF PATIENT INDEX DATA (RAPID3)
PSYCHOLOGICAL DISTRESS SCORE: 2.2
MDHAQ FUNCTION SCORE: 0
FATIGUE SCORE: 8.5
PATIENT GLOBAL ASSESSMENT SCORE: 4.5
PAIN SCORE: 3.5
TOTAL RAPID3 SCORE: 2.67
WHEN YOU AWAKENED IN THE MORNING OVER THE LAST WEEK, PLEASE INDICATE THE AMOUNT OF TIME IT TAKES UNTIL YOU ARE AS LIMBER AS YOU WILL BE FOR THE DAY: 30MIN
AM STIFFNESS SCORE: 1, YES

## 2019-05-30 NOTE — PROGRESS NOTES
Chief Complaint   Patient presents with    Follow-up       Patient with joint pains and high inflammatory markers for a follow up    History of presenting illness    79 year old black female has    Joint pain since 2008     She has seen several rheumatologists. First was Dr. Lerma who thought she had bursitis.Then she saw Dr. Riddle who diagnosed rheumatoid arthritis and gave her Humira x 3 years which did not help. Humira caused her to feel like a zombie. She then went to [facility] : saw Dr. Woo and Dr. Reeves   He diagnosed a combination of rheumatoid arthritis, fibromyalgia and PMR.  She recalls taking methotrexate but it didn't help her. At one point she only took pain pills.She took mtx for 4 years  She has been on prednisone since May 2016 due to ALLERGIES as prescribed by her ALLERGY doctor.   She also took tramadol and percocet but did not help.     Held Arava due to stomach upset.  Night sweats since 2008.    Then we were treating her as PMR.     She has been on prednisone 2.5 mg BID    She took lyrica in the past and that didn't help    June 2018    ESR 29  CRP 26.3  CBC : grossly normal  CMP : GFR 50    Then her complaints were :     Fatigue  Pain all over  No swelling  Once she had right hand pain and swelling,then she had left ankle pain and swelling  Muscle spasms     I checked her uric acid and it was 11.4  She had CRP of 50.3  Her ESR was normal    I offered her gabapentin and she didn't like it  She has stayed on prednisone 2.5 mg bid    She didn't like the lyrica    I then gave her 20 mg prednisone  She takes allopurinol 50 mg and colchicine 0.6 mg   Uric acid dropped to 5.8    She did really well    Her CRP dropped to 9.4  ESR 25  UA looks abnormal but she has no symptoms  Mild stable anemia  gfr 50  Vit d 59  No significant proteinuria    Her predmard panel was negative  Hep A ab positive     I asked her to taper prednisone  Actemra offered last time     She didn't like it     She has stayed on  20 mg prednisone   She takes 50 mg allopurinol and 0.6 mg colchicine  Uric acid down to 5.6 AND THEN UPTO 6.9  ESR nml  CRP 21.2    SHE HAD ONGOING JOINT SYMPTOMS     I added 0.5 ml weekly mtx and she took it for 2 weeks and she stopped it     LAST INFLAMMATORY MARKERS    CRP 43  ESR 30    Past history : HTN,allergic rhinitis,CKD stage 3,GERD,PMR,RA,FMS,elevated inflammatory markers,asthma     Family history : no autoimmune illness    Social history : former smoker 1999      Review of Systems   Constitutional: Negative for activity change, appetite change, chills, diaphoresis, fatigue, fever and unexpected weight change.   HENT: Negative for congestion, dental problem, drooling, ear discharge, ear pain, facial swelling, hearing loss, mouth sores, nosebleeds, postnasal drip, rhinorrhea, sinus pressure, sinus pain, sneezing, sore throat, tinnitus, trouble swallowing and voice change.    Eyes: Negative for photophobia, pain, discharge, redness, itching and visual disturbance.   Respiratory: Negative for apnea, cough, choking, chest tightness, shortness of breath, wheezing and stridor.    Cardiovascular: Negative for chest pain, palpitations and leg swelling.   Gastrointestinal: Negative for abdominal distention, abdominal pain, anal bleeding, blood in stool, constipation, diarrhea, nausea, rectal pain and vomiting.   Endocrine: Negative for cold intolerance, heat intolerance, polydipsia, polyphagia and polyuria.   Genitourinary: Negative for decreased urine volume, difficulty urinating, dysuria, enuresis, flank pain, frequency, genital sores, hematuria and urgency.   Musculoskeletal: Positive for arthralgias. Negative for back pain, gait problem, joint swelling, myalgias, neck pain and neck stiffness.   Skin: Negative for color change, pallor, rash and wound.   Allergic/Immunologic: Negative for environmental allergies, food allergies and immunocompromised state.   Neurological: Negative for dizziness, tremors,  seizures, syncope, facial asymmetry, speech difficulty, weakness, light-headedness, numbness and headaches.   Hematological: Negative for adenopathy. Does not bruise/bleed easily.   Psychiatric/Behavioral: Negative for agitation, behavioral problems, confusion, decreased concentration, dysphoric mood, hallucinations, self-injury, sleep disturbance and suicidal ideas. The patient is not nervous/anxious and is not hyperactive.      Physical Exam     ARENAS-28 tender joint count: 0  ARENAS-28 swollen joint count: 0    Physical Exam   Constitutional: She is oriented to person, place, and time and well-developed, well-nourished, and in no distress. No distress.   HENT:   Head: Normocephalic.   Mouth/Throat: Oropharynx is clear and moist.   Eyes: Conjunctivae are normal. Pupils are equal, round, and reactive to light. Right eye exhibits no discharge. Left eye exhibits no discharge. No scleral icterus.   Neck: Normal range of motion. No thyromegaly present.   Cardiovascular: Normal rate, regular rhythm, normal heart sounds and intact distal pulses.    Pulmonary/Chest: Effort normal and breath sounds normal. No stridor.   Abdominal: Soft. Bowel sounds are normal.   Lymphadenopathy:     She has no cervical adenopathy.   Neurological: She is alert and oriented to person, place, and time.   Skin: Skin is warm. No rash noted. She is not diaphoretic.     Psychiatric: Affect and judgment normal.   Musculoskeletal: Normal range of motion.           Assessment     79 year old black female with     HTN,allergic rhinitis,CKD stage 3,GERD,PMR,RA,FMS,elevated inflammatory markers,asthma     She wants prednisone to survive with the joint pain    We have witnessed synovitis in the past,this made us think she has RA  We thought she rapidly responded to prednisone,hence comes the PMR  She always complains of overall body pain,hence she gets the fibromyalgia diagnosis    We have positive RF in the past  We have high uric acid    She has responded  really well to prednisone 20 mg  Her CRP dropped a great deal    We have her allopurinol and colchicine and her uric acid dropped to less than 6    She does not like to go on actemra as suggested   I GAVE HER MTX AND SHE TOOK IT FOR 2 WEEKS ONLY     She just stays on prednisone 20 mg if she has flares and if she doesn't take it she has flares    Is this PMR ? Seronegative RA? RA ?   Her inflammatory markers were very high even in march  She is also having night sweats     Dexa : Osteopenia of the femoral neck.  FRAX calculations do not suggest treatment      1. Seronegative arthritis    2. PMR (polymyalgia rheumatica)        Reviewed labs/xrays  Reviewed medications    F/u problem    Plan:        Increase allopurinol to 100 mg and colchicine 0.6 mg daily    We had discussed about actemra     D/c mtx    Prednisone for now but taper it as soon as actemra works    Labs today    Betzaida was seen today for follow-up.    Diagnoses and all orders for this visit:    Seronegative arthritis  -     tocilizumab 162 mg/0.9 mL PnIj; Inject 1 Syringe into the skin once a week.  -     Hepatitis B surface antigen; Standing  -     Hepatitis B core antibody, total; Standing  -     Hepatitis B surface antigen  -     Hepatitis B core antibody, total  -     CBC auto differential; Future  -     Comprehensive metabolic panel; Future  -     Uric acid; Future  -     Sedimentation rate; Future  -     C-reactive protein; Future    PMR (polymyalgia rheumatica)  -     tocilizumab 162 mg/0.9 mL PnIj; Inject 1 Syringe into the skin once a week.  -     Hepatitis B surface antigen; Standing  -     Hepatitis B core antibody, total; Standing  -     Hepatitis B surface antigen  -     Hepatitis B core antibody, total  -     CBC auto differential; Future  -     Comprehensive metabolic panel; Future  -     Uric acid; Future  -     Sedimentation rate; Future  -     C-reactive protein; Future    Other orders  -     Discontinue: allopurinol (ZYLOPRIM) 100 MG  tablet; Take 1 tablet (100 mg total) by mouth once daily.  -     Discontinue: colchicine (COLCRYS) 0.6 mg tablet; Take 1 tablet (0.6 mg total) by mouth once daily.  -     allopurinol (ZYLOPRIM) 100 MG tablet; Take 1 tablet (100 mg total) by mouth once daily.  -     colchicine (COLCRYS) 0.6 mg tablet; Take 1 tablet (0.6 mg total) by mouth once daily.        rtc in 3 months

## 2019-05-30 NOTE — TELEPHONE ENCOUNTER
Informed Patient  that [facility] received prescription for Actemra and prior authorization is required.  [facility] will be back in touch once insurance determination is received.

## 2019-06-05 NOTE — TELEPHONE ENCOUNTER
DOCUMENTATION ONLY:     Prior Authorization for Actemra 162 mg/0.9 ml approved from 6/04/19 to 12/31/19.    Case ID# none     Co-Pay: $8.50    Patient Assistance IS NOT required.     Forward to clinical pharmacist for consult & shipment.      ERIC

## 2019-06-12 ENCOUNTER — TELEPHONE (OUTPATIENT)
Dept: PHARMACY | Facility: CLINIC | Age: 79
End: 2019-06-12

## 2019-06-12 ENCOUNTER — PATIENT MESSAGE (OUTPATIENT)
Dept: PHARMACY | Facility: CLINIC | Age: 79
End: 2019-06-12

## 2019-06-12 NOTE — TELEPHONE ENCOUNTER
Called patient 6/12 for Actemra initial fill. LVM . $8.50 copay in 004. Initial consult complete.     Called patient 6/14 to setup initial fill. LVM and sent MyChart. $8.50 copay in 004.

## 2019-06-12 NOTE — TELEPHONE ENCOUNTER
Called patient 6/12 for Actemra initial consult/initial fill. LV and sent [employer] message. $8.50 copay in 004.     Patient called [facility] back on 6/12 and was transferred to [facility].     Initial Actemra Pen consult completed on 6/12. Actemra shipment is pending.     Counseled patient on administration directions:  Inject Actemra 162mg/0.9ml pen (1 pen) into the skin every 7 days.  - Wash hands before and after injection.  - Monthly RX will come with gauze, bandaids, and alcohol swabs.  -  Patient was instructed to rotate injections sites.    - -Warm up to RT for 45 min  - Hold autoinjector with the green cap facing down  - Check window area, should be colorless to pale yellow   - Choose injection site:  in either the tops of the thighs, abdomen- but at least 2 inches away from her belly button, or the outer part of her upper arm.   o rotate injections sites.  o Do not inject into areas of tender, bruised, red, hard, or scarred skin, skin that is not intact, or moles  o Make sure to wipe w/ an alcohol pad  - Hold autoinjector with one hand and use other hand to twist & pull off green cap -the green cap contains a loose-fitting metal tube. Discard into sharps  - Must be used within 3 minutes of taking the cap off. If not used, it must be discarded.  - Pinch your skin to create a firm area to inject.  - Place the needle shield of the pen flat against pinched skin, at a 90 degree angle.By firmly pushing pen aginst your skin, the green activation button will unlock.   - Continue pinching skin and press the green activation button to start the injection, you will hear a click. Continue pressing down on the button.  - The purple indicator will move along the window area during injection. May take up to 10 seconds.   - You may hear a second click but continue holding button until the purple indicator stops moving. Once it has stopped and the window is filled with the purple indicator, the dose is complete and you may lift pen up  at a 90 degree angle to remove it.  - Place the entire syringe into the sharps container. Once the container is full, per [place] law, she/ he may lock the sharps container and place in her trash. She/ he can then contact the Pharmacy and we will replace the sharps at no additional charge.     Patient was counseled on possible side effects:  - Injection site reaction: redness, soreness, itching, bruising, which should resolve within 3-5 days.  - Increased risk for infections. If patient becomes sick, patient is to hold Actemra use until she/ he is better.      DDI: medication list reviewed.     Patient verbalized understanding. Compliance stressed.  Patient advised to call myself or provider should any questions arise.   Consultation included: indication; goals of treatment; administration; storage and handling; side effects; how to handle side effects; the importance of compliance; how to handle missed doses; the importance of laboratory monitoring; the importance of keeping all follow up appointments.

## 2019-06-13 PROBLEM — N28.9 ABNORMAL RENAL FUNCTION: Status: ACTIVE | Noted: 2019-06-13

## 2019-06-13 PROBLEM — H57.89 RED EYE: Status: ACTIVE | Noted: 2019-06-13

## 2019-06-13 PROBLEM — J30.1 ALLERGIC RHINITIS DUE TO POLLEN: Status: ACTIVE | Noted: 2019-06-13

## 2019-06-13 PROBLEM — J01.00 ACUTE MAXILLARY SINUSITIS: Status: ACTIVE | Noted: 2019-06-13

## 2019-06-13 PROBLEM — Z23 NEED FOR PROPHYLACTIC VACCINATION AND INOCULATION AGAINST OTHER VIRAL DISEASES: Status: ACTIVE | Noted: 2019-06-13

## 2019-06-13 PROBLEM — L98.9 CHANGING SKIN LESION: Status: ACTIVE | Noted: 2019-06-13

## 2019-06-13 PROBLEM — M35.3 POLYMYALGIA RHEUMATICA: Status: ACTIVE | Noted: 2019-06-13

## 2019-06-13 PROBLEM — M25.519 SHOULDER PAIN: Status: ACTIVE | Noted: 2019-06-13

## 2019-06-13 PROBLEM — J30.9 ALLERGIC RHINITIS: Status: ACTIVE | Noted: 2019-06-13

## 2019-06-13 PROBLEM — H26.9 BILATERAL CATARACTS: Status: ACTIVE | Noted: 2019-06-13

## 2019-06-13 PROBLEM — R42 VERTIGO: Status: ACTIVE | Noted: 2019-06-13

## 2019-06-13 PROBLEM — Z00.00 ROUTINE GENERAL MEDICAL EXAMINATION AT A HEALTH CARE FACILITY: Status: ACTIVE | Noted: 2017-10-13

## 2019-06-13 PROBLEM — M25.50 JOINT PAIN: Status: ACTIVE | Noted: 2019-06-13

## 2019-06-14 ENCOUNTER — PATIENT MESSAGE (OUTPATIENT)
Dept: PHARMACY | Facility: CLINIC | Age: 79
End: 2019-06-14

## 2019-06-18 ENCOUNTER — TELEPHONE (OUTPATIENT)
Dept: PHARMACY | Facility: CLINIC | Age: 79
End: 2019-06-18

## 2019-06-18 NOTE — TELEPHONE ENCOUNTER
Patient  confirmed shipping of Actemra on  to arrive . Address and  verified. $8.50 copay in 004, [facility] #[id number]. [facility] completed consult on . Patient plans to start Actemra upon arrival. Patient had no further questions or concerns at this time.

## 2019-06-19 ENCOUNTER — HOSPITAL ENCOUNTER (OUTPATIENT)
Facility: HOSPITAL | Age: 79
Discharge: HOME | End: 2019-06-19
Attending: INTERNAL MEDICINE | Admitting: INTERNAL MEDICINE
Payer: MEDICARE

## 2019-06-19 VITALS
DIASTOLIC BLOOD PRESSURE: 84 MMHG | TEMPERATURE: 97 F | SYSTOLIC BLOOD PRESSURE: 181 MMHG | WEIGHT: 165 LBS | HEART RATE: 80 BPM | BODY MASS INDEX: 30.36 KG/M2 | OXYGEN SATURATION: 96 % | HEIGHT: 62 IN | RESPIRATION RATE: 18 BRPM

## 2019-06-19 DIAGNOSIS — R13.10 DYSPHAGIA: ICD-10-CM

## 2019-06-19 PROCEDURE — 91037 PR GERD TST W/ NASAL IMPEDENCE ELECTROD: ICD-10-PCS | Mod: 26,,, | Performed by: INTERNAL MEDICINE

## 2019-06-19 PROCEDURE — 91010 ESOPHAGUS MOTILITY STUDY: CPT | Mod: 26,,, | Performed by: INTERNAL MEDICINE

## 2019-06-19 PROCEDURE — 91037 ESOPH IMPED FUNCTION TEST: CPT | Performed by: INTERNAL MEDICINE

## 2019-06-19 PROCEDURE — 25000003 PHARM REV CODE 250: Performed by: INTERNAL MEDICINE

## 2019-06-19 PROCEDURE — 91010 ESOPHAGUS MOTILITY STUDY: CPT | Performed by: INTERNAL MEDICINE

## 2019-06-19 PROCEDURE — 91010 PR ESOPHAGEAL MOTILITY STUDY, MA2METRY: ICD-10-PCS | Mod: 26,,, | Performed by: INTERNAL MEDICINE

## 2019-06-19 PROCEDURE — 91037 ESOPH IMPED FUNCTION TEST: CPT | Mod: 26,,, | Performed by: INTERNAL MEDICINE

## 2019-06-19 RX ORDER — LIDOCAINE HYDROCHLORIDE 20 MG/ML
JELLY TOPICAL ONCE
Status: COMPLETED | OUTPATIENT
Start: 2019-06-19 | End: 2019-06-19

## 2019-06-19 RX ADMIN — LIDOCAINE HYDROCHLORIDE 10 ML: 20 JELLY TOPICAL at 11:06

## 2019-06-20 ENCOUNTER — LAB VISIT (OUTPATIENT)
Dept: LAB | Facility: OTHER | Age: 79
End: 2019-06-20
Attending: FAMILY MEDICINE
Payer: MEDICARE

## 2019-06-20 DIAGNOSIS — N18.30 CKD (CHRONIC KIDNEY DISEASE) STAGE 3, GFR 30-59 ML/MIN: ICD-10-CM

## 2019-06-20 DIAGNOSIS — I10 HYPERTENSION, ESSENTIAL: ICD-10-CM

## 2019-06-20 LAB
ALBUMIN SERPL BCP-MCNC: 3.6 G/DL (ref 3.5–5.2)
ALP SERPL-CCNC: 65 U/L (ref 55–135)
ALT SERPL W/O P-5'-P-CCNC: 26 U/L (ref 10–44)
ANION GAP SERPL CALC-SCNC: 10 MMOL/L (ref 8–16)
AST SERPL-CCNC: 29 U/L (ref 10–40)
BILIRUB SERPL-MCNC: 0.5 MG/DL (ref 0.1–1)
BUN SERPL-MCNC: 13 MG/DL (ref 8–23)
CALCIUM SERPL-MCNC: 9.8 MG/DL (ref 8.7–10.5)
CHLORIDE SERPL-SCNC: 102 MMOL/L (ref 95–110)
CO2 SERPL-SCNC: 31 MMOL/L (ref 23–29)
CREAT SERPL-MCNC: 1.3 MG/DL (ref 0.5–1.4)
EST. GFR  (AFRICAN AMERICAN): 45 ML/MIN/1.73 M^2
EST. GFR  (NON AFRICAN AMERICAN): 39 ML/MIN/1.73 M^2
GLUCOSE SERPL-MCNC: 91 MG/DL (ref 70–110)
POTASSIUM SERPL-SCNC: 3.9 MMOL/L (ref 3.5–5.1)
PROT SERPL-MCNC: 6.9 G/DL (ref 6–8.4)
SODIUM SERPL-SCNC: 143 MMOL/L (ref 136–145)

## 2019-06-20 PROCEDURE — 80053 COMPREHEN METABOLIC PANEL: CPT

## 2019-06-20 PROCEDURE — 36415 COLL VENOUS BLD VENIPUNCTURE: CPT

## 2019-06-27 ENCOUNTER — OFFICE VISIT (OUTPATIENT)
Dept: INTERNAL MEDICINE | Facility: CLINIC | Age: 79
End: 2019-06-27
Attending: FAMILY MEDICINE
Payer: MEDICARE

## 2019-06-27 VITALS
WEIGHT: 171.06 LBS | BODY MASS INDEX: 31.48 KG/M2 | HEART RATE: 76 BPM | OXYGEN SATURATION: 97 % | DIASTOLIC BLOOD PRESSURE: 80 MMHG | HEIGHT: 62 IN | SYSTOLIC BLOOD PRESSURE: 162 MMHG

## 2019-06-27 DIAGNOSIS — I10 HYPERTENSION, ESSENTIAL: ICD-10-CM

## 2019-06-27 DIAGNOSIS — I25.10 CVD (CARDIOVASCULAR DISEASE): ICD-10-CM

## 2019-06-27 DIAGNOSIS — E78.2 HYPERLIPIDEMIA, MIXED: ICD-10-CM

## 2019-06-27 DIAGNOSIS — I10 ESSENTIAL HYPERTENSION: Primary | ICD-10-CM

## 2019-06-27 DIAGNOSIS — N18.30 CKD (CHRONIC KIDNEY DISEASE) STAGE 3, GFR 30-59 ML/MIN: ICD-10-CM

## 2019-06-27 PROCEDURE — 3077F PR MOST RECENT SYSTOLIC BLOOD PRESSURE >= 140 MM HG: ICD-10-PCS | Mod: CPTII,S$GLB,, | Performed by: FAMILY MEDICINE

## 2019-06-27 PROCEDURE — 99999 PR PBB SHADOW E&M-EST. PATIENT-LVL III: ICD-10-PCS | Mod: PBBFAC,,, | Performed by: FAMILY MEDICINE

## 2019-06-27 PROCEDURE — 99999 PR PBB SHADOW E&M-EST. PATIENT-LVL III: CPT | Mod: PBBFAC,,, | Performed by: FAMILY MEDICINE

## 2019-06-27 PROCEDURE — 3079F PR MOST RECENT DIASTOLIC BLOOD PRESSURE 80-89 MM HG: ICD-10-PCS | Mod: CPTII,S$GLB,, | Performed by: FAMILY MEDICINE

## 2019-06-27 PROCEDURE — 99214 PR OFFICE/OUTPT VISIT, EST, LEVL IV, 30-39 MIN: ICD-10-PCS | Mod: S$GLB,,, | Performed by: FAMILY MEDICINE

## 2019-06-27 PROCEDURE — 99214 OFFICE O/P EST MOD 30 MIN: CPT | Mod: S$GLB,,, | Performed by: FAMILY MEDICINE

## 2019-06-27 PROCEDURE — 99499 RISK ADDL DX/OHS AUDIT: ICD-10-PCS | Mod: S$GLB,,, | Performed by: FAMILY MEDICINE

## 2019-06-27 PROCEDURE — 1101F PT FALLS ASSESS-DOCD LE1/YR: CPT | Mod: CPTII,S$GLB,, | Performed by: FAMILY MEDICINE

## 2019-06-27 PROCEDURE — 1101F PR PT FALLS ASSESS DOC 0-1 FALLS W/OUT INJ PAST YR: ICD-10-PCS | Mod: CPTII,S$GLB,, | Performed by: FAMILY MEDICINE

## 2019-06-27 PROCEDURE — 3077F SYST BP >= 140 MM HG: CPT | Mod: CPTII,S$GLB,, | Performed by: FAMILY MEDICINE

## 2019-06-27 PROCEDURE — 99499 UNLISTED E&M SERVICE: CPT | Mod: S$GLB,,, | Performed by: FAMILY MEDICINE

## 2019-06-27 PROCEDURE — 3079F DIAST BP 80-89 MM HG: CPT | Mod: CPTII,S$GLB,, | Performed by: FAMILY MEDICINE

## 2019-06-27 RX ORDER — HYDROCHLOROTHIAZIDE 25 MG/1
25 TABLET ORAL DAILY
Qty: 90 TABLET | Refills: 3 | Status: SHIPPED | OUTPATIENT
Start: 2019-06-27 | End: 2020-02-20 | Stop reason: SDUPTHER

## 2019-06-27 RX ORDER — METOPROLOL TARTRATE 100 MG/1
100 TABLET ORAL 2 TIMES DAILY
Qty: 180 TABLET | Refills: 3 | Status: SHIPPED | OUTPATIENT
Start: 2019-06-27 | End: 2020-02-20 | Stop reason: SDUPTHER

## 2019-06-28 NOTE — [employer] PATIENT INSTRUCTIONS
Discharge Summary/Instructions after an Endoscopic Procedure  Patient Name: Betzaida Neumann  Patient MRN: [id number]  Patient YOB: 1940  Wednesday, June 19, 2019  Roger De Luna MD  RESTRICTIONS:  During your procedure today, you received medications for sedation.  These   medications may affect your judgment, balance and coordination.  Therefore,   for 24 hours, you have the following restrictions:   - DO NOT drive a car, operate machinery, make legal/financial decisions,   sign important papers or drink alcohol.    ACTIVITY:  Today: no heavy lifting, straining or running due to procedural   sedation/anesthesia.  The following day: return to full activity including work.  DIET:  Eat and drink normally unless instructed otherwise.     TREATMENT FOR COMMON SIDE EFFECTS:  - Mild abdominal pain, nausea, belching, bloating or excessive gas:  rest,   eat lightly and use a heating pad.  - Sore Throat: treat with throat lozenges and/or gargle with warm salt   water.  - Because air was used during the procedure, expelling large amounts of air   from your rectum or belching is normal.  - If a bowel prep was taken, you may not have a bowel movement for 1-3 days.    This is normal.  SYMPTOMS TO WATCH FOR AND REPORT TO YOUR PHYSICIAN:  1. Abdominal pain or bloating, other than gas cramps.  2. Chest pain.  3. Back pain.  4. Signs of infection such as: chills or fever occurring within 24 hours   after the procedure.  5. Rectal bleeding, which would show as bright red, maroon, or black stools.   (A tablespoon of blood from the rectum is not serious, especially if   hemorrhoids are present.)  6. Vomiting.  7. Weakness or dizziness.  GO DIRECTLY TO THE NEAREST EMERGENCY ROOM IF YOU HAVE ANY OF THE FOLLOWING:      Difficulty breathing              Chills and/or fever over 101 F   Persistent vomiting and/or vomiting blood   Severe abdominal pain   Severe chest pain   Black, tarry stools   Bleeding- more than one  tablespoon   Any other symptom or condition that you feel may need urgent attention  Your doctor recommends these additional instructions:  If any biopsies were taken, your doctors clinic will contact you in 1 to 2   weeks with any results.  - Return to referring physician.   For questions, problems or results please call your physician - Roger De Luna MD at Work:  [phone].  [facility], EMERGENCY ROOM PHONE NUMBER: [phone]  IF A COMPLICATION OR EMERGENCY SITUATION ARISES AND YOU ARE UNABLE TO REACH   YOUR PHYSICIAN - GO DIRECTLY TO THE EMERGENCY ROOM.  Roger De Luna MD  6/27/2019 10:59:20 PM  This report has been verified and signed electronically.  [employer]

## 2019-06-28 NOTE — PROGRESS NOTES
CHIEF COMPLAINT: Discuss HTN    HISTORY OF PRESENT ILLNESS: The patient is a generally healthy 79 year-old BF.  Her blood pressure is not well controlled we will make some changes and bring her back soon.    She has polymyalgia rheumatica.  Apparently she's had this in the past.  She is seen with rheumatology.  She is on prednisone daily.    She changed primary care physicians and specialists as she was unclear as to what is going on with her health situation.  She previously had been told she was on the verge of dialysis or kidney transplant. We could find no evidence of abnormal liver or kidney function only did her blood work recently. She is also curious about some other portions of her blood work. Nephrology seems to feel like she is doing quite well. Baseline creatinine appears to be 1.2 and this is stable.    The patient has a history of stable hypertension on current medications.  Patient denies chest pain or shortness of breath today.    REVIEW OF SYSTEMS:  GENERAL: No fever, chills, fatigability or weight loss.  SKIN: No rashes, itching or changes in color or texture of skin.  HEAD: No headaches or recent head trauma.  EYES: Visual acuity fine. No photophobia, ocular pain or diplopia.  EARS: Denies ear pain, discharge or vertigo.  NOSE: No loss of smell, no epistaxis or postnasal drip.  MOUTH & THROAT: No hoarseness or change in voice. No excessive gum bleeding.  NODES: Denies swollen glands.  CHEST: Denies COSBY, cyanosis, wheezing, cough and sputum production.  CARDIOVASCULAR: Denies chest pain, PND, orthopnea or reduced exercise tolerance.  ABDOMEN: Appetite fine. No weight loss. Denies diarrhea, abdominal pain, hematemesis or blood in stool.  URINARY: No flank pain, dysuria or hematuria.  PERIPHERAL VASCULAR: No claudication or cyanosis.  MUSCULOSKELETAL: She has diffuse myalgias and arthralgias consistent with PMR.  NEUROLOGIC: No history of seizures, paralysis, alteration of gait or  "coordination.    SOCIAL HISTORY: The patient does not smoke.  The patient consumes alcohol socially.  The patient is retired.     PHYSICAL EXAMINATION:     Blood pressure (!) 162/80, pulse 76, height 5' 2" (1.575 m), weight 77.6 kg (171 lb 1.2 oz), SpO2 97 %.    APPEARANCE: Well nourished, well developed, in no acute distress.    HEAD: Normocephalic, atraumatic.  EYES: PERRL. EOMI.  Conjunctivae without injection and  anicteric  EARS: TM's intact. Light reflex normal. No retraction or perforation.    NOSE: Mucosa pink. Airway clear.  MOUTH & THROAT: No tonsillar enlargement. No pharyngeal erythema or exudate. No stridor.  NECK: Supple.   NODES: No cervical, axillary or inguinal lymph node enlargement.  CHEST: Lungs clear to auscultation.  No retractions are noted.  No rales or rhonchi are present.  CARDIOVASCULAR: Normal S1, S2. No rubs, murmurs or gallops.  ABDOMEN: Bowel sounds normal. Not distended. Soft. No tenderness or masses.  No ascites is noted.  MUSCULOSKELETAL:  There is no clubbing, cyanosis, or edema of the extremities x4.  There is full range of motion of the lumbar spine.  There is full range of motion of the extremities x4.  There is no deformity noted.    NEUROLOGIC:       Normal speech development.      Hearing normal.      Normal gait.      Motor and sensory exams grossly normal.  PSYCHIATRIC: Patient is alert and oriented x3.  Thought processes are all normal.  There is no homicidality.  There is no suicidality.  There is no evidence of psychosis.    LABORATORY/RADIOLOGY:   Chart reviewed.      ASSESSMENT:   Hypertension, condition is not well controlled on current medication regimen  CKD3  GERD  Chronic ALLERGIC rhinitis  Diffuse myalgias and arthralgias, ruled out a connective tissue disorder previously with blood work     PLAN:   Losartan HCTZ and metoprolol  She will return to clinic in 1 month for blood pressure visit    "

## 2019-07-09 ENCOUNTER — TELEPHONE (OUTPATIENT)
Dept: GASTROENTEROLOGY | Facility: CLINIC | Age: 79
End: 2019-07-09

## 2019-07-09 NOTE — TELEPHONE ENCOUNTER
----- Message from Tramaine Latham PA-C sent at 7/6/2019 10:20 AM CDT -----  Please notify the patient that her esophageal manometry was normal.  The muscles of her esophagus are tom normally.

## 2019-07-25 ENCOUNTER — OFFICE VISIT (OUTPATIENT)
Dept: INTERNAL MEDICINE | Facility: CLINIC | Age: 79
End: 2019-07-25
Attending: FAMILY MEDICINE
Payer: MEDICARE

## 2019-07-25 VITALS
HEART RATE: 71 BPM | WEIGHT: 170.63 LBS | DIASTOLIC BLOOD PRESSURE: 76 MMHG | OXYGEN SATURATION: 100 % | HEIGHT: 62 IN | BODY MASS INDEX: 31.4 KG/M2 | SYSTOLIC BLOOD PRESSURE: 146 MMHG

## 2019-07-25 DIAGNOSIS — I10 ESSENTIAL HYPERTENSION: Primary | ICD-10-CM

## 2019-07-25 DIAGNOSIS — M35.3 PMR (POLYMYALGIA RHEUMATICA): ICD-10-CM

## 2019-07-25 DIAGNOSIS — N18.30 CKD (CHRONIC KIDNEY DISEASE) STAGE 3, GFR 30-59 ML/MIN: ICD-10-CM

## 2019-07-25 DIAGNOSIS — I25.10 CVD (CARDIOVASCULAR DISEASE): ICD-10-CM

## 2019-07-25 PROCEDURE — 1101F PR PT FALLS ASSESS DOC 0-1 FALLS W/OUT INJ PAST YR: ICD-10-PCS | Mod: CPTII,S$GLB,, | Performed by: FAMILY MEDICINE

## 2019-07-25 PROCEDURE — 1101F PT FALLS ASSESS-DOCD LE1/YR: CPT | Mod: CPTII,S$GLB,, | Performed by: FAMILY MEDICINE

## 2019-07-25 PROCEDURE — 3077F PR MOST RECENT SYSTOLIC BLOOD PRESSURE >= 140 MM HG: ICD-10-PCS | Mod: CPTII,S$GLB,, | Performed by: FAMILY MEDICINE

## 2019-07-25 PROCEDURE — 99214 PR OFFICE/OUTPT VISIT, EST, LEVL IV, 30-39 MIN: ICD-10-PCS | Mod: S$GLB,,, | Performed by: FAMILY MEDICINE

## 2019-07-25 PROCEDURE — 3078F DIAST BP <80 MM HG: CPT | Mod: CPTII,S$GLB,, | Performed by: FAMILY MEDICINE

## 2019-07-25 PROCEDURE — 99999 PR PBB SHADOW E&M-EST. PATIENT-LVL III: ICD-10-PCS | Mod: PBBFAC,,, | Performed by: FAMILY MEDICINE

## 2019-07-25 PROCEDURE — 3077F SYST BP >= 140 MM HG: CPT | Mod: CPTII,S$GLB,, | Performed by: FAMILY MEDICINE

## 2019-07-25 PROCEDURE — 3078F PR MOST RECENT DIASTOLIC BLOOD PRESSURE < 80 MM HG: ICD-10-PCS | Mod: CPTII,S$GLB,, | Performed by: FAMILY MEDICINE

## 2019-07-25 PROCEDURE — 99999 PR PBB SHADOW E&M-EST. PATIENT-LVL III: CPT | Mod: PBBFAC,,, | Performed by: FAMILY MEDICINE

## 2019-07-25 PROCEDURE — 99214 OFFICE O/P EST MOD 30 MIN: CPT | Mod: S$GLB,,, | Performed by: FAMILY MEDICINE

## 2019-07-25 RX ORDER — AMLODIPINE BESYLATE 5 MG/1
5 TABLET ORAL DAILY
Qty: 30 TABLET | Refills: 11 | Status: SHIPPED | OUTPATIENT
Start: 2019-07-25 | End: 2020-02-20 | Stop reason: SDUPTHER

## 2019-07-25 NOTE — PROGRESS NOTES
[facility] COMPLAINT: Discuss HTN    HISTORY OF PRESENT ILLNESS: The patient is a generally healthy 79 year-old BF.  Her blood pressure is not well controlled we will make some changes and bring her back soon.    She has polymyalgia rheumatica.  Apparently she's had this in the past.  She is seen with rheumatology.  She is on prednisone daily.    She changed primary care physicians and specialists as she was unclear as to what is going on with her health situation.  She previously had been told she was on the verge of dialysis or kidney transplant. We could find no evidence of abnormal liver or kidney function only did her blood work recently.  Nephrology seems to feel like she is doing quite well. Baseline creatinine appears to be 1.2 and this is stable.    The patient has a history of stable hypertension on current medications.  Patient denies chest pain or shortness of breath today.    REVIEW OF SYSTEMS:  GENERAL: No fever, chills, fatigability or weight loss.  SKIN: No rashes, itching or changes in color or texture of skin.  HEAD: No headaches or recent head trauma.  EYES: Visual acuity fine. No photophobia, ocular pain or diplopia.  EARS: Denies ear pain, discharge or vertigo.  NOSE: No loss of smell, no epistaxis or postnasal drip.  MOUTH & THROAT: No hoarseness or change in voice. No excessive gum bleeding.  NODES: Denies swollen glands.  CHEST: Denies COSBY, cyanosis, wheezing, cough and sputum production.  CARDIOVASCULAR: Denies chest pain, PND, orthopnea or reduced exercise tolerance.  ABDOMEN: Appetite fine. No weight loss. Denies diarrhea, abdominal pain, hematemesis or blood in stool.  URINARY: No flank pain, dysuria or hematuria.  PERIPHERAL VASCULAR: No claudication or cyanosis.  MUSCULOSKELETAL: She has diffuse myalgias and arthralgias consistent with PMR.  NEUROLOGIC: No history of seizures, paralysis, alteration of gait or coordination.    SOCIAL HISTORY: The patient does not smoke.  The patient consumes  "alcohol socially.  The patient is retired.     PHYSICAL EXAMINATION:     Blood pressure (!) 146/76, pulse 71, height 5' 2" (1.575 m), weight 77.4 kg (170 lb 10.2 oz), SpO2 100 %.    APPEARANCE: Well nourished, well developed, in no acute distress.    HEAD: Normocephalic, atraumatic.  EYES: PERRL. EOMI.  Conjunctivae without injection and  anicteric  EARS: TM's intact. Light reflex normal. No retraction or perforation.    NOSE: Mucosa pink. Airway clear.  MOUTH & THROAT: No tonsillar enlargement. No pharyngeal erythema or exudate. No stridor.  NECK: Supple.   NODES: No cervical, axillary or inguinal lymph node enlargement.  CHEST: Lungs clear to auscultation.  No retractions are noted.  No rales or rhonchi are present.  CARDIOVASCULAR: Normal S1, S2. No rubs, murmurs or gallops.  ABDOMEN: Bowel sounds normal. Not distended. Soft. No tenderness or masses.  No ascites is noted.  MUSCULOSKELETAL:  There is no clubbing, cyanosis, or edema of the extremities x4.  There is full range of motion of the lumbar spine.  There is full range of motion of the extremities x4.  There is no deformity noted.    NEUROLOGIC:       Normal speech development.      Hearing normal.      Normal gait.      Motor and sensory exams grossly normal.  PSYCHIATRIC: Patient is alert and oriented x3.  Thought processes are all normal.  There is no homicidality.  There is no suicidality.  There is no evidence of psychosis.    LABORATORY/RADIOLOGY:   Chart reviewed.      ASSESSMENT:   Hypertension, condition is not well controlled on current medication regimen  CKD3  GERD  Chronic ALLERGIC rhinitis  Diffuse myalgias and arthralgias, ruled out a connective tissue disorder previously with blood work     PLAN:   Losartan HCTZ and metoprolol  Add amlodipine  RTC 1 month  "

## 2019-07-29 ENCOUNTER — TELEPHONE (OUTPATIENT)
Dept: PHARMACY | Facility: CLINIC | Age: 79
End: 2019-07-29

## 2019-08-26 ENCOUNTER — OFFICE VISIT (OUTPATIENT)
Dept: INTERNAL MEDICINE | Facility: CLINIC | Age: 79
End: 2019-08-26
Attending: FAMILY MEDICINE
Payer: MEDICARE

## 2019-08-26 VITALS
WEIGHT: 171.94 LBS | HEART RATE: 78 BPM | HEIGHT: 62 IN | DIASTOLIC BLOOD PRESSURE: 74 MMHG | BODY MASS INDEX: 31.64 KG/M2 | OXYGEN SATURATION: 95 % | SYSTOLIC BLOOD PRESSURE: 130 MMHG

## 2019-08-26 DIAGNOSIS — I25.10 CVD (CARDIOVASCULAR DISEASE): ICD-10-CM

## 2019-08-26 DIAGNOSIS — N18.30 CKD (CHRONIC KIDNEY DISEASE) STAGE 3, GFR 30-59 ML/MIN: ICD-10-CM

## 2019-08-26 DIAGNOSIS — I10 HYPERTENSION, ESSENTIAL: Primary | ICD-10-CM

## 2019-08-26 PROCEDURE — 3078F DIAST BP <80 MM HG: CPT | Mod: CPTII,S$GLB,, | Performed by: FAMILY MEDICINE

## 2019-08-26 PROCEDURE — 99214 OFFICE O/P EST MOD 30 MIN: CPT | Mod: S$GLB,,, | Performed by: FAMILY MEDICINE

## 2019-08-26 PROCEDURE — 3078F PR MOST RECENT DIASTOLIC BLOOD PRESSURE < 80 MM HG: ICD-10-PCS | Mod: CPTII,S$GLB,, | Performed by: FAMILY MEDICINE

## 2019-08-26 PROCEDURE — 99999 PR PBB SHADOW E&M-EST. PATIENT-LVL III: CPT | Mod: PBBFAC,,, | Performed by: FAMILY MEDICINE

## 2019-08-26 PROCEDURE — 3075F SYST BP GE 130 - 139MM HG: CPT | Mod: CPTII,S$GLB,, | Performed by: FAMILY MEDICINE

## 2019-08-26 PROCEDURE — 99499 UNLISTED E&M SERVICE: CPT | Mod: S$GLB,,, | Performed by: FAMILY MEDICINE

## 2019-08-26 PROCEDURE — 3075F PR MOST RECENT SYSTOLIC BLOOD PRESS GE 130-139MM HG: ICD-10-PCS | Mod: CPTII,S$GLB,, | Performed by: FAMILY MEDICINE

## 2019-08-26 PROCEDURE — 99499 RISK ADDL DX/OHS AUDIT: ICD-10-PCS | Mod: S$GLB,,, | Performed by: FAMILY MEDICINE

## 2019-08-26 PROCEDURE — 1101F PT FALLS ASSESS-DOCD LE1/YR: CPT | Mod: CPTII,S$GLB,, | Performed by: FAMILY MEDICINE

## 2019-08-26 PROCEDURE — 99999 PR PBB SHADOW E&M-EST. PATIENT-LVL III: ICD-10-PCS | Mod: PBBFAC,,, | Performed by: FAMILY MEDICINE

## 2019-08-26 PROCEDURE — 1101F PR PT FALLS ASSESS DOC 0-1 FALLS W/OUT INJ PAST YR: ICD-10-PCS | Mod: CPTII,S$GLB,, | Performed by: FAMILY MEDICINE

## 2019-08-26 PROCEDURE — 99214 PR OFFICE/OUTPT VISIT, EST, LEVL IV, 30-39 MIN: ICD-10-PCS | Mod: S$GLB,,, | Performed by: FAMILY MEDICINE

## 2019-08-26 RX ORDER — LOSARTAN POTASSIUM 100 MG/1
100 TABLET ORAL DAILY
Qty: 90 TABLET | Refills: 3 | Status: SHIPPED | OUTPATIENT
Start: 2019-08-26 | End: 2020-02-20 | Stop reason: SDUPTHER

## 2019-08-26 NOTE — PROGRESS NOTES
[facility] COMPLAINT: Discuss HTN    HISTORY OF PRESENT ILLNESS: The patient is a generally healthy 79 year-old BF.  Her blood pressure is well controlled.  We recently made some changes and brought her back soon.    She has polymyalgia rheumatica.  Apparently she's had this in the past.  She is seen with rheumatology.  She is on prednisone daily.    She changed primary care physicians and specialists as she was unclear as to what is going on with her health situation.  She previously had been told she was on the verge of dialysis or kidney transplant. We could find no evidence of abnormal liver or kidney function only did her blood work recently.  Nephrology seems to feel like she is doing quite well. Baseline creatinine appears to be 1.2 and this is stable.    The patient has a history of stable hypertension on current medications.  Patient denies chest pain or shortness of breath today.    REVIEW OF SYSTEMS:  GENERAL: No fever, chills, fatigability or weight loss.  SKIN: No rashes, itching or changes in color or texture of skin.  HEAD: No headaches or recent head trauma.  EYES: Visual acuity fine. No photophobia, ocular pain or diplopia.  EARS: Denies ear pain, discharge or vertigo.  NOSE: No loss of smell, no epistaxis or postnasal drip.  MOUTH & THROAT: No hoarseness or change in voice. No excessive gum bleeding.  NODES: Denies swollen glands.  CHEST: Denies COSBY, cyanosis, wheezing, cough and sputum production.  CARDIOVASCULAR: Denies chest pain, PND, orthopnea or reduced exercise tolerance.  ABDOMEN: Appetite fine. No weight loss. Denies diarrhea, abdominal pain, hematemesis or blood in stool.  URINARY: No flank pain, dysuria or hematuria.  PERIPHERAL VASCULAR: No claudication or cyanosis.  MUSCULOSKELETAL: She has diffuse myalgias and arthralgias consistent with PMR.  NEUROLOGIC: No history of seizures, paralysis, alteration of gait or coordination.    SOCIAL HISTORY: The patient does not smoke.  The patient  "consumes alcohol socially.  The patient is retired.     PHYSICAL EXAMINATION:     Blood pressure 130/74, pulse 78, height 5' 2" (1.575 m), weight 78 kg (171 lb 15.3 oz), SpO2 95 %.    APPEARANCE: Well nourished, well developed, in no acute distress.    HEAD: Normocephalic, atraumatic.  EYES: PERRL. EOMI.  Conjunctivae without injection and  anicteric  EARS: TM's intact. Light reflex normal. No retraction or perforation.    NOSE: Mucosa pink. Airway clear.  MOUTH & THROAT: No tonsillar enlargement. No pharyngeal erythema or exudate. No stridor.  NECK: Supple.   NODES: No cervical, axillary or inguinal lymph node enlargement.  CHEST: Lungs clear to auscultation.  No retractions are noted.  No rales or rhonchi are present.  CARDIOVASCULAR: Normal S1, S2. No rubs, murmurs or gallops.  ABDOMEN: Bowel sounds normal. Not distended. Soft. No tenderness or masses.  No ascites is noted.  MUSCULOSKELETAL:  There is no clubbing, cyanosis, or edema of the extremities x4.  There is full range of motion of the lumbar spine.  There is full range of motion of the extremities x4.  There is no deformity noted.    NEUROLOGIC:       Normal speech development.      Hearing normal.      Normal gait.      Motor and sensory exams grossly normal.  PSYCHIATRIC: Patient is alert and oriented x3.  Thought processes are all normal.  There is no homicidality.  There is no suicidality.  There is no evidence of psychosis.    LABORATORY/RADIOLOGY:   Chart reviewed.      ASSESSMENT:   Hypertension, condition is well controlled on current medication regimen  CKD3  GERD  Chronic ALLERGIC rhinitis  Diffuse myalgias and arthralgias, ruled out a connective tissue disorder previously with blood work     PLAN:   Losartan HCTZ and metoprolol  Add amlodipine  RTC 12 months  "

## 2019-08-28 ENCOUNTER — TELEPHONE (OUTPATIENT)
Dept: PHARMACY | Facility: CLINIC | Age: 79
End: 2019-08-28

## 2019-08-29 ENCOUNTER — IMMUNIZATION (OUTPATIENT)
Dept: PHARMACY | Facility: CLINIC | Age: 79
End: 2019-08-29
Payer: MEDICARE

## 2019-09-16 PROBLEM — J01.00 ACUTE MAXILLARY SINUSITIS: Status: RESOLVED | Noted: 2019-06-13 | Resolved: 2019-09-16

## 2019-09-16 PROBLEM — Z00.00 ROUTINE GENERAL MEDICAL EXAMINATION AT A HEALTH CARE FACILITY: Status: RESOLVED | Noted: 2017-10-13 | Resolved: 2019-09-16

## 2019-10-07 ENCOUNTER — TELEPHONE (OUTPATIENT)
Dept: PHARMACY | Facility: CLINIC | Age: 79
End: 2019-10-07

## 2019-10-07 ENCOUNTER — PATIENT MESSAGE (OUTPATIENT)
Dept: PHARMACY | Facility: CLINIC | Age: 79
End: 2019-10-07

## 2019-10-07 NOTE — TELEPHONE ENCOUNTER
Pt confirmed shipping of Actemra on 10/7 to arrive on 10/8. Address and  verified. $0 copay in 004. Pt is in need of a new sharps container. Pt has 0 doses on hand, next dose due 10/7. Pt did not start any new medications. Pt had no further questions or concerns.

## 2019-10-11 ENCOUNTER — PATIENT OUTREACH (OUTPATIENT)
Dept: ADMINISTRATIVE | Facility: OTHER | Age: 79
End: 2019-10-11

## 2019-10-14 ENCOUNTER — OFFICE VISIT (OUTPATIENT)
Dept: RHEUMATOLOGY | Facility: CLINIC | Age: 79
End: 2019-10-14
Payer: MEDICARE

## 2019-10-14 VITALS
HEIGHT: 62 IN | BODY MASS INDEX: 32.25 KG/M2 | HEART RATE: 93 BPM | SYSTOLIC BLOOD PRESSURE: 171 MMHG | DIASTOLIC BLOOD PRESSURE: 82 MMHG | WEIGHT: 175.25 LBS

## 2019-10-14 DIAGNOSIS — M35.3 POLYMYALGIA RHEUMATICA: ICD-10-CM

## 2019-10-14 DIAGNOSIS — M79.7 FIBROMYALGIA: ICD-10-CM

## 2019-10-14 DIAGNOSIS — M13.80 SERONEGATIVE ARTHRITIS: Primary | ICD-10-CM

## 2019-10-14 DIAGNOSIS — R70.0 ESR RAISED: ICD-10-CM

## 2019-10-14 DIAGNOSIS — R79.82 ELEVATED C-REACTIVE PROTEIN (CRP): ICD-10-CM

## 2019-10-14 PROCEDURE — 99999 PR PBB SHADOW E&M-EST. PATIENT-LVL III: ICD-10-PCS | Mod: PBBFAC,,, | Performed by: INTERNAL MEDICINE

## 2019-10-14 PROCEDURE — 3077F PR MOST RECENT SYSTOLIC BLOOD PRESSURE >= 140 MM HG: ICD-10-PCS | Mod: CPTII,S$GLB,, | Performed by: INTERNAL MEDICINE

## 2019-10-14 PROCEDURE — 1101F PT FALLS ASSESS-DOCD LE1/YR: CPT | Mod: CPTII,S$GLB,, | Performed by: INTERNAL MEDICINE

## 2019-10-14 PROCEDURE — 1101F PR PT FALLS ASSESS DOC 0-1 FALLS W/OUT INJ PAST YR: ICD-10-PCS | Mod: CPTII,S$GLB,, | Performed by: INTERNAL MEDICINE

## 2019-10-14 PROCEDURE — 99214 OFFICE O/P EST MOD 30 MIN: CPT | Mod: S$GLB,,, | Performed by: INTERNAL MEDICINE

## 2019-10-14 PROCEDURE — 99999 PR PBB SHADOW E&M-EST. PATIENT-LVL III: CPT | Mod: PBBFAC,,, | Performed by: INTERNAL MEDICINE

## 2019-10-14 PROCEDURE — 99214 PR OFFICE/OUTPT VISIT, EST, LEVL IV, 30-39 MIN: ICD-10-PCS | Mod: S$GLB,,, | Performed by: INTERNAL MEDICINE

## 2019-10-14 PROCEDURE — 3079F PR MOST RECENT DIASTOLIC BLOOD PRESSURE 80-89 MM HG: ICD-10-PCS | Mod: CPTII,S$GLB,, | Performed by: INTERNAL MEDICINE

## 2019-10-14 PROCEDURE — 3077F SYST BP >= 140 MM HG: CPT | Mod: CPTII,S$GLB,, | Performed by: INTERNAL MEDICINE

## 2019-10-14 PROCEDURE — 3079F DIAST BP 80-89 MM HG: CPT | Mod: CPTII,S$GLB,, | Performed by: INTERNAL MEDICINE

## 2019-10-14 RX ORDER — DULOXETIN HYDROCHLORIDE 30 MG/1
CAPSULE, DELAYED RELEASE ORAL
Qty: 60 CAPSULE | Refills: 5 | Status: SHIPPED | OUTPATIENT
Start: 2019-10-14 | End: 2020-04-16

## 2019-10-14 RX ORDER — PREDNISONE 5 MG/1
5 TABLET ORAL DAILY
Qty: 30 TABLET | Refills: 6 | Status: SHIPPED | OUTPATIENT
Start: 2019-10-14 | End: 2019-11-13

## 2019-10-14 RX ORDER — ALLOPURINOL 100 MG/1
150 TABLET ORAL DAILY
Qty: 45 TABLET | Refills: 6 | Status: SHIPPED | OUTPATIENT
Start: 2019-10-14 | End: 2019-11-13

## 2019-10-14 RX ORDER — ALLOPURINOL 100 MG/1
100 TABLET ORAL DAILY
Qty: 30 TABLET | Refills: 6 | Status: SHIPPED | OUTPATIENT
Start: 2019-10-14 | End: 2019-10-14

## 2019-10-14 RX ORDER — COLCHICINE 0.6 MG/1
0.6 TABLET ORAL DAILY
Qty: 30 TABLET | Refills: 6 | Status: SHIPPED | OUTPATIENT
Start: 2019-10-14 | End: 2020-04-16

## 2019-10-14 ASSESSMENT — ROUTINE ASSESSMENT OF PATIENT INDEX DATA (RAPID3)
PSYCHOLOGICAL DISTRESS SCORE: 3.3
FATIGUE SCORE: 10
AM STIFFNESS SCORE: 0, NO
MDHAQ FUNCTION SCORE: 0
PAIN SCORE: 6.5
TOTAL RAPID3 SCORE: 3.83
PATIENT GLOBAL ASSESSMENT SCORE: 5

## 2019-10-14 NOTE — PROGRESS NOTES
Chief Complaint   Patient presents with    Disease Management     seronegative arthritis       Patient with joint pains and high inflammatory markers for a follow up    History of presenting illness    79 year old black female has    Joint pain since 2008     She has seen several rheumatologists. First was Dr. Lerma who thought she had bursitis.Then she saw Dr. Riddle who diagnosed rheumatoid arthritis and gave her Humira x 3 years which did not help. Humira caused her to feel like a zombie. [facility] then went [facility] : saw Dr. Woo and Dr. Reeves     He diagnosed a combination of rheumatoid arthritis, fibromyalgia and PMR.  She recalls taking methotrexate but it didn't help her. At one point she only took pain pills.She took mtx for 4 years    She has been on prednisone since May 2016 due to ALLERGIES as prescribed by her ALLERGY doctor.     She also took tramadol and percocet but did not help.     Held Arava due to stomach upset.  Night sweats since 2008.    Then we were treating her as PMR.     She was on prednisone 2.5 mg BID    She took lyrica in the past and that didn't help      Then her complaints were :     Fatigue  Pain all over  No swelling    Once she had right hand pain and swelling,then she had left ankle pain and swelling  Muscle spasms     I checked her uric acid and it was 11.4  She had CRP of 50.3  Her ESR was normal    I offered her gabapentin and she didn't like it  She didn't like the lyrica    I then gave her 20 mg prednisone    I asked her to taper prednisone  Actemra offered last time     She didn't like it     She says it caused rash,made her sleepy     She has stayed on 10 mg prednisone   But she has continued to take actemra     Uric acid down to 5.6 AND THEN UPTO 6.9    SHE HAD ONGOING JOINT SYMPTOMS     I added 0.5 ml weekly mtx and she took it for 2 weeks and she stopped it     LAST INFLAMMATORY MARKERS    CRP 33.1  ESR 12    Today she says her whole body aches  She has been taking  actemra,prednisone 10 mg   She takes allopurinol 100 mg and colchicine 0.6 mg     Past history : HTN,allergic rhinitis,CKD stage 3,GERD,PMR,RA,FMS,elevated inflammatory markers,asthma     Family history : no autoimmune illness    Social history : former smoker 1999      Review of Systems   Constitutional: Negative for activity change, appetite change, chills, diaphoresis, fatigue, fever and unexpected weight change.   HENT: Negative for congestion, dental problem, drooling, ear discharge, ear pain, facial swelling, hearing loss, mouth sores, nosebleeds, postnasal drip, rhinorrhea, sinus pressure, sinus pain, sneezing, sore throat, tinnitus, trouble swallowing and voice change.    Eyes: Negative for photophobia, pain, discharge, redness, itching and visual disturbance.   Respiratory: Negative for apnea, cough, choking, chest tightness, shortness of breath, wheezing and stridor.    Cardiovascular: Negative for chest pain, palpitations and leg swelling.   Gastrointestinal: Negative for abdominal distention, abdominal pain, anal bleeding, blood in stool, constipation, diarrhea, nausea, rectal pain and vomiting.   Endocrine: Negative for cold intolerance, heat intolerance, polydipsia, polyphagia and polyuria.   Genitourinary: Negative for decreased urine volume, difficulty urinating, dysuria, enuresis, flank pain, frequency, genital sores, hematuria and urgency.   Musculoskeletal: Positive for arthralgias. Negative for back pain, gait problem, joint swelling, myalgias, neck pain and neck stiffness.   Skin: Negative for color change, pallor, rash and wound.   Allergic/Immunologic: Negative for environmental allergies, food allergies and immunocompromised state.   Neurological: Negative for dizziness, tremors, seizures, syncope, facial asymmetry, speech difficulty, weakness, light-headedness, numbness and headaches.   Hematological: Negative for adenopathy. Does not bruise/bleed easily.   Psychiatric/Behavioral: Negative  for agitation, behavioral problems, confusion, decreased concentration, dysphoric mood, hallucinations, self-injury, sleep disturbance and suicidal ideas. The patient is not nervous/anxious and is not hyperactive.      Physical Exam     ARENAS-28 tender joint count: 0  ARENAS-28 swollen joint count: 0    Physical Exam   Constitutional: She is oriented to person, place, and time and well-developed, well-nourished, and in no distress. No distress.   HENT:   Head: Normocephalic.   Mouth/Throat: Oropharynx is clear and moist.   Eyes: Conjunctivae are normal. Pupils are equal, round, and reactive to light. Right eye exhibits no discharge. Left eye exhibits no discharge. No scleral icterus.   Neck: Normal range of motion. No thyromegaly present.   Cardiovascular: Normal rate, regular rhythm, normal heart sounds and intact distal pulses.    Pulmonary/Chest: Effort normal and breath sounds normal. No stridor.   Abdominal: Soft. Bowel sounds are normal.   Lymphadenopathy:     She has no cervical adenopathy.   Neurological: She is alert and oriented to person, place, and time.   Skin: Skin is warm. No rash noted. She is not diaphoretic.     Psychiatric: Affect and judgment normal.   Musculoskeletal: Normal range of motion.           Assessment     79 year old black female with     HTN,allergic rhinitis,CKD stage 3,GERD,PMR,RA,FMS,elevated inflammatory markers,asthma     She wants prednisone to survive with the joint pain    We have witnessed synovitis in the past,this made us think she has RA  We thought she rapidly responded to prednisone,hence comes the PMR  She always complains of overall body pain,hence she gets the fibromyalgia diagnosis    We have positive RF in the past  We have high uric acid    She has responded really well to prednisone 20 mg  Her CRP dropped a great deal    We have her allopurinol 100 mg and colchicine 0.6 mg  and her uric acid dropped to less than 6    I GAVE HER MTX AND SHE TOOK IT FOR 2 WEEKS ONLY    Finally on actemra we have achieved compliace    She just stays on prednisone 10 mg daily    Is this PMR ? Seronegative RA? RA ?   Is this resolved PMR/RA and now fibromyalgia only     Dexa : Osteopenia of the femoral neck.  FRAX calculations do not suggest treatment      1. Seronegative arthritis    2. Polymyalgia rheumatica    3. Elevated C-reactive protein (CRP)    4. Fibromyalgia    5. ESR raised        Reviewed labs/xrays  Reviewed medications    F/u problem    Plan:      Continue allopurinol to 100 mg and colchicine 0.6 mg daily    Continue actemra     Add cymbalta 30 mg and then 60 mg     Prednisone taper     Labs today    Betzaida was seen today for disease management.    Diagnoses and all orders for this visit:    Seronegative arthritis  -     Sedimentation rate; Future  -     C-reactive protein; Future  -     CBC auto differential; Future  -     Comprehensive metabolic panel; Future  -     Uric acid; Future    Polymyalgia rheumatica    Elevated C-reactive protein (CRP)    Fibromyalgia    ESR raised    Other orders  -     colchicine (COLCRYS) 0.6 mg tablet; Take 1 tablet (0.6 mg total) by mouth once daily.  -     allopurinol (ZYLOPRIM) 100 MG tablet; Take 1 tablet (100 mg total) by mouth once daily.  -     DULoxetine (CYMBALTA) 30 MG capsule; 30 mg daily for a week and then 60 mg daily  -     predniSONE (DELTASONE) 5 MG tablet; Take 1 tablet (5 mg total) by mouth once daily.        rtc in 3 months

## 2019-10-30 ENCOUNTER — TELEPHONE (OUTPATIENT)
Dept: PHARMACY | Facility: CLINIC | Age: 79
End: 2019-10-30

## 2019-11-26 ENCOUNTER — TELEPHONE (OUTPATIENT)
Dept: PHARMACY | Facility: CLINIC | Age: 79
End: 2019-11-26

## 2019-12-24 ENCOUNTER — TELEPHONE (OUTPATIENT)
Dept: PHARMACY | Facility: CLINIC | Age: 79
End: 2019-12-24

## 2020-01-23 ENCOUNTER — TELEPHONE (OUTPATIENT)
Dept: PHARMACY | Facility: CLINIC | Age: 80
End: 2020-01-23

## 2020-02-10 ENCOUNTER — PATIENT OUTREACH (OUTPATIENT)
Dept: ADMINISTRATIVE | Facility: OTHER | Age: 80
End: 2020-02-10

## 2020-02-12 ENCOUNTER — LAB VISIT (OUTPATIENT)
Dept: LAB | Facility: HOSPITAL | Age: 80
End: 2020-02-12
Attending: INTERNAL MEDICINE
Payer: MEDICARE

## 2020-02-12 ENCOUNTER — TELEPHONE (OUTPATIENT)
Dept: RHEUMATOLOGY | Facility: CLINIC | Age: 80
End: 2020-02-12

## 2020-02-12 ENCOUNTER — OFFICE VISIT (OUTPATIENT)
Dept: ENDOCRINOLOGY | Facility: CLINIC | Age: 80
End: 2020-02-12
Payer: MEDICARE

## 2020-02-12 VITALS
HEIGHT: 62 IN | DIASTOLIC BLOOD PRESSURE: 80 MMHG | RESPIRATION RATE: 18 BRPM | HEART RATE: 80 BPM | BODY MASS INDEX: 31.34 KG/M2 | SYSTOLIC BLOOD PRESSURE: 150 MMHG | WEIGHT: 170.31 LBS

## 2020-02-12 DIAGNOSIS — E04.2 MULTIPLE THYROID NODULES: Primary | ICD-10-CM

## 2020-02-12 DIAGNOSIS — R61 CHRONIC NIGHT SWEATS: ICD-10-CM

## 2020-02-12 DIAGNOSIS — E04.2 MULTIPLE THYROID NODULES: ICD-10-CM

## 2020-02-12 LAB
T4 FREE SERPL-MCNC: 0.86 NG/DL (ref 0.71–1.51)
TSH SERPL DL<=0.005 MIU/L-ACNC: 1.33 UIU/ML (ref 0.4–4)

## 2020-02-12 PROCEDURE — 1125F PR PAIN SEVERITY QUANTIFIED, PAIN PRESENT: ICD-10-PCS | Mod: S$GLB,,, | Performed by: INTERNAL MEDICINE

## 2020-02-12 PROCEDURE — 1101F PT FALLS ASSESS-DOCD LE1/YR: CPT | Mod: CPTII,S$GLB,, | Performed by: INTERNAL MEDICINE

## 2020-02-12 PROCEDURE — 99204 OFFICE O/P NEW MOD 45 MIN: CPT | Mod: S$GLB,,, | Performed by: INTERNAL MEDICINE

## 2020-02-12 PROCEDURE — 1125F AMNT PAIN NOTED PAIN PRSNT: CPT | Mod: S$GLB,,, | Performed by: INTERNAL MEDICINE

## 2020-02-12 PROCEDURE — 84439 ASSAY OF FREE THYROXINE: CPT

## 2020-02-12 PROCEDURE — 3079F DIAST BP 80-89 MM HG: CPT | Mod: CPTII,S$GLB,, | Performed by: INTERNAL MEDICINE

## 2020-02-12 PROCEDURE — 99204 PR OFFICE/OUTPT VISIT, NEW, LEVL IV, 45-59 MIN: ICD-10-PCS | Mod: S$GLB,,, | Performed by: INTERNAL MEDICINE

## 2020-02-12 PROCEDURE — 99999 PR PBB SHADOW E&M-EST. PATIENT-LVL V: ICD-10-PCS | Mod: PBBFAC,,, | Performed by: INTERNAL MEDICINE

## 2020-02-12 PROCEDURE — 1159F MED LIST DOCD IN RCRD: CPT | Mod: S$GLB,,, | Performed by: INTERNAL MEDICINE

## 2020-02-12 PROCEDURE — 36415 COLL VENOUS BLD VENIPUNCTURE: CPT

## 2020-02-12 PROCEDURE — 99999 PR PBB SHADOW E&M-EST. PATIENT-LVL V: CPT | Mod: PBBFAC,,, | Performed by: INTERNAL MEDICINE

## 2020-02-12 PROCEDURE — 3077F PR MOST RECENT SYSTOLIC BLOOD PRESSURE >= 140 MM HG: ICD-10-PCS | Mod: CPTII,S$GLB,, | Performed by: INTERNAL MEDICINE

## 2020-02-12 PROCEDURE — 1159F PR MEDICATION LIST DOCUMENTED IN MEDICAL RECORD: ICD-10-PCS | Mod: S$GLB,,, | Performed by: INTERNAL MEDICINE

## 2020-02-12 PROCEDURE — 3077F SYST BP >= 140 MM HG: CPT | Mod: CPTII,S$GLB,, | Performed by: INTERNAL MEDICINE

## 2020-02-12 PROCEDURE — 84443 ASSAY THYROID STIM HORMONE: CPT

## 2020-02-12 PROCEDURE — 1101F PR PT FALLS ASSESS DOC 0-1 FALLS W/OUT INJ PAST YR: ICD-10-PCS | Mod: CPTII,S$GLB,, | Performed by: INTERNAL MEDICINE

## 2020-02-12 PROCEDURE — 3079F PR MOST RECENT DIASTOLIC BLOOD PRESSURE 80-89 MM HG: ICD-10-PCS | Mod: CPTII,S$GLB,, | Performed by: INTERNAL MEDICINE

## 2020-02-12 RX ORDER — MINERAL OIL
180 ENEMA (ML) RECTAL DAILY
COMMUNITY
End: 2022-11-29

## 2020-02-12 RX ORDER — DIPHENHYDRAMINE HCL 25 MG
25 CAPSULE ORAL EVERY 6 HOURS PRN
COMMUNITY

## 2020-02-12 RX ORDER — PREDNISONE 50 MG/1
5 TABLET ORAL DAILY
COMMUNITY
End: 2020-02-12

## 2020-02-12 RX ORDER — ALLOPURINOL 100 MG/1
100 TABLET ORAL DAILY
COMMUNITY
End: 2020-03-24

## 2020-02-12 RX ORDER — PREDNISONE 5 MG/1
5 TABLET ORAL DAILY
COMMUNITY
End: 2020-05-24 | Stop reason: SDUPTHER

## 2020-02-12 NOTE — TELEPHONE ENCOUNTER
Called and left a message letting the patient know that the soonest appointment with  will be in April and that I have her on the wait list and the appointment letter is in the mail

## 2020-02-13 ENCOUNTER — TELEPHONE (OUTPATIENT)
Dept: RHEUMATOLOGY | Facility: CLINIC | Age: 80
End: 2020-02-13

## 2020-02-13 NOTE — TELEPHONE ENCOUNTER
Called the patient again and asked her to be here for 230pm if she can not make this appointment it will be in April that she will be seen

## 2020-02-13 NOTE — ASSESSMENT & PLAN NOTE
Unclear etiology, but I doubt it is related to an endocrinopathy. TSH has been normal for many years, but will recheck now to make sure it hasn't changed. Symptoms are not suggestive of pheo, and she had a normal CT of the abdomen in 2018, which is after the symptoms had started. She has no other features to suggest carcinoid syndrome, and isolated night sweats would not be a typical presentation.     Untreated GERD can certainly cause night sweats, so I recommended she get back on prilosec and stay on it for at least 4 weeks to determine if she has decreased night sweats. I asked her to keep a diary of the episodes to track over time.    Cymbalta can also have this effect, but she has not been on it for several months so it can't be the culprit. Review of her other medications does not reveal any other obvious cause.    Onset of sweats at this age would not be consistent with perimenopausal symptoms.

## 2020-02-13 NOTE — TELEPHONE ENCOUNTER
----- Message from Rosemarie Ba sent at 2/13/2020 11:06 AM CST -----  Contact: self  Patient Requesting Sooner Appointment.     Reason for sooner appt.: Pain in neck, right hand and right knee    When is the first available appointment? 2/14/2020 at 8:30a    Communication Preference: [phone]    Additional Information: Pt ask if can be fit into the schedule for a later time due to stay in [place] near [place] and 8:30a is too early in the morning Pt states she is in a lot of pain and need to be seen as soon as possible Pt's pain level is: 10

## 2020-02-20 ENCOUNTER — OFFICE VISIT (OUTPATIENT)
Dept: INTERNAL MEDICINE | Facility: CLINIC | Age: 80
End: 2020-02-20
Attending: FAMILY MEDICINE
Payer: MEDICARE

## 2020-02-20 VITALS
BODY MASS INDEX: 31.72 KG/M2 | SYSTOLIC BLOOD PRESSURE: 130 MMHG | WEIGHT: 172.38 LBS | HEIGHT: 62 IN | DIASTOLIC BLOOD PRESSURE: 82 MMHG | OXYGEN SATURATION: 99 % | HEART RATE: 91 BPM

## 2020-02-20 DIAGNOSIS — I10 ESSENTIAL HYPERTENSION: ICD-10-CM

## 2020-02-20 DIAGNOSIS — M05.79 RHEUMATOID ARTHRITIS INVOLVING MULTIPLE SITES WITH POSITIVE RHEUMATOID FACTOR: ICD-10-CM

## 2020-02-20 DIAGNOSIS — M35.3 PMR (POLYMYALGIA RHEUMATICA): ICD-10-CM

## 2020-02-20 DIAGNOSIS — I25.10 CVD (CARDIOVASCULAR DISEASE): ICD-10-CM

## 2020-02-20 DIAGNOSIS — N18.30 CKD (CHRONIC KIDNEY DISEASE) STAGE 3, GFR 30-59 ML/MIN: ICD-10-CM

## 2020-02-20 DIAGNOSIS — I10 HYPERTENSION, ESSENTIAL: ICD-10-CM

## 2020-02-20 DIAGNOSIS — Z00.00 PREVENTATIVE HEALTH CARE: Primary | ICD-10-CM

## 2020-02-20 PROCEDURE — 3079F DIAST BP 80-89 MM HG: CPT | Mod: CPTII,S$GLB,, | Performed by: FAMILY MEDICINE

## 2020-02-20 PROCEDURE — 3075F PR MOST RECENT SYSTOLIC BLOOD PRESS GE 130-139MM HG: ICD-10-PCS | Mod: CPTII,S$GLB,, | Performed by: FAMILY MEDICINE

## 2020-02-20 PROCEDURE — 99397 PER PM REEVAL EST PAT 65+ YR: CPT | Mod: S$GLB,,, | Performed by: FAMILY MEDICINE

## 2020-02-20 PROCEDURE — 3079F PR MOST RECENT DIASTOLIC BLOOD PRESSURE 80-89 MM HG: ICD-10-PCS | Mod: CPTII,S$GLB,, | Performed by: FAMILY MEDICINE

## 2020-02-20 PROCEDURE — 99397 PR PREVENTIVE VISIT,EST,65 & OVER: ICD-10-PCS | Mod: S$GLB,,, | Performed by: FAMILY MEDICINE

## 2020-02-20 PROCEDURE — 99499 RISK ADDL DX/OHS AUDIT: ICD-10-PCS | Mod: S$GLB,,, | Performed by: FAMILY MEDICINE

## 2020-02-20 PROCEDURE — 99499 UNLISTED E&M SERVICE: CPT | Mod: S$GLB,,, | Performed by: FAMILY MEDICINE

## 2020-02-20 PROCEDURE — 99999 PR PBB SHADOW E&M-EST. PATIENT-LVL III: CPT | Mod: PBBFAC,,, | Performed by: FAMILY MEDICINE

## 2020-02-20 PROCEDURE — 99999 PR PBB SHADOW E&M-EST. PATIENT-LVL III: ICD-10-PCS | Mod: PBBFAC,,, | Performed by: FAMILY MEDICINE

## 2020-02-20 PROCEDURE — 3075F SYST BP GE 130 - 139MM HG: CPT | Mod: CPTII,S$GLB,, | Performed by: FAMILY MEDICINE

## 2020-02-20 RX ORDER — HYDROCHLOROTHIAZIDE 25 MG/1
25 TABLET ORAL DAILY
Qty: 90 TABLET | Refills: 3 | Status: SHIPPED | OUTPATIENT
Start: 2020-02-20 | End: 2020-04-23

## 2020-02-20 RX ORDER — AMLODIPINE BESYLATE 5 MG/1
5 TABLET ORAL DAILY
Qty: 90 TABLET | Refills: 3 | Status: SHIPPED | OUTPATIENT
Start: 2020-02-20 | End: 2021-03-01 | Stop reason: SDUPTHER

## 2020-02-20 RX ORDER — METOPROLOL TARTRATE 100 MG/1
100 TABLET ORAL 2 TIMES DAILY
Qty: 180 TABLET | Refills: 3 | Status: SHIPPED | OUTPATIENT
Start: 2020-02-20 | End: 2021-04-12 | Stop reason: SDUPTHER

## 2020-02-20 RX ORDER — LOSARTAN POTASSIUM 100 MG/1
100 TABLET ORAL DAILY
Qty: 90 TABLET | Refills: 3 | Status: SHIPPED | OUTPATIENT
Start: 2020-02-20 | End: 2021-05-12 | Stop reason: SDUPTHER

## 2020-02-20 NOTE — PROGRESS NOTES
CHIEF COMPLAINT:  Annual    HISTORY OF PRESENT ILLNESS: The patient is a generally healthy 79 year-old BF.  Her blood pressure is well controlled.      She has polymyalgia rheumatica.  Apparently she's had this in the past.  She is seen with rheumatology.  She is on prednisone daily.    She changed primary care physicians and specialists as she was unclear as to what is going on with her health situation.  She previously had been told she was on the verge of dialysis or kidney transplant. We could find no evidence of abnormal liver or kidney function only did her blood work recently.  Nephrology seems to feel like she is doing quite well. Baseline creatinine appears to be 1.2 and this is stable.    The patient has a history of stable hypertension on current medications.  Patient denies chest pain or shortness of breath today.    REVIEW OF SYSTEMS:  GENERAL: No fever, chills, fatigability or weight loss.  SKIN: No rashes, itching or changes in color or texture of skin.  HEAD: No headaches or recent head trauma.  EYES: Visual acuity fine. No photophobia, ocular pain or diplopia.  EARS: Denies ear pain, discharge or vertigo.  NOSE: No loss of smell, no epistaxis or postnasal drip.  MOUTH & THROAT: No hoarseness or change in voice. No excessive gum bleeding.  NODES: Denies swollen glands.  CHEST: Denies COSBY, cyanosis, wheezing, cough and sputum production.  CARDIOVASCULAR: Denies chest pain, PND, orthopnea or reduced exercise tolerance.  ABDOMEN: Appetite fine. No weight loss. Denies diarrhea, abdominal pain, hematemesis or blood in stool.  URINARY: No flank pain, dysuria or hematuria.  PERIPHERAL VASCULAR: No claudication or cyanosis.  MUSCULOSKELETAL: She has diffuse myalgias and arthralgias consistent with PMR.  NEUROLOGIC: No history of seizures, paralysis, alteration of gait or coordination.    SOCIAL HISTORY: The patient does not smoke.  The patient consumes alcohol socially.  The patient is retired.     PHYSICAL  "EXAMINATION:     Blood pressure 130/82, pulse 91, height 5' 2" (1.575 m), weight 78.2 kg (172 lb 6.4 oz), SpO2 99 %.    APPEARANCE: Well nourished, well developed, in no acute distress.    HEAD: Normocephalic, atraumatic.  EYES: PERRL. EOMI.  Conjunctivae without injection and  anicteric  EARS: TM's intact. Light reflex normal. No retraction or perforation.    NOSE: Mucosa pink. Airway clear.  MOUTH & THROAT: No tonsillar enlargement. No pharyngeal erythema or exudate. No stridor.  NECK: Supple.   NODES: No cervical, axillary or inguinal lymph node enlargement.  CHEST: Lungs clear to auscultation.  No retractions are noted.  No rales or rhonchi are present.  CARDIOVASCULAR: Normal S1, S2. No rubs, murmurs or gallops.  ABDOMEN: Bowel sounds normal. Not distended. Soft. No tenderness or masses.  No ascites is noted.  MUSCULOSKELETAL:  There is no clubbing, cyanosis, or edema of the extremities x4.  There is full range of motion of the lumbar spine.  There is full range of motion of the extremities x4.  There is no deformity noted.    NEUROLOGIC:       Normal speech development.      Hearing normal.      Normal gait.      Motor and sensory exams grossly normal.  PSYCHIATRIC: Patient is alert and oriented x3.  Thought processes are all normal.  There is no homicidality.  There is no suicidality.  There is no evidence of psychosis.    LABORATORY/RADIOLOGY:   Chart reviewed.      ASSESSMENT:   Annual  Hypertension, condition is well controlled on current medication regimen  CKD3  GERD  Chronic ALLERGIC rhinitis  Diffuse myalgias and arthralgias, ruled out a connective tissue disorder previously with blood work     PLAN:   Losartan HCTZ and metoprolol  Amlodipine  RTC 12 months  "

## 2020-02-24 ENCOUNTER — TELEPHONE (OUTPATIENT)
Dept: PHARMACY | Facility: CLINIC | Age: 80
End: 2020-02-24

## 2020-03-03 ENCOUNTER — TELEPHONE (OUTPATIENT)
Dept: ENDOCRINOLOGY | Facility: CLINIC | Age: 80
End: 2020-03-03

## 2020-03-03 NOTE — TELEPHONE ENCOUNTER
Spoke with patient rescheduled appointment April 2, @ 11:15am. Patient approved and letter sent as a reminder.

## 2020-03-23 ENCOUNTER — OFFICE VISIT (OUTPATIENT)
Dept: URGENT CARE | Facility: CLINIC | Age: 80
End: 2020-03-23
Payer: MEDICARE

## 2020-03-23 VITALS
HEART RATE: 69 BPM | HEIGHT: 62 IN | SYSTOLIC BLOOD PRESSURE: 140 MMHG | WEIGHT: 172 LBS | OXYGEN SATURATION: 97 % | DIASTOLIC BLOOD PRESSURE: 78 MMHG | TEMPERATURE: 99 F | BODY MASS INDEX: 31.65 KG/M2 | RESPIRATION RATE: 18 BRPM

## 2020-03-23 DIAGNOSIS — S61.412A LACERATION OF SKIN OF LEFT HAND, INITIAL ENCOUNTER: Primary | ICD-10-CM

## 2020-03-23 PROCEDURE — 12002 RPR S/N/AX/GEN/TRNK2.6-7.5CM: CPT | Mod: S$GLB,,, | Performed by: PHYSICIAN ASSISTANT

## 2020-03-23 PROCEDURE — 99214 PR OFFICE/OUTPT VISIT, EST, LEVL IV, 30-39 MIN: ICD-10-PCS | Mod: 25,S$GLB,, | Performed by: PHYSICIAN ASSISTANT

## 2020-03-23 PROCEDURE — 99214 OFFICE O/P EST MOD 30 MIN: CPT | Mod: 25,S$GLB,, | Performed by: PHYSICIAN ASSISTANT

## 2020-03-23 PROCEDURE — 12002 LACERATION REPAIR: ICD-10-PCS | Mod: S$GLB,,, | Performed by: PHYSICIAN ASSISTANT

## 2020-03-23 RX ORDER — MUPIROCIN 20 MG/G
OINTMENT TOPICAL
Qty: 22 G | Refills: 1 | Status: SHIPPED | OUTPATIENT
Start: 2020-03-23 | End: 2021-03-30

## 2020-03-23 RX ORDER — CEPHALEXIN 500 MG/1
500 CAPSULE ORAL 2 TIMES DAILY
Qty: 10 CAPSULE | Refills: 0 | Status: SHIPPED | OUTPATIENT
Start: 2020-03-23 | End: 2020-03-28

## 2020-03-23 NOTE — PROGRESS NOTES
"Subjective:       Patient ID: Betzaida Neumann is a 80 y.o. female.    Vitals:  height is 5' 2" (1.575 m) and weight is 78 kg (172 lb). Her temperature is 98.5 °F (36.9 °C). Her blood pressure is 140/78 (abnormal) and her pulse is 69. Her respiration is 18 and oxygen saturation is 97%.     Chief Complaint: Laceration (PINKY LT HAND (PT FELL OUTSIDE)    This is a 80 yr old female who presents c/o laceration in between 4th and 5th fingers of left hand. Approximately 3.5 cm. She was walking outside, slipped and fell onto butt and hands. Her left hand experienced lac, but on unknown surface. Tetanus UTD. Small amt of bleeding cleaned at home with peroxide. Denies numbness or tingling. Mild pain.     Laceration    The incident occurred less than 1 hour ago (30 MINS AGO ). The laceration is located on the left hand. The laceration is 2 cm in size. The laceration mechanism is unknown.The pain is at a severity of 5/10. The pain is moderate. The pain has been constant since onset. It is unknown if a foreign body is present.       Constitution: Negative for fatigue.   HENT: Negative for facial swelling and facial trauma.    Neck: Negative for neck stiffness.   Cardiovascular: Negative for chest trauma.   Eyes: Negative for eye trauma, double vision and blurred vision.   Gastrointestinal: Negative for abdominal trauma, abdominal pain and rectal bleeding.   Genitourinary: Negative for hematuria, missed menses, genital trauma and pelvic pain.   Musculoskeletal: Negative for pain, trauma, joint swelling and abnormal ROM of joint.   Skin: Positive for laceration. Negative for color change, wound, abrasion, erythema and bruising.   Neurological: Negative for dizziness, history of vertigo, light-headedness, coordination disturbances, altered mental status and loss of consciousness.   Hematologic/Lymphatic: Negative for history of bleeding disorder.   Psychiatric/Behavioral: Negative for altered mental status.       Objective:    "   Physical Exam   Constitutional: She is oriented to person, place, and time. She appears well-developed and well-nourished.   HENT:   Head: Normocephalic and atraumatic. Head is without abrasion, without contusion and without laceration.   Right Ear: External ear normal.   Left Ear: External ear normal.   Nose: Nose normal.   Mouth/Throat: Oropharynx is clear and moist and mucous membranes are normal.   Eyes: Pupils are equal, round, and reactive to light. Conjunctivae, EOM and lids are normal.   Neck: Trachea normal, full passive range of motion without pain and phonation normal. Neck supple.   Cardiovascular: Normal rate, regular rhythm and normal heart sounds.   Pulmonary/Chest: Effort normal and breath sounds normal. No stridor. No respiratory distress.   Musculoskeletal: Normal range of motion.        Hands:  neurovasc intact distally to wound  2+ radial pulse  Good cap refill in all fingers  No numbness or tingling   Neurological: She is alert and oriented to person, place, and time.   Skin: Skin is warm, dry and no rash. Capillary refill takes less than 2 seconds. abrasion, burn, bruising, erythema and ecchymosis  Psychiatric: She has a normal mood and affect. Her speech is normal and behavior is normal. Judgment and thought content normal. Cognition and memory are normal.   Nursing note and vitals reviewed.  Laceration Repair  Date/Time: 3/23/2020 2:55 PM  Performed by: Jeannette Newell PA-C  Authorized by: Jeannette Newell PA-C   Consent Done: Yes  Consent: Verbal consent obtained.  Risks and benefits: risks, benefits and alternatives were discussed  Consent given by: patient  Patient understanding: patient states understanding of the procedure being performed  Patient consent: the patient's understanding of the procedure matches consent given  Procedure consent: procedure consent matches procedure scheduled  Relevant documents: relevant documents present and verified  Site marked: the operative site was  "marked  Patient identity confirmed: , MRN and name  Time out: Immediately prior to procedure a "time out" was called to verify the correct patient, procedure, equipment, support staff and site/side marked as required.  Body area: upper extremity  Location details: left hand  Laceration length: 3.5 cm  Foreign bodies: no foreign bodies  Tendon involvement: none  Nerve involvement: none  Vascular damage: no  Anesthesia: local infiltration    Anesthesia:  Local Anesthetic: lidocaine 1% without epinephrine  Anesthetic total: 6 mL  Patient sedated: no  Preparation: Patient was prepped and draped in the usual sterile fashion.  Irrigation solution: saline  Irrigation method: syringe  Amount of cleaning: extensive  Debridement: none  Degree of undermining: none  Skin closure: 5-0 nylon  Number of sutures: 6  Technique: simple  Approximation: close  Approximation difficulty: simple  Dressing: antibiotic ointment and non-stick sterile dressing  Patient tolerance: Patient tolerated the procedure well with no immediate complications              Assessment:       1. Laceration of skin of left hand, initial encounter        Plan:       Clean, mupirocin, dressing changes daily  Return precautions for any signs of infection  Return in 10-14 days for removal  Hx of DM, will cover prophylacticcally for infection    Laceration of skin of left hand, initial encounter  -     mupirocin (BACTROBAN) 2 % ointment; Apply to affected area 3 times daily  Dispense: 22 g; Refill: 1  -     cephALEXin (KEFLEX) 500 MG capsule; Take 1 capsule (500 mg total) by mouth 2 (two) times daily. for 5 days  Dispense: 10 capsule; Refill: 0  -     Laceration Repair    Labs reviewed, pertinent imaging reviewed, previous medical records, medical history, surgical history, social history, family history reviewed.       Patient Instructions     Keep clean and dry 24 hours  After this, clean daily with mild soap and water  Do not scrub  Do not soak in " water  Keep covered during the day if outside  Apply bactroban daily for 5 days  Take antibiotics    Clean any time dressing is dirty    Return in 10-14 days for suture removal  Return sooner if you have any pain, redness, swelling, drainage    Please return here or go to the Emergency Department for any concerns or worsening of condition.  If you were prescribed antibiotics, please take them to completion.  If you were prescribed a narcotic medication, do not drive or operate heavy equipment or machinery while taking these medications.  Please follow up with your primary care doctor or specialist as needed.    If you  smoke, please stop smoking.      Suture Care    Stitches (sutures) are used to close wounds. Sutures also help stop bleeding and speed healing. To help your wound heal, follow the tips on this handout.  Some sutures need to be removed by a healthcare provider. Others dissolve on their own. Sometimes strips of tape are used. Youll be told what kind of sutures you have.   Keep sutures clean  · Avoid doing things that could cause dirt or sweat to get on your sutures. If needed, cover your sutures with a bandage (dressing) to protect them.  · Dont pick at scabs. They help protect the wound.  · Dont wash the area around your sutures unless your healthcare provider says its OK. Then, follow his or her instructions for washing and drying.  Keep sutures dry  · Keep your sutures out of water.  · Take a sponge bath to avoid getting your sutures wound wet, unless your healthcare provider tells you otherwise.  · Ask your provider when can you take a shower or bathe.  · Ask your provider about the best way to keep your sutures dry when bathing or showering.  · If sutures get damp, pat them dry.  Changing your dressing  Leave the dressing in place until you are told to remove it or change it. Change it only as directed, using clean hands:  · After the first ___hours, change your dressing every  ___hours.  · Change your dressing if it gets wet or dirty.  Other tips  · To help wounds on an arm or leg heal, use the affected limb as little as possible.  · To help reduce swelling and throbbing, raise the area with sutures above your heart.  · To help prevent itching, cover sutures with gauze. If sutures itch, try not to scratch them.  · For pain relief, try acetaminophen or ibuprofen. Dont use aspirin. It can increase bleeding.  When to seek medical care  Call your healthcare provider if you notice any of the following signs:  · Increased soreness, pain, or tenderness after 24 hours  · A red streak, increased redness, or puffiness near the wound  · White, yellowish, or bad smelling discharge from the wound  · Bleeding that cant be stopped by applying pressure  · Steri-Strips fall off or stitches dissolve before the wound heals  · Fever over 100.4°F (38.0°C)   Date Last Reviewed: 7/1/2016  © 2697-9253 [employer], [employer]. [address] [place] [address], [place] [address]. All rights reserved. This information is not intended as a substitute for professional medical care. Always follow your healthcare professional's instructions.

## 2020-03-23 NOTE — PATIENT INSTRUCTIONS
Keep clean and dry 24 hours  After this, clean daily with mild soap and water  Do not scrub  Do not soak in water  Keep covered during the day if outside  Apply bactroban daily for 5 days  Take antibiotics    Clean any time dressing is dirty    Return in 10-14 days for suture removal  Return sooner if you have any pain, redness, swelling, drainage    Please return here or go to the Emergency Department for any concerns or worsening of condition.  If you were prescribed antibiotics, please take them to completion.  If you were prescribed a narcotic medication, do not drive or operate heavy equipment or machinery while taking these medications.  Please follow up with your primary care doctor or specialist as needed.    If you  smoke, please stop smoking.      Suture Care    Stitches (sutures) are used to close wounds. Sutures also help stop bleeding and speed healing. To help your wound heal, follow the tips on this handout.  Some sutures need to be removed by a healthcare provider. Others dissolve on their own. Sometimes strips of tape are used. Youll be told what kind of sutures you have.   Keep sutures clean  · Avoid doing things that could cause dirt or sweat to get on your sutures. If needed, cover your sutures with a bandage (dressing) to protect them.  · Dont pick at scabs. They help protect the wound.  · Dont wash the area around your sutures unless your healthcare provider says its OK. Then, follow his or her instructions for washing and drying.  Keep sutures dry  · Keep your sutures out of water.  · Take a sponge bath to avoid getting your sutures wound wet, unless your healthcare provider tells you otherwise.  · Ask your provider when can you take a shower or bathe.  · Ask your provider about the best way to keep your sutures dry when bathing or showering.  · If sutures get damp, pat them dry.  Changing your dressing  Leave the dressing in place until you are told to remove it or change it. Change it  only as directed, using clean hands:  · After the first ___hours, change your dressing every ___hours.  · Change your dressing if it gets wet or dirty.  Other tips  · To help wounds on an arm or leg heal, use the affected limb as little as possible.  · To help reduce swelling and throbbing, raise the area with sutures above your heart.  · To help prevent itching, cover sutures with gauze. If sutures itch, try not to scratch them.  · For pain relief, try acetaminophen or ibuprofen. Dont use aspirin. It can increase bleeding.  When to seek medical care  Call your healthcare provider if you notice any of the following signs:  · Increased soreness, pain, or tenderness after 24 hours  · A red streak, increased redness, or puffiness near the wound  · White, yellowish, or bad smelling discharge from the wound  · Bleeding that cant be stopped by applying pressure  · Steri-Strips fall off or stitches dissolve before the wound heals  · Fever over 100.4°F (38.0°C)   Date Last Reviewed: 7/1/2016  © 8118-5487 [employer]. [address] [place] [address]. All rights reserved. This information is not intended as a substitute for professional medical care. Always follow your healthcare professional's instructions.

## 2020-03-23 NOTE — PROCEDURES
"Laceration Repair  Date/Time: 3/23/2020 2:55 PM  Performed by: Jeannette Newell PA-C  Authorized by: Jeannette Newell PA-C   Consent Done: Yes  Consent: Verbal consent obtained.  Risks and benefits: risks, benefits and alternatives were discussed  Consent given by: patient  Patient understanding: patient states understanding of the procedure being performed  Patient consent: the patient's understanding of the procedure matches consent given  Procedure consent: procedure consent matches procedure scheduled  Relevant documents: relevant documents present and verified  Site marked: the operative site was marked  Patient identity confirmed: , MRN and name  Time out: Immediately prior to procedure a "time out" was called to verify the correct patient, procedure, equipment, support staff and site/side marked as required.  Body area: upper extremity  Location details: left hand  Laceration length: 3.5 cm  Foreign bodies: no foreign bodies  Tendon involvement: none  Nerve involvement: none  Vascular damage: no  Anesthesia: local infiltration    Anesthesia:  Local Anesthetic: lidocaine 1% without epinephrine  Anesthetic total: 6 mL  Patient sedated: no  Preparation: Patient was prepped and draped in the usual sterile fashion.  Irrigation solution: saline  Irrigation method: syringe  Amount of cleaning: extensive  Debridement: none  Degree of undermining: none  Skin closure: 5-0 nylon  Number of sutures: 6  Technique: simple  Approximation: close  Approximation difficulty: simple  Dressing: antibiotic ointment and non-stick sterile dressing  Patient tolerance: Patient tolerated the procedure well with no immediate complications        "

## 2020-03-24 RX ORDER — ALLOPURINOL 100 MG/1
TABLET ORAL
Qty: 30 TABLET | Refills: 2 | Status: SHIPPED | OUTPATIENT
Start: 2020-03-24 | End: 2020-04-16

## 2020-03-31 ENCOUNTER — PATIENT MESSAGE (OUTPATIENT)
Dept: RHEUMATOLOGY | Facility: CLINIC | Age: 80
End: 2020-03-31

## 2020-04-09 ENCOUNTER — PATIENT MESSAGE (OUTPATIENT)
Dept: RHEUMATOLOGY | Facility: CLINIC | Age: 80
End: 2020-04-09

## 2020-04-13 ENCOUNTER — CLINICAL SUPPORT (OUTPATIENT)
Dept: URGENT CARE | Facility: CLINIC | Age: 80
End: 2020-04-13
Payer: MEDICARE

## 2020-04-13 VITALS
WEIGHT: 172 LBS | BODY MASS INDEX: 31.65 KG/M2 | HEART RATE: 105 BPM | TEMPERATURE: 97 F | HEIGHT: 62 IN | SYSTOLIC BLOOD PRESSURE: 140 MMHG | OXYGEN SATURATION: 97 % | DIASTOLIC BLOOD PRESSURE: 74 MMHG

## 2020-04-13 DIAGNOSIS — Z48.02 ENCOUNTER FOR REMOVAL OF SUTURES: Primary | ICD-10-CM

## 2020-04-13 PROCEDURE — 99499 NO LOS: ICD-10-PCS | Mod: S$GLB,,, | Performed by: PHYSICIAN ASSISTANT

## 2020-04-13 PROCEDURE — 99499 UNLISTED E&M SERVICE: CPT | Mod: S$GLB,,, | Performed by: PHYSICIAN ASSISTANT

## 2020-04-13 PROCEDURE — 99024 SUTURE REMOVAL: ICD-10-PCS | Mod: S$GLB,,, | Performed by: PHYSICIAN ASSISTANT

## 2020-04-13 PROCEDURE — 99024 POSTOP FOLLOW-UP VISIT: CPT | Mod: S$GLB,,, | Performed by: PHYSICIAN ASSISTANT

## 2020-04-13 RX ORDER — TOCILIZUMAB 180 MG/ML
INJECTION, SOLUTION SUBCUTANEOUS
COMMUNITY
Start: 2020-01-23 | End: 2020-11-16

## 2020-04-13 RX ORDER — HYDROXYCHLOROQUINE SULFATE 200 MG/1
TABLET, FILM COATED ORAL
COMMUNITY
End: 2020-04-16

## 2020-04-13 RX ORDER — GABAPENTIN 100 MG/1
CAPSULE ORAL
COMMUNITY
End: 2020-04-16

## 2020-04-13 RX ORDER — METHOTREXATE 2.5 MG/1
TABLET ORAL
COMMUNITY
End: 2020-08-17

## 2020-04-13 RX ORDER — IPRATROPIUM BROMIDE 42 UG/1
SPRAY, METERED NASAL
COMMUNITY
Start: 2020-01-29 | End: 2022-07-13

## 2020-04-13 RX ORDER — TERBINAFINE HYDROCHLORIDE 250 MG/1
TABLET ORAL
COMMUNITY
End: 2024-03-05

## 2020-04-13 RX ORDER — BUDESONIDE AND FORMOTEROL FUMARATE DIHYDRATE 160; 4.5 UG/1; UG/1
AEROSOL RESPIRATORY (INHALATION)
COMMUNITY
Start: 2020-01-29 | End: 2022-11-29

## 2020-04-13 RX ORDER — OLOPATADINE HYDROCHLORIDE 1 MG/ML
SOLUTION/ DROPS OPHTHALMIC
COMMUNITY
End: 2020-11-16

## 2020-04-13 RX ORDER — SIMVASTATIN 20 MG/1
TABLET, FILM COATED ORAL
COMMUNITY
End: 2021-03-30

## 2020-04-13 RX ORDER — ATORVASTATIN CALCIUM 10 MG/1
TABLET, FILM COATED ORAL
COMMUNITY
End: 2020-05-29

## 2020-04-13 RX ORDER — MECLIZINE HYDROCHLORIDE 25 MG/1
TABLET ORAL
COMMUNITY
End: 2020-11-16

## 2020-04-13 NOTE — PROGRESS NOTES
"Subjective:       Patient ID: Betzaida Neumann is a 80 y.o. female.    Vitals:  height is 5' 2" (1.575 m) and weight is 78 kg (172 lb). Her tympanic temperature is 97.1 °F (36.2 °C). Her blood pressure is 140/74 (abnormal) and her pulse is 105. Her oxygen saturation is 97%.     Chief Complaint: Suture / Staple Removal    Pt was here on 3/23/20 and had 6 sutures placed to left hand.     Suture / Staple Removal   The sutures were placed more than 14 days ago. She tried antibiotic ointment use and oral antibiotics since the wound repair. The treatment provided significant relief. There has been no drainage from the wound. There is no redness present. There is no swelling present. There is no pain present.       Constitution: Negative for fever.   Neck: Negative for painful lymph nodes.   Skin: Negative for erythema.   Hematologic/Lymphatic: Negative for swollen lymph nodes.       Objective:      Physical Exam   Musculoskeletal:        Hands:  Skin: erythema        Assessment:       1. Encounter for removal of sutures        Plan:         Encounter for removal of sutures      Patient Instructions       Suture Removal  Please return here or go to the Emergency Department for any concerns or worsening of condition.    If you have any of the following symptoms below, please go directly to the ER:  · Wound reopens or bleeds  · Signs of an infection, such as:  ¨ Increasing redness or swelling around the wound  ¨ Increased warmth from the wound  ¨ Worsening pain  ¨ Red streaking lines away from the wound  ¨ Fluid draining from the wound  · Fever of 100.4°F (38°C) or higher, or as directed by your child's healthcare provider    Please follow up with your primary care doctor or specialist in the next 48-72hrs as needed.      If you smoke, please stop smoking.            Suture or Staple Removal  You were seen today for a suture or staple removal. Your wound is healing as expected. The wound has healed well enough that the " "sutures or staples can be removed. The wound will continue to heal for the next few months.  At this time there is no sign of infection.   Home care  · If you have pain, take pain medicine as advised by your healthcare provider.   · Keep your wound clean and protected by covering it with a bandage for the next week or so.   · Wash your hands with soap and warm water before and after caring for your wound. This helps prevent infection.  · Clean the wound gently with soap and warm water daily or as directed by your childs health care provider. Do not use iodine, alcohol, or other cleansers on the wound.  Gently pat it dry. Put on a new bandage, if needed. Do not reuse bandages.  · If the area gets wet, gently pat it dry with a clean cloth. Replace the wet bandage with a dry one.  · Check the wound daily for signs of infection. (These are listed under "When to seek medical advice" below.)  · You may shower and bathe as usual. Swimming is now permitted.  Follow-up care  Follow up with your healthcare provider as advised.  When to seek medical advice   Call your healthcare provider if any of the following occur:  · Wound reopens or bleeds  · Signs of an infection, such as:  ¨ Increasing redness or swelling around the wound  ¨ Increased warmth from the wound  ¨ Worsening pain  ¨ Red streaking lines away from the wound  ¨ Fluid draining from the wound  · Fever of 100.4°F (38°C) or higher, or as directed by your child's healthcare provider  Date Last Reviewed: 9/27/2015  © 4975-7610 [employer]. [address] [place] [address], [place] [address]. All rights reserved. This information is not intended as a substitute for professional medical care. Always follow your healthcare professional's instructions.               "

## 2020-04-13 NOTE — PATIENT INSTRUCTIONS
"    Suture Removal  Please return here or go to the Emergency Department for any concerns or worsening of condition.    If you have any of the following symptoms below, please go directly to the ER:  · Wound reopens or bleeds  · Signs of an infection, such as:  ¨ Increasing redness or swelling around the wound  ¨ Increased warmth from the wound  ¨ Worsening pain  ¨ Red streaking lines away from the wound  ¨ Fluid draining from the wound  · Fever of 100.4°F (38°C) or higher, or as directed by your child's healthcare provider    Please follow up with your primary care doctor or specialist in the next 48-72hrs as needed.      If you smoke, please stop smoking.            Suture or Staple Removal  You were seen today for a suture or staple removal. Your wound is healing as expected. The wound has healed well enough that the sutures or staples can be removed. The wound will continue to heal for the next few months.  At this time there is no sign of infection.   Home care  · If you have pain, take pain medicine as advised by your healthcare provider.   · Keep your wound clean and protected by covering it with a bandage for the next week or so.   · Wash your hands with soap and warm water before and after caring for your wound. This helps prevent infection.  · Clean the wound gently with soap and warm water daily or as directed by your childs health care provider. Do not use iodine, alcohol, or other cleansers on the wound.  Gently pat it dry. Put on a new bandage, if needed. Do not reuse bandages.  · If the area gets wet, gently pat it dry with a clean cloth. Replace the wet bandage with a dry one.  · Check the wound daily for signs of infection. (These are listed under "When to seek medical advice" below.)  · You may shower and bathe as usual. Swimming is now permitted.  Follow-up care  Follow up with your healthcare provider as advised.  When to seek medical advice   Call your healthcare provider if any of the " following occur:  · Wound reopens or bleeds  · Signs of an infection, such as:  ¨ Increasing redness or swelling around the wound  ¨ Increased warmth from the wound  ¨ Worsening pain  ¨ Red streaking lines away from the wound  ¨ Fluid draining from the wound  · Fever of 100.4°F (38°C) or higher, or as directed by your child's healthcare provider  Date Last Reviewed: 9/27/2015  © 8750-6065 [employer] [address] [place] [address], [place] [address]. All rights reserved. This information is not intended as a substitute for professional medical care. Always follow your healthcare professional's instructions.

## 2020-04-16 ENCOUNTER — OFFICE VISIT (OUTPATIENT)
Dept: RHEUMATOLOGY | Facility: CLINIC | Age: 80
End: 2020-04-16
Payer: MEDICARE

## 2020-04-16 DIAGNOSIS — M13.80 SERONEGATIVE ARTHRITIS: Primary | ICD-10-CM

## 2020-04-16 PROCEDURE — 1159F PR MEDICATION LIST DOCUMENTED IN MEDICAL RECORD: ICD-10-PCS | Mod: ,,, | Performed by: INTERNAL MEDICINE

## 2020-04-16 PROCEDURE — 99214 PR OFFICE/OUTPT VISIT, EST, LEVL IV, 30-39 MIN: ICD-10-PCS | Mod: 95,,, | Performed by: INTERNAL MEDICINE

## 2020-04-16 PROCEDURE — 1159F MED LIST DOCD IN RCRD: CPT | Mod: ,,, | Performed by: INTERNAL MEDICINE

## 2020-04-16 PROCEDURE — 1101F PT FALLS ASSESS-DOCD LE1/YR: CPT | Mod: CPTII,,, | Performed by: INTERNAL MEDICINE

## 2020-04-16 PROCEDURE — 99214 OFFICE O/P EST MOD 30 MIN: CPT | Mod: 95,,, | Performed by: INTERNAL MEDICINE

## 2020-04-16 PROCEDURE — 1101F PR PT FALLS ASSESS DOC 0-1 FALLS W/OUT INJ PAST YR: ICD-10-PCS | Mod: CPTII,,, | Performed by: INTERNAL MEDICINE

## 2020-04-16 RX ORDER — COLCHICINE 0.6 MG/1
0.6 TABLET ORAL DAILY
Qty: 30 TABLET | Refills: 5 | Status: SHIPPED | OUTPATIENT
Start: 2020-04-16 | End: 2020-08-17

## 2020-04-16 RX ORDER — DULOXETIN HYDROCHLORIDE 30 MG/1
CAPSULE, DELAYED RELEASE ORAL
Qty: 30 CAPSULE | Refills: 5 | Status: SHIPPED | OUTPATIENT
Start: 2020-04-16 | End: 2020-08-17

## 2020-04-16 RX ORDER — ALLOPURINOL 100 MG/1
TABLET ORAL
Qty: 45 TABLET | Refills: 5 | Status: SHIPPED | OUTPATIENT
Start: 2020-04-16 | End: 2020-04-23

## 2020-04-16 NOTE — PROGRESS NOTES
Chief Complaint   Patient presents with    Disease Management     seronegative arthritis       Patient with joint pains and high inflammatory markers for a follow up    The patient location is: home  The chief complaint leading to consultation is: seronegative arthritis  Visit type: audio  Total time spent with patient: 30 minutes  Each patient to whom he or she provides medical services by telemedicine is:  (1) informed of the relationship between the physician and patient and the respective role of any other health care provider with respect to management of the patient; and (2) notified that he or she may decline to receive medical services by telemedicine and may withdraw from such care at any time.    History of presenting illness    80 year old black female has    Joint pain since 2008     She has seen several rheumatologists. First was Dr. Lerma who thought she had bursitis.Then she saw Dr. Riddle who diagnosed rheumatoid arthritis and gave her Humira x 3 years which did not help. Humira caused her to feel like a zombie. She then went to [facility] : saw Dr. Woo and Dr. Reeves     He diagnosed a combination of rheumatoid arthritis, fibromyalgia and PMR.  She recalls taking methotrexate but it didn't help her. At one point she only took pain pills.She took mtx for 4 years    She has been on prednisone since May 2016 due to ALLERGIES as prescribed by her ALLERGY doctor.     She also took tramadol and percocet but did not help.     Held Arava due to stomach upset.  Night sweats since 2008.    Then we were treating her as PMR.     She was on prednisone 2.5 mg BID    She took lyrica in the past and that didn't help      Then her complaints were :     Fatigue  Pain all over  No swelling    Once she had right hand pain and swelling,then she had left ankle pain and swelling  Muscle spasms     I checked her uric acid and it was 11.4  She had CRP of 50.3  Her ESR was normal    I offered her gabapentin and she didn't  like it  She didn't like the lyrica    I then gave her 20 mg prednisone    I asked her to taper prednisone  Actemra offered last time     She didn't like it     She says it caused rash,made her sleepy     She has stayed on 10 mg prednisone   But she has continued to take actemra     Uric acid down to 5.6 AND THEN UPTO 6.9    SHE HAD ONGOING JOINT SYMPTOMS   She started to develop fibromyalgia symptoms as well    I added 0.5 ml weekly mtx and she took it for 2 weeks and she stopped it     She has been taking actemra,prednisone 10 mg tapered to 5 mg   She is now on cymbalta 30 mg   She takes allopurinol 150 mg and colchicine 0.6 mg     Last     ESR 13  CRP 6.2    Uric acid 7.2  GFR 50  LFTs nml  nml CBC    Past history : HTN,allergic rhinitis,CKD stage 3,GERD,PMR,RA,FMS,elevated inflammatory markers,asthma     Family history : no autoimmune illness    Social history : former smoker 1999      Review of Systems   Constitutional: Negative for activity change, appetite change, chills, diaphoresis, fatigue, fever and unexpected weight change.   HENT: Negative for congestion, dental problem, drooling, ear discharge, ear pain, facial swelling, hearing loss, mouth sores, nosebleeds, postnasal drip, rhinorrhea, sinus pressure, sinus pain, sneezing, sore throat, tinnitus, trouble swallowing and voice change.    Eyes: Negative for photophobia, pain, discharge, redness, itching and visual disturbance.   Respiratory: Negative for apnea, cough, choking, chest tightness, shortness of breath, wheezing and stridor.    Cardiovascular: Negative for chest pain, palpitations and leg swelling.   Gastrointestinal: Negative for abdominal distention, abdominal pain, anal bleeding, blood in stool, constipation, diarrhea, nausea, rectal pain and vomiting.   Endocrine: Negative for cold intolerance, heat intolerance, polydipsia, polyphagia and polyuria.   Genitourinary: Negative for decreased urine volume, difficulty urinating, dysuria,  enuresis, flank pain, frequency, genital sores, hematuria and urgency.   Musculoskeletal: Positive for arthralgias. Negative for back pain, gait problem, joint swelling, myalgias, neck pain and neck stiffness.   Skin: Negative for color change, pallor, rash and wound.   Allergic/Immunologic: Negative for environmental allergies, food allergies and immunocompromised state.   Neurological: Negative for dizziness, tremors, seizures, syncope, facial asymmetry, speech difficulty, weakness, light-headedness, numbness and headaches.   Hematological: Negative for adenopathy. Does not bruise/bleed easily.   Psychiatric/Behavioral: Negative for agitation, behavioral problems, confusion, decreased concentration, dysphoric mood, hallucinations, self-injury, sleep disturbance and suicidal ideas. The patient is not nervous/anxious and is not hyperactive.      Physical exam not done        Assessment     80 year old black female with     HTN,allergic rhinitis,CKD stage 3,GERD,PMR,RA,FMS,elevated inflammatory markers,asthma     She wants prednisone to survive with the joint pain    We have witnessed synovitis in the past,this made us think she has RA  We thought she rapidly responded to prednisone,hence comes the PMR  She always complains of overall body pain,hence she gets the fibromyalgia diagnosis    We have positive RF in the past  We have high uric acid    She has responded really well to prednisone 20 mg  Her CRP dropped a great deal    We have her allopurinol 100 mg and colchicine 0.6 mg  and her uric acid dropped to less than 6    I GAVE HER MTX AND SHE TOOK IT FOR 2 WEEKS ONLY     Finally on actemra we have achieved compliace    She has cut back prednisone to 5 mg daily now    She probably has seronegative RA/PMR/Gout and fibromyalgia    Dexa : Osteopenia of the femoral neck.  FRAX calculations do not suggest treatment      1. Seronegative arthritis        Reviewed labs/xrays  Reviewed medications    F/u problem    Plan:       Continue allopurinol 150 mg and colchicine 0.6 mg daily    Continue actemra     Continue cymbalta 30 mg     Prednisone taper further now on 5 mg,suggested 2.5 mg     Labs today    Betzaida was seen today for disease management.    Diagnoses and all orders for this visit:    Seronegative arthritis  -     CBC auto differential; Future  -     Comprehensive metabolic panel; Future  -     Uric acid; Future  -     Sedimentation rate; Future  -     C-Reactive Protein; Future    Other orders  -     allopurinoL (ZYLOPRIM) 100 MG tablet; 150 mg daily  -     DULoxetine (CYMBALTA) 30 MG capsule; 30 mg daily  -     colchicine (COLCRYS) 0.6 mg tablet; Take 1 tablet (0.6 mg total) by mouth once daily.        rtc in 3 months

## 2020-04-17 ENCOUNTER — LAB VISIT (OUTPATIENT)
Dept: LAB | Facility: OTHER | Age: 80
End: 2020-04-17
Attending: INTERNAL MEDICINE
Payer: MEDICARE

## 2020-04-17 DIAGNOSIS — M13.80 SERONEGATIVE ARTHRITIS: ICD-10-CM

## 2020-04-17 LAB
ALBUMIN SERPL BCP-MCNC: 3.6 G/DL (ref 3.5–5.2)
ALP SERPL-CCNC: 82 U/L (ref 55–135)
ALT SERPL W/O P-5'-P-CCNC: 16 U/L (ref 10–44)
ANION GAP SERPL CALC-SCNC: 12 MMOL/L (ref 8–16)
AST SERPL-CCNC: 15 U/L (ref 10–40)
BASOPHILS # BLD AUTO: 0.05 K/UL (ref 0–0.2)
BASOPHILS NFR BLD: 0.6 % (ref 0–1.9)
BILIRUB SERPL-MCNC: 0.3 MG/DL (ref 0.1–1)
BUN SERPL-MCNC: 14 MG/DL (ref 8–23)
CALCIUM SERPL-MCNC: 9.2 MG/DL (ref 8.7–10.5)
CHLORIDE SERPL-SCNC: 103 MMOL/L (ref 95–110)
CO2 SERPL-SCNC: 26 MMOL/L (ref 23–29)
CREAT SERPL-MCNC: 1.3 MG/DL (ref 0.5–1.4)
CRP SERPL-MCNC: 26.8 MG/L (ref 0–8.2)
DIFFERENTIAL METHOD: ABNORMAL
EOSINOPHIL # BLD AUTO: 0.5 K/UL (ref 0–0.5)
EOSINOPHIL NFR BLD: 5.3 % (ref 0–8)
ERYTHROCYTE [DISTWIDTH] IN BLOOD BY AUTOMATED COUNT: 13.2 % (ref 11.5–14.5)
ERYTHROCYTE [SEDIMENTATION RATE] IN BLOOD: 46 MM/HR (ref 0–20)
EST. GFR  (AFRICAN AMERICAN): 45 ML/MIN/1.73 M^2
EST. GFR  (NON AFRICAN AMERICAN): 39 ML/MIN/1.73 M^2
GLUCOSE SERPL-MCNC: 105 MG/DL (ref 70–110)
HCT VFR BLD AUTO: 37.9 % (ref 37–48.5)
HGB BLD-MCNC: 11 G/DL (ref 12–16)
IMM GRANULOCYTES # BLD AUTO: 0.04 K/UL (ref 0–0.04)
IMM GRANULOCYTES NFR BLD AUTO: 0.5 % (ref 0–0.5)
LYMPHOCYTES # BLD AUTO: 2.5 K/UL (ref 1–4.8)
LYMPHOCYTES NFR BLD: 29.2 % (ref 18–48)
MCH RBC QN AUTO: 29 PG (ref 27–31)
MCHC RBC AUTO-ENTMCNC: 29 G/DL (ref 32–36)
MCV RBC AUTO: 100 FL (ref 82–98)
MONOCYTES # BLD AUTO: 0.5 K/UL (ref 0.3–1)
MONOCYTES NFR BLD: 5.3 % (ref 4–15)
NEUTROPHILS # BLD AUTO: 5.1 K/UL (ref 1.8–7.7)
NEUTROPHILS NFR BLD: 59.1 % (ref 38–73)
NRBC BLD-RTO: 0 /100 WBC
PLATELET # BLD AUTO: 295 K/UL (ref 150–350)
PMV BLD AUTO: 9.8 FL (ref 9.2–12.9)
POTASSIUM SERPL-SCNC: 3.7 MMOL/L (ref 3.5–5.1)
PROT SERPL-MCNC: 7.3 G/DL (ref 6–8.4)
RBC # BLD AUTO: 3.79 M/UL (ref 4–5.4)
SODIUM SERPL-SCNC: 141 MMOL/L (ref 136–145)
URATE SERPL-MCNC: 4.3 MG/DL (ref 2.4–5.7)
WBC # BLD AUTO: 8.66 K/UL (ref 3.9–12.7)

## 2020-04-17 PROCEDURE — 84550 ASSAY OF BLOOD/URIC ACID: CPT

## 2020-04-17 PROCEDURE — 85651 RBC SED RATE NONAUTOMATED: CPT

## 2020-04-17 PROCEDURE — 85025 COMPLETE CBC W/AUTO DIFF WBC: CPT | Mod: GA

## 2020-04-17 PROCEDURE — 80053 COMPREHEN METABOLIC PANEL: CPT

## 2020-04-17 PROCEDURE — 36415 COLL VENOUS BLD VENIPUNCTURE: CPT

## 2020-04-17 PROCEDURE — 86140 C-REACTIVE PROTEIN: CPT

## 2020-04-23 RX ORDER — PREDNISONE 10 MG/1
TABLET ORAL
Qty: 60 TABLET | Refills: 3 | Status: SHIPPED | OUTPATIENT
Start: 2020-04-23 | End: 2020-08-17

## 2020-04-23 RX ORDER — HYDROCHLOROTHIAZIDE 25 MG/1
TABLET ORAL
Qty: 30 TABLET | Refills: 1 | Status: SHIPPED | OUTPATIENT
Start: 2020-04-23 | End: 2021-04-13 | Stop reason: SDUPTHER

## 2020-04-23 RX ORDER — ALLOPURINOL 100 MG/1
TABLET ORAL
Qty: 45 TABLET | Refills: 5 | Status: SHIPPED | OUTPATIENT
Start: 2020-04-23 | End: 2020-08-17

## 2020-04-24 RX ORDER — OMEPRAZOLE 40 MG/1
CAPSULE, DELAYED RELEASE ORAL
Qty: 30 CAPSULE | Refills: 2 | Status: SHIPPED | OUTPATIENT
Start: 2020-04-24 | End: 2020-07-18

## 2020-05-22 ENCOUNTER — PATIENT MESSAGE (OUTPATIENT)
Dept: RHEUMATOLOGY | Facility: CLINIC | Age: 80
End: 2020-05-22

## 2020-05-24 RX ORDER — PREDNISONE 5 MG/1
TABLET ORAL
Qty: 30 TABLET | Refills: 0 | Status: SHIPPED | OUTPATIENT
Start: 2020-05-24 | End: 2020-06-18

## 2020-05-28 ENCOUNTER — TELEPHONE (OUTPATIENT)
Dept: RHEUMATOLOGY | Facility: CLINIC | Age: 80
End: 2020-05-28

## 2020-05-29 ENCOUNTER — OFFICE VISIT (OUTPATIENT)
Dept: RHEUMATOLOGY | Facility: CLINIC | Age: 80
End: 2020-05-29
Payer: MEDICARE

## 2020-05-29 DIAGNOSIS — M13.80 SERONEGATIVE ARTHRITIS: Primary | ICD-10-CM

## 2020-05-29 PROCEDURE — 99443 PR PHYSICIAN TELEPHONE EVALUATION 21-30 MIN: ICD-10-PCS | Mod: 95,,, | Performed by: INTERNAL MEDICINE

## 2020-05-29 PROCEDURE — 99443 PR PHYSICIAN TELEPHONE EVALUATION 21-30 MIN: CPT | Mod: 95,,, | Performed by: INTERNAL MEDICINE

## 2020-05-29 NOTE — PROGRESS NOTES
Established Patient - Audio Only Telehealth Visit     The patient location is: home  The chief complaint leading to consultation is: gout and seronegative arthritis   Visit type: Virtual visit with audio only (telephone)  Total time spent with patient: 30 minutes        The reason for the audio only service rather than synchronous audio and video virtual visit was related to technical difficulties or patient preference/necessity.     Each patient to whom I provide medical services by telemedicine is:  (1) informed of the relationship between the physician and patient and the respective role of any other health care provider with respect to management of the patient; and (2) notified that they may decline to receive medical services by telemedicine and may withdraw from such care at any time. Patient verbally consented to receive this service via voice-only telephone call.       HPI:     Patient with joint pains and high inflammatory markers for a follow up    The patient location is: home  The chief complaint leading to consultation is: seronegative arthritis  Visit type: audio  Total time spent with patient: 30 minutes  Each patient to whom he or she provides medical services by telemedicine is:  (1) informed of the relationship between the physician and patient and the respective role of any other health care provider with respect to management of the patient; and (2) notified that he or she may decline to receive medical services by telemedicine and may withdraw from such care at any time.    History of presenting illness    80 year old black female has    Joint pain since 2008     She has seen several rheumatologists. First was Dr. Lerma who thought she had bursitis.Then she saw Dr. Riddle who diagnosed rheumatoid arthritis and gave her Humira x 3 years which did not help. Humira caused her to feel like a zombie. She then went to [facility] : saw Dr. Woo and Dr. Reeves     He diagnosed a combination of  rheumatoid arthritis, fibromyalgia and PMR.  She recalls taking methotrexate but it didn't help her. At one point she only took pain pills.She took mtx for 4 years    She has been on prednisone since May 2016 due to ALLERGIES as prescribed by her ALLERGY doctor.     She also took tramadol and percocet but did not help.     Held Arava due to stomach upset.  Night sweats since 2008.    Then we were treating her as PMR.     She was on prednisone 2.5 mg BID    She took lyrica in the past and that didn't help      Then her complaints were :     Fatigue  Pain all over  No swelling    Once she had right hand pain and swelling,then she had left ankle pain and swelling  Muscle spasms     I checked her uric acid and it was 11.4  She had CRP of 50.3  Her ESR was normal    I offered her gabapentin and she didn't like it  She didn't like the lyrica    I then gave her 20 mg prednisone    I asked her to taper prednisone  Actemra offered last time     She didn't like it     She says it caused rash,made her sleepy     She has stayed on 10 mg prednisone   But she has continued to take actemra     Uric acid down to 5.6 AND THEN UPTO 6.9    SHE HAD ONGOING JOINT SYMPTOMS   She started to develop fibromyalgia symptoms as well    I added 0.5 ml weekly mtx and she took it for 2 weeks and she stopped it     She has been taking actemra,prednisone 10 mg tapered to 2.5 mg   She is now on cymbalta 30 mg   She takes allopurinol 150 mg and colchicine 0.6 mg     Last     ESR 46  CRP 26.8  Uric acid 4.3  GFR 45  LFTs nml  H/h 11/37.9    Past history : HTN,allergic rhinitis,CKD stage 3,GERD,PMR,RA,FMS,elevated inflammatory markers,asthma     Family history : no autoimmune illness    Social history : former smoker 1999      Review of Systems   Constitutional: Negative for activity change, appetite change, chills, diaphoresis, fatigue, fever and unexpected weight change.   HENT: Negative for congestion, dental problem, drooling, ear discharge, ear  pain, facial swelling, hearing loss, mouth sores, nosebleeds, postnasal drip, rhinorrhea, sinus pressure, sinus pain, sneezing, sore throat, tinnitus, trouble swallowing and voice change.    Eyes: Negative for photophobia, pain, discharge, redness, itching and visual disturbance.   Respiratory: Negative for apnea, cough, choking, chest tightness, shortness of breath, wheezing and stridor.    Cardiovascular: Negative for chest pain, palpitations and leg swelling.   Gastrointestinal: Negative for abdominal distention, abdominal pain, anal bleeding, blood in stool, constipation, diarrhea, nausea, rectal pain and vomiting.   Endocrine: Negative for cold intolerance, heat intolerance, polydipsia, polyphagia and polyuria.   Genitourinary: Negative for decreased urine volume, difficulty urinating, dysuria, enuresis, flank pain, frequency, genital sores, hematuria and urgency.   Musculoskeletal: Positive for arthralgias. Negative for back pain, gait problem, joint swelling, myalgias, neck pain and neck stiffness.   Skin: Negative for color change, pallor, rash and wound.   Allergic/Immunologic: Negative for environmental allergies, food allergies and immunocompromised state.   Neurological: Negative for dizziness, tremors, seizures, syncope, facial asymmetry, speech difficulty, weakness, light-headedness, numbness and headaches.   Hematological: Negative for adenopathy. Does not bruise/bleed easily.   Psychiatric/Behavioral: Negative for agitation, behavioral problems, confusion, decreased concentration, dysphoric mood, hallucinations, self-injury, sleep disturbance and suicidal ideas. The patient is not nervous/anxious and is not hyperactive.      Physical exam not done        Assessment     80 year old black female with     HTN,allergic rhinitis,CKD stage 3,GERD,PMR,RA,FMS,elevated inflammatory markers,asthma     She wants prednisone to survive with the joint pain    We have witnessed synovitis in the past,this made us  think she has RA  We thought she rapidly responded to prednisone,hence comes the PMR  She always complains of overall body pain,hence she gets the fibromyalgia diagnosis    We have positive RF in the past  We have high uric acid    She has responded really well to prednisone 20 mg  Her CRP dropped a great deal    We have her allopurinol 100 mg and colchicine 0.6 mg  and her uric acid dropped to less than 6    I GAVE HER MTX AND SHE TOOK IT FOR 2 WEEKS ONLY     Finally on actemra we have achieved compliace    She has cut back prednisone to 2.5 mg daily now    She probably has seronegative RA/PMR/Gout and fibromyalgia    Dexa : Osteopenia of the femoral neck.  FRAX calculations do not suggest treatment      1. Seronegative arthritis        Reviewed labs/xrays  Reviewed medications    F/u problem    Plan:      Continue allopurinol 150 mg and colchicine 0.6 mg daily    Continue actemra     Continue cymbalta 30 mg     Prednisone 2.5 mg     Labs today      rtc in 3 months       This service was not originating from a related E/M service provided within the previous 7 days nor will  to an E/M service or procedure within the next 24 hours or my soonest available appointment.  Prevailing standard of care was able to be met in this audio-only visit.

## 2020-06-01 ENCOUNTER — LAB VISIT (OUTPATIENT)
Dept: LAB | Facility: OTHER | Age: 80
End: 2020-06-01
Attending: INTERNAL MEDICINE
Payer: MEDICARE

## 2020-06-01 ENCOUNTER — LAB VISIT (OUTPATIENT)
Dept: PRIMARY CARE CLINIC | Facility: CLINIC | Age: 80
End: 2020-06-01
Payer: MEDICARE

## 2020-06-01 DIAGNOSIS — M13.80 SERONEGATIVE ARTHRITIS: ICD-10-CM

## 2020-06-01 DIAGNOSIS — Z11.59 ENCOUNTER FOR SCREENING FOR OTHER VIRAL DISEASES: Primary | ICD-10-CM

## 2020-06-01 LAB
ALBUMIN SERPL BCP-MCNC: 3.7 G/DL (ref 3.5–5.2)
ALP SERPL-CCNC: 80 U/L (ref 55–135)
ALT SERPL W/O P-5'-P-CCNC: 17 U/L (ref 10–44)
ANION GAP SERPL CALC-SCNC: 12 MMOL/L (ref 8–16)
AST SERPL-CCNC: 23 U/L (ref 10–40)
BASOPHILS # BLD AUTO: 0.04 K/UL (ref 0–0.2)
BASOPHILS NFR BLD: 0.5 % (ref 0–1.9)
BILIRUB SERPL-MCNC: 0.4 MG/DL (ref 0.1–1)
BUN SERPL-MCNC: 10 MG/DL (ref 8–23)
CALCIUM SERPL-MCNC: 10.2 MG/DL (ref 8.7–10.5)
CHLORIDE SERPL-SCNC: 101 MMOL/L (ref 95–110)
CO2 SERPL-SCNC: 27 MMOL/L (ref 23–29)
CREAT SERPL-MCNC: 1.4 MG/DL (ref 0.5–1.4)
CRP SERPL-MCNC: 38.2 MG/L (ref 0–8.2)
DIFFERENTIAL METHOD: ABNORMAL
EOSINOPHIL # BLD AUTO: 0.5 K/UL (ref 0–0.5)
EOSINOPHIL NFR BLD: 5.9 % (ref 0–8)
ERYTHROCYTE [DISTWIDTH] IN BLOOD BY AUTOMATED COUNT: 13.4 % (ref 11.5–14.5)
ERYTHROCYTE [SEDIMENTATION RATE] IN BLOOD: 60 MM/HR (ref 0–20)
EST. GFR  (AFRICAN AMERICAN): 41 ML/MIN/1.73 M^2
EST. GFR  (NON AFRICAN AMERICAN): 36 ML/MIN/1.73 M^2
GLUCOSE SERPL-MCNC: 117 MG/DL (ref 70–110)
HCT VFR BLD AUTO: 36 % (ref 37–48.5)
HGB BLD-MCNC: 10.8 G/DL (ref 12–16)
IMM GRANULOCYTES # BLD AUTO: 0.03 K/UL (ref 0–0.04)
IMM GRANULOCYTES NFR BLD AUTO: 0.4 % (ref 0–0.5)
LYMPHOCYTES # BLD AUTO: 2.6 K/UL (ref 1–4.8)
LYMPHOCYTES NFR BLD: 32.6 % (ref 18–48)
MCH RBC QN AUTO: 29.7 PG (ref 27–31)
MCHC RBC AUTO-ENTMCNC: 30 G/DL (ref 32–36)
MCV RBC AUTO: 99 FL (ref 82–98)
MONOCYTES # BLD AUTO: 0.5 K/UL (ref 0.3–1)
MONOCYTES NFR BLD: 5.9 % (ref 4–15)
NEUTROPHILS # BLD AUTO: 4.4 K/UL (ref 1.8–7.7)
NEUTROPHILS NFR BLD: 54.7 % (ref 38–73)
NRBC BLD-RTO: 0 /100 WBC
PLATELET # BLD AUTO: 284 K/UL (ref 150–350)
PMV BLD AUTO: 9.4 FL (ref 9.2–12.9)
POTASSIUM SERPL-SCNC: 4 MMOL/L (ref 3.5–5.1)
PROT SERPL-MCNC: 7.3 G/DL (ref 6–8.4)
RBC # BLD AUTO: 3.64 M/UL (ref 4–5.4)
SODIUM SERPL-SCNC: 140 MMOL/L (ref 136–145)
URATE SERPL-MCNC: 6.4 MG/DL (ref 2.4–5.7)
WBC # BLD AUTO: 7.97 K/UL (ref 3.9–12.7)

## 2020-06-01 PROCEDURE — U0003 INFECTIOUS AGENT DETECTION BY NUCLEIC ACID (DNA OR RNA); SEVERE ACUTE RESPIRATORY SYNDROME CORONAVIRUS 2 (SARS-COV-2) (CORONAVIRUS DISEASE [COVID-19]), AMPLIFIED PROBE TECHNIQUE, MAKING USE OF HIGH THROUGHPUT TECHNOLOGIES AS DESCRIBED BY CMS-2020-01-R: HCPCS

## 2020-06-01 PROCEDURE — 86140 C-REACTIVE PROTEIN: CPT

## 2020-06-01 PROCEDURE — 85025 COMPLETE CBC W/AUTO DIFF WBC: CPT | Mod: GA

## 2020-06-01 PROCEDURE — 84550 ASSAY OF BLOOD/URIC ACID: CPT

## 2020-06-01 PROCEDURE — 80053 COMPREHEN METABOLIC PANEL: CPT

## 2020-06-01 PROCEDURE — 85651 RBC SED RATE NONAUTOMATED: CPT

## 2020-06-01 PROCEDURE — 36415 COLL VENOUS BLD VENIPUNCTURE: CPT

## 2020-06-02 LAB — SARS-COV-2 RNA RESP QL NAA+PROBE: NOT DETECTED

## 2020-06-12 ENCOUNTER — TELEPHONE (OUTPATIENT)
Dept: INTERNAL MEDICINE | Facility: CLINIC | Age: 80
End: 2020-06-12

## 2020-07-20 ENCOUNTER — TELEPHONE (OUTPATIENT)
Dept: GASTROENTEROLOGY | Facility: CLINIC | Age: 80
End: 2020-07-20

## 2020-07-20 NOTE — TELEPHONE ENCOUNTER
----- Message from Carlos Rodgers MD sent at 7/18/2020  8:54 AM CDT -----  Please schedule a follow up with me to discuss reflux medicine and kidney function.

## 2020-08-17 ENCOUNTER — LAB VISIT (OUTPATIENT)
Dept: LAB | Facility: HOSPITAL | Age: 80
End: 2020-08-17
Attending: INTERNAL MEDICINE
Payer: MEDICARE

## 2020-08-17 ENCOUNTER — OFFICE VISIT (OUTPATIENT)
Dept: RHEUMATOLOGY | Facility: CLINIC | Age: 80
End: 2020-08-17
Payer: MEDICARE

## 2020-08-17 VITALS
BODY MASS INDEX: 31.28 KG/M2 | HEIGHT: 62 IN | DIASTOLIC BLOOD PRESSURE: 89 MMHG | HEART RATE: 89 BPM | TEMPERATURE: 98 F | WEIGHT: 170 LBS | SYSTOLIC BLOOD PRESSURE: 180 MMHG

## 2020-08-17 DIAGNOSIS — M13.80 SERONEGATIVE ARTHRITIS: Primary | ICD-10-CM

## 2020-08-17 DIAGNOSIS — M13.80 SERONEGATIVE ARTHRITIS: ICD-10-CM

## 2020-08-17 LAB
ALBUMIN SERPL BCP-MCNC: 3.7 G/DL (ref 3.5–5.2)
ALP SERPL-CCNC: 74 U/L (ref 55–135)
ALT SERPL W/O P-5'-P-CCNC: 19 U/L (ref 10–44)
ANION GAP SERPL CALC-SCNC: 11 MMOL/L (ref 8–16)
AST SERPL-CCNC: 17 U/L (ref 10–40)
BASOPHILS # BLD AUTO: 0.04 K/UL (ref 0–0.2)
BASOPHILS NFR BLD: 0.4 % (ref 0–1.9)
BILIRUB SERPL-MCNC: 0.3 MG/DL (ref 0.1–1)
BUN SERPL-MCNC: 20 MG/DL (ref 8–23)
CALCIUM SERPL-MCNC: 10.5 MG/DL (ref 8.7–10.5)
CHLORIDE SERPL-SCNC: 104 MMOL/L (ref 95–110)
CO2 SERPL-SCNC: 27 MMOL/L (ref 23–29)
CREAT SERPL-MCNC: 1.3 MG/DL (ref 0.5–1.4)
CRP SERPL-MCNC: 25 MG/L (ref 0–8.2)
DIFFERENTIAL METHOD: ABNORMAL
EOSINOPHIL # BLD AUTO: 0.1 K/UL (ref 0–0.5)
EOSINOPHIL NFR BLD: 1.3 % (ref 0–8)
ERYTHROCYTE [DISTWIDTH] IN BLOOD BY AUTOMATED COUNT: 13.7 % (ref 11.5–14.5)
ERYTHROCYTE [SEDIMENTATION RATE] IN BLOOD BY WESTERGREN METHOD: 87 MM/HR (ref 0–36)
EST. GFR  (AFRICAN AMERICAN): 45.1 ML/MIN/1.73 M^2
EST. GFR  (NON AFRICAN AMERICAN): 39.1 ML/MIN/1.73 M^2
GLUCOSE SERPL-MCNC: 96 MG/DL (ref 70–110)
HCT VFR BLD AUTO: 35.7 % (ref 37–48.5)
HGB BLD-MCNC: 10.9 G/DL (ref 12–16)
IMM GRANULOCYTES # BLD AUTO: 0.05 K/UL (ref 0–0.04)
IMM GRANULOCYTES NFR BLD AUTO: 0.5 % (ref 0–0.5)
LYMPHOCYTES # BLD AUTO: 1.8 K/UL (ref 1–4.8)
LYMPHOCYTES NFR BLD: 16.6 % (ref 18–48)
MCH RBC QN AUTO: 30.4 PG (ref 27–31)
MCHC RBC AUTO-ENTMCNC: 30.5 G/DL (ref 32–36)
MCV RBC AUTO: 100 FL (ref 82–98)
MONOCYTES # BLD AUTO: 0.4 K/UL (ref 0.3–1)
MONOCYTES NFR BLD: 4.1 % (ref 4–15)
NEUTROPHILS # BLD AUTO: 8.1 K/UL (ref 1.8–7.7)
NEUTROPHILS NFR BLD: 77.1 % (ref 38–73)
NRBC BLD-RTO: 0 /100 WBC
PLATELET # BLD AUTO: 302 K/UL (ref 150–350)
PMV BLD AUTO: 10 FL (ref 9.2–12.9)
POTASSIUM SERPL-SCNC: 4.3 MMOL/L (ref 3.5–5.1)
PROT SERPL-MCNC: 7.4 G/DL (ref 6–8.4)
RBC # BLD AUTO: 3.58 M/UL (ref 4–5.4)
SODIUM SERPL-SCNC: 142 MMOL/L (ref 136–145)
URATE SERPL-MCNC: 5.5 MG/DL (ref 2.4–5.7)
WBC # BLD AUTO: 10.53 K/UL (ref 3.9–12.7)

## 2020-08-17 PROCEDURE — 1101F PR PT FALLS ASSESS DOC 0-1 FALLS W/OUT INJ PAST YR: ICD-10-PCS | Mod: CPTII,S$GLB,, | Performed by: INTERNAL MEDICINE

## 2020-08-17 PROCEDURE — 36415 COLL VENOUS BLD VENIPUNCTURE: CPT

## 2020-08-17 PROCEDURE — 99999 PR PBB SHADOW E&M-EST. PATIENT-LVL IV: ICD-10-PCS | Mod: PBBFAC,,, | Performed by: INTERNAL MEDICINE

## 2020-08-17 PROCEDURE — 85025 COMPLETE CBC W/AUTO DIFF WBC: CPT | Mod: GA

## 2020-08-17 PROCEDURE — 1125F AMNT PAIN NOTED PAIN PRSNT: CPT | Mod: S$GLB,,, | Performed by: INTERNAL MEDICINE

## 2020-08-17 PROCEDURE — 3077F PR MOST RECENT SYSTOLIC BLOOD PRESSURE >= 140 MM HG: ICD-10-PCS | Mod: CPTII,S$GLB,, | Performed by: INTERNAL MEDICINE

## 2020-08-17 PROCEDURE — 1159F MED LIST DOCD IN RCRD: CPT | Mod: S$GLB,,, | Performed by: INTERNAL MEDICINE

## 2020-08-17 PROCEDURE — 3079F PR MOST RECENT DIASTOLIC BLOOD PRESSURE 80-89 MM HG: ICD-10-PCS | Mod: CPTII,S$GLB,, | Performed by: INTERNAL MEDICINE

## 2020-08-17 PROCEDURE — 86140 C-REACTIVE PROTEIN: CPT

## 2020-08-17 PROCEDURE — 80053 COMPREHEN METABOLIC PANEL: CPT

## 2020-08-17 PROCEDURE — 99215 PR OFFICE/OUTPT VISIT, EST, LEVL V, 40-54 MIN: ICD-10-PCS | Mod: S$GLB,,, | Performed by: INTERNAL MEDICINE

## 2020-08-17 PROCEDURE — 3077F SYST BP >= 140 MM HG: CPT | Mod: CPTII,S$GLB,, | Performed by: INTERNAL MEDICINE

## 2020-08-17 PROCEDURE — 3079F DIAST BP 80-89 MM HG: CPT | Mod: CPTII,S$GLB,, | Performed by: INTERNAL MEDICINE

## 2020-08-17 PROCEDURE — 1159F PR MEDICATION LIST DOCUMENTED IN MEDICAL RECORD: ICD-10-PCS | Mod: S$GLB,,, | Performed by: INTERNAL MEDICINE

## 2020-08-17 PROCEDURE — 99215 OFFICE O/P EST HI 40 MIN: CPT | Mod: S$GLB,,, | Performed by: INTERNAL MEDICINE

## 2020-08-17 PROCEDURE — 85652 RBC SED RATE AUTOMATED: CPT

## 2020-08-17 PROCEDURE — 84550 ASSAY OF BLOOD/URIC ACID: CPT

## 2020-08-17 PROCEDURE — 99999 PR PBB SHADOW E&M-EST. PATIENT-LVL IV: CPT | Mod: PBBFAC,,, | Performed by: INTERNAL MEDICINE

## 2020-08-17 PROCEDURE — 1125F PR PAIN SEVERITY QUANTIFIED, PAIN PRESENT: ICD-10-PCS | Mod: S$GLB,,, | Performed by: INTERNAL MEDICINE

## 2020-08-17 PROCEDURE — 1101F PT FALLS ASSESS-DOCD LE1/YR: CPT | Mod: CPTII,S$GLB,, | Performed by: INTERNAL MEDICINE

## 2020-08-17 RX ORDER — PREDNISONE 2.5 MG/1
2.5 TABLET ORAL DAILY
Qty: 30 TABLET | Refills: 5 | Status: SHIPPED | OUTPATIENT
Start: 2020-08-17 | End: 2020-09-16

## 2020-08-17 RX ORDER — COLCHICINE 0.6 MG/1
0.6 TABLET ORAL DAILY
Qty: 30 TABLET | Refills: 5 | Status: SHIPPED | OUTPATIENT
Start: 2020-08-17 | End: 2020-08-17

## 2020-08-17 RX ORDER — COLCHICINE 0.6 MG/1
0.6 TABLET ORAL DAILY
Qty: 30 TABLET | Refills: 5 | Status: SHIPPED | OUTPATIENT
Start: 2020-08-17 | End: 2020-11-16

## 2020-08-17 RX ORDER — DULOXETIN HYDROCHLORIDE 30 MG/1
CAPSULE, DELAYED RELEASE ORAL
Qty: 30 CAPSULE | Refills: 5 | Status: SHIPPED | OUTPATIENT
Start: 2020-08-17 | End: 2020-11-16

## 2020-08-17 RX ORDER — ALLOPURINOL 100 MG/1
TABLET ORAL
Qty: 60 TABLET | Refills: 5 | Status: SHIPPED | OUTPATIENT
Start: 2020-08-17 | End: 2020-11-16

## 2020-08-17 ASSESSMENT — ROUTINE ASSESSMENT OF PATIENT INDEX DATA (RAPID3)
FATIGUE SCORE: 10
PATIENT GLOBAL ASSESSMENT SCORE: 8.5
TOTAL RAPID3 SCORE: 6.17
AM STIFFNESS SCORE: 1, YES
PSYCHOLOGICAL DISTRESS SCORE: 3.3
MDHAQ FUNCTION SCORE: 0
PAIN SCORE: 10

## 2020-08-17 NOTE — PROGRESS NOTES
HPI:     Patient with joint pains and high inflammatory markers for a follow up    The patient location is: home  The chief complaint leading to consultation is: seronegative arthritis  Visit type: audio  Total time spent with patient: 30 minutes  Each patient to whom he or she provides medical services by telemedicine is:  (1) informed of the relationship between the physician and patient and the respective role of any other health care provider with respect to management of the patient; and (2) notified that he or she may decline to receive medical services by telemedicine and may withdraw from such care at any time.    History of presenting illness    80 year old black female has    Joint pain since 2008     She has seen several rheumatologists. First was Dr. Lerma who thought she had bursitis.Then she saw Dr. Riddle who diagnosed rheumatoid arthritis and gave her Humira x 3 years which did not help. Humira caused her to feel like a zombie. She then went to [facility] : saw Dr. Woo and Dr. Reeves     He diagnosed a combination of rheumatoid arthritis, fibromyalgia and PMR.  She recalls taking methotrexate but it didn't help her. At one point she only took pain pills.She took mtx for 4 years    She has been on prednisone since May 2016 due to ALLERGIES as prescribed by her ALLERGY doctor.     She also took tramadol and percocet but did not help.     Held Arava due to stomach upset.  Night sweats since 2008.    Then we were treating her as PMR.     She was on prednisone 2.5 mg BID    She took lyrica in the past and that didn't help      Then her complaints were :     Fatigue  Pain all over  No swelling    Once she had right hand pain and swelling,then she had left ankle pain and swelling  Muscle spasms     I checked her uric acid and it was 11.4  She had CRP of 50.3  Her ESR was normal    I offered her gabapentin and she didn't like it  She didn't like the lyrica    I then gave her 20 mg prednisone    I asked  her to taper prednisone  Actemra offered last time     She didn't like it     She says it caused rash,made her sleepy     She has stayed on 10 mg prednisone   But she has continued to take actemra     Uric acid down to 5.6 AND THEN UPTO 6.9    SHE HAD ONGOING JOINT SYMPTOMS   She started to develop fibromyalgia symptoms as well    I added 0.5 ml weekly mtx and she took it for 2 weeks and she stopped it     She has been taking actemra,prednisone 10 mg tapered to 2.5 mg   She is now on cymbalta 30 mg   She takes allopurinol 150 mg and colchicine 0.6 mg     One flare with stiff neck for a day or two  EMS no  Fatigue +   No joint pains     Last labs 6/2020    Uric acid 6.4  CRP 38.2  ESR 60  GFR 41  H/H 10.8/36  White count nml,plts nml      Past history : HTN,allergic rhinitis,CKD stage 3,GERD,PMR,RA,FMS,elevated inflammatory markers,asthma     Family history : no autoimmune illness    Social history : former smoker 1999      Review of Systems   Constitutional: Negative for activity change, appetite change, chills, diaphoresis, fatigue, fever and unexpected weight change.   HENT: Negative for congestion, dental problem, drooling, ear discharge, ear pain, facial swelling, hearing loss, mouth sores, nosebleeds, postnasal drip, rhinorrhea, sinus pressure, sinus pain, sneezing, sore throat, tinnitus, trouble swallowing and voice change.    Eyes: Negative for photophobia, pain, discharge, redness, itching and visual disturbance.   Respiratory: Negative for apnea, cough, choking, chest tightness, shortness of breath, wheezing and stridor.    Cardiovascular: Negative for chest pain, palpitations and leg swelling.   Gastrointestinal: Negative for abdominal distention, abdominal pain, anal bleeding, blood in stool, constipation, diarrhea, nausea, rectal pain and vomiting.   Endocrine: Negative for cold intolerance, heat intolerance, polydipsia, polyphagia and polyuria.   Genitourinary: Negative for decreased urine volume,  difficulty urinating, dysuria, enuresis, flank pain, frequency, genital sores, hematuria and urgency.   Musculoskeletal: Positive for arthralgias. Negative for back pain, gait problem, joint swelling, myalgias, neck pain and neck stiffness.   Skin: Negative for color change, pallor, rash and wound.   Allergic/Immunologic: Negative for environmental allergies, food allergies and immunocompromised state.   Neurological: Negative for dizziness, tremors, seizures, syncope, facial asymmetry, speech difficulty, weakness, light-headedness, numbness and headaches.   Hematological: Negative for adenopathy. Does not bruise/bleed easily.   Psychiatric/Behavioral: Negative for agitation, behavioral problems, confusion, decreased concentration, dysphoric mood, hallucinations, self-injury, sleep disturbance and suicidal ideas. The patient is not nervous/anxious and is not hyperactive.      Physical exam     MSK exam nml        Assessment     80 year old black female with     HTN,allergic rhinitis,CKD stage 3,GERD,PMR,RA,FMS,elevated inflammatory markers,asthma     She wants prednisone to survive with the joint pain    We have witnessed synovitis in the past,this made us think she has RA  We thought she rapidly responded to prednisone,hence comes the PMR  She always complains of overall body pain,hence she gets the fibromyalgia diagnosis    We have positive RF in the past  We have high uric acid    She has responded really well to prednisone 20 mg  Her CRP dropped a great deal    We have her allopurinol 100 mg and colchicine 0.6 mg  and her uric acid dropped to less than 6    I GAVE HER MTX AND SHE TOOK IT FOR 2 WEEKS ONLY     Finally on actemra we have achieved compliance    She has cut back prednisone to 2.5 mg daily now    She probably has seronegative RA/PMR/Gout and fibromyalgia    Dexa : Osteopenia of the femoral neck.  FRAX calculations do not suggest treatment      1. Seronegative arthritis        Reviewed  labs/xrays  Reviewed medications    F/u problem    She has been taking actemra,prednisone 10 mg tapered to 2.5 mg   She is now on cymbalta 30 mg   She takes allopurinol 150 mg and colchicine 0.6 mg     One flare with stiff neck for a day or two  EMS no  Fatigue +   No joint pains     Last labs 6/2020    Uric acid 6.4  CRP 38.2  ESR 60  GFR 41  H/H 10.8/36  White count nml,plts nml      Plan:      Increase allopurinol to 200 mg daily and colchicine 0.6 mg daily    Continue actemra     Continue cymbalta 30 mg     Prednisone 2.5 mg     Labs today    If neck pain keeps coming and her inflammatory markers are elevated we have to image the neck with MRI      rtc in 3 months       This service was not originating from a related E/M service provided within the previous 7 days nor will  to an E/M service or procedure within the next 24 hours or my soonest available appointment.  Prevailing standard of care was able to be met in this audio-only visit.

## 2020-09-10 DIAGNOSIS — M54.2 NECK PAIN: Primary | ICD-10-CM

## 2020-09-14 ENCOUNTER — HOSPITAL ENCOUNTER (OUTPATIENT)
Dept: RADIOLOGY | Facility: OTHER | Age: 80
Discharge: HOME | End: 2020-09-14
Attending: INTERNAL MEDICINE
Payer: MEDICARE

## 2020-09-14 DIAGNOSIS — M54.2 NECK PAIN: ICD-10-CM

## 2020-09-14 PROCEDURE — 72141 MRI CERVICAL SPINE WITHOUT CONTRAST: ICD-10-PCS | Mod: 26,,, | Performed by: RADIOLOGY

## 2020-09-14 PROCEDURE — 72141 MRI NECK SPINE W/O DYE: CPT | Mod: TC

## 2020-09-14 PROCEDURE — 72141 MRI NECK SPINE W/O DYE: CPT | Mod: 26,,, | Performed by: RADIOLOGY

## 2020-09-17 ENCOUNTER — PATIENT MESSAGE (OUTPATIENT)
Dept: RHEUMATOLOGY | Facility: CLINIC | Age: 80
End: 2020-09-17

## 2020-09-19 DIAGNOSIS — M47.812 OSTEOARTHRITIS OF CERVICAL SPINE, UNSPECIFIED SPINAL OSTEOARTHRITIS COMPLICATION STATUS: Primary | ICD-10-CM

## 2020-09-23 ENCOUNTER — TELEPHONE (OUTPATIENT)
Dept: RHEUMATOLOGY | Facility: CLINIC | Age: 80
End: 2020-09-23

## 2020-09-23 ENCOUNTER — PATIENT MESSAGE (OUTPATIENT)
Dept: RHEUMATOLOGY | Facility: CLINIC | Age: 80
End: 2020-09-23

## 2020-09-23 NOTE — TELEPHONE ENCOUNTER
----- Message from Thomas Cowan sent at 9/23/2020 11:53 AM CDT -----  Contact: self  Pt calling to speak with Samia concerning the referral for a spin Dr     Please Call     Contact  [phone]

## 2020-09-30 ENCOUNTER — TELEPHONE (OUTPATIENT)
Dept: GASTROENTEROLOGY | Facility: CLINIC | Age: 80
End: 2020-09-30

## 2020-10-02 ENCOUNTER — TELEPHONE (OUTPATIENT)
Dept: GASTROENTEROLOGY | Facility: CLINIC | Age: 80
End: 2020-10-02

## 2020-10-02 NOTE — TELEPHONE ENCOUNTER
----- Message from Dang Son sent at 10/2/2020  9:51 AM CDT -----  Betzaida Neumann calling regarding miss call from Saritha. Call back [phone]

## 2020-10-02 NOTE — TELEPHONE ENCOUNTER
Ma spoke with pt appt scheduled   Pt states she only wants to see Dr. Rodgers  Pt declined appt on 10/21 at 8am

## 2020-10-07 ENCOUNTER — PES CALL (OUTPATIENT)
Dept: ADMINISTRATIVE | Facility: CLINIC | Age: 80
End: 2020-10-07

## 2020-11-10 ENCOUNTER — TELEPHONE (OUTPATIENT)
Dept: ORTHOPEDICS | Facility: CLINIC | Age: 80
End: 2020-11-10

## 2020-11-10 ENCOUNTER — PATIENT MESSAGE (OUTPATIENT)
Dept: ORTHOPEDICS | Facility: CLINIC | Age: 80
End: 2020-11-10

## 2020-11-10 DIAGNOSIS — M50.30 DDD (DEGENERATIVE DISC DISEASE), CERVICAL: Primary | ICD-10-CM

## 2020-11-15 ENCOUNTER — PATIENT OUTREACH (OUTPATIENT)
Dept: ADMINISTRATIVE | Facility: OTHER | Age: 80
End: 2020-11-15

## 2020-11-16 ENCOUNTER — OFFICE VISIT (OUTPATIENT)
Dept: RHEUMATOLOGY | Facility: CLINIC | Age: 80
End: 2020-11-16
Payer: MEDICARE

## 2020-11-16 DIAGNOSIS — M05.79 RHEUMATOID ARTHRITIS INVOLVING MULTIPLE SITES WITH POSITIVE RHEUMATOID FACTOR: Primary | ICD-10-CM

## 2020-11-16 PROCEDURE — 1159F PR MEDICATION LIST DOCUMENTED IN MEDICAL RECORD: ICD-10-PCS | Mod: 95,,, | Performed by: INTERNAL MEDICINE

## 2020-11-16 PROCEDURE — 1159F MED LIST DOCD IN RCRD: CPT | Mod: 95,,, | Performed by: INTERNAL MEDICINE

## 2020-11-16 PROCEDURE — 99214 PR OFFICE/OUTPT VISIT, EST, LEVL IV, 30-39 MIN: ICD-10-PCS | Mod: 95,,, | Performed by: INTERNAL MEDICINE

## 2020-11-16 PROCEDURE — 99214 OFFICE O/P EST MOD 30 MIN: CPT | Mod: 95,,, | Performed by: INTERNAL MEDICINE

## 2020-11-16 RX ORDER — ALLOPURINOL 100 MG/1
TABLET ORAL
Qty: 60 TABLET | Refills: 5 | Status: SHIPPED | OUTPATIENT
Start: 2020-11-16 | End: 2021-06-30

## 2020-11-16 RX ORDER — COLCHICINE 0.6 MG/1
0.6 TABLET ORAL DAILY
Qty: 30 TABLET | Refills: 5 | Status: SHIPPED | OUTPATIENT
Start: 2020-11-16 | End: 2020-11-16

## 2020-11-16 RX ORDER — ALLOPURINOL 100 MG/1
TABLET ORAL
Qty: 60 TABLET | Refills: 5 | Status: SHIPPED | OUTPATIENT
Start: 2020-11-16 | End: 2020-11-16

## 2020-11-16 RX ORDER — DULOXETIN HYDROCHLORIDE 30 MG/1
CAPSULE, DELAYED RELEASE ORAL
Qty: 30 CAPSULE | Refills: 5 | Status: SHIPPED | OUTPATIENT
Start: 2020-11-16 | End: 2020-11-16

## 2020-11-16 RX ORDER — DULOXETIN HYDROCHLORIDE 30 MG/1
CAPSULE, DELAYED RELEASE ORAL
Qty: 30 CAPSULE | Refills: 5 | Status: SHIPPED | OUTPATIENT
Start: 2020-11-16 | End: 2021-02-11

## 2020-11-16 RX ORDER — PREDNISONE 2.5 MG/1
2.5 TABLET ORAL DAILY
Qty: 30 TABLET | Refills: 5 | Status: SHIPPED | OUTPATIENT
Start: 2020-11-16 | End: 2021-09-06

## 2020-11-16 RX ORDER — COLCHICINE 0.6 MG/1
0.6 TABLET ORAL DAILY
Qty: 30 TABLET | Refills: 5 | Status: SHIPPED | OUTPATIENT
Start: 2020-11-16 | End: 2021-06-30

## 2020-11-16 RX ORDER — TOFACITINIB 5 MG/1
5 TABLET, FILM COATED ORAL 2 TIMES DAILY
Qty: 60 TABLET | Refills: 11 | Status: SHIPPED | OUTPATIENT
Start: 2020-11-16 | End: 2021-09-20

## 2020-11-16 NOTE — PROGRESS NOTES
Rapid3 Question Responses and Scores 11/16/2020   MDHAQ Score 0   Psychologic Score 2.2   Pain Score 7   When you awakened in the morning OVER THE LAST WEEK, did you feel stiff? Yes   If Yes, please indicate the number of hours until you are as limber as you will be for the day 6   Fatigue Score 9   Global Health Score 7   RAPID3 Score 4.66       Answers for HPI/ROS submitted by the patient on 11/16/2020   fever: No  eye redness: No  mouth sores: No  headaches: No  shortness of breath: No  chest pain: No  trouble swallowing: No  diarrhea: No  constipation: No  unexpected weight change: No  genital sore: No  dysuria: No  During the last 3 days, have you had a skin rash?: No  Bruises or bleeds easily: No  cough: No

## 2020-11-16 NOTE — PROGRESS NOTES
Care Everywhere: updated  Immunization: updated  Health Maintenance:updated  Media Review:   Legacy Review:   Order placed:   Upcoming appts:

## 2020-11-16 NOTE — PROGRESS NOTES
HPI:     Patient with joint pains and high inflammatory markers for a follow up    The patient location is: home  The chief complaint leading to consultation is: seronegative arthritis  Visit type: audio  Total time spent with patient: 30 minutes  Each patient to whom he or she provides medical services by telemedicine is:  (1) informed of the relationship between the physician and patient and the respective role of any other health care provider with respect to management of the patient; and (2) notified that he or she may decline to receive medical services by telemedicine and may withdraw from such care at any time.    History of presenting illness    80 year old black female has    Joint pain since 2008     She has seen several rheumatologists. First was Dr. Lerma who thought she had bursitis.Then she saw Dr. Riddle who diagnosed rheumatoid arthritis and gave her Humira x 3 years which did not help. Humira caused her to feel like a zombie. [facility] then went [facility] : saw Dr. Woo and Dr. Reeves     He diagnosed a combination of rheumatoid arthritis, fibromyalgia and PMR.  She recalls taking methotrexate but it didn't help her. At one point she only took pain pills.She took mtx for 4 years    She has been on prednisone since May 2016 due to ALLERGIES as prescribed by her ALLERGY doctor.     She also took tramadol and percocet but did not help.     Held Arava due to stomach upset.  Night sweats since 2008.    Then we were treating her as PMR.     She was on prednisone 2.5 mg BID    She took lyrica in the past and that didn't help      Then her complaints were :     Fatigue  Pain all over  No swelling    Once she had right hand pain and swelling,then she had left ankle pain and swelling  Muscle spasms     I checked her uric acid and it was 11.4  She had CRP of 50.3  Her ESR was normal    I offered her gabapentin and she didn't like it  She didn't like the lyrica    I then gave her 20 mg prednisone    I asked  her to taper prednisone  Actemra offered last time     She didn't like it     She says it caused rash,made her sleepy     She has stayed on 10 mg prednisone   But she has continued to take actemra     Uric acid down to 5.6 AND THEN UPTO 6.9    SHE HAD ONGOING JOINT SYMPTOMS   She started to develop fibromyalgia symptoms as well    I added 0.5 ml weekly mtx and she took it for 2 weeks and she stopped it     She has been taking actemra,prednisone 10 mg tapered to 2.5 mg   She is now on cymbalta 30 mg   She takes allopurinol 150 mg and colchicine 0.6 mg     One flare with stiff neck for a day or two  EMS no  Fatigue +   No joint pains   Poor sleep      Last labs 8/2020    Uric acid 5.5  CRP 25  ESR 87  GFR 45  H/H 10.9/35.7  White count nml,plts nml      MRI C spine    The alignment of the cervical spine is normal.  The vertebral body heights are well maintained.  Severe disc space narrowing at C3-C4 and mild disc space narrowing at C4-5 and C5-6.  No evidence of malignant bone marrow replacement process or infection.  The craniocervical junction, cervical cord and upper thoracic cord demonstrate normal signal.     C2-C3: No disc abnormality, no canal stenosis or foraminal narrowing.  There is mild left facet joint osseous hypertrophy.     C3-C4: Posterior disc osteophyte complex effacing the anterior CSF sleeve causing no canal stenosis.  There is bilateral uncovertebral spur creating moderate left and mild right foraminal narrowing.     C4-C5: Posterior disc osteophyte complex with left paracentral disc  protrusion and ligamentum flavum hypertrophy result in overall moderate central canal stenosis with severe left lateral canal stenosis.  There is mild left foraminal narrowing.     C5-C6: No disc abnormality, mild ligamentum flavum hypertrophy.  No canal stenosis or foraminal narrowing.     C6-C7: Unremarkable     C7-T1: Unremarkable     The paraspinal soft tissues appear normal.     Impression:     Significant  spondylosis of the cervical spine most severe at C5-6 where there is severe left lateral canal stenosis and overall moderate central canal stenosis due to a posterior disc osteophyte complex with a left paracentral disc protrusion and prominent ligamentum flavum hypertrophy.     She will see spine ortho soon    Past history : HTN,allergic rhinitis,CKD stage 3,GERD,PMR,RA,FMS,elevated inflammatory markers,asthma     Family history : no autoimmune illness    Social history : former smoker 1999      Review of Systems   Constitutional: Negative for activity change, appetite change, chills, diaphoresis, fatigue, fever and unexpected weight change.   HENT: Negative for congestion, dental problem, drooling, ear discharge, ear pain, facial swelling, hearing loss, mouth sores, nosebleeds, postnasal drip, rhinorrhea, sinus pressure, sinus pain, sneezing, sore throat, tinnitus, trouble swallowing and voice change.    Eyes: Negative for photophobia, pain, discharge, redness, itching and visual disturbance.   Respiratory: Negative for apnea, cough, choking, chest tightness, shortness of breath, wheezing and stridor.    Cardiovascular: Negative for chest pain, palpitations and leg swelling.   Gastrointestinal: Negative for abdominal distention, abdominal pain, anal bleeding, blood in stool, constipation, diarrhea, nausea, rectal pain and vomiting.   Endocrine: Negative for cold intolerance, heat intolerance, polydipsia, polyphagia and polyuria.   Genitourinary: Negative for decreased urine volume, difficulty urinating, dysuria, enuresis, flank pain, frequency, genital sores, hematuria and urgency.   Musculoskeletal: Positive for arthralgias. Negative for back pain, gait problem, joint swelling, myalgias, neck pain and neck stiffness.   Skin: Negative for color change, pallor, rash and wound.   Allergic/Immunologic: Negative for environmental allergies, food allergies and immunocompromised state.   Neurological: Negative for  dizziness, tremors, seizures, syncope, facial asymmetry, speech difficulty, weakness, light-headedness, numbness and headaches.   Hematological: Negative for adenopathy. Does not bruise/bleed easily.   Psychiatric/Behavioral: Negative for agitation, behavioral problems, confusion, decreased concentration, dysphoric mood, hallucinations, self-injury, sleep disturbance and suicidal ideas. The patient is not nervous/anxious and is not hyperactive.      Physical exam     MSK exam nml        Assessment     80 year old black female with     HTN,allergic rhinitis,CKD stage 3,GERD,PMR,RA,FMS,elevated inflammatory markers,asthma     She wants prednisone to survive with the joint pain    We have witnessed synovitis in the past,this made us think she has RA  We thought she rapidly responded to prednisone,hence comes the PMR  She always complains of overall body pain,hence she gets the fibromyalgia diagnosis    We have positive RF in the past  We have high uric acid    She has responded really well to prednisone 20 mg  Her CRP dropped a great deal    We have her allopurinol 100 mg and colchicine 0.6 mg  and her uric acid dropped to less than 6    I GAVE HER MTX AND SHE TOOK IT FOR 2 WEEKS ONLY     Finally on actemra we have achieved compliance    She has cut back prednisone to 2.5 mg daily now    She probably has seronegative RA/PMR/Gout and fibromyalgia    Dexa : Osteopenia of the femoral neck.  FRAX calculations do not suggest treatment      1. Seronegative arthritis        Reviewed labs/xrays  Reviewed medications    F/u problem    She has been taking actemra,prednisone 10 mg tapered to 2.5 mg   She is now on cymbalta 30 mg   She takes allopurinol 200 mg and colchicine 0.6 mg     One flare with stiff neck for a day or two  EMS no  Fatigue +   No joint pains       Last labs 8/2020    Uric acid 5.5  CRP 25  ESR 87  GFR 45  H/H 10.9/35.7  White count nml,plts nml    She is too tired  She has neck pain  She also has high  inflammatory markers    May be the fatigue is due to the ongoing inflammation      Plan:      Continue allopurinol 200 mg daily and colchicine 0.6 mg daily    D/c actemra     Initiate xeljanz 5 mg bid     Continue cymbalta 30 mg     Prednisone 2.5 mg     Labs today    She is scheduled to see her neck spine ortho soon    rtc in 3 months            Answers for HPI/ROS submitted by the patient on 11/16/2020   fever: No  eye redness: No  mouth sores: No  headaches: No  shortness of breath: No  chest pain: No  trouble swallowing: No  diarrhea: No  constipation: No  unexpected weight change: No  genital sore: No  dysuria: No  During the last 3 days, have you had a skin rash?: No  Bruises or bleeds easily: No  cough: No

## 2020-11-17 ENCOUNTER — LAB VISIT (OUTPATIENT)
Dept: LAB | Facility: OTHER | Age: 80
End: 2020-11-17
Attending: INTERNAL MEDICINE
Payer: MEDICARE

## 2020-11-17 DIAGNOSIS — M05.79 RHEUMATOID ARTHRITIS INVOLVING MULTIPLE SITES WITH POSITIVE RHEUMATOID FACTOR: ICD-10-CM

## 2020-11-17 LAB
ALBUMIN SERPL BCP-MCNC: 3.7 G/DL (ref 3.5–5.2)
ALP SERPL-CCNC: 66 U/L (ref 55–135)
ALT SERPL W/O P-5'-P-CCNC: 20 U/L (ref 10–44)
ANION GAP SERPL CALC-SCNC: 12 MMOL/L (ref 8–16)
AST SERPL-CCNC: 21 U/L (ref 10–40)
BASOPHILS # BLD AUTO: 0.06 K/UL (ref 0–0.2)
BASOPHILS NFR BLD: 0.7 % (ref 0–1.9)
BILIRUB SERPL-MCNC: 0.4 MG/DL (ref 0.1–1)
BUN SERPL-MCNC: 14 MG/DL (ref 8–23)
CALCIUM SERPL-MCNC: 9.8 MG/DL (ref 8.7–10.5)
CHLORIDE SERPL-SCNC: 102 MMOL/L (ref 95–110)
CO2 SERPL-SCNC: 27 MMOL/L (ref 23–29)
CREAT SERPL-MCNC: 1.2 MG/DL (ref 0.5–1.4)
CRP SERPL-MCNC: 33.4 MG/L (ref 0–8.2)
DIFFERENTIAL METHOD: ABNORMAL
EOSINOPHIL # BLD AUTO: 0.5 K/UL (ref 0–0.5)
EOSINOPHIL NFR BLD: 5.2 % (ref 0–8)
ERYTHROCYTE [DISTWIDTH] IN BLOOD BY AUTOMATED COUNT: 13.2 % (ref 11.5–14.5)
ERYTHROCYTE [SEDIMENTATION RATE] IN BLOOD: 26 MM/HR (ref 0–20)
EST. GFR  (AFRICAN AMERICAN): 50 ML/MIN/1.73 M^2
EST. GFR  (NON AFRICAN AMERICAN): 43 ML/MIN/1.73 M^2
GLUCOSE SERPL-MCNC: 108 MG/DL (ref 70–110)
HCT VFR BLD AUTO: 39.2 % (ref 37–48.5)
HGB BLD-MCNC: 11.8 G/DL (ref 12–16)
IMM GRANULOCYTES # BLD AUTO: 0.04 K/UL (ref 0–0.04)
IMM GRANULOCYTES NFR BLD AUTO: 0.5 % (ref 0–0.5)
LYMPHOCYTES # BLD AUTO: 2.8 K/UL (ref 1–4.8)
LYMPHOCYTES NFR BLD: 31.9 % (ref 18–48)
MCH RBC QN AUTO: 29.8 PG (ref 27–31)
MCHC RBC AUTO-ENTMCNC: 30.1 G/DL (ref 32–36)
MCV RBC AUTO: 99 FL (ref 82–98)
MONOCYTES # BLD AUTO: 0.5 K/UL (ref 0.3–1)
MONOCYTES NFR BLD: 5.9 % (ref 4–15)
NEUTROPHILS # BLD AUTO: 4.8 K/UL (ref 1.8–7.7)
NEUTROPHILS NFR BLD: 55.8 % (ref 38–73)
NRBC BLD-RTO: 0 /100 WBC
PLATELET # BLD AUTO: 267 K/UL (ref 150–350)
PMV BLD AUTO: 10 FL (ref 9.2–12.9)
POTASSIUM SERPL-SCNC: 4.1 MMOL/L (ref 3.5–5.1)
PROT SERPL-MCNC: 7.3 G/DL (ref 6–8.4)
RBC # BLD AUTO: 3.96 M/UL (ref 4–5.4)
SODIUM SERPL-SCNC: 141 MMOL/L (ref 136–145)
URATE SERPL-MCNC: 5.8 MG/DL (ref 2.4–5.7)
WBC # BLD AUTO: 8.64 K/UL (ref 3.9–12.7)

## 2020-11-17 PROCEDURE — 84550 ASSAY OF BLOOD/URIC ACID: CPT

## 2020-11-17 PROCEDURE — 36415 COLL VENOUS BLD VENIPUNCTURE: CPT

## 2020-11-17 PROCEDURE — 80053 COMPREHEN METABOLIC PANEL: CPT

## 2020-11-17 PROCEDURE — 85651 RBC SED RATE NONAUTOMATED: CPT

## 2020-11-17 PROCEDURE — 85025 COMPLETE CBC W/AUTO DIFF WBC: CPT

## 2020-11-17 PROCEDURE — 86140 C-REACTIVE PROTEIN: CPT

## 2020-11-24 ENCOUNTER — OFFICE VISIT (OUTPATIENT)
Dept: ORTHOPEDICS | Facility: CLINIC | Age: 80
End: 2020-11-24
Payer: MEDICARE

## 2020-11-24 ENCOUNTER — HOSPITAL ENCOUNTER (OUTPATIENT)
Dept: RADIOLOGY | Facility: HOSPITAL | Age: 80
Discharge: HOME | End: 2020-11-24
Attending: ORTHOPAEDIC SURGERY
Payer: MEDICARE

## 2020-11-24 VITALS — WEIGHT: 168.19 LBS | BODY MASS INDEX: 30.95 KG/M2 | HEIGHT: 62 IN

## 2020-11-24 DIAGNOSIS — M47.812 OSTEOARTHRITIS OF CERVICAL SPINE, UNSPECIFIED SPINAL OSTEOARTHRITIS COMPLICATION STATUS: ICD-10-CM

## 2020-11-24 DIAGNOSIS — M50.30 DDD (DEGENERATIVE DISC DISEASE), CERVICAL: ICD-10-CM

## 2020-11-24 DIAGNOSIS — M48.02 SPINAL STENOSIS IN CERVICAL REGION: Primary | ICD-10-CM

## 2020-11-24 DIAGNOSIS — M54.12 CERVICAL RADICULOPATHY: ICD-10-CM

## 2020-11-24 PROCEDURE — 99204 OFFICE O/P NEW MOD 45 MIN: CPT | Mod: S$GLB,,, | Performed by: ORTHOPAEDIC SURGERY

## 2020-11-24 PROCEDURE — 3288F FALL RISK ASSESSMENT DOCD: CPT | Mod: CPTII,S$GLB,, | Performed by: ORTHOPAEDIC SURGERY

## 2020-11-24 PROCEDURE — 1101F PT FALLS ASSESS-DOCD LE1/YR: CPT | Mod: CPTII,S$GLB,, | Performed by: ORTHOPAEDIC SURGERY

## 2020-11-24 PROCEDURE — 1159F MED LIST DOCD IN RCRD: CPT | Mod: S$GLB,,, | Performed by: ORTHOPAEDIC SURGERY

## 2020-11-24 PROCEDURE — 1125F PR PAIN SEVERITY QUANTIFIED, PAIN PRESENT: ICD-10-PCS | Mod: S$GLB,,, | Performed by: ORTHOPAEDIC SURGERY

## 2020-11-24 PROCEDURE — 1125F AMNT PAIN NOTED PAIN PRSNT: CPT | Mod: S$GLB,,, | Performed by: ORTHOPAEDIC SURGERY

## 2020-11-24 PROCEDURE — 1159F PR MEDICATION LIST DOCUMENTED IN MEDICAL RECORD: ICD-10-PCS | Mod: S$GLB,,, | Performed by: ORTHOPAEDIC SURGERY

## 2020-11-24 PROCEDURE — 99499 RISK ADDL DX/OHS AUDIT: ICD-10-PCS | Mod: S$GLB,,, | Performed by: ORTHOPAEDIC SURGERY

## 2020-11-24 PROCEDURE — 99999 PR PBB SHADOW E&M-EST. PATIENT-LVL IV: ICD-10-PCS | Mod: PBBFAC,,, | Performed by: ORTHOPAEDIC SURGERY

## 2020-11-24 PROCEDURE — 99499 UNLISTED E&M SERVICE: CPT | Mod: S$GLB,,, | Performed by: ORTHOPAEDIC SURGERY

## 2020-11-24 PROCEDURE — 1101F PR PT FALLS ASSESS DOC 0-1 FALLS W/OUT INJ PAST YR: ICD-10-PCS | Mod: CPTII,S$GLB,, | Performed by: ORTHOPAEDIC SURGERY

## 2020-11-24 PROCEDURE — 72050 XR CERVICAL SPINE AP LAT WITH FLEX EXTEN: ICD-10-PCS | Mod: 26,,, | Performed by: RADIOLOGY

## 2020-11-24 PROCEDURE — 99204 PR OFFICE/OUTPT VISIT, NEW, LEVL IV, 45-59 MIN: ICD-10-PCS | Mod: S$GLB,,, | Performed by: ORTHOPAEDIC SURGERY

## 2020-11-24 PROCEDURE — 99999 PR PBB SHADOW E&M-EST. PATIENT-LVL IV: CPT | Mod: PBBFAC,,, | Performed by: ORTHOPAEDIC SURGERY

## 2020-11-24 PROCEDURE — 72050 X-RAY EXAM NECK SPINE 4/5VWS: CPT | Mod: 26,,, | Performed by: RADIOLOGY

## 2020-11-24 PROCEDURE — 3288F PR FALLS RISK ASSESSMENT DOCUMENTED: ICD-10-PCS | Mod: CPTII,S$GLB,, | Performed by: ORTHOPAEDIC SURGERY

## 2020-11-24 PROCEDURE — 72050 X-RAY EXAM NECK SPINE 4/5VWS: CPT | Mod: TC

## 2020-11-24 NOTE — H&P (VIEW-ONLY)
DATE: 2020  PATIENT: Betzaida Neumann    Supervising Physician: Samm Nguyen M.D.    CHIEF COMPLAINT: neck and bilateral arm pain    HISTORY:  Betzaida Neumann is a 80 y.o. female with a hx of RA here for initial evaluation of neck and bilateral arm pain (R>L) (Neck - 10, Arm - 10). The pain has been present for years, worsening recently. The patient describes the pain as sharp and shooting down her R arm. There is no specific pattern to the pain and it is constant. There is positive associated numbness and tingling. There is positive subjective weakness, pt reports sometimes her R arm is in so much pain she feels like she cant move it. Prior treatments have included NSAIDs, lyrica, gabapentin, but no PT, ESIs, surgery.     The patient ENDORSES myelopathic symptoms occasionally such as handwriting changes or difficulty with buttons/coins/keys. Denies perineal paresthesias, bowel/bladder dysfunction.    PAST MEDICAL/SURGICAL HISTORY:  Past Medical History:   Diagnosis Date    Allergy     Arthritis     Cataract     CKD (chronic kidney disease) stage 3, GFR 30-59 ml/min     Fibromyalgia     GERD (gastroesophageal reflux disease)     Hyperlipidemia     Hypertension     Polymyalgia rheumatica     RA (rheumatoid arthritis)      Past Surgical History:   Procedure Laterality Date    APPENDECTOMY      CATARACT EXTRACTION W/  INTRAOCULAR LENS IMPLANT Left     Dr. Cedeño     CATARACT EXTRACTION W/  INTRAOCULAR LENS IMPLANT Right 2017    Dr. Sena     SECTION      x2    CHOLECYSTECTOMY      COSMETIC SURGERY      Rhinoplasty    ESOPHAGEAL MANOMETRY WITH MEASUREMENT OF IMPEDANCE N/A 2019    Procedure: MANOMETRY, ESOPHAGUS, WITH IMPEDANCE MEASUREMENT;  Surgeon: Roger De Luna MD;  Location: [address]);  Service: Endoscopy;  Laterality: N/A;  Prep instructions mailed to Pt - ERW    ESOPHAGOGASTRODUODENOSCOPY N/A 2019    Procedure: EGD (ESOPHAGOGASTRODUODENOSCOPY);   Surgeon: Carlos Rodgers MD;  Location: [facility] ([address] [place] [address]);  Service: Endoscopy;  Laterality: N/A;    EYE SURGERY      left eye Lens Placed    HYSTERECTOMY      RHINOPLASTY TIP  1980's    TONSILLECTOMY      TUBAL LIGATION         Medications:  Current Outpatient Medications on File Prior to Visit   Medication Sig Dispense Refill    albuterol (PROVENTIL) 2.5 mg /3 mL (0.083 %) nebulizer solution Take 2.5 mg by nebulization every 6 (six) hours as needed for Wheezing. Rescue      allopurinoL (ZYLOPRIM) 100 MG tablet 200 MG DAILY 60 tablet 5    amLODIPine (NORVASC) 5 MG tablet Take 1 tablet (5 mg total) by mouth once daily. 90 tablet 3    cetirizine (ZYRTEC) 10 MG tablet Take 10 mg by mouth once daily.      ciclopirox (PENLAC) 8 % Soln Apply topically nightly. 6.6 mL 11    colchicine (COLCRYS) 0.6 mg tablet Take 1 tablet (0.6 mg total) by mouth once daily. 30 tablet 5    diphenhydrAMINE (BENADRYL) 25 mg capsule Take 25 mg by mouth every 6 (six) hours as needed for Itching.      DULoxetine (CYMBALTA) 30 MG capsule 30 mg daily 30 capsule 5    estradiol (ESTRACE) 2 MG tablet estradiol 2 mg tablet   Take 1 tablet every day by oral route.      fexofenadine (ALLERGY RELIEF, FEXOFENADINE,) 180 MG tablet Take 180 mg by mouth once daily.      fluticasone (FLONASE) 50 mcg/actuation nasal spray fluticasone 50 mcg/actuation nasal spray,suspension      fluticasone-vilanterol (BREO) 200-25 mcg/dose DsDv diskus inhaler Inhale 1 puff into the lungs once daily. Controller      hydroCHLOROthiazide (HYDRODIURIL) 25 MG tablet TAKE 1 TABLET(25 MG) BY MOUTH EVERY DAY 30 tablet 1    ipratropium (ATROVENT) 42 mcg (0.06 %) nasal spray USE 2 SPRAYS IN EACH NOSTRIL BID PRF DRIPPING      ipratropium/albuterol sulfate (IPRATROPIUM-ALBUTEROL INHL) Inhale into the lungs as needed.      levocetirizine (XYZAL) 5 MG tablet       losartan (COZAAR) 100 MG tablet Take 1 tablet (100 mg total) by mouth once daily. 90 tablet 3     metoprolol tartrate (LOPRESSOR) 100 MG tablet Take 1 tablet (100 mg total) by mouth 2 (two) times daily. 180 tablet 3    montelukast (SINGULAIR) 10 mg tablet Take 10 mg by mouth every evening.      mupirocin (BACTROBAN) 2 % ointment Apply to affected area 3 times daily 22 g 1    omeprazole (PRILOSEC) 40 MG capsule TAKE 1 CAPSULE(40 MG) BY MOUTH EVERY DAY 30 capsule 1    predniSONE (DELTASONE) 2.5 MG tablet Take 1 tablet (2.5 mg total) by mouth once daily. 30 tablet 5    simvastatin (ZOCOR) 20 MG tablet simvastatin 20 mg tablet      SYMBICORT 160-4.5 mcg/actuation HFAA INHALE 2 PUFFS PO BID      terbinafine HCL (LAMISIL) 250 mg tablet terbinafine HCl 250 mg tablet      tofacitinib (XELJANZ) 5 mg Tab Take 5 mg by mouth 2 (two) times a day. 60 tablet 11     No current facility-administered medications on file prior to visit.        Social History:   Social History     Socioeconomic History    Marital status: Single     Spouse name: Not on file    Number of children: Not on file    Years of education: Not on file    Highest education level: Not on file   Occupational History    Not on file   Social Needs    Financial resource strain: Not on file    Food insecurity     Worry: Not on file     Inability: Not on file    Transportation needs     Medical: Not on file     Non-medical: Not on file   Tobacco Use    Smoking status: Former Smoker     Packs/day: 1.00     Years: 20.00     Pack years: 20.00     Start date: 1970     Quit date: 1999     Years since quittin.2    Smokeless tobacco: Never Used    Tobacco comment: Retired ; ; 4 children healthy   Substance and Sexual Activity    Alcohol use: Yes     Alcohol/week: 1.0 standard drinks     Types: 1 Glasses of wine per week     Comment: social    Drug use: No    Sexual activity: Yes     Partners: Male   Lifestyle    Physical activity     Days per week: Not on file     Minutes per session: Not on file    Stress:  "Not on file   Relationships    Social connections     Talks on phone: Not on file     Gets together: Not on file     Attends Rastafarian service: Not on file     Active member of club or organization: Not on file     Attends meetings of clubs or organizations: Not on file     Relationship status: Not on file   Other Topics Concern    Not on file   Social History Narrative    Not on file       REVIEW OF SYSTEMS:  Constitution: Negative. Negative for chills, fever and night sweats.   Cardiovascular: Negative for chest pain and syncope.   Respiratory: Negative for cough and shortness of breath.   Gastrointestinal: See HPI. Negative for nausea/vomiting. Negative for abdominal pain.  Genitourinary: See HPI. Negative for discoloration or dysuria.  Skin: Negative for dry skin, itching and rash.   Hematologic/Lymphatic: Negative for bleeding problem. Does not bruise/bleed easily.   Musculoskeletal: Negative for falls and muscle weakness.   Neurological: See HPI. No seizures.   Endocrine: Negative for polydipsia, polyphagia and polyuria.   Allergic/Immunologic: Negative for hives and persistent infections.  Psychiatric/Behavioral: Negative for depression and insomnia.         EXAM:  Ht 5' 2" (1.575 m)   Wt 76.3 kg (168 lb 3.4 oz)   BMI 30.77 kg/m²     General: The patient is a very pleasant 80 y.o. female in no apparent distress, the patient is oriented to person, place and time.  Psych: Normal mood and affect  HEENT: Vision grossly intact, hearing intact to the spoken word.  Lungs: Respirations unlabored.  Gait: Normal station and gait, no difficulty with toe or heel walk.   Skin: Cervical skin negative for rashes, lesions, hairy patches and surgical scars.  Range of motion: Cervical range of motion is acceptable. There is positive generalized tenderness to palpation.  Spinal Balance: Global saggital and coronal spinal balance acceptable, no significant for scoliosis and kyphosis.  Musculoskeletal: No pain with the range " of motion of the bilateral shoulders and elbows. Normal bulk and contour of the bilateral hands.  Vascular: Bilateral hands warm and well perfused, radial pulses 2+ bilaterally.  Neurological: Diminished strength and tone in all major motor groups in the RUE secondary to pain in R arm. Normal strength/tone in LLE and bilateral lower extremities. Normal sensation to light touch in the C5-T1 and L2-S1 dermatomes bilaterally.  Deep tendon reflexes symmetric 3+ in the bilateral upper extremities.  Equivocal Inverted Radial Reflex and negative Cartagena's bilaterally. Negative Babinski bilaterally.     IMAGING:   Today I personally reviewed AP, Lat and Flex/Ex  upright C-spine films that demonstrate significant disc space narrowing at C3-4 with mild retrolisthesis of C4 on C5.    Multilevel degenerative changes, mild-moderate bilateral neural foraminal narrowing at C3-4, mild central canal stenosis at C4-5, and moderate central canal stenosis/severe left lateral canal stenosis at C5-6.     Body mass index is 30.77 kg/m².    Hemoglobin A1C   Date Value Ref Range Status   10/20/2017 5.3 4.0 - 5.6 % Final     Comment:     According to ADA guidelines, hemoglobin A1c <7.0% represents  optimal control in non-pregnant diabetic patients. Different  metrics may apply to specific patient populations.   Standards of Medical Care in Diabetes-2016.  For the purpose of screening for the presence of diabetes:  <5.7%     Consistent with the absence of diabetes  5.7-6.4%  Consistent with increasing risk for diabetes   (prediabetes)  >or=6.5%  Consistent with diabetes  Currently, no consensus exists for use of hemoglobin A1c  for diagnosis of diabetes for children.  This Hemoglobin A1c assay has significant interference with fetal   hemoglobin   (HbF). The results are invalid for patients with abnormal amounts of   HbF,   including those with known Hereditary Persistence   of Fetal Hemoglobin. Heterozygous hemoglobin variants (HbAS, HbAC,    HbAD, HbAE, HbA2) do not significantly interfere with this assay;   however, presence of multiple variants in a sample may impact the %   interference.     01/14/2016 5.2 4.5 - 6.2 % Final           ASSESSMENT/PLAN:    Betzaida was seen today for neck pain and arm pain.    Diagnoses and all orders for this visit:    Osteoarthritis of cervical spine, unspecified spinal osteoarthritis complication status  -     [facility] referral[facility]        Today we discussed at length all of the different treatment options including anti-inflammatories, acetaminophen, rest, ice, heat, physical therapy including strengthening and stretching exercises, home exercises, ROM, aerobic conditioning, aqua therapy, other modalities including ultrasound, massage, and dry needling, epidural steroid injections and finally surgical intervention.      I had a sit down discussion with the patient and daughter regarding cervical myelopathy. I discussed the known stepwise degeneration of cervical myelopathy. I discussed the risks and benefits of decompressive surgery, including the concept that surgery would be done to prevent further neurological injury/degeneration rather than to ameliorate any existing deficits.       Will order bilateral C3-C4 TFESI with pain management at [facility]. Pt will f/u in 3 weeks with Mary Alvarez PA-C to discuss surgery, possibly C3-C6 fusion.

## 2020-11-24 NOTE — LETTER
November 24, 2020      Gagan Alvarez MD  [address] [facility] [address]           [id number]  [address]  [place] [address]  Phone: [phone]          Patient: Betzaida Neumann   MR Number: [id number]   YOB: 1940   Date of Visit: 11/24/2020       Dear Dr. Gagan Alvarez:    Thank you for referring Betzaida Neumann to me for evaluation. Attached you will find relevant portions of my assessment and plan of care.    If you have questions, please do not hesitate to call me. I look forward to following Betzaida Neumann along with you.    Sincerely,    Eulalia Wright PA-C    Enclosure  CC:  No Recipients    If you would like to receive this communication electronically, please contact [email or web address] or [phone] to request more information on [employer] access.    For providers and/or their staff who would like to refer a patient to [facility], please contact us through our one-stop-shop provider referral line, [facility], at [phone].    If you feel you have received this communication in error or would no longer like to receive these types of communications, please e-mail [email or web address]

## 2020-11-24 NOTE — PROGRESS NOTES
DATE: 2020  PATIENT: Betzaida Neumann    Supervising Physician: Samm Nguyen M.D.    CHIEF COMPLAINT: neck and bilateral arm pain    HISTORY:  Betzaida Neumann is a 80 y.o. female with a hx of RA here for initial evaluation of neck and bilateral arm pain (R>L) (Neck - 10, Arm - 10). The pain has been present for years, worsening recently. The patient describes the pain as sharp and shooting down her R arm. There is no specific pattern to the pain and it is constant. There is positive associated numbness and tingling. There is positive subjective weakness, pt reports sometimes her R arm is in so much pain she feels like she cant move it. Prior treatments have included NSAIDs, lyrica, gabapentin, but no PT, ESIs, surgery.     The patient ENDORSES myelopathic symptoms occasionally such as handwriting changes or difficulty with buttons/coins/keys. Denies perineal paresthesias, bowel/bladder dysfunction.    PAST MEDICAL/SURGICAL HISTORY:  Past Medical History:   Diagnosis Date    Allergy     Arthritis     Cataract     CKD (chronic kidney disease) stage 3, GFR 30-59 ml/min     Fibromyalgia     GERD (gastroesophageal reflux disease)     Hyperlipidemia     Hypertension     Polymyalgia rheumatica     RA (rheumatoid arthritis)      Past Surgical History:   Procedure Laterality Date    APPENDECTOMY      CATARACT EXTRACTION W/  INTRAOCULAR LENS IMPLANT Left     Dr. Cedeño     CATARACT EXTRACTION W/  INTRAOCULAR LENS IMPLANT Right 2017    Dr. Sena     SECTION      x2    CHOLECYSTECTOMY      COSMETIC SURGERY      Rhinoplasty    ESOPHAGEAL MANOMETRY WITH MEASUREMENT OF IMPEDANCE N/A 2019    Procedure: MANOMETRY, ESOPHAGUS, WITH IMPEDANCE MEASUREMENT;  Surgeon: Roger De Luna MD;  Location: [address]);  Service: Endoscopy;  Laterality: N/A;  Prep instructions mailed to Pt - ERW    ESOPHAGOGASTRODUODENOSCOPY N/A 2019    Procedure: EGD (ESOPHAGOGASTRODUODENOSCOPY);   Surgeon: Carlos Rodgers MD;  Location: [facility] ([address] [place] [address]);  Service: Endoscopy;  Laterality: N/A;    EYE SURGERY      left eye Lens Placed    HYSTERECTOMY      RHINOPLASTY TIP  1980's    TONSILLECTOMY      TUBAL LIGATION         Medications:  Current Outpatient Medications on File Prior to Visit   Medication Sig Dispense Refill    albuterol (PROVENTIL) 2.5 mg /3 mL (0.083 %) nebulizer solution Take 2.5 mg by nebulization every 6 (six) hours as needed for Wheezing. Rescue      allopurinoL (ZYLOPRIM) 100 MG tablet 200 MG DAILY 60 tablet 5    amLODIPine (NORVASC) 5 MG tablet Take 1 tablet (5 mg total) by mouth once daily. 90 tablet 3    cetirizine (ZYRTEC) 10 MG tablet Take 10 mg by mouth once daily.      ciclopirox (PENLAC) 8 % Soln Apply topically nightly. 6.6 mL 11    colchicine (COLCRYS) 0.6 mg tablet Take 1 tablet (0.6 mg total) by mouth once daily. 30 tablet 5    diphenhydrAMINE (BENADRYL) 25 mg capsule Take 25 mg by mouth every 6 (six) hours as needed for Itching.      DULoxetine (CYMBALTA) 30 MG capsule 30 mg daily 30 capsule 5    estradiol (ESTRACE) 2 MG tablet estradiol 2 mg tablet   Take 1 tablet every day by oral route.      fexofenadine (ALLERGY RELIEF, FEXOFENADINE,) 180 MG tablet Take 180 mg by mouth once daily.      fluticasone (FLONASE) 50 mcg/actuation nasal spray fluticasone 50 mcg/actuation nasal spray,suspension      fluticasone-vilanterol (BREO) 200-25 mcg/dose DsDv diskus inhaler Inhale 1 puff into the lungs once daily. Controller      hydroCHLOROthiazide (HYDRODIURIL) 25 MG tablet TAKE 1 TABLET(25 MG) BY MOUTH EVERY DAY 30 tablet 1    ipratropium (ATROVENT) 42 mcg (0.06 %) nasal spray USE 2 SPRAYS IN EACH NOSTRIL BID PRF DRIPPING      ipratropium/albuterol sulfate (IPRATROPIUM-ALBUTEROL INHL) Inhale into the lungs as needed.      levocetirizine (XYZAL) 5 MG tablet       losartan (COZAAR) 100 MG tablet Take 1 tablet (100 mg total) by mouth once daily. 90 tablet 3     metoprolol tartrate (LOPRESSOR) 100 MG tablet Take 1 tablet (100 mg total) by mouth 2 (two) times daily. 180 tablet 3    montelukast (SINGULAIR) 10 mg tablet Take 10 mg by mouth every evening.      mupirocin (BACTROBAN) 2 % ointment Apply to affected area 3 times daily 22 g 1    omeprazole (PRILOSEC) 40 MG capsule TAKE 1 CAPSULE(40 MG) BY MOUTH EVERY DAY 30 capsule 1    predniSONE (DELTASONE) 2.5 MG tablet Take 1 tablet (2.5 mg total) by mouth once daily. 30 tablet 5    simvastatin (ZOCOR) 20 MG tablet simvastatin 20 mg tablet      SYMBICORT 160-4.5 mcg/actuation HFAA INHALE 2 PUFFS PO BID      terbinafine HCL (LAMISIL) 250 mg tablet terbinafine HCl 250 mg tablet      tofacitinib (XELJANZ) 5 mg Tab Take 5 mg by mouth 2 (two) times a day. 60 tablet 11     No current facility-administered medications on file prior to visit.        Social History:   Social History     Socioeconomic History    Marital status: Single     Spouse name: Not on file    Number of children: Not on file    Years of education: Not on file    Highest education level: Not on file   Occupational History    Not on file   Social Needs    Financial resource strain: Not on file    Food insecurity     Worry: Not on file     Inability: Not on file    Transportation needs     Medical: Not on file     Non-medical: Not on file   Tobacco Use    Smoking status: Former Smoker     Packs/day: 1.00     Years: 20.00     Pack years: 20.00     Start date: 1970     Quit date: 1999     Years since quittin.2    Smokeless tobacco: Never Used    Tobacco comment: Retired ; ; 4 children healthy   Substance and Sexual Activity    Alcohol use: Yes     Alcohol/week: 1.0 standard drinks     Types: 1 Glasses of wine per week     Comment: social    Drug use: No    Sexual activity: Yes     Partners: Male   Lifestyle    Physical activity     Days per week: Not on file     Minutes per session: Not on file    Stress:  "Not on file   Relationships    Social connections     Talks on phone: Not on file     Gets together: Not on file     Attends [employer] service: Not on file     Active member of club or organization: Not on file     Attends meetings of clubs or organizations: Not on file     Relationship status: Not on file   Other Topics Concern    Not on file   Social History Narrative    Not on file       REVIEW OF SYSTEMS:  Constitution: Negative. Negative for chills, fever and night sweats.   Cardiovascular: Negative for chest pain and syncope.   Respiratory: Negative for cough and shortness of breath.   Gastrointestinal: See HPI. Negative for nausea/vomiting. Negative for abdominal pain.  Genitourinary: See HPI. Negative for discoloration or dysuria.  Skin: Negative for dry skin, itching and rash.   Hematologic/Lymphatic: Negative for bleeding problem. Does not bruise/bleed easily.   Musculoskeletal: Negative for falls and muscle weakness.   Neurological: See HPI. No seizures.   Endocrine: Negative for polydipsia, polyphagia and polyuria.   Allergic/Immunologic: Negative for hives and persistent infections.  Psychiatric/Behavioral: Negative for depression and insomnia.         EXAM:  Ht 5' 2" (1.575 m)   Wt 76.3 kg (168 lb 3.4 oz)   BMI 30.77 kg/m²     General: The patient is a very pleasant 80 y.o. female in no apparent distress, the patient is oriented to person, place and time.  Psych: Normal mood and affect  HEENT: Vision grossly intact, hearing intact to the spoken word.  Lungs: Respirations unlabored.  Gait: Normal station and gait, no difficulty with toe or heel walk.   Skin: Cervical skin negative for rashes, lesions, hairy patches and surgical scars.  Range of motion: Cervical range of motion is acceptable. There is positive generalized tenderness to palpation.  Spinal Balance: Global saggital and coronal spinal balance acceptable, no significant for scoliosis and kyphosis.  Musculoskeletal: No pain with the range " of motion of the bilateral shoulders and elbows. Normal bulk and contour of the bilateral hands.  Vascular: Bilateral hands warm and well perfused, radial pulses 2+ bilaterally.  Neurological: Diminished strength and tone in all major motor groups in the RUE secondary to pain in R arm. Normal strength/tone in LLE and bilateral lower extremities. Normal sensation to light touch in the C5-T1 and L2-S1 dermatomes bilaterally.  Deep tendon reflexes symmetric 3+ in the bilateral upper extremities.  Equivocal Inverted Radial Reflex and negative Cartagena's bilaterally. Negative Babinski bilaterally.     IMAGING:   Today I personally reviewed AP, Lat and Flex/Ex  upright C-spine films that demonstrate significant disc space narrowing at C3-4 with mild retrolisthesis of C4 on C5.    Multilevel degenerative changes, mild-moderate bilateral neural foraminal narrowing at C3-4, mild central canal stenosis at C4-5, and moderate central canal stenosis/severe left lateral canal stenosis at C5-6.     Body mass index is 30.77 kg/m².    Hemoglobin A1C   Date Value Ref Range Status   10/20/2017 5.3 4.0 - 5.6 % Final     Comment:     According to ADA guidelines, hemoglobin A1c <7.0% represents  optimal control in non-pregnant diabetic patients. Different  metrics may apply to specific patient populations.   Standards of Medical Care in Diabetes-2016.  For the purpose of screening for the presence of diabetes:  <5.7%     Consistent with the absence of diabetes  5.7-6.4%  Consistent with increasing risk for diabetes   (prediabetes)  >or=6.5%  Consistent with diabetes  Currently, no consensus exists for use of hemoglobin A1c  for diagnosis of diabetes for children.  This Hemoglobin A1c assay has significant interference with fetal   hemoglobin   (HbF). The results are invalid for patients with abnormal amounts of   HbF,   including those with known Hereditary Persistence   of Fetal Hemoglobin. Heterozygous hemoglobin variants (HbAS, HbAC,    HbAD, HbAE, HbA2) do not significantly interfere with this assay;   however, presence of multiple variants in a sample may impact the %   interference.     01/14/2016 5.2 4.5 - 6.2 % Final           ASSESSMENT/PLAN:    Betzaida was seen today for neck pain and arm pain.    Diagnoses and all orders for this visit:    Osteoarthritis of cervical spine, unspecified spinal osteoarthritis complication status  -     Ambulatory referral[facility]        Today we discussed at length all of the different treatment options including anti-inflammatories, acetaminophen, rest, ice, heat, physical therapy including strengthening and stretching exercises, home exercises, ROM, aerobic conditioning, aqua therapy, other modalities including ultrasound, massage, and dry needling, epidural steroid injections and finally surgical intervention.      I had a sit down discussion with the patient and daughter regarding cervical myelopathy. I discussed the known stepwise degeneration of cervical myelopathy. I discussed the risks and benefits of decompressive surgery, including the concept that surgery would be done to prevent further neurological injury/degeneration rather than to ameliorate any existing deficits.       Will order bilateral C3-C4 TFESI with pain management at [facility]. Pt will f/u in 3 weeks with Mary Alvarez PA-C to discuss surgery, possibly C3-C6 fusion.

## 2020-11-25 ENCOUNTER — PATIENT MESSAGE (OUTPATIENT)
Dept: PAIN MEDICINE | Facility: OTHER | Age: 80
End: 2020-11-25

## 2020-11-25 ENCOUNTER — TELEPHONE (OUTPATIENT)
Dept: PAIN MEDICINE | Facility: CLINIC | Age: 80
End: 2020-11-25

## 2020-11-25 ENCOUNTER — TELEPHONE (OUTPATIENT)
Dept: PAIN MEDICINE | Facility: OTHER | Age: 80
End: 2020-11-25

## 2020-11-25 DIAGNOSIS — M54.12 CERVICAL RADICULOPATHY: Primary | ICD-10-CM

## 2020-11-25 NOTE — TELEPHONE ENCOUNTER
----- Message from Melody Saba sent at 11/25/2020 11:37 AM CST -----  Contact: MOISES WORTHY [[id number]]  Type:  Patient Returning Call Who Called:  MOISES WORTHY [[id number]] Who Left Message for Patient: Ria Does the patient know what this is regarding?: yes Would the patient rather a call back or a response via [facility]?  Call back Best Call Back Number:[phone] (mobile)   Additional Information:

## 2020-12-08 ENCOUNTER — PATIENT MESSAGE (OUTPATIENT)
Dept: PAIN MEDICINE | Facility: OTHER | Age: 80
End: 2020-12-08

## 2020-12-10 ENCOUNTER — HOSPITAL ENCOUNTER (OUTPATIENT)
Facility: OTHER | Age: 80
Discharge: HOME | End: 2020-12-10
Attending: ANESTHESIOLOGY | Admitting: ANESTHESIOLOGY
Payer: MEDICARE

## 2020-12-10 VITALS
RESPIRATION RATE: 16 BRPM | DIASTOLIC BLOOD PRESSURE: 67 MMHG | HEIGHT: 62 IN | BODY MASS INDEX: 30.91 KG/M2 | TEMPERATURE: 99 F | SYSTOLIC BLOOD PRESSURE: 134 MMHG | HEART RATE: 83 BPM | WEIGHT: 168 LBS | OXYGEN SATURATION: 99 %

## 2020-12-10 DIAGNOSIS — M54.12 CERVICAL RADICULOPATHY: Primary | ICD-10-CM

## 2020-12-10 PROCEDURE — 63600175 PHARM REV CODE 636 W HCPCS: Performed by: ANESTHESIOLOGY

## 2020-12-10 PROCEDURE — 62321 PR INJ CERV/THORAC, W/GUIDANCE: ICD-10-PCS | Mod: ,,, | Performed by: ANESTHESIOLOGY

## 2020-12-10 PROCEDURE — 25500020 PHARM REV CODE 255: Performed by: ANESTHESIOLOGY

## 2020-12-10 PROCEDURE — 62321 NJX INTERLAMINAR CRV/THRC: CPT | Performed by: ANESTHESIOLOGY

## 2020-12-10 PROCEDURE — A4216 STERILE WATER/SALINE, 10 ML: HCPCS | Performed by: ANESTHESIOLOGY

## 2020-12-10 PROCEDURE — 62321 NJX INTERLAMINAR CRV/THRC: CPT | Mod: ,,, | Performed by: ANESTHESIOLOGY

## 2020-12-10 PROCEDURE — 25000003 PHARM REV CODE 250: Performed by: ANESTHESIOLOGY

## 2020-12-10 RX ORDER — SODIUM CHLORIDE 9 MG/ML
INJECTION, SOLUTION INTRAVENOUS CONTINUOUS
Status: DISCONTINUED | OUTPATIENT
Start: 2020-12-10 | End: 2020-12-10 | Stop reason: HOSPADM

## 2020-12-10 RX ORDER — SODIUM CHLORIDE 9 MG/ML
INJECTION, SOLUTION INTRAMUSCULAR; INTRAVENOUS; SUBCUTANEOUS
Status: DISCONTINUED | OUTPATIENT
Start: 2020-12-10 | End: 2020-12-10 | Stop reason: HOSPADM

## 2020-12-10 RX ORDER — DEXAMETHASONE SODIUM PHOSPHATE 100 MG/10ML
INJECTION INTRAMUSCULAR; INTRAVENOUS
Status: DISCONTINUED | OUTPATIENT
Start: 2020-12-10 | End: 2020-12-10 | Stop reason: HOSPADM

## 2020-12-10 RX ORDER — LIDOCAINE HYDROCHLORIDE 10 MG/ML
INJECTION, SOLUTION EPIDURAL; INFILTRATION; INTRACAUDAL; PERINEURAL
Status: DISCONTINUED | OUTPATIENT
Start: 2020-12-10 | End: 2020-12-10 | Stop reason: HOSPADM

## 2020-12-10 RX ORDER — FENTANYL CITRATE 50 UG/ML
INJECTION, SOLUTION INTRAMUSCULAR; INTRAVENOUS
Status: DISCONTINUED | OUTPATIENT
Start: 2020-12-10 | End: 2020-12-10 | Stop reason: HOSPADM

## 2020-12-10 RX ORDER — LIDOCAINE HYDROCHLORIDE 10 MG/ML
INJECTION INFILTRATION; PERINEURAL
Status: DISCONTINUED | OUTPATIENT
Start: 2020-12-10 | End: 2020-12-10 | Stop reason: HOSPADM

## 2020-12-10 RX ORDER — MIDAZOLAM HYDROCHLORIDE 1 MG/ML
INJECTION INTRAMUSCULAR; INTRAVENOUS
Status: DISCONTINUED | OUTPATIENT
Start: 2020-12-10 | End: 2020-12-10 | Stop reason: HOSPADM

## 2020-12-10 RX ADMIN — SODIUM CHLORIDE 25 ML/HR: 0.9 INJECTION, SOLUTION INTRAVENOUS at 12:12

## 2020-12-10 NOTE — OP NOTE
Cervical Interlaminar Epidural Steroid Injection under fluoroscopic guidance.  Time-out taken to identify patient and procedure side prior to starting the procedure.   I attest that I have reviewed the patient's home medications prior to the procedure and no contraindication have been identified. I  re-evaluated the patient after the patient was positioned for the procedure in the procedure room immediately before the procedural time-out. The vital signs are current and represent the current state of the patient which has not significantly changed since the preprocedure assessment.                                                              Date of Service: 12/10/2020    PCP: Moustapha English MD    Referring Physician:    PROCEDURE: C7-T1 cervical interlaminar epidural steroid injection under fluoroscopy.  REASONS FOR PROCEDURE: Cervical radiculopathy [M54.12]  1. Cervical radiculopathy      POSTOP DIAGNOSIS:  Cervical radiculopathy [M54.12]     1. Cervical radiculopathy      PHYSICIAN: Inga Blackburn MD  ASSISTANTS: Roger Mariano MD Resident      MEDICATIONS INJECTED:  Preservative-free dexamethasone 10mg and Xylocaine 1% MPF 1ml.  This was followed by a slow injection of 4 mL sterile, preservative-free normal saline.    LOCAL ANESTHETIC USED: Xylocaine 1% 9ml with Sodium Bicarbonate 1ml.     SEDATION MEDICATIONS: local/IV sedation: Versed 2 mg and fentanyl 25 mcg IV.  Conscious sedation ordered by MD.  Patient reevaluated and sedation administered by MD and monitored by RN.  Total sedation time was less than 6 min. (See nurse documentation and case log for sedation time)    COMPLICATIONS:  none.    ESTIMATED BLOOD LOSS: none.    TECHNIQUE:  With the patient laying in a prone position with the neck in a flexed forward position, the area was prepped and draped in the usual sterile fashion using ChloraPrep and a fenestrated drape.  The area was determined under fluoroscopic guidance.  Local anesthetic was given  using a 27-gauge needle by raising a wheal and going down to the osseous elements of the cervical spine.  A 3.5 inch 20-gauge Touhy needle was introduced under fluoroscopic guidance to meet the lamina of T1.  The needle was then hinged under the lamina then advanced using loss of resistance technique.  Once the tip of the needle was in the desired position, the contrast dye Omnipaque was injected to determine placement and no vascular runoff.  Digital subtraction was employed to confirm that there is no vascular runoff.  The steroid was then injected slowly followed by a slow injection of the 4 mL of the sterile preservative-free normal saline.  The patient tolerated the procedure well.    PAIN BEFORE THE PROCEDURE: 10/10    PAIN AFTER THE PROCEDURE: 0/10    The patient was monitored after the procedure and was given post-procedure and discharge instructions to follow at home. The patient was discharged in a stable condition

## 2020-12-10 NOTE — BRIEF OP NOTE
Discharge Note  Short Stay      SUMMARY     Admit Date: 12/10/2020    Attending Physician: Inga Blackburn      Discharge Physician: Inga Blackburn      Discharge Date: 12/10/2020 12:11 PM    Procedure(s) (LRB):  CERVICAL  DORIS DIRECT REFERRAL (Bilateral)    Final Diagnosis: Cervical radiculopathy [M54.12]    Disposition: Home or self care    Patient Instructions:   Current Discharge Medication List      CONTINUE these medications which have NOT CHANGED    Details   albuterol (PROVENTIL) 2.5 mg /3 mL (0.083 %) nebulizer solution Take 2.5 mg by nebulization every 6 (six) hours as needed for Wheezing. Rescue      allopurinoL (ZYLOPRIM) 100 MG tablet 200 MG DAILY  Qty: 60 tablet, Refills: 5      amLODIPine (NORVASC) 5 MG tablet Take 1 tablet (5 mg total) by mouth once daily.  Qty: 90 tablet, Refills: 3      cetirizine (ZYRTEC) 10 MG tablet Take 10 mg by mouth once daily.      ciclopirox (PENLAC) 8 % Soln Apply topically nightly.  Qty: 6.6 mL, Refills: 11      colchicine (COLCRYS) 0.6 mg tablet Take 1 tablet (0.6 mg total) by mouth once daily.  Qty: 30 tablet, Refills: 5      diphenhydrAMINE (BENADRYL) 25 mg capsule Take 25 mg by mouth every 6 (six) hours as needed for Itching.      DULoxetine (CYMBALTA) 30 MG capsule 30 mg daily  Qty: 30 capsule, Refills: 5      estradiol (ESTRACE) 2 MG tablet estradiol 2 mg tablet   Take 1 tablet every day by oral route.      fexofenadine (ALLERGY RELIEF, FEXOFENADINE,) 180 MG tablet Take 180 mg by mouth once daily.      fluticasone (FLONASE) 50 mcg/actuation nasal spray fluticasone 50 mcg/actuation nasal spray,suspension      fluticasone-vilanterol (BREO) 200-25 mcg/dose DsDv diskus inhaler Inhale 1 puff into the lungs once daily. Controller      hydroCHLOROthiazide (HYDRODIURIL) 25 MG tablet TAKE 1 TABLET(25 MG) BY MOUTH EVERY DAY  Qty: 30 tablet, Refills: 1      ipratropium (ATROVENT) 42 mcg (0.06 %) nasal spray USE 2 SPRAYS IN EACH NOSTRIL BID PRF DRIPPING      ipratropium/albuterol  sulfate (IPRATROPIUM-ALBUTEROL INHL) Inhale into the lungs as needed.      levocetirizine (XYZAL) 5 MG tablet       losartan (COZAAR) 100 MG tablet Take 1 tablet (100 mg total) by mouth once daily.  Qty: 90 tablet, Refills: 3    Associated Diagnoses: Hypertension, essential      metoprolol tartrate (LOPRESSOR) 100 MG tablet Take 1 tablet (100 mg total) by mouth 2 (two) times daily.  Qty: 180 tablet, Refills: 3    Associated Diagnoses: Hypertension, essential      montelukast (SINGULAIR) 10 mg tablet Take 10 mg by mouth every evening.      mupirocin (BACTROBAN) 2 % ointment Apply to affected area 3 times daily  Qty: 22 g, Refills: 1    Associated Diagnoses: Laceration of skin of left hand, initial encounter      omeprazole (PRILOSEC) 40 MG capsule TAKE 1 CAPSULE(40 MG) BY MOUTH EVERY DAY  Qty: 30 capsule, Refills: 1      predniSONE (DELTASONE) 2.5 MG tablet Take 1 tablet (2.5 mg total) by mouth once daily.  Qty: 30 tablet, Refills: 5      simvastatin (ZOCOR) 20 MG tablet simvastatin 20 mg tablet      SYMBICORT 160-4.5 mcg/actuation HFAA INHALE 2 PUFFS PO BID      terbinafine HCL (LAMISIL) 250 mg tablet terbinafine HCl 250 mg tablet      tofacitinib (XELJANZ) 5 mg Tab Take 5 mg by mouth 2 (two) times a day.  Qty: 60 tablet, Refills: 11    Associated Diagnoses: Rheumatoid arthritis involving multiple sites with positive rheumatoid factor                 Discharge Diagnosis: Cervical radiculopathy [M54.12]  Condition on Discharge: Stable with no complications to procedure   Diet on Discharge: Same as before.  Activity: as per instruction sheet.  Discharge to: Home with a responsible adult.  Follow up: 2-4 weeks       Please call my office or pager at [phone] if experienced any weakness or loss of sensation, fever > 101.5, pain uncontrolled with oral medications, persistent nausea/vomiting/or diarrhea, redness or drainage from the incisions, or any other worrisome concerns. If physician on call was not reached or  could not communicate with our office for any reason please go to the nearest emergency department

## 2020-12-10 NOTE — DISCHARGE INSTRUCTIONS
Thank you for allowing us to care for you today. You may receive a survey about the care we provided. Your feedback is valuable and helps us provide excellent care throughout the community.     Home Care Instructions for Pain Management:    1. DIET:   You may resume your normal diet today.   2. BATHING:   You may shower with luke warm water. No tub baths or anything that will soak injection sites under water for the next 24 hours.  3. DRESSING:   You may remove your bandage today.   4. ACTIVITY LEVEL:   You may resume your normal activities 24 hrs after your procedure. Nothing strenuous today.  5. MEDICATIONS:   You may resume your normal medications today. To restart blood thinners, ask your doctor.  6. DRIVING    If you have received any sedatives by mouth today, you may not drive for 12 hours.    If you have received any sedation through your IV, you may not drive for 24 hrs.   7. SPECIAL INSTRUCTIONS:   No heat to the injection site for 24 hrs including, hot bath or shower, heating pad, moist heat, or hot tubs.    Use ice pack to injection site for any pain or discomfort.  Apply ice packs for 20 minute intervals as needed.    IF you have diabetes, be sure to monitor your blood sugar more closely. IF your injection contained steroids your blood sugar levels may become higher than normal.    If you are still having pain upon discharge:  Your pain may improve over the next 48 hours. The anesthetic (numbing medication) works immediately to 48 hours. IF your injection contained a steroid (anti-inflammatory medication), it takes approximately 3 days to start feeling relief and 7-10 days to see your greatest results from the medication. It is possible you may need subsequent injections. This would be discussed at your follow up appointment with pain management or your referring doctor.    Please call the PAIN MANAGEMENT office at [phone] or ON CALL pager at [phone] if you experienced any:   -Weakness or  loss of sensation  -Fever > 101.5  -Pain uncontrolled with oral medications   -Persistent nausea, vomiting, or diarrhea  -Redness or drainage from the injection sites, or any other worrisome concerns.   If physician on call was not reached or could not communicate with our office for any reason please go to the nearest emergency department.  Adult Procedural Sedation Instructions    Recovery After Procedural Sedation (Adult)  You have been given medicine by vein to make you sleep during your surgery. This may have included both a pain medicine and sleeping medicine. Most of the effects have worn off. But you may still have some drowsiness for the next 6 to 8 hours.  Home care  Follow these guidelines when you get home:  · For the next 8 hours, you should be watched by a responsible adult. This person should make sure your condition is not getting worse.  · Don't drink any alcohol for the next 24 hours.  · Don't drive, operate dangerous machinery, or make important business or personal decisions during the next 24 hours.  Note: Your healthcare provider may tell you not to take any medicine by mouth for pain or sleep in the next 4 hours. These medicines may react with the medicines you were given in the hospital. This could cause a much stronger response than usual.  Follow-up care  Follow up with your healthcare provider if you are not alert and back to your usual level of activity within 12 hours.  When to seek medical advice  Call your healthcare provider right away if any of these occur:  · Drowsiness gets worse  · Weakness or dizziness gets worse  · Repeated vomiting  · You can't be awakened   Date Last Reviewed: 10/18/2016  © 0587-8192 [employer]. [address] [place] [address], [place] [address]. All rights reserved. This information is not intended as a substitute for professional medical care. Always follow your healthcare professional's instructions.

## 2020-12-16 ENCOUNTER — TELEPHONE (OUTPATIENT)
Dept: ORTHOPEDICS | Facility: CLINIC | Age: 80
End: 2020-12-16

## 2020-12-16 ENCOUNTER — OFFICE VISIT (OUTPATIENT)
Dept: ORTHOPEDICS | Facility: CLINIC | Age: 80
End: 2020-12-16
Payer: MEDICARE

## 2020-12-16 VITALS — BODY MASS INDEX: 31.2 KG/M2 | WEIGHT: 169.56 LBS | HEIGHT: 62 IN

## 2020-12-16 DIAGNOSIS — M54.12 CERVICAL RADICULOPATHY: Primary | ICD-10-CM

## 2020-12-16 DIAGNOSIS — G95.9 CERVICAL MYELOPATHY: ICD-10-CM

## 2020-12-16 DIAGNOSIS — Z01.818 PRE-OP TESTING: ICD-10-CM

## 2020-12-16 PROCEDURE — 1125F AMNT PAIN NOTED PAIN PRSNT: CPT | Mod: S$GLB,,, | Performed by: PHYSICIAN ASSISTANT

## 2020-12-16 PROCEDURE — 99999 PR PBB SHADOW E&M-EST. PATIENT-LVL IV: CPT | Mod: PBBFAC,,, | Performed by: PHYSICIAN ASSISTANT

## 2020-12-16 PROCEDURE — 1159F PR MEDICATION LIST DOCUMENTED IN MEDICAL RECORD: ICD-10-PCS | Mod: S$GLB,,, | Performed by: PHYSICIAN ASSISTANT

## 2020-12-16 PROCEDURE — 3288F PR FALLS RISK ASSESSMENT DOCUMENTED: ICD-10-PCS | Mod: CPTII,S$GLB,, | Performed by: PHYSICIAN ASSISTANT

## 2020-12-16 PROCEDURE — 99999 PR PBB SHADOW E&M-EST. PATIENT-LVL IV: ICD-10-PCS | Mod: PBBFAC,,, | Performed by: PHYSICIAN ASSISTANT

## 2020-12-16 PROCEDURE — 99213 OFFICE O/P EST LOW 20 MIN: CPT | Mod: S$GLB,,, | Performed by: PHYSICIAN ASSISTANT

## 2020-12-16 PROCEDURE — 1101F PT FALLS ASSESS-DOCD LE1/YR: CPT | Mod: CPTII,S$GLB,, | Performed by: PHYSICIAN ASSISTANT

## 2020-12-16 PROCEDURE — 99213 PR OFFICE/OUTPT VISIT, EST, LEVL III, 20-29 MIN: ICD-10-PCS | Mod: S$GLB,,, | Performed by: PHYSICIAN ASSISTANT

## 2020-12-16 PROCEDURE — 1101F PR PT FALLS ASSESS DOC 0-1 FALLS W/OUT INJ PAST YR: ICD-10-PCS | Mod: CPTII,S$GLB,, | Performed by: PHYSICIAN ASSISTANT

## 2020-12-16 PROCEDURE — 1125F PR PAIN SEVERITY QUANTIFIED, PAIN PRESENT: ICD-10-PCS | Mod: S$GLB,,, | Performed by: PHYSICIAN ASSISTANT

## 2020-12-16 PROCEDURE — 3288F FALL RISK ASSESSMENT DOCD: CPT | Mod: CPTII,S$GLB,, | Performed by: PHYSICIAN ASSISTANT

## 2020-12-16 PROCEDURE — 1159F MED LIST DOCD IN RCRD: CPT | Mod: S$GLB,,, | Performed by: PHYSICIAN ASSISTANT

## 2020-12-16 NOTE — LETTER
December 18, 2020      Eulalia Wright PA-C  [address]  [facility] [address]           [facility] [address]  [address]  [facility] [address]  Phone: [phone]          Patient: Betzaida Neumann   MR Number: [id number]   YOB: 1940   Date of Visit: 12/16/2020       Dear Eulalia Wright:    Thank you for referring Betzaida Neumann to me for evaluation. Attached you will find relevant portions of my assessment and plan of care.    If you have questions, please do not hesitate to call me. I look forward to following Betzaida Neumann along with you.    Sincerely,    Mary Alvarez PA-C    Enclosure  CC:  No Recipients    If you would like to receive this communication electronically, please contact [email or web address] or [phone] to request more information on [employer]ync Link access.    For providers and/or their staff who would like to refer a patient to [facility], please contact us through our one-stop-shop provider referral line, [facility], at [phone].    If you feel you have received this communication in error or would no longer like to receive these types of communications, please e-mail [email or web address]

## 2020-12-17 NOTE — PROGRESS NOTES
"DATE: 12/17/2020  PATIENT: Betzaida Neumann    Attending Physician: Samm Nguyen M.D.    HISTORY:  Betzaida Neumann is a 80 y.o. female who returns to me today for follow up and to discuss surgical options..  She was last seen by Miesha 11/24.  Today she is doing well but notes she has neck and bilateral arm pain.  She reports myelopathic symptoms such as handwriting changes or difficulty with buttons/coins/keys. These symptoms have worsened over the last couple years. Denies perineal paresthesias, bowel/bladder dysfunction.    PMH/PSH/FamHx/SocHx:  Unchanged from prior visit    ROS:  REVIEW OF SYSTEMS:  Constitution: Negative. Negative for chills, fever and night sweats.   HENT: Negative for congestion and headaches.    Eyes: Negative for blurred vision, left vision loss and right vision loss.   Cardiovascular: Negative for chest pain and syncope.   Respiratory: Negative for cough and shortness of breath.    Endocrine: Negative for polydipsia, polyphagia and polyuria.   Hematologic/Lymphatic: Negative for bleeding problem. Does not bruise/bleed easily.   Skin: Negative for dry skin, itching and rash.   Musculoskeletal: Negative for falls and muscle weakness.   Gastrointestinal: Negative for abdominal pain and bowel incontinence.   Allergic/Immunologic: Negative for hives and persistent infections.  Genitourinary: Negative for urinary retention/incontinence and nocturia.   Neurological: No loss of balance and seizures.   Psychiatric/Behavioral: Negative for depression. The patient does not have insomnia.   Denies myelopathic symptoms, perineal paresthesias, bowel or bladder incontinence    EXAM:  Ht 5' 2" (1.575 m)   Wt 76.9 kg (169 lb 8.5 oz)   BMI 31.01 kg/m²     Physical exam stable. Neuro exam stable.  Positive inverted radial reflex bilaterally.  Negative butcher's.    IMAGING:  No new imaging today.    Today I personally re-reviewed AP, Lat and Flex/Ex  upright C-spine films that demonstrate significant " disc space narrowing at C3-4 with mild retrolisthesis of C4 on C5.     Multilevel degenerative changes, mild-moderate bilateral neural foraminal narrowing at C3-4, mild central canal stenosis at C4-5, and moderate central canal stenosis/severe left lateral canal stenosis at C5-6.     Body mass index is 31.01 kg/m².    Hemoglobin A1C   Date Value Ref Range Status   10/20/2017 5.3 4.0 - 5.6 % Final     Comment:     According to ADA guidelines, hemoglobin A1c <7.0% represents  optimal control in non-pregnant diabetic patients. Different  metrics may apply to specific patient populations.   Standards of Medical Care in Diabetes-2016.  For the purpose of screening for the presence of diabetes:  <5.7%     Consistent with the absence of diabetes  5.7-6.4%  Consistent with increasing risk for diabetes   (prediabetes)  >or=6.5%  Consistent with diabetes  Currently, no consensus exists for use of hemoglobin A1c  for diagnosis of diabetes for children.  This Hemoglobin A1c assay has significant interference with fetal   hemoglobin   (HbF). The results are invalid for patients with abnormal amounts of   HbF,   including those with known Hereditary Persistence   of Fetal Hemoglobin. Heterozygous hemoglobin variants (HbAS, HbAC,   HbAD, HbAE, HbA2) do not significantly interfere with this assay;   however, presence of multiple variants in a sample may impact the %   interference.     01/14/2016 5.2 4.5 - 6.2 % Final         ASSESSMENT/PLAN:    Betzaida was seen today for neck pain.    Diagnoses and all orders for this visit:    Cervical radiculopathy  -     CT Cervical Spine Without Contrast; Future    Cervical myelopathy  -     CT Cervical Spine Without Contrast; Future        Today we discussed at length all of the different treatment options including anti-inflammatories, acetaminophen, rest, ice, heat, physical therapy including strengthening and stretching exercises, home exercises, ROM, aerobic conditioning, aqua therapy,  other modalities including ultrasound, massage, and dry needling, epidural steroid injections and finally surgical intervention.      I had a sit down discussion with the patient and her daughter regarding cervical myelopathy. I discussed the known stepwise degeneration of cervical myelopathy. I discussed the risks and benefits of decompressive surgery, including the concept that surgery would be done to prevent further neurological injury/degeneration rather than to ameliorate any existing deficits.     Discussed with Dr. Nguyen.  We will get a CT scan to evaluate OPLL.  She is a candidate for either a C5 corpectomy or a posterior lami/fusion, depending on CT scan.  We can do this 1/21. Will schedule preop.

## 2020-12-21 ENCOUNTER — OFFICE VISIT (OUTPATIENT)
Dept: GASTROENTEROLOGY | Facility: CLINIC | Age: 80
End: 2020-12-21
Payer: MEDICARE

## 2020-12-21 ENCOUNTER — SPECIALTY PHARMACY (OUTPATIENT)
Dept: PHARMACY | Facility: CLINIC | Age: 80
End: 2020-12-21

## 2020-12-21 VITALS
HEIGHT: 62 IN | HEART RATE: 82 BPM | WEIGHT: 170.88 LBS | DIASTOLIC BLOOD PRESSURE: 80 MMHG | BODY MASS INDEX: 31.45 KG/M2 | SYSTOLIC BLOOD PRESSURE: 170 MMHG

## 2020-12-21 DIAGNOSIS — Z86.010 HISTORY OF COLON POLYPS: Primary | ICD-10-CM

## 2020-12-21 DIAGNOSIS — K21.9 GASTROESOPHAGEAL REFLUX DISEASE, UNSPECIFIED WHETHER ESOPHAGITIS PRESENT: ICD-10-CM

## 2020-12-21 PROCEDURE — 3288F PR FALLS RISK ASSESSMENT DOCUMENTED: ICD-10-PCS | Mod: CPTII,S$GLB,, | Performed by: INTERNAL MEDICINE

## 2020-12-21 PROCEDURE — 1126F AMNT PAIN NOTED NONE PRSNT: CPT | Mod: S$GLB,,, | Performed by: INTERNAL MEDICINE

## 2020-12-21 PROCEDURE — 99214 OFFICE O/P EST MOD 30 MIN: CPT | Mod: S$GLB,,, | Performed by: INTERNAL MEDICINE

## 2020-12-21 PROCEDURE — 1159F MED LIST DOCD IN RCRD: CPT | Mod: S$GLB,,, | Performed by: INTERNAL MEDICINE

## 2020-12-21 PROCEDURE — 3077F PR MOST RECENT SYSTOLIC BLOOD PRESSURE >= 140 MM HG: ICD-10-PCS | Mod: CPTII,S$GLB,, | Performed by: INTERNAL MEDICINE

## 2020-12-21 PROCEDURE — 1159F PR MEDICATION LIST DOCUMENTED IN MEDICAL RECORD: ICD-10-PCS | Mod: S$GLB,,, | Performed by: INTERNAL MEDICINE

## 2020-12-21 PROCEDURE — 99214 PR OFFICE/OUTPT VISIT, EST, LEVL IV, 30-39 MIN: ICD-10-PCS | Mod: S$GLB,,, | Performed by: INTERNAL MEDICINE

## 2020-12-21 PROCEDURE — 3077F SYST BP >= 140 MM HG: CPT | Mod: CPTII,S$GLB,, | Performed by: INTERNAL MEDICINE

## 2020-12-21 PROCEDURE — 1101F PT FALLS ASSESS-DOCD LE1/YR: CPT | Mod: CPTII,S$GLB,, | Performed by: INTERNAL MEDICINE

## 2020-12-21 PROCEDURE — 99999 PR PBB SHADOW E&M-EST. PATIENT-LVL IV: CPT | Mod: PBBFAC,,, | Performed by: INTERNAL MEDICINE

## 2020-12-21 PROCEDURE — 1101F PR PT FALLS ASSESS DOC 0-1 FALLS W/OUT INJ PAST YR: ICD-10-PCS | Mod: CPTII,S$GLB,, | Performed by: INTERNAL MEDICINE

## 2020-12-21 PROCEDURE — 3288F FALL RISK ASSESSMENT DOCD: CPT | Mod: CPTII,S$GLB,, | Performed by: INTERNAL MEDICINE

## 2020-12-21 PROCEDURE — 3079F DIAST BP 80-89 MM HG: CPT | Mod: CPTII,S$GLB,, | Performed by: INTERNAL MEDICINE

## 2020-12-21 PROCEDURE — 99999 PR PBB SHADOW E&M-EST. PATIENT-LVL IV: ICD-10-PCS | Mod: PBBFAC,,, | Performed by: INTERNAL MEDICINE

## 2020-12-21 PROCEDURE — 1126F PR PAIN SEVERITY QUANTIFIED, NO PAIN PRESENT: ICD-10-PCS | Mod: S$GLB,,, | Performed by: INTERNAL MEDICINE

## 2020-12-21 PROCEDURE — 3079F PR MOST RECENT DIASTOLIC BLOOD PRESSURE 80-89 MM HG: ICD-10-PCS | Mod: CPTII,S$GLB,, | Performed by: INTERNAL MEDICINE

## 2020-12-21 RX ORDER — PREDNISONE 5 MG/1
TABLET ORAL
COMMUNITY
Start: 2020-11-18 | End: 2021-01-19

## 2020-12-21 RX ORDER — AZITHROMYCIN 250 MG/1
TABLET, FILM COATED ORAL
COMMUNITY
Start: 2020-11-19 | End: 2021-03-30

## 2020-12-21 RX ORDER — FAMOTIDINE 20 MG/1
20 TABLET, FILM COATED ORAL DAILY
Qty: 90 TABLET | Refills: 3 | Status: SHIPPED | OUTPATIENT
Start: 2020-12-21 | End: 2021-03-30

## 2020-12-21 NOTE — TELEPHONE ENCOUNTER
Prior Authorization required for Xeljanz.   Transition fill $8.95 copay.     Forwarding to  for review.     Xeljanz dose (5mg BID) appropriate for RA (M05.79). New to therapy - Switch from Actemra. Tried/Failed: Actemra, MTX, prednisone, Humira. Lab reviewed (11/17/2020): RBC, Hgb/Hct low but improved; Plts, WBC, Lymph%, ANC WNL; LFTs WNL; CrCl 43.1 mL/min. TB and Hep B negative (10/17/2018). Messaged Rheum to request updated TB/Hep B and asked if pt to start Xeljanz prior to updated labs.   Rheum advised that patient have updated Hep B and TB labs completed prior to Xeljanz start.

## 2020-12-21 NOTE — PROGRESS NOTES
REASON FOR VISIT:  Acid regurgitation.     HISTORY OF PRESENT ILLNESS:  Ms. Neumann is a 80-year-old who presents with a   longstanding history of acid reflux with regurgitation of gastric contents.  She   has been dealing with acid reflux for a long time (approximately three years);   She has been taking Omeprazone prn. Today we discussed about switching to famotidine 20 mg daily 30 minutes before breakfast or supper.    She has no dysphagia to solids or liquids.  Has a globus sensation. The patient takes Advil as needed for neck pain.  She is on prednisone for   polymyalgia rheumatica and rheumatoid arthritis.  Her bowels move 2-3 times a   day.  No blood in the stool.  Her last endoscopy from February 2019 revealed a hiatal hernia, but   no peptic ulcers.  Her last colonoscopy from 2016 revealed a polyp for which she   needs a followup in the latter part of 2019.     PAST MEDICAL, SURGICAL, SOCIAL AND FAMILY HISTORY:  Reviewed.     MEDICATIONS AND ALLERGIES:  Reviewed.     REVIEW OF SYSTEMS:  CONSTITUTIONAL:  No fever, no chills, no weight loss.  Appetite is normal.  EYES:  No visual changes, no red eyes.  ENT:  No odynophagia.  No hoarseness of voice.  CARDIOVASCULAR:  No angina or palpitation.  RESPIRATORY:  No shortness of breath or wheezing.  GENITOURINARY:  No dysuria or frequency.  MUSCULOSKELETAL:  Complains of generalized myalgias.  SKIN:  No pruritus or eczema.  NEUROLOGIC:  No headache, no seizures.  PSYCHIATRIC:  No anxiety or depression.  GASTROINTESTINAL:  See HPI.     PHYSICAL EXAMINATION:  VITAL SIGNS:  See EPIC.  GENERAL:  Awake, alert and oriented x3, no acute distress.  NECK:  Supple, no carotid bruit.  No cervical adenopathy.  ABDOMEN:  Obese, soft, nondistended, no tenderness to deep palpation.  No guarding or rigidity.  Bowel sounds are normal.  No abdominal   bruits heard.  EYES:  Conjunctivae anicteric.  ENT:  Oropharynx, mucosa moist.  Mallampati 2.  CARDIOVASCULAR:  S1, S2  normal.  RESPIRATORY:  Bilateral air entry equal.  SKIN:  No palmar erythema or spider angiomata.  NEUROLOGIC:  No asterixis.  PSYCHIATRY:  Affect appropriate.  LOWER EXTREMITY:  No pedal edema.     IMPRESSION:  Gastroesophageal reflux and history of colon polyps.       RECOMMENDATION:  1.  Proceed with colonoscopy.  2.  Start Pepcid 20 mg every day.

## 2020-12-22 DIAGNOSIS — M05.79 RHEUMATOID ARTHRITIS INVOLVING MULTIPLE SITES WITH POSITIVE RHEUMATOID FACTOR: Primary | ICD-10-CM

## 2020-12-23 ENCOUNTER — TELEPHONE (OUTPATIENT)
Dept: ENDOSCOPY | Facility: HOSPITAL | Age: 80
End: 2020-12-23

## 2020-12-23 ENCOUNTER — PATIENT MESSAGE (OUTPATIENT)
Dept: ENDOSCOPY | Facility: HOSPITAL | Age: 80
End: 2020-12-23

## 2020-12-23 DIAGNOSIS — Z12.11 SPECIAL SCREENING FOR MALIGNANT NEOPLASMS, COLON: Primary | ICD-10-CM

## 2020-12-23 DIAGNOSIS — Z01.812 PRE-PROCEDURE LAB EXAM: Primary | ICD-10-CM

## 2020-12-23 RX ORDER — SODIUM, POTASSIUM,MAG SULFATES 17.5-3.13G
1 SOLUTION, RECONSTITUTED, ORAL ORAL DAILY
Qty: 1 KIT | Refills: 0 | Status: SHIPPED | OUTPATIENT
Start: 2020-12-23 | End: 2020-12-25

## 2020-12-23 NOTE — TELEPHONE ENCOUNTER
PA request for Xeljanz submitted on 12/[facility] (Key: [id number]) - [id number]).    PA for Xeljanz denied on 12/22 (scanned into Media). Plan prefers Enbrel.   Messaged Rheum to review how to proceed - Rheum advised to proceed with appeal.

## 2020-12-28 DIAGNOSIS — M13.80 SERONEGATIVE ARTHRITIS: ICD-10-CM

## 2020-12-28 DIAGNOSIS — M35.3 PMR (POLYMYALGIA RHEUMATICA): ICD-10-CM

## 2021-01-05 NOTE — TELEPHONE ENCOUNTER
Called plan ([phone]) on 1/5 for Xeljanz XR appeal status. Rep stated Xeljanz appeal overturned. Rep will fax approval documents to [facility].    Xeljanz appeal approved.   Effective until 12/31/2021  Appeal Ref #: [id number]  Call Ref #: [id number]   Pending BI.    Messaged Rheum to inform Xeljanz appeal approved and review if patient is to proceed with Actemra instead.

## 2021-01-06 ENCOUNTER — TELEPHONE (OUTPATIENT)
Dept: INTERNAL MEDICINE | Facility: CLINIC | Age: 81
End: 2021-01-06

## 2021-01-06 ENCOUNTER — TELEPHONE (OUTPATIENT)
Dept: GASTROENTEROLOGY | Facility: CLINIC | Age: 81
End: 2021-01-06

## 2021-01-06 DIAGNOSIS — Z01.20 DENTAL EXAMINATION: Primary | ICD-10-CM

## 2021-01-08 ENCOUNTER — SPECIALTY PHARMACY (OUTPATIENT)
Dept: PHARMACY | Facility: CLINIC | Age: 81
End: 2021-01-08

## 2021-01-08 NOTE — TELEPHONE ENCOUNTER
Per Rheum, patient is to proceed with Xeljanz since approved and not start Actemra at this time. Actemra has been d/c'd.     Xeljanz start still pending completion of Hep B and TB.

## 2021-01-14 RX ORDER — OMEPRAZOLE 40 MG/1
CAPSULE, DELAYED RELEASE ORAL
Qty: 30 CAPSULE | Refills: 1 | OUTPATIENT
Start: 2021-01-14

## 2021-02-09 ENCOUNTER — SPECIALTY PHARMACY (OUTPATIENT)
Dept: PHARMACY | Facility: CLINIC | Age: 81
End: 2021-02-09

## 2021-02-23 ENCOUNTER — PATIENT MESSAGE (OUTPATIENT)
Dept: RHEUMATOLOGY | Facility: CLINIC | Age: 81
End: 2021-02-23

## 2021-03-01 ENCOUNTER — LAB VISIT (OUTPATIENT)
Dept: LAB | Facility: OTHER | Age: 81
End: 2021-03-01
Attending: INTERNAL MEDICINE
Payer: MEDICARE

## 2021-03-01 ENCOUNTER — OFFICE VISIT (OUTPATIENT)
Dept: INTERNAL MEDICINE | Facility: CLINIC | Age: 81
End: 2021-03-01
Attending: INTERNAL MEDICINE
Payer: MEDICARE

## 2021-03-01 VITALS
SYSTOLIC BLOOD PRESSURE: 178 MMHG | DIASTOLIC BLOOD PRESSURE: 94 MMHG | WEIGHT: 167.75 LBS | BODY MASS INDEX: 30.87 KG/M2 | HEIGHT: 62 IN | OXYGEN SATURATION: 97 % | HEART RATE: 88 BPM

## 2021-03-01 DIAGNOSIS — I10 HYPERTENSION, ESSENTIAL: ICD-10-CM

## 2021-03-01 DIAGNOSIS — N18.32 STAGE 3B CHRONIC KIDNEY DISEASE: ICD-10-CM

## 2021-03-01 DIAGNOSIS — D53.9 MACROCYTIC ANEMIA: ICD-10-CM

## 2021-03-01 DIAGNOSIS — D84.9 IMMUNOSUPPRESSION: ICD-10-CM

## 2021-03-01 DIAGNOSIS — G95.9 CERVICAL MYELOPATHY: ICD-10-CM

## 2021-03-01 DIAGNOSIS — M05.79 RHEUMATOID ARTHRITIS INVOLVING MULTIPLE SITES WITH POSITIVE RHEUMATOID FACTOR: ICD-10-CM

## 2021-03-01 DIAGNOSIS — J44.9 CHRONIC OBSTRUCTIVE PULMONARY DISEASE, UNSPECIFIED COPD TYPE: ICD-10-CM

## 2021-03-01 DIAGNOSIS — R35.89 POLYURIA: ICD-10-CM

## 2021-03-01 DIAGNOSIS — R79.9 ABNORMAL FINDING OF BLOOD CHEMISTRY, UNSPECIFIED: ICD-10-CM

## 2021-03-01 DIAGNOSIS — G47.33 OSA (OBSTRUCTIVE SLEEP APNEA): Primary | ICD-10-CM

## 2021-03-01 LAB
ALBUMIN SERPL BCP-MCNC: 3.9 G/DL (ref 3.5–5.2)
ALP SERPL-CCNC: 73 U/L (ref 55–135)
ALT SERPL W/O P-5'-P-CCNC: 23 U/L (ref 10–44)
ANION GAP SERPL CALC-SCNC: 12 MMOL/L (ref 8–16)
AST SERPL-CCNC: 23 U/L (ref 10–40)
BASOPHILS # BLD AUTO: 0.03 K/UL (ref 0–0.2)
BASOPHILS NFR BLD: 0.4 % (ref 0–1.9)
BILIRUB SERPL-MCNC: 0.6 MG/DL (ref 0.1–1)
BUN SERPL-MCNC: 14 MG/DL (ref 8–23)
CALCIUM SERPL-MCNC: 10.6 MG/DL (ref 8.7–10.5)
CHLORIDE SERPL-SCNC: 101 MMOL/L (ref 95–110)
CO2 SERPL-SCNC: 27 MMOL/L (ref 23–29)
CREAT SERPL-MCNC: 1.4 MG/DL (ref 0.5–1.4)
DIFFERENTIAL METHOD: ABNORMAL
EOSINOPHIL # BLD AUTO: 0.2 K/UL (ref 0–0.5)
EOSINOPHIL NFR BLD: 2.1 % (ref 0–8)
ERYTHROCYTE [DISTWIDTH] IN BLOOD BY AUTOMATED COUNT: 13.5 % (ref 11.5–14.5)
EST. GFR  (AFRICAN AMERICAN): 41 ML/MIN/1.73 M^2
EST. GFR  (NON AFRICAN AMERICAN): 36 ML/MIN/1.73 M^2
GLUCOSE SERPL-MCNC: 127 MG/DL (ref 70–110)
HCT VFR BLD AUTO: 38.6 % (ref 37–48.5)
HGB BLD-MCNC: 11.7 G/DL (ref 12–16)
IMM GRANULOCYTES # BLD AUTO: 0.03 K/UL (ref 0–0.04)
IMM GRANULOCYTES NFR BLD AUTO: 0.4 % (ref 0–0.5)
LYMPHOCYTES # BLD AUTO: 1.4 K/UL (ref 1–4.8)
LYMPHOCYTES NFR BLD: 17.6 % (ref 18–48)
MCH RBC QN AUTO: 29.8 PG (ref 27–31)
MCHC RBC AUTO-ENTMCNC: 30.3 G/DL (ref 32–36)
MCV RBC AUTO: 98 FL (ref 82–98)
MONOCYTES # BLD AUTO: 0.4 K/UL (ref 0.3–1)
MONOCYTES NFR BLD: 4.3 % (ref 4–15)
NEUTROPHILS # BLD AUTO: 6.1 K/UL (ref 1.8–7.7)
NEUTROPHILS NFR BLD: 75.2 % (ref 38–73)
NRBC BLD-RTO: 0 /100 WBC
PLATELET # BLD AUTO: 269 K/UL (ref 150–350)
PMV BLD AUTO: 9.9 FL (ref 9.2–12.9)
POTASSIUM SERPL-SCNC: 4 MMOL/L (ref 3.5–5.1)
PROT SERPL-MCNC: 7.6 G/DL (ref 6–8.4)
RBC # BLD AUTO: 3.93 M/UL (ref 4–5.4)
SODIUM SERPL-SCNC: 140 MMOL/L (ref 136–145)
WBC # BLD AUTO: 8.14 K/UL (ref 3.9–12.7)

## 2021-03-01 PROCEDURE — 3080F PR MOST RECENT DIASTOLIC BLOOD PRESSURE >= 90 MM HG: ICD-10-PCS | Mod: CPTII,S$GLB,, | Performed by: INTERNAL MEDICINE

## 2021-03-01 PROCEDURE — 99499 RISK ADDL DX/OHS AUDIT: ICD-10-PCS | Mod: S$GLB,,, | Performed by: INTERNAL MEDICINE

## 2021-03-01 PROCEDURE — 99499 UNLISTED E&M SERVICE: CPT | Mod: S$GLB,,, | Performed by: INTERNAL MEDICINE

## 2021-03-01 PROCEDURE — 3077F PR MOST RECENT SYSTOLIC BLOOD PRESSURE >= 140 MM HG: ICD-10-PCS | Mod: CPTII,S$GLB,, | Performed by: INTERNAL MEDICINE

## 2021-03-01 PROCEDURE — 3080F DIAST BP >= 90 MM HG: CPT | Mod: CPTII,S$GLB,, | Performed by: INTERNAL MEDICINE

## 2021-03-01 PROCEDURE — 83036 HEMOGLOBIN GLYCOSYLATED A1C: CPT

## 2021-03-01 PROCEDURE — 1101F PR PT FALLS ASSESS DOC 0-1 FALLS W/OUT INJ PAST YR: ICD-10-PCS | Mod: CPTII,S$GLB,, | Performed by: INTERNAL MEDICINE

## 2021-03-01 PROCEDURE — 80061 LIPID PANEL: CPT

## 2021-03-01 PROCEDURE — 1101F PT FALLS ASSESS-DOCD LE1/YR: CPT | Mod: CPTII,S$GLB,, | Performed by: INTERNAL MEDICINE

## 2021-03-01 PROCEDURE — 99214 OFFICE O/P EST MOD 30 MIN: CPT | Mod: S$GLB,,, | Performed by: INTERNAL MEDICINE

## 2021-03-01 PROCEDURE — 99214 PR OFFICE/OUTPT VISIT, EST, LEVL IV, 30-39 MIN: ICD-10-PCS | Mod: S$GLB,,, | Performed by: INTERNAL MEDICINE

## 2021-03-01 PROCEDURE — 82607 VITAMIN B-12: CPT

## 2021-03-01 PROCEDURE — 1126F AMNT PAIN NOTED NONE PRSNT: CPT | Mod: S$GLB,,, | Performed by: INTERNAL MEDICINE

## 2021-03-01 PROCEDURE — 86704 HEP B CORE ANTIBODY TOTAL: CPT

## 2021-03-01 PROCEDURE — 86706 HEP B SURFACE ANTIBODY: CPT

## 2021-03-01 PROCEDURE — 87340 HEPATITIS B SURFACE AG IA: CPT

## 2021-03-01 PROCEDURE — 1126F PR PAIN SEVERITY QUANTIFIED, NO PAIN PRESENT: ICD-10-PCS | Mod: S$GLB,,, | Performed by: INTERNAL MEDICINE

## 2021-03-01 PROCEDURE — 3288F PR FALLS RISK ASSESSMENT DOCUMENTED: ICD-10-PCS | Mod: CPTII,S$GLB,, | Performed by: INTERNAL MEDICINE

## 2021-03-01 PROCEDURE — 85025 COMPLETE CBC W/AUTO DIFF WBC: CPT

## 2021-03-01 PROCEDURE — 1159F PR MEDICATION LIST DOCUMENTED IN MEDICAL RECORD: ICD-10-PCS | Mod: S$GLB,,, | Performed by: INTERNAL MEDICINE

## 2021-03-01 PROCEDURE — 99999 PR PBB SHADOW E&M-EST. PATIENT-LVL V: CPT | Mod: PBBFAC,,, | Performed by: INTERNAL MEDICINE

## 2021-03-01 PROCEDURE — 99999 PR PBB SHADOW E&M-EST. PATIENT-LVL V: ICD-10-PCS | Mod: PBBFAC,,, | Performed by: INTERNAL MEDICINE

## 2021-03-01 PROCEDURE — 3077F SYST BP >= 140 MM HG: CPT | Mod: CPTII,S$GLB,, | Performed by: INTERNAL MEDICINE

## 2021-03-01 PROCEDURE — 3288F FALL RISK ASSESSMENT DOCD: CPT | Mod: CPTII,S$GLB,, | Performed by: INTERNAL MEDICINE

## 2021-03-01 PROCEDURE — 80053 COMPREHEN METABOLIC PANEL: CPT

## 2021-03-01 PROCEDURE — 1159F MED LIST DOCD IN RCRD: CPT | Mod: S$GLB,,, | Performed by: INTERNAL MEDICINE

## 2021-03-01 RX ORDER — OMEPRAZOLE 40 MG/1
40 CAPSULE, DELAYED RELEASE ORAL DAILY
COMMUNITY
End: 2022-11-29

## 2021-03-01 RX ORDER — AMLODIPINE BESYLATE 5 MG/1
5 TABLET ORAL DAILY
Qty: 90 TABLET | Refills: 3 | Status: SHIPPED | OUTPATIENT
Start: 2021-03-01 | End: 2021-04-12 | Stop reason: SDUPTHER

## 2021-03-02 LAB
CHOLEST SERPL-MCNC: 269 MG/DL (ref 120–199)
CHOLEST/HDLC SERPL: 2.6 {RATIO} (ref 2–5)
ESTIMATED AVG GLUCOSE: 105 MG/DL (ref 68–131)
HBA1C MFR BLD: 5.3 % (ref 4–5.6)
HBV CORE AB SERPL QL IA: NEGATIVE
HBV SURFACE AB SER-ACNC: NEGATIVE M[IU]/ML
HBV SURFACE AG SERPL QL IA: NEGATIVE
HDLC SERPL-MCNC: 103 MG/DL (ref 40–75)
HDLC SERPL: 38.3 % (ref 20–50)
LDLC SERPL CALC-MCNC: 137.6 MG/DL (ref 63–159)
NONHDLC SERPL-MCNC: 166 MG/DL
TRIGL SERPL-MCNC: 142 MG/DL (ref 30–150)
VIT B12 SERPL-MCNC: 1880 PG/ML (ref 210–950)

## 2021-03-08 ENCOUNTER — PATIENT OUTREACH (OUTPATIENT)
Dept: ADMINISTRATIVE | Facility: HOSPITAL | Age: 81
End: 2021-03-08

## 2021-03-10 ENCOUNTER — TELEPHONE (OUTPATIENT)
Dept: INTERNAL MEDICINE | Facility: CLINIC | Age: 81
End: 2021-03-10

## 2021-03-10 ENCOUNTER — CLINICAL SUPPORT (OUTPATIENT)
Dept: INTERNAL MEDICINE | Facility: CLINIC | Age: 81
End: 2021-03-10
Payer: MEDICARE

## 2021-03-10 VITALS
SYSTOLIC BLOOD PRESSURE: 160 MMHG | DIASTOLIC BLOOD PRESSURE: 102 MMHG | HEART RATE: 80 BPM | BODY MASS INDEX: 30.69 KG/M2 | OXYGEN SATURATION: 98 % | HEIGHT: 62 IN

## 2021-03-10 PROCEDURE — 99999 PR PBB SHADOW E&M-EST. PATIENT-LVL II: CPT | Mod: PBBFAC,,,

## 2021-03-10 PROCEDURE — 99999 PR PBB SHADOW E&M-EST. PATIENT-LVL II: ICD-10-PCS | Mod: PBBFAC,,,

## 2021-03-12 ENCOUNTER — TELEPHONE (OUTPATIENT)
Dept: INTERNAL MEDICINE | Facility: CLINIC | Age: 81
End: 2021-03-12

## 2021-03-15 ENCOUNTER — PATIENT OUTREACH (OUTPATIENT)
Dept: ADMINISTRATIVE | Facility: HOSPITAL | Age: 81
End: 2021-03-15

## 2021-03-29 ENCOUNTER — OFFICE VISIT (OUTPATIENT)
Dept: RHEUMATOLOGY | Facility: CLINIC | Age: 81
End: 2021-03-29
Payer: MEDICARE

## 2021-03-29 VITALS
BODY MASS INDEX: 30.73 KG/M2 | DIASTOLIC BLOOD PRESSURE: 75 MMHG | HEART RATE: 95 BPM | WEIGHT: 168 LBS | SYSTOLIC BLOOD PRESSURE: 174 MMHG

## 2021-03-29 DIAGNOSIS — M06.09 RHEUMATOID ARTHRITIS OF MULTIPLE SITES WITH NEGATIVE RHEUMATOID FACTOR: Primary | ICD-10-CM

## 2021-03-29 PROCEDURE — 1125F PR PAIN SEVERITY QUANTIFIED, PAIN PRESENT: ICD-10-PCS | Mod: S$GLB,,, | Performed by: INTERNAL MEDICINE

## 2021-03-29 PROCEDURE — 3077F SYST BP >= 140 MM HG: CPT | Mod: CPTII,S$GLB,, | Performed by: INTERNAL MEDICINE

## 2021-03-29 PROCEDURE — 1159F PR MEDICATION LIST DOCUMENTED IN MEDICAL RECORD: ICD-10-PCS | Mod: S$GLB,,, | Performed by: INTERNAL MEDICINE

## 2021-03-29 PROCEDURE — 99999 PR PBB SHADOW E&M-EST. PATIENT-LVL II: CPT | Mod: PBBFAC,,, | Performed by: INTERNAL MEDICINE

## 2021-03-29 PROCEDURE — 3078F DIAST BP <80 MM HG: CPT | Mod: CPTII,S$GLB,, | Performed by: INTERNAL MEDICINE

## 2021-03-29 PROCEDURE — 3078F PR MOST RECENT DIASTOLIC BLOOD PRESSURE < 80 MM HG: ICD-10-PCS | Mod: CPTII,S$GLB,, | Performed by: INTERNAL MEDICINE

## 2021-03-29 PROCEDURE — 99214 OFFICE O/P EST MOD 30 MIN: CPT | Mod: S$GLB,,, | Performed by: INTERNAL MEDICINE

## 2021-03-29 PROCEDURE — 1159F MED LIST DOCD IN RCRD: CPT | Mod: S$GLB,,, | Performed by: INTERNAL MEDICINE

## 2021-03-29 PROCEDURE — 99214 PR OFFICE/OUTPT VISIT, EST, LEVL IV, 30-39 MIN: ICD-10-PCS | Mod: S$GLB,,, | Performed by: INTERNAL MEDICINE

## 2021-03-29 PROCEDURE — 3077F PR MOST RECENT SYSTOLIC BLOOD PRESSURE >= 140 MM HG: ICD-10-PCS | Mod: CPTII,S$GLB,, | Performed by: INTERNAL MEDICINE

## 2021-03-29 PROCEDURE — 99999 PR PBB SHADOW E&M-EST. PATIENT-LVL II: ICD-10-PCS | Mod: PBBFAC,,, | Performed by: INTERNAL MEDICINE

## 2021-03-29 PROCEDURE — 1125F AMNT PAIN NOTED PAIN PRSNT: CPT | Mod: S$GLB,,, | Performed by: INTERNAL MEDICINE

## 2021-03-29 ASSESSMENT — ROUTINE ASSESSMENT OF PATIENT INDEX DATA (RAPID3)
MDHAQ FUNCTION SCORE: 0.2
AM STIFFNESS SCORE: 1, YES
PAIN SCORE: 10
TOTAL RAPID3 SCORE: 6.72
PATIENT GLOBAL ASSESSMENT SCORE: 9.5
PSYCHOLOGICAL DISTRESS SCORE: 1.1
FATIGUE SCORE: 10

## 2021-03-30 ENCOUNTER — SPECIALTY PHARMACY (OUTPATIENT)
Dept: PHARMACY | Facility: CLINIC | Age: 81
End: 2021-03-30

## 2021-03-30 DIAGNOSIS — M05.79 SEROPOSITIVE RHEUMATOID ARTHRITIS OF MULTIPLE SITES: Primary | ICD-10-CM

## 2021-04-09 ENCOUNTER — OFFICE VISIT (OUTPATIENT)
Dept: INTERNAL MEDICINE | Facility: CLINIC | Age: 81
End: 2021-04-09
Attending: INTERNAL MEDICINE
Payer: MEDICARE

## 2021-04-09 ENCOUNTER — TELEPHONE (OUTPATIENT)
Dept: INTERNAL MEDICINE | Facility: CLINIC | Age: 81
End: 2021-04-09

## 2021-04-09 VITALS
HEIGHT: 62 IN | SYSTOLIC BLOOD PRESSURE: 150 MMHG | WEIGHT: 166.25 LBS | HEART RATE: 93 BPM | OXYGEN SATURATION: 98 % | DIASTOLIC BLOOD PRESSURE: 86 MMHG | BODY MASS INDEX: 30.59 KG/M2

## 2021-04-09 DIAGNOSIS — H91.93 BILATERAL HEARING LOSS, UNSPECIFIED HEARING LOSS TYPE: ICD-10-CM

## 2021-04-09 DIAGNOSIS — I10 HYPERTENSION, ESSENTIAL: Primary | ICD-10-CM

## 2021-04-09 DIAGNOSIS — Z97.2 WEARS DENTURES: ICD-10-CM

## 2021-04-09 DIAGNOSIS — N18.31 STAGE 3A CHRONIC KIDNEY DISEASE: ICD-10-CM

## 2021-04-09 PROCEDURE — 99499 RISK ADDL DX/OHS AUDIT: ICD-10-PCS | Mod: S$GLB,,, | Performed by: INTERNAL MEDICINE

## 2021-04-09 PROCEDURE — 3079F DIAST BP 80-89 MM HG: CPT | Mod: CPTII,S$GLB,, | Performed by: INTERNAL MEDICINE

## 2021-04-09 PROCEDURE — 3077F PR MOST RECENT SYSTOLIC BLOOD PRESSURE >= 140 MM HG: ICD-10-PCS | Mod: CPTII,S$GLB,, | Performed by: INTERNAL MEDICINE

## 2021-04-09 PROCEDURE — 1125F AMNT PAIN NOTED PAIN PRSNT: CPT | Mod: S$GLB,,, | Performed by: INTERNAL MEDICINE

## 2021-04-09 PROCEDURE — 3288F FALL RISK ASSESSMENT DOCD: CPT | Mod: CPTII,S$GLB,, | Performed by: INTERNAL MEDICINE

## 2021-04-09 PROCEDURE — 3077F SYST BP >= 140 MM HG: CPT | Mod: CPTII,S$GLB,, | Performed by: INTERNAL MEDICINE

## 2021-04-09 PROCEDURE — 99214 OFFICE O/P EST MOD 30 MIN: CPT | Mod: S$GLB,,, | Performed by: INTERNAL MEDICINE

## 2021-04-09 PROCEDURE — 3079F PR MOST RECENT DIASTOLIC BLOOD PRESSURE 80-89 MM HG: ICD-10-PCS | Mod: CPTII,S$GLB,, | Performed by: INTERNAL MEDICINE

## 2021-04-09 PROCEDURE — 1125F PR PAIN SEVERITY QUANTIFIED, PAIN PRESENT: ICD-10-PCS | Mod: S$GLB,,, | Performed by: INTERNAL MEDICINE

## 2021-04-09 PROCEDURE — 99499 UNLISTED E&M SERVICE: CPT | Mod: S$GLB,,, | Performed by: INTERNAL MEDICINE

## 2021-04-09 PROCEDURE — 99214 PR OFFICE/OUTPT VISIT, EST, LEVL IV, 30-39 MIN: ICD-10-PCS | Mod: S$GLB,,, | Performed by: INTERNAL MEDICINE

## 2021-04-09 PROCEDURE — 99999 PR PBB SHADOW E&M-EST. PATIENT-LVL V: CPT | Mod: PBBFAC,,, | Performed by: INTERNAL MEDICINE

## 2021-04-09 PROCEDURE — 1159F MED LIST DOCD IN RCRD: CPT | Mod: S$GLB,,, | Performed by: INTERNAL MEDICINE

## 2021-04-09 PROCEDURE — 99999 PR PBB SHADOW E&M-EST. PATIENT-LVL V: ICD-10-PCS | Mod: PBBFAC,,, | Performed by: INTERNAL MEDICINE

## 2021-04-09 PROCEDURE — 3288F PR FALLS RISK ASSESSMENT DOCUMENTED: ICD-10-PCS | Mod: CPTII,S$GLB,, | Performed by: INTERNAL MEDICINE

## 2021-04-09 PROCEDURE — 1101F PT FALLS ASSESS-DOCD LE1/YR: CPT | Mod: CPTII,S$GLB,, | Performed by: INTERNAL MEDICINE

## 2021-04-09 PROCEDURE — 1159F PR MEDICATION LIST DOCUMENTED IN MEDICAL RECORD: ICD-10-PCS | Mod: S$GLB,,, | Performed by: INTERNAL MEDICINE

## 2021-04-09 PROCEDURE — 1101F PR PT FALLS ASSESS DOC 0-1 FALLS W/OUT INJ PAST YR: ICD-10-PCS | Mod: CPTII,S$GLB,, | Performed by: INTERNAL MEDICINE

## 2021-04-12 DIAGNOSIS — I10 HYPERTENSION, ESSENTIAL: ICD-10-CM

## 2021-04-12 DIAGNOSIS — I10 HYPERTENSION, UNSPECIFIED TYPE: Primary | ICD-10-CM

## 2021-04-12 RX ORDER — AMLODIPINE BESYLATE 10 MG/1
10 TABLET ORAL DAILY
Qty: 90 TABLET | Refills: 3 | Status: SHIPPED | OUTPATIENT
Start: 2021-04-12 | End: 2024-03-05

## 2021-04-12 RX ORDER — METOPROLOL TARTRATE 100 MG/1
100 TABLET ORAL 2 TIMES DAILY
Qty: 180 TABLET | Refills: 3 | Status: SHIPPED | OUTPATIENT
Start: 2021-04-12 | End: 2021-10-11

## 2021-04-13 RX ORDER — HYDROCHLOROTHIAZIDE 25 MG/1
25 TABLET ORAL DAILY
Qty: 90 TABLET | Refills: 2 | Status: SHIPPED | OUTPATIENT
Start: 2021-04-13 | End: 2021-06-30

## 2021-04-14 ENCOUNTER — CLINICAL SUPPORT (OUTPATIENT)
Dept: AUDIOLOGY | Facility: CLINIC | Age: 81
End: 2021-04-14
Payer: MEDICARE

## 2021-04-14 DIAGNOSIS — H90.3 SENSORINEURAL HEARING LOSS, BILATERAL: Primary | ICD-10-CM

## 2021-04-14 DIAGNOSIS — H91.93 BILATERAL HEARING LOSS, UNSPECIFIED HEARING LOSS TYPE: ICD-10-CM

## 2021-04-14 PROCEDURE — 99999 PR PBB SHADOW E&M-EST. PATIENT-LVL I: CPT | Mod: PBBFAC,,, | Performed by: AUDIOLOGIST

## 2021-04-14 PROCEDURE — 92557 PR COMPREHENSIVE HEARING TEST: ICD-10-PCS | Mod: S$GLB,,, | Performed by: AUDIOLOGIST

## 2021-04-14 PROCEDURE — 99999 PR PBB SHADOW E&M-EST. PATIENT-LVL I: ICD-10-PCS | Mod: PBBFAC,,, | Performed by: AUDIOLOGIST

## 2021-04-14 PROCEDURE — 92557 COMPREHENSIVE HEARING TEST: CPT | Mod: S$GLB,,, | Performed by: AUDIOLOGIST

## 2021-04-14 PROCEDURE — 92567 TYMPANOMETRY: CPT | Mod: S$GLB,,, | Performed by: AUDIOLOGIST

## 2021-04-14 PROCEDURE — 92567 PR TYMPA2METRY: ICD-10-PCS | Mod: S$GLB,,, | Performed by: AUDIOLOGIST

## 2021-04-20 ENCOUNTER — CLINICAL SUPPORT (OUTPATIENT)
Dept: INTERNAL MEDICINE | Facility: CLINIC | Age: 81
End: 2021-04-20
Payer: MEDICARE

## 2021-04-20 ENCOUNTER — TELEPHONE (OUTPATIENT)
Dept: INTERNAL MEDICINE | Facility: CLINIC | Age: 81
End: 2021-04-20

## 2021-04-20 VITALS — HEART RATE: 94 BPM | OXYGEN SATURATION: 97 % | DIASTOLIC BLOOD PRESSURE: 60 MMHG | SYSTOLIC BLOOD PRESSURE: 140 MMHG

## 2021-04-20 PROCEDURE — 99999 PR PBB SHADOW E&M-EST. PATIENT-LVL II: ICD-10-PCS | Mod: PBBFAC,,,

## 2021-04-20 PROCEDURE — 99999 PR PBB SHADOW E&M-EST. PATIENT-LVL II: CPT | Mod: PBBFAC,,,

## 2021-05-03 ENCOUNTER — SPECIALTY PHARMACY (OUTPATIENT)
Dept: PHARMACY | Facility: CLINIC | Age: 81
End: 2021-05-03

## 2021-05-04 ENCOUNTER — TELEPHONE (OUTPATIENT)
Dept: INTERNAL MEDICINE | Facility: CLINIC | Age: 81
End: 2021-05-04

## 2021-05-12 DIAGNOSIS — I10 HYPERTENSION, ESSENTIAL: ICD-10-CM

## 2021-05-12 RX ORDER — LOSARTAN POTASSIUM 100 MG/1
100 TABLET ORAL DAILY
Qty: 90 TABLET | Refills: 3 | Status: SHIPPED | OUTPATIENT
Start: 2021-05-12 | End: 2022-05-12 | Stop reason: SDUPTHER

## 2021-05-24 ENCOUNTER — OFFICE VISIT (OUTPATIENT)
Dept: SLEEP MEDICINE | Facility: CLINIC | Age: 81
End: 2021-05-24
Attending: INTERNAL MEDICINE
Payer: MEDICARE

## 2021-05-24 ENCOUNTER — OFFICE VISIT (OUTPATIENT)
Dept: INTERNAL MEDICINE | Facility: CLINIC | Age: 81
End: 2021-05-24
Attending: INTERNAL MEDICINE
Payer: MEDICARE

## 2021-05-24 VITALS
OXYGEN SATURATION: 98 % | BODY MASS INDEX: 30.22 KG/M2 | DIASTOLIC BLOOD PRESSURE: 82 MMHG | HEART RATE: 96 BPM | SYSTOLIC BLOOD PRESSURE: 145 MMHG | HEIGHT: 62 IN | WEIGHT: 164.25 LBS

## 2021-05-24 VITALS
HEART RATE: 93 BPM | DIASTOLIC BLOOD PRESSURE: 79 MMHG | SYSTOLIC BLOOD PRESSURE: 158 MMHG | HEIGHT: 62 IN | WEIGHT: 164 LBS | BODY MASS INDEX: 30.18 KG/M2

## 2021-05-24 DIAGNOSIS — G47.33 OSA (OBSTRUCTIVE SLEEP APNEA): ICD-10-CM

## 2021-05-24 DIAGNOSIS — N18.31 STAGE 3A CHRONIC KIDNEY DISEASE: ICD-10-CM

## 2021-05-24 DIAGNOSIS — R61 NIGHT SWEATS: ICD-10-CM

## 2021-05-24 DIAGNOSIS — F51.09 OTHER INSOMNIA NOT DUE TO A SUBSTANCE OR KNOWN PHYSIOLOGICAL CONDITION: Primary | ICD-10-CM

## 2021-05-24 DIAGNOSIS — R06.83 SNORING: ICD-10-CM

## 2021-05-24 DIAGNOSIS — M79.676 PAIN OF TOE, UNSPECIFIED LATERALITY: ICD-10-CM

## 2021-05-24 DIAGNOSIS — I10 HYPERTENSION, ESSENTIAL: Primary | ICD-10-CM

## 2021-05-24 PROCEDURE — 1126F PR PAIN SEVERITY QUANTIFIED, NO PAIN PRESENT: ICD-10-PCS | Mod: S$GLB,,, | Performed by: INTERNAL MEDICINE

## 2021-05-24 PROCEDURE — 1101F PT FALLS ASSESS-DOCD LE1/YR: CPT | Mod: CPTII,S$GLB,, | Performed by: INTERNAL MEDICINE

## 2021-05-24 PROCEDURE — 99214 PR OFFICE/OUTPT VISIT, EST, LEVL IV, 30-39 MIN: ICD-10-PCS | Mod: S$GLB,,, | Performed by: INTERNAL MEDICINE

## 2021-05-24 PROCEDURE — 99214 OFFICE O/P EST MOD 30 MIN: CPT | Mod: S$GLB,,, | Performed by: INTERNAL MEDICINE

## 2021-05-24 PROCEDURE — 99204 PR OFFICE/OUTPT VISIT, NEW, LEVL IV, 45-59 MIN: ICD-10-PCS | Mod: S$GLB,,, | Performed by: INTERNAL MEDICINE

## 2021-05-24 PROCEDURE — 3288F PR FALLS RISK ASSESSMENT DOCUMENTED: ICD-10-PCS | Mod: CPTII,S$GLB,, | Performed by: INTERNAL MEDICINE

## 2021-05-24 PROCEDURE — 3077F SYST BP >= 140 MM HG: CPT | Mod: CPTII,S$GLB,, | Performed by: INTERNAL MEDICINE

## 2021-05-24 PROCEDURE — 99204 OFFICE O/P NEW MOD 45 MIN: CPT | Mod: S$GLB,,, | Performed by: INTERNAL MEDICINE

## 2021-05-24 PROCEDURE — 3079F PR MOST RECENT DIASTOLIC BLOOD PRESSURE 80-89 MM HG: ICD-10-PCS | Mod: CPTII,S$GLB,, | Performed by: INTERNAL MEDICINE

## 2021-05-24 PROCEDURE — 1101F PR PT FALLS ASSESS DOC 0-1 FALLS W/OUT INJ PAST YR: ICD-10-PCS | Mod: CPTII,S$GLB,, | Performed by: INTERNAL MEDICINE

## 2021-05-24 PROCEDURE — 99999 PR PBB SHADOW E&M-EST. PATIENT-LVL IV: ICD-10-PCS | Mod: PBBFAC,,, | Performed by: INTERNAL MEDICINE

## 2021-05-24 PROCEDURE — 1125F AMNT PAIN NOTED PAIN PRSNT: CPT | Mod: S$GLB,,, | Performed by: INTERNAL MEDICINE

## 2021-05-24 PROCEDURE — 1126F AMNT PAIN NOTED NONE PRSNT: CPT | Mod: S$GLB,,, | Performed by: INTERNAL MEDICINE

## 2021-05-24 PROCEDURE — 99999 PR PBB SHADOW E&M-EST. PATIENT-LVL IV: CPT | Mod: PBBFAC,,, | Performed by: INTERNAL MEDICINE

## 2021-05-24 PROCEDURE — 1159F MED LIST DOCD IN RCRD: CPT | Mod: S$GLB,,, | Performed by: INTERNAL MEDICINE

## 2021-05-24 PROCEDURE — 3077F PR MOST RECENT SYSTOLIC BLOOD PRESSURE >= 140 MM HG: ICD-10-PCS | Mod: CPTII,S$GLB,, | Performed by: INTERNAL MEDICINE

## 2021-05-24 PROCEDURE — 1159F PR MEDICATION LIST DOCUMENTED IN MEDICAL RECORD: ICD-10-PCS | Mod: S$GLB,,, | Performed by: INTERNAL MEDICINE

## 2021-05-24 PROCEDURE — 1125F PR PAIN SEVERITY QUANTIFIED, PAIN PRESENT: ICD-10-PCS | Mod: S$GLB,,, | Performed by: INTERNAL MEDICINE

## 2021-05-24 PROCEDURE — 99999 PR PBB SHADOW E&M-EST. PATIENT-LVL V: ICD-10-PCS | Mod: PBBFAC,,, | Performed by: INTERNAL MEDICINE

## 2021-05-24 PROCEDURE — 99999 PR PBB SHADOW E&M-EST. PATIENT-LVL V: CPT | Mod: PBBFAC,,, | Performed by: INTERNAL MEDICINE

## 2021-05-24 PROCEDURE — 3288F FALL RISK ASSESSMENT DOCD: CPT | Mod: CPTII,S$GLB,, | Performed by: INTERNAL MEDICINE

## 2021-05-24 PROCEDURE — 3079F DIAST BP 80-89 MM HG: CPT | Mod: CPTII,S$GLB,, | Performed by: INTERNAL MEDICINE

## 2021-05-27 ENCOUNTER — TELEPHONE (OUTPATIENT)
Dept: SLEEP MEDICINE | Facility: OTHER | Age: 81
End: 2021-05-27

## 2021-05-31 ENCOUNTER — PATIENT OUTREACH (OUTPATIENT)
Dept: ADMINISTRATIVE | Facility: HOSPITAL | Age: 81
End: 2021-05-31

## 2021-06-01 ENCOUNTER — SPECIALTY PHARMACY (OUTPATIENT)
Dept: PHARMACY | Facility: CLINIC | Age: 81
End: 2021-06-01

## 2021-06-01 ENCOUNTER — PATIENT MESSAGE (OUTPATIENT)
Dept: PHARMACY | Facility: CLINIC | Age: 81
End: 2021-06-01

## 2021-06-03 ENCOUNTER — PATIENT OUTREACH (OUTPATIENT)
Dept: ADMINISTRATIVE | Facility: HOSPITAL | Age: 81
End: 2021-06-03

## 2021-06-22 ENCOUNTER — TELEPHONE (OUTPATIENT)
Dept: SLEEP MEDICINE | Facility: OTHER | Age: 81
End: 2021-06-22

## 2021-06-24 ENCOUNTER — SPECIALTY PHARMACY (OUTPATIENT)
Dept: PHARMACY | Facility: CLINIC | Age: 81
End: 2021-06-24

## 2021-06-25 ENCOUNTER — HOSPITAL ENCOUNTER (OUTPATIENT)
Dept: SLEEP MEDICINE | Facility: OTHER | Age: 81
Discharge: HOME | End: 2021-06-25
Attending: INTERNAL MEDICINE
Payer: MEDICARE

## 2021-06-25 ENCOUNTER — CLINICAL SUPPORT (OUTPATIENT)
Dept: INTERNAL MEDICINE | Facility: CLINIC | Age: 81
End: 2021-06-25
Payer: MEDICARE

## 2021-06-25 ENCOUNTER — TELEPHONE (OUTPATIENT)
Dept: SLEEP MEDICINE | Facility: OTHER | Age: 81
End: 2021-06-25

## 2021-06-25 ENCOUNTER — PATIENT MESSAGE (OUTPATIENT)
Dept: RHEUMATOLOGY | Facility: CLINIC | Age: 81
End: 2021-06-25

## 2021-06-25 VITALS
DIASTOLIC BLOOD PRESSURE: 64 MMHG | WEIGHT: 163.81 LBS | OXYGEN SATURATION: 95 % | HEART RATE: 76 BPM | HEIGHT: 62 IN | BODY MASS INDEX: 30.14 KG/M2 | SYSTOLIC BLOOD PRESSURE: 140 MMHG

## 2021-06-25 DIAGNOSIS — I10 HYPERTENSION, UNSPECIFIED TYPE: ICD-10-CM

## 2021-06-25 DIAGNOSIS — I10 HYPERTENSION, ESSENTIAL: Primary | ICD-10-CM

## 2021-06-25 PROCEDURE — 99999 PR PBB SHADOW E&M-EST. PATIENT-LVL II: CPT | Mod: PBBFAC,,,

## 2021-06-25 PROCEDURE — 99999 PR PBB SHADOW E&M-EST. PATIENT-LVL II: ICD-10-PCS | Mod: PBBFAC,,,

## 2021-06-25 PROCEDURE — 95800 PR SLEEP STUDY, UNATTENDED, RECORD HEART RATE/O2 SAT/RESP ANAL/SLEEP TIME: ICD-10-PCS | Mod: 26,,, | Performed by: INTERNAL MEDICINE

## 2021-06-25 PROCEDURE — 95800 SLP STDY UNATTENDED: CPT

## 2021-06-25 PROCEDURE — 95800 SLP STDY UNATTENDED: CPT | Mod: 26,,, | Performed by: INTERNAL MEDICINE

## 2021-06-29 ENCOUNTER — PATIENT OUTREACH (OUTPATIENT)
Dept: ADMINISTRATIVE | Facility: OTHER | Age: 81
End: 2021-06-29

## 2021-06-30 ENCOUNTER — OFFICE VISIT (OUTPATIENT)
Dept: RHEUMATOLOGY | Facility: CLINIC | Age: 81
End: 2021-06-30
Payer: MEDICARE

## 2021-06-30 VITALS
WEIGHT: 168.44 LBS | DIASTOLIC BLOOD PRESSURE: 88 MMHG | BODY MASS INDEX: 30.81 KG/M2 | HEART RATE: 108 BPM | SYSTOLIC BLOOD PRESSURE: 183 MMHG

## 2021-06-30 DIAGNOSIS — M05.742 RHEUMATOID ARTHRITIS INVOLVING BOTH HANDS WITH POSITIVE RHEUMATOID FACTOR: Primary | ICD-10-CM

## 2021-06-30 DIAGNOSIS — M05.741 RHEUMATOID ARTHRITIS INVOLVING BOTH HANDS WITH POSITIVE RHEUMATOID FACTOR: Primary | ICD-10-CM

## 2021-06-30 DIAGNOSIS — M85.89 OSTEOPENIA OF MULTIPLE SITES: ICD-10-CM

## 2021-06-30 PROCEDURE — 1125F AMNT PAIN NOTED PAIN PRSNT: CPT | Mod: S$GLB,,, | Performed by: INTERNAL MEDICINE

## 2021-06-30 PROCEDURE — 99214 OFFICE O/P EST MOD 30 MIN: CPT | Mod: S$GLB,,, | Performed by: INTERNAL MEDICINE

## 2021-06-30 PROCEDURE — 99214 PR OFFICE/OUTPT VISIT, EST, LEVL IV, 30-39 MIN: ICD-10-PCS | Mod: S$GLB,,, | Performed by: INTERNAL MEDICINE

## 2021-06-30 PROCEDURE — 99999 PR PBB SHADOW E&M-EST. PATIENT-LVL III: ICD-10-PCS | Mod: PBBFAC,,, | Performed by: INTERNAL MEDICINE

## 2021-06-30 PROCEDURE — 1125F PR PAIN SEVERITY QUANTIFIED, PAIN PRESENT: ICD-10-PCS | Mod: S$GLB,,, | Performed by: INTERNAL MEDICINE

## 2021-06-30 PROCEDURE — 1159F MED LIST DOCD IN RCRD: CPT | Mod: S$GLB,,, | Performed by: INTERNAL MEDICINE

## 2021-06-30 PROCEDURE — 99999 PR PBB SHADOW E&M-EST. PATIENT-LVL III: CPT | Mod: PBBFAC,,, | Performed by: INTERNAL MEDICINE

## 2021-06-30 PROCEDURE — 1159F PR MEDICATION LIST DOCUMENTED IN MEDICAL RECORD: ICD-10-PCS | Mod: S$GLB,,, | Performed by: INTERNAL MEDICINE

## 2021-06-30 RX ORDER — ALLOPURINOL 100 MG/1
TABLET ORAL
Qty: 60 TABLET | Refills: 5 | Status: SHIPPED | OUTPATIENT
Start: 2021-06-30 | End: 2022-04-11

## 2021-06-30 RX ORDER — HYDROCHLOROTHIAZIDE 50 MG/1
50 TABLET ORAL DAILY
Qty: 90 TABLET | Refills: 1 | Status: SHIPPED | OUTPATIENT
Start: 2021-06-30 | End: 2021-10-18

## 2021-06-30 RX ORDER — COLCHICINE 0.6 MG/1
0.6 TABLET ORAL DAILY
Qty: 30 TABLET | Refills: 5 | Status: SHIPPED | OUTPATIENT
Start: 2021-06-30 | End: 2022-07-13

## 2021-06-30 RX ORDER — DULOXETIN HYDROCHLORIDE 30 MG/1
CAPSULE, DELAYED RELEASE ORAL
Qty: 60 CAPSULE | Refills: 5 | Status: SHIPPED | OUTPATIENT
Start: 2021-06-30 | End: 2022-04-11

## 2021-07-06 ENCOUNTER — PATIENT MESSAGE (OUTPATIENT)
Dept: SLEEP MEDICINE | Facility: CLINIC | Age: 81
End: 2021-07-06

## 2021-07-07 ENCOUNTER — HOSPITAL ENCOUNTER (OUTPATIENT)
Dept: RADIOLOGY | Facility: OTHER | Age: 81
Discharge: HOME | End: 2021-07-07
Attending: INTERNAL MEDICINE
Payer: MEDICARE

## 2021-07-07 DIAGNOSIS — M85.89 OSTEOPENIA OF MULTIPLE SITES: ICD-10-CM

## 2021-07-07 PROCEDURE — 77080 DEXA BONE DENSITY SPINE HIP: ICD-10-PCS | Mod: 26,,, | Performed by: RADIOLOGY

## 2021-07-07 PROCEDURE — 77080 DXA BONE DENSITY AXIAL: CPT | Mod: 26,,, | Performed by: RADIOLOGY

## 2021-07-07 PROCEDURE — 77080 DXA BONE DENSITY AXIAL: CPT | Mod: TC

## 2021-07-21 ENCOUNTER — OFFICE VISIT (OUTPATIENT)
Dept: PODIATRY | Facility: CLINIC | Age: 81
End: 2021-07-21
Payer: MEDICARE

## 2021-07-21 VITALS
SYSTOLIC BLOOD PRESSURE: 139 MMHG | RESPIRATION RATE: 16 BRPM | HEART RATE: 89 BPM | WEIGHT: 165.13 LBS | BODY MASS INDEX: 30.39 KG/M2 | HEIGHT: 62 IN | DIASTOLIC BLOOD PRESSURE: 69 MMHG

## 2021-07-21 DIAGNOSIS — Z48.89 AFTERCARE FOLLOWING SURGERY: ICD-10-CM

## 2021-07-21 DIAGNOSIS — E11.69 TYPE 2 DIABETES MELLITUS WITH OTHER SPECIFIED COMPLICATION, WITHOUT LONG-TERM CURRENT USE OF INSULIN: Primary | ICD-10-CM

## 2021-07-21 DIAGNOSIS — M20.42 HAMMER TOES OF BOTH FEET: ICD-10-CM

## 2021-07-21 DIAGNOSIS — M20.41 HAMMER TOES OF BOTH FEET: ICD-10-CM

## 2021-07-21 DIAGNOSIS — L84 SOFT CORN: ICD-10-CM

## 2021-07-21 DIAGNOSIS — M20.41 HAMMER TOE OF RIGHT FOOT: ICD-10-CM

## 2021-07-21 DIAGNOSIS — M79.676 PAIN OF TOE, UNSPECIFIED LATERALITY: ICD-10-CM

## 2021-07-21 DIAGNOSIS — M20.5X1 ACQUIRED ADDUCTOVARUS ROTATION OF TOE OF RIGHT FOOT: ICD-10-CM

## 2021-07-21 PROCEDURE — 3288F FALL RISK ASSESSMENT DOCD: CPT | Mod: CPTII,S$GLB,, | Performed by: PODIATRIST

## 2021-07-21 PROCEDURE — 1101F PR PT FALLS ASSESS DOC 0-1 FALLS W/OUT INJ PAST YR: ICD-10-PCS | Mod: CPTII,S$GLB,, | Performed by: PODIATRIST

## 2021-07-21 PROCEDURE — 99499 UNLISTED E&M SERVICE: CPT | Mod: S$GLB,,, | Performed by: PODIATRIST

## 2021-07-21 PROCEDURE — 99999 PR PBB SHADOW E&M-EST. PATIENT-LVL V: ICD-10-PCS | Mod: PBBFAC,,, | Performed by: PODIATRIST

## 2021-07-21 PROCEDURE — 3288F PR FALLS RISK ASSESSMENT DOCUMENTED: ICD-10-PCS | Mod: CPTII,S$GLB,, | Performed by: PODIATRIST

## 2021-07-21 PROCEDURE — 1126F PR PAIN SEVERITY QUANTIFIED, NO PAIN PRESENT: ICD-10-PCS | Mod: CPTII,S$GLB,, | Performed by: PODIATRIST

## 2021-07-21 PROCEDURE — 3075F PR MOST RECENT SYSTOLIC BLOOD PRESS GE 130-139MM HG: ICD-10-PCS | Mod: CPTII,S$GLB,, | Performed by: PODIATRIST

## 2021-07-21 PROCEDURE — 1101F PT FALLS ASSESS-DOCD LE1/YR: CPT | Mod: CPTII,S$GLB,, | Performed by: PODIATRIST

## 2021-07-21 PROCEDURE — 1159F MED LIST DOCD IN RCRD: CPT | Mod: CPTII,S$GLB,, | Performed by: PODIATRIST

## 2021-07-21 PROCEDURE — 99999 PR PBB SHADOW E&M-EST. PATIENT-LVL V: CPT | Mod: PBBFAC,,, | Performed by: PODIATRIST

## 2021-07-21 PROCEDURE — 1159F PR MEDICATION LIST DOCUMENTED IN MEDICAL RECORD: ICD-10-PCS | Mod: CPTII,S$GLB,, | Performed by: PODIATRIST

## 2021-07-21 PROCEDURE — 3075F SYST BP GE 130 - 139MM HG: CPT | Mod: CPTII,S$GLB,, | Performed by: PODIATRIST

## 2021-07-21 PROCEDURE — 3078F DIAST BP <80 MM HG: CPT | Mod: CPTII,S$GLB,, | Performed by: PODIATRIST

## 2021-07-21 PROCEDURE — 3078F PR MOST RECENT DIASTOLIC BLOOD PRESSURE < 80 MM HG: ICD-10-PCS | Mod: CPTII,S$GLB,, | Performed by: PODIATRIST

## 2021-07-21 PROCEDURE — 99214 PR OFFICE/OUTPT VISIT, EST, LEVL IV, 30-39 MIN: ICD-10-PCS | Mod: S$GLB,,, | Performed by: PODIATRIST

## 2021-07-21 PROCEDURE — 1126F AMNT PAIN NOTED NONE PRSNT: CPT | Mod: CPTII,S$GLB,, | Performed by: PODIATRIST

## 2021-07-21 PROCEDURE — 99214 OFFICE O/P EST MOD 30 MIN: CPT | Mod: S$GLB,,, | Performed by: PODIATRIST

## 2021-07-21 PROCEDURE — 99499 RISK ADDL DX/OHS AUDIT: ICD-10-PCS | Mod: S$GLB,,, | Performed by: PODIATRIST

## 2021-07-21 RX ORDER — SODIUM CHLORIDE 0.9 % (FLUSH) 0.9 %
10 SYRINGE (ML) INJECTION
Status: DISCONTINUED | OUTPATIENT
Start: 2021-08-27 | End: 2024-03-05

## 2021-07-21 RX ORDER — CEFAZOLIN SODIUM 1 G/50ML
1 SOLUTION INTRAVENOUS
Status: CANCELLED | OUTPATIENT
Start: 2021-08-27

## 2021-07-21 RX ORDER — KETOROLAC TROMETHAMINE 30 MG/ML
15 INJECTION, SOLUTION INTRAMUSCULAR; INTRAVENOUS
Status: SHIPPED | OUTPATIENT
Start: 2021-08-27 | End: 2021-08-29

## 2021-07-27 ENCOUNTER — SPECIALTY PHARMACY (OUTPATIENT)
Dept: PHARMACY | Facility: CLINIC | Age: 81
End: 2021-07-27

## 2021-08-06 ENCOUNTER — TELEPHONE (OUTPATIENT)
Dept: PODIATRY | Facility: CLINIC | Age: 81
End: 2021-08-06

## 2021-08-19 ENCOUNTER — SPECIALTY PHARMACY (OUTPATIENT)
Dept: PHARMACY | Facility: CLINIC | Age: 81
End: 2021-08-19

## 2021-09-13 ENCOUNTER — SPECIALTY PHARMACY (OUTPATIENT)
Dept: PHARMACY | Facility: CLINIC | Age: 81
End: 2021-09-13

## 2021-09-20 ENCOUNTER — SPECIALTY PHARMACY (OUTPATIENT)
Dept: PHARMACY | Facility: CLINIC | Age: 81
End: 2021-09-20

## 2021-09-20 RX ORDER — ABATACEPT 125 MG/ML
1 INJECTION, SOLUTION SUBCUTANEOUS
Qty: 4 ML | Refills: 11 | Status: SHIPPED | OUTPATIENT
Start: 2021-09-20 | End: 2021-11-01

## 2021-09-30 ENCOUNTER — LAB VISIT (OUTPATIENT)
Dept: LAB | Facility: OTHER | Age: 81
End: 2021-09-30
Attending: INTERNAL MEDICINE
Payer: MEDICARE

## 2021-09-30 DIAGNOSIS — M05.742 RHEUMATOID ARTHRITIS INVOLVING BOTH HANDS WITH POSITIVE RHEUMATOID FACTOR: ICD-10-CM

## 2021-09-30 DIAGNOSIS — M05.741 RHEUMATOID ARTHRITIS INVOLVING BOTH HANDS WITH POSITIVE RHEUMATOID FACTOR: ICD-10-CM

## 2021-09-30 LAB
ALBUMIN SERPL BCP-MCNC: 3.6 G/DL (ref 3.5–5.2)
ALP SERPL-CCNC: 69 U/L (ref 55–135)
ALT SERPL W/O P-5'-P-CCNC: 13 U/L (ref 10–44)
ANION GAP SERPL CALC-SCNC: 10 MMOL/L (ref 8–16)
AST SERPL-CCNC: 15 U/L (ref 10–40)
BASOPHILS # BLD AUTO: 0.04 K/UL (ref 0–0.2)
BASOPHILS NFR BLD: 0.6 % (ref 0–1.9)
BILIRUB SERPL-MCNC: 0.3 MG/DL (ref 0.1–1)
BUN SERPL-MCNC: 14 MG/DL (ref 8–23)
CALCIUM SERPL-MCNC: 10.1 MG/DL (ref 8.7–10.5)
CHLORIDE SERPL-SCNC: 104 MMOL/L (ref 95–110)
CO2 SERPL-SCNC: 27 MMOL/L (ref 23–29)
CREAT SERPL-MCNC: 1.3 MG/DL (ref 0.5–1.4)
CRP SERPL-MCNC: 24 MG/L (ref 0–8.2)
DIFFERENTIAL METHOD: ABNORMAL
EOSINOPHIL # BLD AUTO: 0.3 K/UL (ref 0–0.5)
EOSINOPHIL NFR BLD: 3.9 % (ref 0–8)
ERYTHROCYTE [DISTWIDTH] IN BLOOD BY AUTOMATED COUNT: 13.5 % (ref 11.5–14.5)
ERYTHROCYTE [SEDIMENTATION RATE] IN BLOOD: 54 MM/HR (ref 0–20)
EST. GFR  (AFRICAN AMERICAN): 45 ML/MIN/1.73 M^2
EST. GFR  (NON AFRICAN AMERICAN): 39 ML/MIN/1.73 M^2
GLUCOSE SERPL-MCNC: 82 MG/DL (ref 70–110)
HCT VFR BLD AUTO: 35.7 % (ref 37–48.5)
HGB BLD-MCNC: 10.8 G/DL (ref 12–16)
IMM GRANULOCYTES # BLD AUTO: 0.03 K/UL (ref 0–0.04)
IMM GRANULOCYTES NFR BLD AUTO: 0.4 % (ref 0–0.5)
LYMPHOCYTES # BLD AUTO: 2.5 K/UL (ref 1–4.8)
LYMPHOCYTES NFR BLD: 34.8 % (ref 18–48)
MCH RBC QN AUTO: 29 PG (ref 27–31)
MCHC RBC AUTO-ENTMCNC: 30.3 G/DL (ref 32–36)
MCV RBC AUTO: 96 FL (ref 82–98)
MONOCYTES # BLD AUTO: 0.4 K/UL (ref 0.3–1)
MONOCYTES NFR BLD: 6.1 % (ref 4–15)
NEUTROPHILS # BLD AUTO: 3.9 K/UL (ref 1.8–7.7)
NEUTROPHILS NFR BLD: 54.2 % (ref 38–73)
NRBC BLD-RTO: 0 /100 WBC
PLATELET # BLD AUTO: 367 K/UL (ref 150–450)
PMV BLD AUTO: 9.5 FL (ref 9.2–12.9)
POTASSIUM SERPL-SCNC: 4.2 MMOL/L (ref 3.5–5.1)
PROT SERPL-MCNC: 7.5 G/DL (ref 6–8.4)
RBC # BLD AUTO: 3.73 M/UL (ref 4–5.4)
SODIUM SERPL-SCNC: 141 MMOL/L (ref 136–145)
WBC # BLD AUTO: 7.16 K/UL (ref 3.9–12.7)

## 2021-09-30 PROCEDURE — 80053 COMPREHEN METABOLIC PANEL: CPT | Performed by: INTERNAL MEDICINE

## 2021-09-30 PROCEDURE — 86140 C-REACTIVE PROTEIN: CPT | Performed by: INTERNAL MEDICINE

## 2021-09-30 PROCEDURE — 85025 COMPLETE CBC W/AUTO DIFF WBC: CPT | Performed by: INTERNAL MEDICINE

## 2021-09-30 PROCEDURE — 85651 RBC SED RATE NONAUTOMATED: CPT | Performed by: INTERNAL MEDICINE

## 2021-09-30 PROCEDURE — 36415 COLL VENOUS BLD VENIPUNCTURE: CPT | Performed by: INTERNAL MEDICINE

## 2021-10-07 ENCOUNTER — SPECIALTY PHARMACY (OUTPATIENT)
Dept: PHARMACY | Facility: CLINIC | Age: 81
End: 2021-10-07

## 2021-10-07 RX ORDER — PREDNISONE 2.5 MG/1
2.5 TABLET ORAL DAILY
COMMUNITY
End: 2021-11-23 | Stop reason: SDUPTHER

## 2021-10-11 ENCOUNTER — OFFICE VISIT (OUTPATIENT)
Dept: INTERNAL MEDICINE | Facility: CLINIC | Age: 81
End: 2021-10-11
Attending: INTERNAL MEDICINE
Payer: MEDICARE

## 2021-10-11 VITALS
OXYGEN SATURATION: 98 % | HEIGHT: 62 IN | HEART RATE: 91 BPM | BODY MASS INDEX: 29.49 KG/M2 | DIASTOLIC BLOOD PRESSURE: 79 MMHG | SYSTOLIC BLOOD PRESSURE: 165 MMHG | WEIGHT: 160.25 LBS

## 2021-10-11 DIAGNOSIS — J30.9 CHRONIC ALLERGIC RHINITIS: Primary | ICD-10-CM

## 2021-10-11 DIAGNOSIS — I10 HYPERTENSION, ESSENTIAL: ICD-10-CM

## 2021-10-11 DIAGNOSIS — M79.7 FIBROMYALGIA: ICD-10-CM

## 2021-10-11 DIAGNOSIS — I10 ESSENTIAL HYPERTENSION: ICD-10-CM

## 2021-10-11 PROCEDURE — 3288F FALL RISK ASSESSMENT DOCD: CPT | Mod: CPTII,S$GLB,, | Performed by: INTERNAL MEDICINE

## 2021-10-11 PROCEDURE — 3078F DIAST BP <80 MM HG: CPT | Mod: CPTII,S$GLB,, | Performed by: INTERNAL MEDICINE

## 2021-10-11 PROCEDURE — 3288F PR FALLS RISK ASSESSMENT DOCUMENTED: ICD-10-PCS | Mod: CPTII,S$GLB,, | Performed by: INTERNAL MEDICINE

## 2021-10-11 PROCEDURE — 1159F PR MEDICATION LIST DOCUMENTED IN MEDICAL RECORD: ICD-10-PCS | Mod: CPTII,S$GLB,, | Performed by: INTERNAL MEDICINE

## 2021-10-11 PROCEDURE — 3077F PR MOST RECENT SYSTOLIC BLOOD PRESSURE >= 140 MM HG: ICD-10-PCS | Mod: CPTII,S$GLB,, | Performed by: INTERNAL MEDICINE

## 2021-10-11 PROCEDURE — 99214 OFFICE O/P EST MOD 30 MIN: CPT | Mod: S$GLB,,, | Performed by: INTERNAL MEDICINE

## 2021-10-11 PROCEDURE — 1125F AMNT PAIN NOTED PAIN PRSNT: CPT | Mod: CPTII,S$GLB,, | Performed by: INTERNAL MEDICINE

## 2021-10-11 PROCEDURE — 1100F PTFALLS ASSESS-DOCD GE2>/YR: CPT | Mod: CPTII,S$GLB,, | Performed by: INTERNAL MEDICINE

## 2021-10-11 PROCEDURE — 1160F RVW MEDS BY RX/DR IN RCRD: CPT | Mod: CPTII,S$GLB,, | Performed by: INTERNAL MEDICINE

## 2021-10-11 PROCEDURE — 99499 UNLISTED E&M SERVICE: CPT | Mod: S$GLB,,, | Performed by: INTERNAL MEDICINE

## 2021-10-11 PROCEDURE — 99999 PR PBB SHADOW E&M-EST. PATIENT-LVL IV: ICD-10-PCS | Mod: PBBFAC,,, | Performed by: INTERNAL MEDICINE

## 2021-10-11 PROCEDURE — 99214 PR OFFICE/OUTPT VISIT, EST, LEVL IV, 30-39 MIN: ICD-10-PCS | Mod: S$GLB,,, | Performed by: INTERNAL MEDICINE

## 2021-10-11 PROCEDURE — 1100F PR PT FALLS ASSESS DOC 2+ FALLS/FALL W/INJURY/YR: ICD-10-PCS | Mod: CPTII,S$GLB,, | Performed by: INTERNAL MEDICINE

## 2021-10-11 PROCEDURE — 1125F PR PAIN SEVERITY QUANTIFIED, PAIN PRESENT: ICD-10-PCS | Mod: CPTII,S$GLB,, | Performed by: INTERNAL MEDICINE

## 2021-10-11 PROCEDURE — 1159F MED LIST DOCD IN RCRD: CPT | Mod: CPTII,S$GLB,, | Performed by: INTERNAL MEDICINE

## 2021-10-11 PROCEDURE — 3078F PR MOST RECENT DIASTOLIC BLOOD PRESSURE < 80 MM HG: ICD-10-PCS | Mod: CPTII,S$GLB,, | Performed by: INTERNAL MEDICINE

## 2021-10-11 PROCEDURE — 99499 RISK ADDL DX/OHS AUDIT: ICD-10-PCS | Mod: S$GLB,,, | Performed by: INTERNAL MEDICINE

## 2021-10-11 PROCEDURE — 99999 PR PBB SHADOW E&M-EST. PATIENT-LVL IV: CPT | Mod: PBBFAC,,, | Performed by: INTERNAL MEDICINE

## 2021-10-11 PROCEDURE — 3077F SYST BP >= 140 MM HG: CPT | Mod: CPTII,S$GLB,, | Performed by: INTERNAL MEDICINE

## 2021-10-11 PROCEDURE — 1160F PR REVIEW ALL MEDS BY PRESCRIBER/CLIN PHARMACIST DOCUMENTED: ICD-10-PCS | Mod: CPTII,S$GLB,, | Performed by: INTERNAL MEDICINE

## 2021-10-11 RX ORDER — HYDROCHLOROTHIAZIDE 25 MG/1
25 TABLET ORAL DAILY
COMMUNITY
Start: 2021-07-25 | End: 2022-04-11 | Stop reason: SDUPTHER

## 2021-10-11 RX ORDER — ESTRADIOL 1 MG/1
1 TABLET ORAL DAILY
COMMUNITY
Start: 2021-08-07 | End: 2024-03-18

## 2021-10-11 RX ORDER — OLOPATADINE HYDROCHLORIDE 1 MG/ML
SOLUTION/ DROPS OPHTHALMIC
COMMUNITY
Start: 2021-07-31 | End: 2023-02-28

## 2021-10-11 RX ORDER — FLUCONAZOLE 150 MG/1
TABLET ORAL
COMMUNITY
Start: 2021-08-17 | End: 2022-07-13

## 2021-10-11 RX ORDER — METOPROLOL SUCCINATE 50 MG/1
50 TABLET, EXTENDED RELEASE ORAL DAILY
Qty: 90 TABLET | Refills: 2 | Status: SHIPPED | OUTPATIENT
Start: 2021-10-11 | End: 2021-10-19

## 2021-10-11 RX ORDER — CEFUROXIME AXETIL 500 MG/1
500 TABLET ORAL 2 TIMES DAILY
COMMUNITY
Start: 2021-07-29 | End: 2021-10-11

## 2021-10-14 ENCOUNTER — IMMUNIZATION (OUTPATIENT)
Dept: PHARMACY | Facility: CLINIC | Age: 81
End: 2021-10-14
Payer: MEDICARE

## 2021-10-18 ENCOUNTER — PES CALL (OUTPATIENT)
Dept: ADMINISTRATIVE | Facility: CLINIC | Age: 81
End: 2021-10-18

## 2021-10-18 ENCOUNTER — TELEPHONE (OUTPATIENT)
Dept: INTERNAL MEDICINE | Facility: CLINIC | Age: 81
End: 2021-10-18

## 2021-10-18 ENCOUNTER — CLINICAL SUPPORT (OUTPATIENT)
Dept: INTERNAL MEDICINE | Facility: CLINIC | Age: 81
End: 2021-10-18
Payer: MEDICARE

## 2021-10-18 VITALS — OXYGEN SATURATION: 96 % | DIASTOLIC BLOOD PRESSURE: 78 MMHG | SYSTOLIC BLOOD PRESSURE: 146 MMHG | HEART RATE: 84 BPM

## 2021-10-18 DIAGNOSIS — I10 HYPERTENSION, ESSENTIAL: Primary | ICD-10-CM

## 2021-10-18 PROCEDURE — 99999 PR PBB SHADOW E&M-EST. PATIENT-LVL II: CPT | Mod: PBBFAC,,,

## 2021-10-18 PROCEDURE — 99999 PR PBB SHADOW E&M-EST. PATIENT-LVL II: ICD-10-PCS | Mod: PBBFAC,,,

## 2021-10-19 RX ORDER — CARVEDILOL 3.12 MG/1
3.12 TABLET ORAL 2 TIMES DAILY WITH MEALS
Qty: 180 TABLET | Refills: 0 | Status: SHIPPED | OUTPATIENT
Start: 2021-10-19 | End: 2022-01-05

## 2021-10-25 ENCOUNTER — PATIENT OUTREACH (OUTPATIENT)
Dept: ADMINISTRATIVE | Facility: HOSPITAL | Age: 81
End: 2021-10-25
Payer: MEDICARE

## 2021-10-28 ENCOUNTER — CLINICAL SUPPORT (OUTPATIENT)
Dept: INTERNAL MEDICINE | Facility: CLINIC | Age: 81
End: 2021-10-28
Payer: MEDICARE

## 2021-10-28 ENCOUNTER — TELEPHONE (OUTPATIENT)
Dept: INTERNAL MEDICINE | Facility: CLINIC | Age: 81
End: 2021-10-28

## 2021-10-28 VITALS — SYSTOLIC BLOOD PRESSURE: 160 MMHG | HEART RATE: 72 BPM | DIASTOLIC BLOOD PRESSURE: 86 MMHG | OXYGEN SATURATION: 98 %

## 2021-10-28 DIAGNOSIS — I10 BENIGN ESSENTIAL HYPERTENSION: Primary | ICD-10-CM

## 2021-10-28 PROCEDURE — 99999 PR PBB SHADOW E&M-EST. PATIENT-LVL II: CPT | Mod: PBBFAC,,,

## 2021-10-28 PROCEDURE — 99999 PR PBB SHADOW E&M-EST. PATIENT-LVL II: ICD-10-PCS | Mod: PBBFAC,,,

## 2021-10-28 NOTE — TELEPHONE ENCOUNTER
Does patient have record of home blood pressure readings no. Readings have been averaging unknown.   Last dose of blood pressure medication was taken at 10:00am.  Patient is asymptomatic.   BP: (!) 160/84 , Pulse: 86.  Blood pressure reading after 15 minutes was 160/86, Pulse 72.

## 2021-10-29 ENCOUNTER — SPECIALTY PHARMACY (OUTPATIENT)
Dept: PHARMACY | Facility: CLINIC | Age: 81
End: 2021-10-29
Payer: MEDICARE

## 2021-10-29 RX ORDER — ABATACEPT 125 MG/ML
1 INJECTION, SOLUTION SUBCUTANEOUS
Qty: 4 ML | Refills: 11 | Status: SHIPPED | OUTPATIENT
Start: 2021-10-29 | End: 2022-10-19 | Stop reason: SDUPTHER

## 2021-11-01 ENCOUNTER — SPECIALTY PHARMACY (OUTPATIENT)
Dept: PHARMACY | Facility: CLINIC | Age: 81
End: 2021-11-01
Payer: MEDICARE

## 2021-11-19 ENCOUNTER — TELEPHONE (OUTPATIENT)
Dept: INTERNAL MEDICINE | Facility: CLINIC | Age: 81
End: 2021-11-19
Payer: MEDICARE

## 2021-11-19 ENCOUNTER — PES CALL (OUTPATIENT)
Dept: ADMINISTRATIVE | Facility: CLINIC | Age: 81
End: 2021-11-19
Payer: MEDICARE

## 2021-11-23 ENCOUNTER — SPECIALTY PHARMACY (OUTPATIENT)
Dept: PHARMACY | Facility: CLINIC | Age: 81
End: 2021-11-23
Payer: MEDICARE

## 2021-11-24 ENCOUNTER — TELEPHONE (OUTPATIENT)
Dept: INTERNAL MEDICINE | Facility: CLINIC | Age: 81
End: 2021-11-24

## 2021-11-24 ENCOUNTER — CLINICAL SUPPORT (OUTPATIENT)
Dept: INTERNAL MEDICINE | Facility: CLINIC | Age: 81
End: 2021-11-24
Payer: MEDICARE

## 2021-11-24 VITALS — DIASTOLIC BLOOD PRESSURE: 78 MMHG | OXYGEN SATURATION: 98 % | HEART RATE: 87 BPM | SYSTOLIC BLOOD PRESSURE: 138 MMHG

## 2021-11-24 DIAGNOSIS — I10 ESSENTIAL HYPERTENSION: Primary | ICD-10-CM

## 2021-11-24 PROCEDURE — 99999 PR PBB SHADOW E&M-EST. PATIENT-LVL II: CPT | Mod: PBBFAC,,,

## 2021-11-24 PROCEDURE — 99999 PR PBB SHADOW E&M-EST. PATIENT-LVL II: ICD-10-PCS | Mod: PBBFAC,,,

## 2021-11-24 RX ORDER — PREDNISONE 2.5 MG/1
2.5 TABLET ORAL DAILY
Qty: 30 TABLET | Refills: 1 | Status: SHIPPED | OUTPATIENT
Start: 2021-11-24 | End: 2022-01-22

## 2021-11-26 ENCOUNTER — TELEPHONE (OUTPATIENT)
Dept: INTERNAL MEDICINE | Facility: CLINIC | Age: 81
End: 2021-11-26
Payer: MEDICARE

## 2021-11-26 ENCOUNTER — PATIENT OUTREACH (OUTPATIENT)
Dept: ADMINISTRATIVE | Facility: HOSPITAL | Age: 81
End: 2021-11-26
Payer: MEDICARE

## 2021-11-26 NOTE — TELEPHONE ENCOUNTER
Left message for patient to call clinic. Need to discuss suggestions from Dr. English regarding BP medications

## 2021-11-29 ENCOUNTER — TELEPHONE (OUTPATIENT)
Dept: INTERNAL MEDICINE | Facility: CLINIC | Age: 81
End: 2021-11-29
Payer: MEDICARE

## 2021-11-29 NOTE — TELEPHONE ENCOUNTER
Attempted to call patient but no answer. Left message to return call to office at  to clarify her concerns about taking too many medicaitons.

## 2021-12-22 ENCOUNTER — CLINICAL SUPPORT (OUTPATIENT)
Dept: INTERNAL MEDICINE | Facility: CLINIC | Age: 81
End: 2021-12-22
Payer: MEDICARE

## 2021-12-22 VITALS — OXYGEN SATURATION: 99 % | SYSTOLIC BLOOD PRESSURE: 158 MMHG | HEART RATE: 81 BPM | DIASTOLIC BLOOD PRESSURE: 82 MMHG

## 2021-12-22 DIAGNOSIS — I10 HYPERTENSION, ESSENTIAL: ICD-10-CM

## 2021-12-22 PROCEDURE — 99999 PR PBB SHADOW E&M-EST. PATIENT-LVL I: ICD-10-PCS | Mod: PBBFAC,,,

## 2021-12-22 PROCEDURE — 99999 PR PBB SHADOW E&M-EST. PATIENT-LVL I: CPT | Mod: PBBFAC,,,

## 2021-12-27 ENCOUNTER — SPECIALTY PHARMACY (OUTPATIENT)
Dept: PHARMACY | Facility: CLINIC | Age: 81
End: 2021-12-27
Payer: MEDICARE

## 2022-01-05 RX ORDER — CARVEDILOL 6.25 MG/1
6.25 TABLET ORAL 2 TIMES DAILY WITH MEALS
Qty: 180 TABLET | Refills: 0 | Status: SHIPPED | OUTPATIENT
Start: 2022-01-05 | End: 2022-04-11 | Stop reason: SDUPTHER

## 2022-01-05 RX ORDER — CARVEDILOL 3.12 MG/1
6.25 TABLET ORAL 2 TIMES DAILY WITH MEALS
Qty: 180 TABLET | Refills: 0
Start: 2022-01-05 | End: 2022-01-05

## 2022-01-25 ENCOUNTER — SPECIALTY PHARMACY (OUTPATIENT)
Dept: PHARMACY | Facility: CLINIC | Age: 82
End: 2022-01-25
Payer: MEDICARE

## 2022-01-25 NOTE — TELEPHONE ENCOUNTER
Specialty Pharmacy - Refill Coordination    Specialty Medication Orders Linked to Encounter    Flowsheet Row Most Recent Value   Medication #1 abatacept (ORENCIA CLICKJECT) 125 mg/mL AtIn (Order#[id number], Rx#[id number])          Refill Questions - Documented Responses    Flowsheet Row Most Recent Value   Patient Availability and HIPAA Verification    Does patient want to proceed with activity? Yes   HIPAA/medical authority confirmed? Yes   Relationship to patient of person spoken to? Self   Refill Screening Questions    Changes to allergies? No   Changes to medications? No   New conditions since last clinic visit? No   Unplanned office visit, urgent care, ED, or hospital admission in the last 4 weeks? No   How does patient/caregiver feel medication is working? Good   Financial problems or insurance changes? No   How many doses of your specialty medications were missed in the last 4 weeks? 0   Would patient like to speak to a pharmacist? No   When does the patient need to receive the medication? 01/31/22   Refill Delivery Questions    How will the patient receive the medication? Delivery Aidee   When does the patient need to receive the medication? 01/31/22   Shipping Address Home   Address in [facility] confirmed and updated if neccessary? Yes   Expected Copay ($) 0   Is the patient able to afford the medication copay? Yes   Payment Method zero copay   Days supply of Refill 28   Supplies needed? No supplies needed   Refill activity completed? Yes   Refill activity plan Refill scheduled   Shipment/Pickup Date: 01/27/22          Current Outpatient Medications   Medication Sig    abatacept (ORENCIA CLICKJECT) 125 mg/mL AtIn Inject 1 mL into the skin every 7 days.    ACETAMINOPHEN ORAL Take by mouth.    albuterol (PROVENTIL) 2.5 mg /3 mL (0.083 %) nebulizer solution Take 2.5 mg by nebulization every 6 (six) hours as needed for Wheezing. Rescue    allopurinoL (ZYLOPRIM) 100 MG tablet 200 MG DAILY     amLODIPine (NORVASC) 10 MG tablet Take 1 tablet (10 mg total) by mouth once daily.    bismuth subsalicylate (PEPTO-BISMOL ORAL) Take by mouth.    carvediloL (COREG) 6.25 MG tablet Take 1 tablet (6.25 mg total) by mouth 2 (two) times daily with meals.    colchicine (COLCRYS) 0.6 mg tablet Take 1 tablet (0.6 mg total) by mouth once daily.    diphenhydrAMINE (BENADRYL) 25 mg capsule Take 25 mg by mouth every 6 (six) hours as needed for Itching.    DULoxetine (CYMBALTA) 30 MG capsule TAKE 1 CAPSULE BY MOUTH twice DAILY    estradioL (ESTRACE) 1 MG tablet Take 1 mg by mouth once daily.    estradiol (ESTRACE) 2 MG tablet estradiol 2 mg tablet   Take 1 tablet every day by oral route.    fexofenadine (ALLERGY RELIEF, FEXOFENADINE,) 180 MG tablet Take 180 mg by mouth once daily.    fluconazole (DIFLUCAN) 150 MG Tab Take by mouth.    fluticasone (FLONASE) 50 mcg/actuation nasal spray fluticasone 50 mcg/actuation nasal spray,suspension    fluticasone-vilanterol (BREO) 200-25 mcg/dose DsDv diskus inhaler Inhale 1 puff into the lungs once daily. Controller    hydroCHLOROthiazide (HYDRODIURIL) 25 MG tablet Take 25 mg by mouth once daily.    ipratropium (ATROVENT) 42 mcg (0.06 %) nasal spray USE 2 SPRAYS IN EACH NOSTRIL BID PRF DRIPPING    ipratropium/albuterol sulfate (IPRATROPIUM-ALBUTEROL INHL) Inhale into the lungs as needed.    losartan (COZAAR) 100 MG tablet Take 1 tablet (100 mg total) by mouth once daily.    montelukast (SINGULAIR) 10 mg tablet Take 10 mg by mouth every evening.    olopatadine (PATANOL) 0.1 % ophthalmic solution SMARTSI Drop(s) In Eye(s) Twice Daily    omeprazole (PRILOSEC) 40 MG capsule Take 40 mg by mouth once daily.    predniSONE (DELTASONE) 2.5 MG tablet TAKE 1 TABLET(2.5 MG) BY MOUTH EVERY DAY    SYMBICORT 160-4.5 mcg/actuation HFAA INHALE 2 PUFFS PO BID    terbinafine HCL (LAMISIL) 250 mg tablet terbinafine HCl 250 mg tablet   Last reviewed on 10/19/2021 10:13 AM by Moustapha CHAPIN  MD Amador    Review of patient's allergies indicates:   Allergen Reactions    Shellfish containing products Anaphylaxis    Cabbage (brassica oleracea)     Chocolate flavor     Duckweed     Kale Swelling    Mustard     Nuts [tree nut]     Oranges [orange]     Sulfa (sulfonamide antibiotics)     Sulfacetamide sodium Swelling    Tomato (solanum lycopersicum)     Weed pollen      Patient reported allergy to weed pollen, grass, trees, duckweed, cockroaches    Last reviewed on  1/5/2022 9:17 AM by Moustapha English      Tasks added this encounter   2/21/2022 - Refill Call (Auto Added)   Tasks due within next 3 months   No tasks due.     Janine Mckeon, PharmD  [facility]  [address] [facility] [address]  Phone: [phone]  Fax: [phone]

## 2022-02-23 ENCOUNTER — SPECIALTY PHARMACY (OUTPATIENT)
Dept: PHARMACY | Facility: CLINIC | Age: 82
End: 2022-02-23
Payer: MEDICARE

## 2022-02-23 DIAGNOSIS — N18.9 CHRONIC KIDNEY DISEASE, UNSPECIFIED CKD STAGE: ICD-10-CM

## 2022-02-23 NOTE — TELEPHONE ENCOUNTER
Specialty Pharmacy - Refill Coordination    Specialty Medication Orders Linked to Encounter    Flowsheet Row Most Recent Value   Medication #1 abatacept (ORENCIA CLICKJECT) 125 mg/mL AtIn (Order#[id number], Rx#[id number])          Refill Questions - Documented Responses    Flowsheet Row Most Recent Value   Patient Availability and HIPAA Verification    Does patient want to proceed with activity? Yes   HIPAA/medical authority confirmed? Yes   Relationship to patient of person spoken to? Self   Refill Screening Questions    Changes to allergies? No   Changes to medications? No   New conditions since last clinic visit? No   Unplanned office visit, urgent care, ED, or hospital admission in the last 4 weeks? No   How does patient/caregiver feel medication is working? Excellent   Financial problems or insurance changes? No   How many doses of your specialty medications were missed in the last 4 weeks? 0   Would patient like to speak to a pharmacist? No   When does the patient need to receive the medication? 03/07/22   Refill Delivery Questions    How will the patient receive the medication? Delivery Aidee   When does the patient need to receive the medication? 03/07/22   Shipping Address Home   Address in [facility] confirmed and updated if neccessary? Yes   Expected Copay ($) 0   Is the patient able to afford the medication copay? Yes   Payment Method zero copay   Days supply of Refill 28   Supplies needed? No supplies needed   Refill activity completed? Yes   Refill activity plan Refill scheduled   Shipment/Pickup Date: 03/03/22          Current Outpatient Medications   Medication Sig    abatacept (ORENCIA CLICKJECT) 125 mg/mL AtIn Inject 1 mL into the skin every 7 days.    ACETAMINOPHEN ORAL Take by mouth.    albuterol (PROVENTIL) 2.5 mg /3 mL (0.083 %) nebulizer solution Take 2.5 mg by nebulization every 6 (six) hours as needed for Wheezing. Rescue    allopurinoL (ZYLOPRIM) 100 MG tablet 200 MG DAILY     amLODIPine (NORVASC) 10 MG tablet Take 1 tablet (10 mg total) by mouth once daily.    bismuth subsalicylate (PEPTO-BISMOL ORAL) Take by mouth.    carvediloL (COREG) 6.25 MG tablet Take 1 tablet (6.25 mg total) by mouth 2 (two) times daily with meals.    colchicine (COLCRYS) 0.6 mg tablet Take 1 tablet (0.6 mg total) by mouth once daily.    diphenhydrAMINE (BENADRYL) 25 mg capsule Take 25 mg by mouth every 6 (six) hours as needed for Itching.    DULoxetine (CYMBALTA) 30 MG capsule TAKE 1 CAPSULE BY MOUTH twice DAILY    estradioL (ESTRACE) 1 MG tablet Take 1 mg by mouth once daily.    estradiol (ESTRACE) 2 MG tablet estradiol 2 mg tablet   Take 1 tablet every day by oral route.    fexofenadine (ALLERGY RELIEF, FEXOFENADINE,) 180 MG tablet Take 180 mg by mouth once daily.    fluconazole (DIFLUCAN) 150 MG Tab Take by mouth.    fluticasone (FLONASE) 50 mcg/actuation nasal spray fluticasone 50 mcg/actuation nasal spray,suspension    fluticasone-vilanterol (BREO) 200-25 mcg/dose DsDv diskus inhaler Inhale 1 puff into the lungs once daily. Controller    hydroCHLOROthiazide (HYDRODIURIL) 25 MG tablet Take 25 mg by mouth once daily.    ipratropium (ATROVENT) 42 mcg (0.06 %) nasal spray USE 2 SPRAYS IN EACH NOSTRIL BID PRF DRIPPING    ipratropium/albuterol sulfate (IPRATROPIUM-ALBUTEROL INHL) Inhale into the lungs as needed.    losartan (COZAAR) 100 MG tablet Take 1 tablet (100 mg total) by mouth once daily.    montelukast (SINGULAIR) 10 mg tablet Take 10 mg by mouth every evening.    olopatadine (PATANOL) 0.1 % ophthalmic solution SMARTSI Drop(s) In Eye(s) Twice Daily    omeprazole (PRILOSEC) 40 MG capsule Take 40 mg by mouth once daily.    predniSONE (DELTASONE) 2.5 MG tablet TAKE 1 TABLET(2.5 MG) BY MOUTH EVERY DAY    SYMBICORT 160-4.5 mcg/actuation HFAA INHALE 2 PUFFS PO BID    terbinafine HCL (LAMISIL) 250 mg tablet terbinafine HCl 250 mg tablet   Last reviewed on 10/19/2021 10:13 AM by Moustapha CHAPIN  MD Amador    Review of patient's allergies indicates:   Allergen Reactions    Shellfish containing products Anaphylaxis    Cabbage (brassica oleracea)     Chocolate flavor     Duckweed     Kale Swelling    Mustard     Nuts [tree nut]     Oranges [orange]     Sulfa (sulfonamide antibiotics)     Sulfacetamide sodium Swelling    Tomato (solanum lycopersicum)     Weed pollen      Patient reported allergy to weed pollen, grass, trees, duckweed, cockroaches    Last reviewed on  1/5/2022 9:17 AM by Moustapha English      Tasks added this encounter   No tasks added.   Tasks due within next 3 months   2/21/2022 - Refill Call (Auto Added)     Deonna ConnellyD  [facility]  [address] [facility] [address]  Phone: [phone]  Fax: [phone]

## 2022-03-28 ENCOUNTER — SPECIALTY PHARMACY (OUTPATIENT)
Dept: PHARMACY | Facility: CLINIC | Age: 82
End: 2022-03-28
Payer: MEDICARE

## 2022-03-28 ENCOUNTER — PATIENT MESSAGE (OUTPATIENT)
Dept: PHARMACY | Facility: CLINIC | Age: 82
End: 2022-03-28
Payer: MEDICARE

## 2022-03-28 NOTE — TELEPHONE ENCOUNTER
Specialty Pharmacy - Refill Coordination    Specialty Medication Orders Linked to Encounter    Flowsheet Row Most Recent Value   Medication #1 abatacept (ORENCIA CLICKJECT) 125 mg/mL AtIn (Order#[id number], Rx#[id number])          Refill Questions - Documented Responses    Flowsheet Row Most Recent Value   Patient Availability and HIPAA Verification    Does patient want to proceed with activity? Yes   HIPAA/medical authority confirmed? Yes   Relationship to patient of person spoken to? Self   Refill Screening Questions    Changes to allergies? No   Changes to medications? No   New conditions since last clinic visit? No   Unplanned office visit, urgent care, ED, or hospital admission in the last 4 weeks? No   How does patient/caregiver feel medication is working? Excellent   Financial problems or insurance changes? No   How many doses of your specialty medications were missed in the last 4 weeks? 0   Would patient like to speak to a pharmacist? No   When does the patient need to receive the medication? 04/04/22   Refill Delivery Questions    How will the patient receive the medication? Delivery Aidee   When does the patient need to receive the medication? 04/04/22   Shipping Address Home   Address in [facility] confirmed and updated if neccessary? Yes   Expected Copay ($) 0   Is the patient able to afford the medication copay? Yes   Payment Method zero copay   Days supply of Refill 28   Supplies needed? No supplies needed   Refill activity completed? Yes   Refill activity plan Refill scheduled   Shipment/Pickup Date: 03/30/22          Current Outpatient Medications   Medication Sig    abatacept (ORENCIA CLICKJECT) 125 mg/mL AtIn Inject 1 mL into the skin every 7 days.    ACETAMINOPHEN ORAL Take by mouth.    albuterol (PROVENTIL) 2.5 mg /3 mL (0.083 %) nebulizer solution Take 2.5 mg by nebulization every 6 (six) hours as needed for Wheezing. Rescue    allopurinoL (ZYLOPRIM) 100 MG tablet 200 MG DAILY     amLODIPine (NORVASC) 10 MG tablet Take 1 tablet (10 mg total) by mouth once daily.    bismuth subsalicylate (PEPTO-BISMOL ORAL) Take by mouth.    carvediloL (COREG) 6.25 MG tablet Take 1 tablet (6.25 mg total) by mouth 2 (two) times daily with meals.    colchicine (COLCRYS) 0.6 mg tablet Take 1 tablet (0.6 mg total) by mouth once daily.    diphenhydrAMINE (BENADRYL) 25 mg capsule Take 25 mg by mouth every 6 (six) hours as needed for Itching.    DULoxetine (CYMBALTA) 30 MG capsule TAKE 1 CAPSULE BY MOUTH twice DAILY    estradioL (ESTRACE) 1 MG tablet Take 1 mg by mouth once daily.    estradiol (ESTRACE) 2 MG tablet estradiol 2 mg tablet   Take 1 tablet every day by oral route.    fexofenadine (ALLERGY RELIEF, FEXOFENADINE,) 180 MG tablet Take 180 mg by mouth once daily.    fluconazole (DIFLUCAN) 150 MG Tab Take by mouth.    fluticasone (FLONASE) 50 mcg/actuation nasal spray fluticasone 50 mcg/actuation nasal spray,suspension    fluticasone-vilanterol (BREO) 200-25 mcg/dose DsDv diskus inhaler Inhale 1 puff into the lungs once daily. Controller    hydroCHLOROthiazide (HYDRODIURIL) 25 MG tablet Take 25 mg by mouth once daily.    ipratropium (ATROVENT) 42 mcg (0.06 %) nasal spray USE 2 SPRAYS IN EACH NOSTRIL BID PRF DRIPPING    ipratropium/albuterol sulfate (IPRATROPIUM-ALBUTEROL INHL) Inhale into the lungs as needed.    losartan (COZAAR) 100 MG tablet Take 1 tablet (100 mg total) by mouth once daily.    montelukast (SINGULAIR) 10 mg tablet Take 10 mg by mouth every evening.    olopatadine (PATANOL) 0.1 % ophthalmic solution SMARTSI Drop(s) In Eye(s) Twice Daily    omeprazole (PRILOSEC) 40 MG capsule Take 40 mg by mouth once daily.    predniSONE (DELTASONE) 2.5 MG tablet TAKE 1 TABLET(2.5 MG) BY MOUTH EVERY DAY    SYMBICORT 160-4.5 mcg/actuation HFAA INHALE 2 PUFFS PO BID    terbinafine HCL (LAMISIL) 250 mg tablet terbinafine HCl 250 mg tablet   Last reviewed on 10/19/2021 10:13 AM by Moustapha CHAPIN  MD Amador    Review of patient's allergies indicates:   Allergen Reactions    Shellfish containing products Anaphylaxis    Cabbage (brassica oleracea)     Chocolate flavor     Duckweed     Kale Swelling    Mustard     Nuts [tree nut]     Oranges [orange]     Sulfa (sulfonamide antibiotics)     Sulfacetamide sodium Swelling    Tomato (solanum lycopersicum)     Weed pollen      Patient reported allergy to weed pollen, grass, trees, duckweed, cockroaches    Last reviewed on  1/5/2022 9:17 AM by Moustapha English      Tasks added this encounter   4/25/2022 - Refill Call (Auto Added)   Tasks due within next 3 months   No tasks due.     Vibha Denise, PharmD  [facility]  [address] [place] [address]  Phone: [phone]  Fax: [phone]

## 2022-04-05 ENCOUNTER — PES CALL (OUTPATIENT)
Dept: ADMINISTRATIVE | Facility: CLINIC | Age: 82
End: 2022-04-05
Payer: MEDICARE

## 2022-04-13 ENCOUNTER — TELEPHONE (OUTPATIENT)
Dept: RHEUMATOLOGY | Facility: CLINIC | Age: 82
End: 2022-04-13
Payer: MEDICARE

## 2022-04-18 ENCOUNTER — PATIENT MESSAGE (OUTPATIENT)
Dept: ADMINISTRATIVE | Facility: OTHER | Age: 82
End: 2022-04-18
Payer: MEDICARE

## 2022-04-19 ENCOUNTER — LAB VISIT (OUTPATIENT)
Dept: LAB | Facility: OTHER | Age: 82
End: 2022-04-19
Attending: INTERNAL MEDICINE
Payer: MEDICARE

## 2022-04-19 DIAGNOSIS — K52.9 CHRONIC DIARRHEA OF UNKNOWN ORIGIN: Primary | ICD-10-CM

## 2022-04-19 PROCEDURE — 87177 OVA AND PARASITES SMEARS: CPT | Performed by: INTERNAL MEDICINE

## 2022-04-19 PROCEDURE — 89055 LEUKOCYTE ASSESSMENT FECAL: CPT | Performed by: INTERNAL MEDICINE

## 2022-04-19 PROCEDURE — 87209 SMEAR COMPLEX STAIN: CPT | Performed by: INTERNAL MEDICINE

## 2022-04-22 LAB — WBC #/AREA STL HPF: NORMAL /[HPF]

## 2022-04-23 LAB — O+P STL MICRO: NORMAL

## 2022-04-25 ENCOUNTER — SPECIALTY PHARMACY (OUTPATIENT)
Dept: PHARMACY | Facility: CLINIC | Age: 82
End: 2022-04-25
Payer: MEDICARE

## 2022-04-25 NOTE — TELEPHONE ENCOUNTER
Specialty Pharmacy - Refill Coordination    Specialty Medication Orders Linked to Encounter    Flowsheet Row Most Recent Value   Medication #1 abatacept (ORENCIA CLICKJECT) 125 mg/mL AtIn (Order#[id number], Rx#[id number])          Refill Questions - Documented Responses    Flowsheet Row Most Recent Value   Patient Availability and HIPAA Verification    Does patient want to proceed with activity? Yes   HIPAA/medical authority confirmed? Yes   Relationship to patient of person spoken to? Self   Refill Screening Questions    Changes to allergies? No   Changes to medications? No   New conditions since last clinic visit? No   Unplanned office visit, urgent care, ED, or hospital admission in the last 4 weeks? No   How does patient/caregiver feel medication is working? Good   Financial problems or insurance changes? No   How many doses of your specialty medications were missed in the last 4 weeks? 0   Would patient like to speak to a pharmacist? No   When does the patient need to receive the medication? 05/02/22   Refill Delivery Questions    How will the patient receive the medication? Delivery Aidee   When does the patient need to receive the medication? 05/02/22   Shipping Address Home   Address in [facility] confirmed and updated if neccessary? Yes   Expected Copay ($) 0   Is the patient able to afford the medication copay? Yes   Payment Method zero copay   Days supply of Refill 28   Supplies needed? No supplies needed   Refill activity completed? Yes   Refill activity plan Refill scheduled   Shipment/Pickup Date: 05/02/22          Current Outpatient Medications   Medication Sig    abatacept (ORENCIA CLICKJECT) 125 mg/mL AtIn Inject 1 mL into the skin every 7 days.    ACETAMINOPHEN ORAL Take by mouth.    albuterol (PROVENTIL) 2.5 mg /3 mL (0.083 %) nebulizer solution Take 2.5 mg by nebulization every 6 (six) hours as needed for Wheezing. Rescue    allopurinoL (ZYLOPRIM) 100 MG tablet TAKE 2 TABLETS BY MOUTH  DAILY    amLODIPine (NORVASC) 10 MG tablet Take 1 tablet (10 mg total) by mouth once daily.    bismuth subsalicylate (PEPTO-BISMOL ORAL) Take by mouth.    carvediloL (COREG) 6.25 MG tablet Take 1 tablet (6.25 mg total) by mouth 2 (two) times daily with meals.    colchicine (COLCRYS) 0.6 mg tablet Take 1 tablet (0.6 mg total) by mouth once daily.    diphenhydrAMINE (BENADRYL) 25 mg capsule Take 25 mg by mouth every 6 (six) hours as needed for Itching.    DULoxetine (CYMBALTA) 30 MG capsule TAKE 1 CAPSULE BY MOUTH DAILY    estradioL (ESTRACE) 1 MG tablet Take 1 mg by mouth once daily.    estradiol (ESTRACE) 2 MG tablet estradiol 2 mg tablet   Take 1 tablet every day by oral route.    fexofenadine (ALLERGY RELIEF, FEXOFENADINE,) 180 MG tablet Take 180 mg by mouth once daily.    fluconazole (DIFLUCAN) 150 MG Tab Take by mouth.    fluticasone (FLONASE) 50 mcg/actuation nasal spray fluticasone 50 mcg/actuation nasal spray,suspension    fluticasone-vilanterol (BREO) 200-25 mcg/dose DsDv diskus inhaler Inhale 1 puff into the lungs once daily. Controller    hydroCHLOROthiazide (HYDRODIURIL) 25 MG tablet Take 1 tablet (25 mg total) by mouth once daily.    ipratropium (ATROVENT) 42 mcg (0.06 %) nasal spray USE 2 SPRAYS IN EACH NOSTRIL BID PRF DRIPPING    ipratropium/albuterol sulfate (IPRATROPIUM-ALBUTEROL INHL) Inhale into the lungs as needed.    losartan (COZAAR) 100 MG tablet Take 1 tablet (100 mg total) by mouth once daily.    montelukast (SINGULAIR) 10 mg tablet Take 10 mg by mouth every evening.    olopatadine (PATANOL) 0.1 % ophthalmic solution SMARTSI Drop(s) In Eye(s) Twice Daily    omeprazole (PRILOSEC) 40 MG capsule Take 40 mg by mouth once daily.    predniSONE (DELTASONE) 2.5 MG tablet TAKE 1 TABLET(2.5 MG) BY MOUTH EVERY DAY    SYMBICORT 160-4.5 mcg/actuation HFAA INHALE 2 PUFFS PO BID    terbinafine HCL (LAMISIL) 250 mg tablet terbinafine HCl 250 mg tablet   Last reviewed on 10/19/2021  10:13 AM by Moustapha English MD    Review of patient's allergies indicates:   Allergen Reactions    Shellfish containing products Anaphylaxis    Cabbage (brassica oleracea)     Chocolate flavor     Duckweed     Kale Swelling    Mustard     Nuts [tree nut]     Oranges [orange]     Sulfa (sulfonamide antibiotics)     Sulfacetamide sodium Swelling    Tomato (solanum lycopersicum)     Weed pollen      Patient reported allergy to weed pollen, grass, trees, duckweed, cockroaches    Last reviewed on  4/11/2022 11:38 AM by Pati Gaines      Tasks added this encounter   No tasks added.   Tasks due within next 3 months   4/25/2022 - Refill Call (Auto Added)     [facility] - Specialty Pharmacy  [facility] [facility] [address]  Phone: [phone]  Fax: [phone]

## 2022-04-29 ENCOUNTER — TELEPHONE (OUTPATIENT)
Dept: RHEUMATOLOGY | Facility: CLINIC | Age: 82
End: 2022-04-29
Payer: MEDICARE

## 2022-05-12 DIAGNOSIS — I10 HYPERTENSION, ESSENTIAL: ICD-10-CM

## 2022-05-12 RX ORDER — LOSARTAN POTASSIUM 100 MG/1
100 TABLET ORAL DAILY
Qty: 90 TABLET | Refills: 3 | Status: SHIPPED | OUTPATIENT
Start: 2022-05-12 | End: 2024-03-18 | Stop reason: SDUPTHER

## 2022-05-12 NOTE — TELEPHONE ENCOUNTER
Faxed Prescription Refill from [facility] for Losartan 100 mg . Will send to Centralized  Pool   LOV     05/24/2021                                        Thank you

## 2022-05-12 NOTE — TELEPHONE ENCOUNTER
Refill Routing Note   Medication(s) are not appropriate for processing by [facility] for the following reason(s):      - Required vitals are abnormal    [facility] action(s):  Defer          Medication reconciliation completed: No     Appointments  past 12m or future 3m with PCP    Date Provider   Last Visit   10/11/2021 Moustapha English MD   Next Visit   Visit date not found Moustapha English MD   ED visits in past 90 days: 0        Note composed:11:26 AM 05/12/2022

## 2022-05-12 NOTE — TELEPHONE ENCOUNTER
No new care gaps identified.  [facility] Embedded Care Gaps. Reference number: [id number]. 5/12/2022   8:49:46 AM JOANNET

## 2022-05-24 ENCOUNTER — SPECIALTY PHARMACY (OUTPATIENT)
Dept: PHARMACY | Facility: CLINIC | Age: 82
End: 2022-05-24
Payer: MEDICARE

## 2022-05-24 NOTE — TELEPHONE ENCOUNTER
Specialty Pharmacy - Refill Coordination    Specialty Medication Orders Linked to Encounter    Flowsheet Row Most Recent Value   Medication #1 abatacept (ORENCIA CLICKJECT) 125 mg/mL AtIn (Order#[id number], Rx#[id number])          Refill Questions - Documented Responses    Flowsheet Row Most Recent Value   Patient Availability and HIPAA Verification    Does patient want to proceed with activity? Yes   HIPAA/medical authority confirmed? Yes   Relationship to patient of person spoken to? Self   Refill Screening Questions    Changes to allergies? No   Changes to medications? No   New conditions since last clinic visit? No   Unplanned office visit, urgent care, ED, or hospital admission in the last 4 weeks? No   How does patient/caregiver feel medication is working? Good   Financial problems or insurance changes? No   How many doses of your specialty medications were missed in the last 4 weeks? 0   Would patient like to speak to a pharmacist? No   When does the patient need to receive the medication? 05/30/22   Refill Delivery Questions    How will the patient receive the medication? Delivery Aidee   When does the patient need to receive the medication? 05/30/22   Shipping Address Home   Address in [facility] confirmed and updated if neccessary? Yes   Expected Copay ($) 0   Is the patient able to afford the medication copay? Yes   Payment Method zero copay   Days supply of Refill 28   Supplies needed? No supplies needed   Refill activity completed? Yes   Refill activity plan Refill scheduled   Shipment/Pickup Date: 05/30/22          Current Outpatient Medications   Medication Sig    abatacept (ORENCIA CLICKJECT) 125 mg/mL AtIn Inject 1 mL into the skin every 7 days.    ACETAMINOPHEN ORAL Take by mouth.    albuterol (PROVENTIL) 2.5 mg /3 mL (0.083 %) nebulizer solution Take 2.5 mg by nebulization every 6 (six) hours as needed for Wheezing. Rescue    allopurinoL (ZYLOPRIM) 100 MG tablet TAKE 2 TABLETS BY MOUTH  DAILY    amLODIPine (NORVASC) 10 MG tablet Take 1 tablet (10 mg total) by mouth once daily.    bismuth subsalicylate (PEPTO-BISMOL ORAL) Take by mouth.    carvediloL (COREG) 6.25 MG tablet Take 1 tablet (6.25 mg total) by mouth 2 (two) times daily with meals.    colchicine (COLCRYS) 0.6 mg tablet Take 1 tablet (0.6 mg total) by mouth once daily.    diphenhydrAMINE (BENADRYL) 25 mg capsule Take 25 mg by mouth every 6 (six) hours as needed for Itching.    DULoxetine (CYMBALTA) 30 MG capsule TAKE 1 CAPSULE BY MOUTH DAILY    estradioL (ESTRACE) 1 MG tablet Take 1 mg by mouth once daily.    estradiol (ESTRACE) 2 MG tablet estradiol 2 mg tablet   Take 1 tablet every day by oral route.    fexofenadine (ALLERGY RELIEF, FEXOFENADINE,) 180 MG tablet Take 180 mg by mouth once daily.    fluconazole (DIFLUCAN) 150 MG Tab Take by mouth.    fluticasone (FLONASE) 50 mcg/actuation nasal spray fluticasone 50 mcg/actuation nasal spray,suspension    fluticasone-vilanterol (BREO) 200-25 mcg/dose DsDv diskus inhaler Inhale 1 puff into the lungs once daily. Controller    hydroCHLOROthiazide (HYDRODIURIL) 25 MG tablet Take 1 tablet (25 mg total) by mouth once daily.    ipratropium (ATROVENT) 42 mcg (0.06 %) nasal spray USE 2 SPRAYS IN EACH NOSTRIL BID PRF DRIPPING    ipratropium/albuterol sulfate (IPRATROPIUM-ALBUTEROL INHL) Inhale into the lungs as needed.    losartan (COZAAR) 100 MG tablet Take 1 tablet (100 mg total) by mouth once daily.    montelukast (SINGULAIR) 10 mg tablet Take 10 mg by mouth every evening.    olopatadine (PATANOL) 0.1 % ophthalmic solution SMARTSI Drop(s) In Eye(s) Twice Daily    omeprazole (PRILOSEC) 40 MG capsule Take 40 mg by mouth once daily.    predniSONE (DELTASONE) 2.5 MG tablet TAKE 1 TABLET(2.5 MG) BY MOUTH EVERY DAY    SYMBICORT 160-4.5 mcg/actuation HFAA INHALE 2 PUFFS PO BID    terbinafine HCL (LAMISIL) 250 mg tablet terbinafine HCl 250 mg tablet   Last reviewed on 10/19/2021  10:13 AM by Moustapha English MD    Review of patient's allergies indicates:   Allergen Reactions    Shellfish containing products Anaphylaxis    Cabbage (brassica oleracea)     Chocolate flavor     Duckweed     Kale Swelling    Mustard     Nuts [tree nut]     Oranges [orange]     Sulfa (sulfonamide antibiotics)     Sulfacetamide sodium Swelling    Tomato (solanum lycopersicum)     Weed pollen      Patient reported allergy to weed pollen, grass, trees, duckweed, cockroaches    Last reviewed on  5/12/2022 8:45 AM by Pati Gaines      Tasks added this encounter   No tasks added.   Tasks due within next 3 months   5/23/2022 - Refill Call (Auto Added)     [facility] - Specialty Pharmacy  [facility] [facility] [address]  Phone: [phone]  Fax: [phone]

## 2022-05-30 ENCOUNTER — TELEPHONE (OUTPATIENT)
Dept: RHEUMATOLOGY | Facility: CLINIC | Age: 82
End: 2022-05-30
Payer: MEDICARE

## 2022-05-30 NOTE — TELEPHONE ENCOUNTER
Attempt to reach out to patient to claudia. Patient's appt canceled in May. No answer. Left voicemail.     Thank you,   Litzy

## 2022-06-15 ENCOUNTER — SPECIALTY PHARMACY (OUTPATIENT)
Dept: PHARMACY | Facility: CLINIC | Age: 82
End: 2022-06-15
Payer: MEDICARE

## 2022-06-15 NOTE — TELEPHONE ENCOUNTER
Specialty Pharmacy - Clinical Reassessment    Specialty Medication Orders Linked to Encounter    Flowsheet Row Most Recent Value   Medication #1 abatacept (ORENCIA CLICKJECT) 125 mg/mL AtIn (Order#[id number], Rx#[id number])        Patient Diagnosis   M06.9 - Rheumatoid arthritis    Specialty clinical pharmacist review completed for an annual review of reassessment. Reviewed the following areas: current med list, reports of adverse effects, adherence and progress towards therapeutic goals.    Recommendations: none at this time.    Tasks added this encounter   3/15/2023 - Clinical - Follow Up Assesement (Annual)   Tasks due within next 3 months   6/20/2022 - Refill Call (Auto Added)     Elodia Blair, PharmD  [facility]ty Pharmacy  [address] [facility] [address]  Phone: [phone]  Fax: [phone]

## 2022-06-20 ENCOUNTER — PATIENT MESSAGE (OUTPATIENT)
Dept: PHARMACY | Facility: CLINIC | Age: 82
End: 2022-06-20
Payer: MEDICARE

## 2022-06-20 ENCOUNTER — SPECIALTY PHARMACY (OUTPATIENT)
Dept: PHARMACY | Facility: CLINIC | Age: 82
End: 2022-06-20
Payer: MEDICARE

## 2022-06-20 DIAGNOSIS — M05.79 SEROPOSITIVE RHEUMATOID ARTHRITIS OF MULTIPLE SITES: Primary | ICD-10-CM

## 2022-06-20 NOTE — TELEPHONE ENCOUNTER
Specialty Pharmacy - Refill Coordination    Specialty Medication Orders Linked to Encounter    Flowsheet Row Most Recent Value   Medication #1 abatacept (ORENCIA CLICKJECT) 125 mg/mL AtIn (Order#[id number], Rx#[id number])          Refill Questions - Documented Responses    Flowsheet Row Most Recent Value   Patient Availability and HIPAA Verification    Does patient want to proceed with activity? Yes   HIPAA/medical authority confirmed? Yes   Relationship to patient of person spoken to? Self   Refill Screening Questions    Changes to allergies? No   Changes to medications? No   New conditions since last clinic visit? No   Unplanned office visit, urgent care, ED, or hospital admission in the last 4 weeks? No   How does patient/caregiver feel medication is working? Good   Financial problems or insurance changes? No   How many doses of your specialty medications were missed in the last 4 weeks? 0   Would patient like to speak to a pharmacist? No   When does the patient need to receive the medication? 06/27/22   Refill Delivery Questions    How will the patient receive the medication? Delivery Aidee   When does the patient need to receive the medication? 06/27/22   Shipping Address Home   Address in [facility] confirmed and updated if neccessary? Yes   Expected Copay ($) 0   Is the patient able to afford the medication copay? Yes   Payment Method zero copay   Days supply of Refill 28   Supplies needed? No supplies needed   Refill activity completed? Yes   Refill activity plan Refill scheduled   Shipment/Pickup Date: 06/24/22          Current Outpatient Medications   Medication Sig    abatacept (ORENCIA CLICKJECT) 125 mg/mL AtIn Inject 1 mL into the skin every 7 days.    ACETAMINOPHEN ORAL Take by mouth.    albuterol (PROVENTIL) 2.5 mg /3 mL (0.083 %) nebulizer solution Take 2.5 mg by nebulization every 6 (six) hours as needed for Wheezing. Rescue    allopurinoL (ZYLOPRIM) 100 MG tablet TAKE 2 TABLETS BY MOUTH  DAILY    amLODIPine (NORVASC) 10 MG tablet Take 1 tablet (10 mg total) by mouth once daily.    bismuth subsalicylate (PEPTO-BISMOL ORAL) Take by mouth.    carvediloL (COREG) 6.25 MG tablet Take 1 tablet (6.25 mg total) by mouth 2 (two) times daily with meals.    colchicine (COLCRYS) 0.6 mg tablet Take 1 tablet (0.6 mg total) by mouth once daily.    diphenhydrAMINE (BENADRYL) 25 mg capsule Take 25 mg by mouth every 6 (six) hours as needed for Itching.    DULoxetine (CYMBALTA) 30 MG capsule TAKE 1 CAPSULE BY MOUTH DAILY    estradioL (ESTRACE) 1 MG tablet Take 1 mg by mouth once daily.    estradiol (ESTRACE) 2 MG tablet estradiol 2 mg tablet   Take 1 tablet every day by oral route.    fexofenadine (ALLERGY RELIEF, FEXOFENADINE,) 180 MG tablet Take 180 mg by mouth once daily.    fluconazole (DIFLUCAN) 150 MG Tab Take by mouth.    fluticasone (FLONASE) 50 mcg/actuation nasal spray fluticasone 50 mcg/actuation nasal spray,suspension    fluticasone-vilanterol (BREO) 200-25 mcg/dose DsDv diskus inhaler Inhale 1 puff into the lungs once daily. Controller    hydroCHLOROthiazide (HYDRODIURIL) 25 MG tablet Take 1 tablet (25 mg total) by mouth once daily.    ipratropium (ATROVENT) 42 mcg (0.06 %) nasal spray USE 2 SPRAYS IN EACH NOSTRIL BID PRF DRIPPING    ipratropium/albuterol sulfate (IPRATROPIUM-ALBUTEROL INHL) Inhale into the lungs as needed.    losartan (COZAAR) 100 MG tablet Take 1 tablet (100 mg total) by mouth once daily.    montelukast (SINGULAIR) 10 mg tablet Take 10 mg by mouth every evening.    olopatadine (PATANOL) 0.1 % ophthalmic solution SMARTSI Drop(s) In Eye(s) Twice Daily    omeprazole (PRILOSEC) 40 MG capsule Take 40 mg by mouth once daily.    predniSONE (DELTASONE) 2.5 MG tablet TAKE 1 TABLET(2.5 MG) BY MOUTH EVERY DAY    SYMBICORT 160-4.5 mcg/actuation HFAA INHALE 2 PUFFS PO BID    terbinafine HCL (LAMISIL) 250 mg tablet terbinafine HCl 250 mg tablet   Last reviewed on 10/19/2021  10:13 AM by Moustapha English MD    Review of patient's allergies indicates:   Allergen Reactions    Shellfish containing products Anaphylaxis    Cabbage (brassica oleracea)     Chocolate flavor     Duckweed     Kale Swelling    Mustard     Nuts [tree nut]     Oranges [orange]     Sulfa (sulfonamide antibiotics)     Sulfacetamide sodium Swelling    Tomato (solanum lycopersicum)     Weed pollen      Patient reported allergy to weed pollen, grass, trees, duckweed, cockroaches    Last reviewed on  5/12/2022 8:45 AM by Pati Gaines      Tasks added this encounter   7/18/2022 - Refill Call (Auto Added)   Tasks due within next 3 months   No tasks due.     Sunshine Nguyen, PharmD  [facility]  [address] [place] [address]  Phone: [phone]  Fax: [phone]

## 2022-07-13 ENCOUNTER — LAB VISIT (OUTPATIENT)
Dept: LAB | Facility: HOSPITAL | Age: 82
End: 2022-07-13
Attending: INTERNAL MEDICINE
Payer: MEDICARE

## 2022-07-13 ENCOUNTER — OFFICE VISIT (OUTPATIENT)
Dept: RHEUMATOLOGY | Facility: CLINIC | Age: 82
End: 2022-07-13
Payer: MEDICARE

## 2022-07-13 VITALS
SYSTOLIC BLOOD PRESSURE: 144 MMHG | BODY MASS INDEX: 28.52 KG/M2 | WEIGHT: 155 LBS | TEMPERATURE: 99 F | DIASTOLIC BLOOD PRESSURE: 77 MMHG | HEIGHT: 62 IN | HEART RATE: 92 BPM

## 2022-07-13 DIAGNOSIS — M47.812 OSTEOARTHRITIS OF CERVICAL SPINE, UNSPECIFIED SPINAL OSTEOARTHRITIS COMPLICATION STATUS: ICD-10-CM

## 2022-07-13 DIAGNOSIS — M05.741 RHEUMATOID ARTHRITIS INVOLVING BOTH HANDS WITH POSITIVE RHEUMATOID FACTOR: Primary | ICD-10-CM

## 2022-07-13 DIAGNOSIS — M05.742 RHEUMATOID ARTHRITIS INVOLVING BOTH HANDS WITH POSITIVE RHEUMATOID FACTOR: ICD-10-CM

## 2022-07-13 DIAGNOSIS — M1A.09X0 IDIOPATHIC CHRONIC GOUT OF MULTIPLE SITES WITHOUT TOPHUS: ICD-10-CM

## 2022-07-13 DIAGNOSIS — M05.742 RHEUMATOID ARTHRITIS INVOLVING BOTH HANDS WITH POSITIVE RHEUMATOID FACTOR: Primary | ICD-10-CM

## 2022-07-13 DIAGNOSIS — M05.741 RHEUMATOID ARTHRITIS INVOLVING BOTH HANDS WITH POSITIVE RHEUMATOID FACTOR: ICD-10-CM

## 2022-07-13 LAB
ALBUMIN SERPL BCP-MCNC: 4.1 G/DL (ref 3.5–5.2)
ALP SERPL-CCNC: 88 U/L (ref 55–135)
ALT SERPL W/O P-5'-P-CCNC: 18 U/L (ref 10–44)
ANION GAP SERPL CALC-SCNC: 11 MMOL/L (ref 8–16)
AST SERPL-CCNC: 20 U/L (ref 10–40)
BASOPHILS # BLD AUTO: 0.04 K/UL (ref 0–0.2)
BASOPHILS NFR BLD: 0.5 % (ref 0–1.9)
BILIRUB SERPL-MCNC: 0.6 MG/DL (ref 0.1–1)
BUN SERPL-MCNC: 16 MG/DL (ref 8–23)
CALCIUM SERPL-MCNC: 10.2 MG/DL (ref 8.7–10.5)
CHLORIDE SERPL-SCNC: 101 MMOL/L (ref 95–110)
CO2 SERPL-SCNC: 26 MMOL/L (ref 23–29)
CREAT SERPL-MCNC: 1.5 MG/DL (ref 0.5–1.4)
CRP SERPL-MCNC: 18.8 MG/L (ref 0–8.2)
DIFFERENTIAL METHOD: ABNORMAL
EOSINOPHIL # BLD AUTO: 0.1 K/UL (ref 0–0.5)
EOSINOPHIL NFR BLD: 1.8 % (ref 0–8)
ERYTHROCYTE [DISTWIDTH] IN BLOOD BY AUTOMATED COUNT: 13.2 % (ref 11.5–14.5)
ERYTHROCYTE [SEDIMENTATION RATE] IN BLOOD BY PHOTOMETRIC METHOD: 33 MM/HR (ref 0–36)
EST. GFR  (AFRICAN AMERICAN): 37.4 ML/MIN/1.73 M^2
EST. GFR  (NON AFRICAN AMERICAN): 32.4 ML/MIN/1.73 M^2
GLUCOSE SERPL-MCNC: 103 MG/DL (ref 70–110)
HCT VFR BLD AUTO: 37.1 % (ref 37–48.5)
HGB BLD-MCNC: 11.2 G/DL (ref 12–16)
IMM GRANULOCYTES # BLD AUTO: 0.02 K/UL (ref 0–0.04)
IMM GRANULOCYTES NFR BLD AUTO: 0.3 % (ref 0–0.5)
LYMPHOCYTES # BLD AUTO: 2.1 K/UL (ref 1–4.8)
LYMPHOCYTES NFR BLD: 27.7 % (ref 18–48)
MCH RBC QN AUTO: 30.4 PG (ref 27–31)
MCHC RBC AUTO-ENTMCNC: 30.2 G/DL (ref 32–36)
MCV RBC AUTO: 101 FL (ref 82–98)
MONOCYTES # BLD AUTO: 0.3 K/UL (ref 0.3–1)
MONOCYTES NFR BLD: 4.1 % (ref 4–15)
NEUTROPHILS # BLD AUTO: 5 K/UL (ref 1.8–7.7)
NEUTROPHILS NFR BLD: 65.6 % (ref 38–73)
NRBC BLD-RTO: 0 /100 WBC
PLATELET # BLD AUTO: 307 K/UL (ref 150–450)
PMV BLD AUTO: 9.6 FL (ref 9.2–12.9)
POTASSIUM SERPL-SCNC: 4.3 MMOL/L (ref 3.5–5.1)
PROT SERPL-MCNC: 7.9 G/DL (ref 6–8.4)
RBC # BLD AUTO: 3.69 M/UL (ref 4–5.4)
SODIUM SERPL-SCNC: 138 MMOL/L (ref 136–145)
URATE SERPL-MCNC: 8.1 MG/DL (ref 2.4–5.7)
WBC # BLD AUTO: 7.62 K/UL (ref 3.9–12.7)

## 2022-07-13 PROCEDURE — 86140 C-REACTIVE PROTEIN: CPT | Performed by: INTERNAL MEDICINE

## 2022-07-13 PROCEDURE — 3077F PR MOST RECENT SYSTOLIC BLOOD PRESSURE >= 140 MM HG: ICD-10-PCS | Mod: CPTII,S$GLB,, | Performed by: INTERNAL MEDICINE

## 2022-07-13 PROCEDURE — 3078F PR MOST RECENT DIASTOLIC BLOOD PRESSURE < 80 MM HG: ICD-10-PCS | Mod: CPTII,S$GLB,, | Performed by: INTERNAL MEDICINE

## 2022-07-13 PROCEDURE — 84550 ASSAY OF BLOOD/URIC ACID: CPT | Performed by: INTERNAL MEDICINE

## 2022-07-13 PROCEDURE — 1125F PR PAIN SEVERITY QUANTIFIED, PAIN PRESENT: ICD-10-PCS | Mod: CPTII,S$GLB,, | Performed by: INTERNAL MEDICINE

## 2022-07-13 PROCEDURE — 99214 PR OFFICE/OUTPT VISIT, EST, LEVL IV, 30-39 MIN: ICD-10-PCS | Mod: S$GLB,,, | Performed by: INTERNAL MEDICINE

## 2022-07-13 PROCEDURE — 1125F AMNT PAIN NOTED PAIN PRSNT: CPT | Mod: CPTII,S$GLB,, | Performed by: INTERNAL MEDICINE

## 2022-07-13 PROCEDURE — 3078F DIAST BP <80 MM HG: CPT | Mod: CPTII,S$GLB,, | Performed by: INTERNAL MEDICINE

## 2022-07-13 PROCEDURE — 36415 COLL VENOUS BLD VENIPUNCTURE: CPT | Performed by: INTERNAL MEDICINE

## 2022-07-13 PROCEDURE — 99214 OFFICE O/P EST MOD 30 MIN: CPT | Mod: S$GLB,,, | Performed by: INTERNAL MEDICINE

## 2022-07-13 PROCEDURE — 85025 COMPLETE CBC W/AUTO DIFF WBC: CPT | Performed by: INTERNAL MEDICINE

## 2022-07-13 PROCEDURE — 1159F MED LIST DOCD IN RCRD: CPT | Mod: CPTII,S$GLB,, | Performed by: INTERNAL MEDICINE

## 2022-07-13 PROCEDURE — 99999 PR PBB SHADOW E&M-EST. PATIENT-LVL IV: CPT | Mod: PBBFAC,,, | Performed by: INTERNAL MEDICINE

## 2022-07-13 PROCEDURE — 85652 RBC SED RATE AUTOMATED: CPT | Performed by: INTERNAL MEDICINE

## 2022-07-13 PROCEDURE — 80053 COMPREHEN METABOLIC PANEL: CPT | Performed by: INTERNAL MEDICINE

## 2022-07-13 PROCEDURE — 1159F PR MEDICATION LIST DOCUMENTED IN MEDICAL RECORD: ICD-10-PCS | Mod: CPTII,S$GLB,, | Performed by: INTERNAL MEDICINE

## 2022-07-13 PROCEDURE — 99999 PR PBB SHADOW E&M-EST. PATIENT-LVL IV: ICD-10-PCS | Mod: PBBFAC,,, | Performed by: INTERNAL MEDICINE

## 2022-07-13 PROCEDURE — 3077F SYST BP >= 140 MM HG: CPT | Mod: CPTII,S$GLB,, | Performed by: INTERNAL MEDICINE

## 2022-07-13 RX ORDER — EVOLOCUMAB 140 MG/ML
140 INJECTION, SOLUTION SUBCUTANEOUS
COMMUNITY
Start: 2022-06-15 | End: 2024-03-05

## 2022-07-13 RX ORDER — ALLOPURINOL 100 MG/1
200 TABLET ORAL DAILY
Qty: 180 TABLET | Refills: 1 | Status: SHIPPED | OUTPATIENT
Start: 2022-07-13 | End: 2023-06-20

## 2022-07-13 RX ORDER — LEVOCETIRIZINE DIHYDROCHLORIDE 5 MG/1
5 TABLET, FILM COATED ORAL DAILY
COMMUNITY
Start: 2022-06-24

## 2022-07-13 RX ORDER — METOPROLOL SUCCINATE 50 MG/1
50 TABLET, EXTENDED RELEASE ORAL DAILY
COMMUNITY
Start: 2022-04-10 | End: 2022-11-29

## 2022-07-13 RX ORDER — PREDNISONE 2.5 MG/1
2.5 TABLET ORAL DAILY
Qty: 30 TABLET | Refills: 4 | Status: SHIPPED | OUTPATIENT
Start: 2022-07-13 | End: 2022-10-09

## 2022-07-13 RX ORDER — BETAMETHASONE DIPROPIONATE 0.5 MG/G
CREAM TOPICAL
COMMUNITY
Start: 2022-06-13 | End: 2024-03-05

## 2022-07-13 RX ORDER — AZELASTINE HCL 205.5 UG/1
2 SPRAY NASAL 2 TIMES DAILY
COMMUNITY
Start: 2022-04-10

## 2022-07-13 RX ORDER — COLCHICINE 0.6 MG/1
0.6 TABLET ORAL DAILY
Qty: 30 TABLET | Refills: 5 | Status: SHIPPED | OUTPATIENT
Start: 2022-07-13 | End: 2022-11-29

## 2022-07-13 RX ORDER — PREGABALIN 50 MG/1
50 CAPSULE ORAL 3 TIMES DAILY
Qty: 90 CAPSULE | Refills: 6 | Status: SHIPPED | OUTPATIENT
Start: 2022-07-13 | End: 2023-01-18

## 2022-07-13 ASSESSMENT — ROUTINE ASSESSMENT OF PATIENT INDEX DATA (RAPID3)
PSYCHOLOGICAL DISTRESS SCORE: 1.1
FATIGUE SCORE: 10
TOTAL RAPID3 SCORE: 4.94
PAIN SCORE: 10
AM STIFFNESS SCORE: 0, NO
PATIENT GLOBAL ASSESSMENT SCORE: 4.5
MDHAQ FUNCTION SCORE: 0.1

## 2022-07-13 NOTE — PROGRESS NOTES
HPI:     Patient with joint pains and high inflammatory markers for a follow up    The chief complaint leading to consultation is: seronegative arthritis      History of presenting illness    82 year old black female has    Joint pain since 2008     She has seen several rheumatologists. First was Dr. Lerma who thought she had bursitis.Then she saw Dr. Riddle who diagnosed rheumatoid arthritis and gave her Humira x 3 years which did not help. Humira caused her to feel like a zombie. She then went to [facility] : saw Dr. Woo and Dr. Reeves     He diagnosed a combination of rheumatoid arthritis, fibromyalgia and PMR.  She recalls taking methotrexate but it didn't help her. At one point she only took pain pills.She took mtx for 4 years    She has been on prednisone since May 2016 due to ALLERGIES as prescribed by her ALLERGY doctor.     She also took tramadol and percocet but did not help.     Held Arava due to stomach upset.  Night sweats since 2008.    Then we were treating her as PMR.     She was on prednisone 2.5 mg BID    She took lyrica in the past and that didn't help      Then her complaints were :     Fatigue  Pain all over  No swelling    Once she had right hand pain and swelling,then she had left ankle pain and swelling  Muscle spasms     I checked her uric acid and it was 11.4  She had CRP of 50.3  Her ESR was normal    I offered her gabapentin and she didn't like it  She didn't like the lyrica    I then gave her 20 mg prednisone    I asked her to taper prednisone  Actemra offered last time     She didn't like it     She says it caused rash,made her sleepy     She has stayed on 10 mg prednisone   But she has continued to take actemra     Uric acid down to 5.6 AND THEN UPTO 6.9    SHE HAD ONGOING JOINT SYMPTOMS   She started to develop fibromyalgia symptoms as well    I added 0.5 ml weekly mtx and she took it for 2 weeks and she stopped it     She was taking actemra,prednisone 10 mg tapered to 2.5 mg    She is now on cymbalta 30 mg   She takes allopurinol 200 mg and colchicine 0.6 mg     I last saw her in November and she was asked to start xeljanz 5 mg bid  She was taken off actemra in february  She didn't get xeljanz unfortunately yet    3/2021    She has had more flares recently  One in the left hand yesterday    3/1/2021    Tb neg  H/h 11.7/38.6  nml white and plt count  GFR 41  Hep b and c neg    11/2020  Uric acid 5.8  ESR 26  CRP 33.4    6/2021    Neck is hurting and it causes shooting pain down the left arm    ESR 53  CRP 33.8  GFR 49  CBC nml    Sinuses are bothering her a lot      MRI C spine    The alignment of the cervical spine is normal.  The vertebral body heights are well maintained.  Severe disc space narrowing at C3-C4 and mild disc space narrowing at C4-5 and C5-6.  No evidence of malignant bone marrow replacement process or infection.  The craniocervical junction, cervical cord and upper thoracic cord demonstrate normal signal.     C2-C3: No disc abnormality, no canal stenosis or foraminal narrowing.  There is mild left facet joint osseous hypertrophy.     C3-C4: Posterior disc osteophyte complex effacing the anterior CSF sleeve causing no canal stenosis.  There is bilateral uncovertebral spur creating moderate left and mild right foraminal narrowing.     C4-C5: Posterior disc osteophyte complex with left paracentral disc  protrusion and ligamentum flavum hypertrophy result in overall moderate central canal stenosis with severe left lateral canal stenosis.  There is mild left foraminal narrowing.     C5-C6: No disc abnormality, mild ligamentum flavum hypertrophy.  No canal stenosis or foraminal narrowing.     C6-C7: Unremarkable     C7-T1: Unremarkable     The paraspinal soft tissues appear normal.     Impression:     Significant spondylosis of the cervical spine most severe at C5-6 where there is severe left lateral canal stenosis and overall moderate central canal stenosis due to a posterior  disc osteophyte complex with a left paracentral disc protrusion and prominent ligamentum flavum hypertrophy.     She sees spine ortho and surgery is recommended     7/2022    She has fibromyalgia flares  Joints are stable    She likes orencia  She stopped daily allopurinol  She stopped cymbalta    Past history : HTN,allergic rhinitis,CKD stage 3,GERD,PMR,RA,FMS,elevated inflammatory markers,asthma     Family history : no autoimmune illness    Social history : former smoker 1999      Review of Systems   Constitutional: Negative for activity change, appetite change, chills, diaphoresis, fatigue, fever and unexpected weight change.   HENT: Negative for congestion, dental problem, drooling, ear discharge, ear pain, facial swelling, hearing loss, mouth sores, nosebleeds, postnasal drip, rhinorrhea, sinus pressure, sinus pain, sneezing, sore throat, tinnitus, trouble swallowing and voice change.    Eyes: Negative for photophobia, pain, discharge, redness, itching and visual disturbance.   Respiratory: Negative for apnea, cough, choking, chest tightness, shortness of breath, wheezing and stridor.    Cardiovascular: Negative for chest pain, palpitations and leg swelling.   Gastrointestinal: Negative for abdominal distention, abdominal pain, anal bleeding, blood in stool, constipation, diarrhea, nausea, rectal pain and vomiting.   Endocrine: Negative for cold intolerance, heat intolerance, polydipsia, polyphagia and polyuria.   Genitourinary: Negative for decreased urine volume, difficulty urinating, dysuria, enuresis, flank pain, frequency, genital sores, hematuria and urgency.   Musculoskeletal: Positive for arthralgias. Negative for back pain, gait problem, joint swelling, myalgias, neck pain and neck stiffness.   Skin: Negative for color change, pallor, rash and wound.   Allergic/Immunologic: Negative for environmental allergies, food allergies and immunocompromised state.   Neurological: Negative for dizziness, tremors,  seizures, syncope, facial asymmetry, speech difficulty, weakness, light-headedness, numbness and headaches.   Hematological: Negative for adenopathy. Does not bruise/bleed easily.   Psychiatric/Behavioral: Negative for agitation, behavioral problems, confusion, decreased concentration, dysphoric mood, hallucinations, self-injury, sleep disturbance and suicidal ideas. The patient is not nervous/anxious and is not hyperactive.      Physical exam     MSK exam nml        Assessment   82 year old black female with     HTN,allergic rhinitis,CKD stage 3,GERD,PMR,RA,FMS,elevated inflammatory markers,asthma     She wants prednisone to survive with the joint pain    We have witnessed synovitis in the past,this made us think she has RA  We thought she rapidly responded to prednisone,hence comes the PMR  She always complains of overall body pain,hence she gets the fibromyalgia diagnosis    We have positive RF in the past  We have high uric acid    She has responded really well to prednisone 20 mg  Her CRP dropped a great deal    We have her allopurinol 100 mg and colchicine 0.6 mg  and her uric acid dropped to less than 6    I GAVE HER MTX AND SHE TOOK IT FOR 2 WEEKS ONLY     Finally on actemra we had achieved compliance    She has cut back prednisone to 2.5 mg daily    She then had more flares    We offered xeljanz 5 mg bid  She had more flares    She now take orencia weekly    She probably has seronegative RA/PMR/Gout and fibromyalgia    Dexa : Osteopenia of the femoral neck.  FRAX calculations do not suggest treatment    Now mostly fibromyalgia flares      1. Seronegative arthritis        Reviewed labs/xrays  Reviewed medications    F/u problem    She is off xeljanz 5 mg bid - she takes orencia     Her prednisone is 2.5 mg  daily     She benefits from orencia    She does not take cymbalta 30 mg/60 mg   We will try lyrica 50 mg bid    She stopped allopurinol 200 mg daily   She takes allopurinol as needed  She takes daily  colchicine 0.6 mg     C spine OA     Recurrent yeast infections    Plan:      She does not want allopurinol 200 mg daily and colchicine 0.6 mg daily  She wants dietary management only   Suggested compliance    Continue orencia weekly    She stopped cymbalta  60 mg daily  Will offer lyrica 50 mg bid    Prednisone 2.5 mg taper off,she says she cant   But I convinced her that prednisone might be the cause of her recurrent infections    rtc in 3 months     CBC,CMP,ESR,CRP,uric acid    Vaccines : covid,flu,pneumonia,shingles    dexa osteopenia 2019  dexa osteopenia 2021  On tonya and vit d    Pain management for C spine injections

## 2022-07-18 ENCOUNTER — SPECIALTY PHARMACY (OUTPATIENT)
Dept: PHARMACY | Facility: CLINIC | Age: 82
End: 2022-07-18
Payer: MEDICARE

## 2022-07-18 NOTE — TELEPHONE ENCOUNTER
Outgoing call regarding orencia refill, pt reports next refill needed for 8/1. Will pend call closer to fill date

## 2022-07-26 ENCOUNTER — PATIENT MESSAGE (OUTPATIENT)
Dept: PHARMACY | Facility: CLINIC | Age: 82
End: 2022-07-26
Payer: MEDICARE

## 2022-07-26 NOTE — TELEPHONE ENCOUNTER
Specialty Pharmacy - Refill Coordination    Specialty Medication Orders Linked to Encounter    Flowsheet Row Most Recent Value   Medication #1 abatacept (ORENCIA CLICKJECT) 125 mg/mL AtIn (Order#[id number], Rx#[id number])          Refill Questions - Documented Responses    Flowsheet Row Most Recent Value   Patient Availability and HIPAA Verification    Does patient want to proceed with activity? Yes   HIPAA/medical authority confirmed? Yes   Relationship to patient of person spoken to? Self   Refill Screening Questions    Changes to allergies? No   Changes to medications? No   New conditions since last clinic visit? No   Unplanned office visit, urgent care, ED, or hospital admission in the last 4 weeks? No   How does patient/caregiver feel medication is working? Good   Financial problems or insurance changes? No   How many doses of your specialty medications were missed in the last 4 weeks? 0   Would patient like to speak to a pharmacist? No   When does the patient need to receive the medication? 08/01/22   Refill Delivery Questions    How will the patient receive the medication? Delivery Aidee   When does the patient need to receive the medication? 08/01/22   Shipping Address Home   Address in [facility] confirmed and updated if neccessary? Yes   Expected Copay ($) 0   Is the patient able to afford the medication copay? Yes   Payment Method zero copay   Days supply of Refill 28   Supplies needed? No supplies needed   Refill activity completed? Yes   Refill activity plan Refill scheduled   Shipment/Pickup Date: 07/28/22          Current Outpatient Medications   Medication Sig    abatacept (ORENCIA CLICKJECT) 125 mg/mL AtIn Inject 1 mL into the skin every 7 days.    ACETAMINOPHEN ORAL Take by mouth.    albuterol (PROVENTIL) 2.5 mg /3 mL (0.083 %) nebulizer solution Take 2.5 mg by nebulization every 6 (six) hours as needed for Wheezing. Rescue    allopurinoL (ZYLOPRIM) 100 MG tablet Take 2 tablets (200 mg  total) by mouth once daily.    amLODIPine (NORVASC) 10 MG tablet Take 1 tablet (10 mg total) by mouth once daily.    azelastine 205.5 mcg (0.15 %) Spry 2 sprays by Each Nostril route 2 (two) times daily.    betamethasone dipropionate 0.05 % cream SMARTSIG:Sparingly Topical Twice Daily    bismuth subsalicylate (PEPTO-BISMOL ORAL) Take by mouth.    carvediloL (COREG) 6.25 MG tablet Take 1 tablet (6.25 mg total) by mouth 2 (two) times daily with meals.    colchicine (COLCRYS) 0.6 mg tablet Take 1 tablet (0.6 mg total) by mouth once daily.    diphenhydrAMINE (BENADRYL) 25 mg capsule Take 25 mg by mouth every 6 (six) hours as needed for Itching.    DULoxetine (CYMBALTA) 30 MG capsule TAKE 1 CAPSULE BY MOUTH DAILY (Patient not taking: Reported on 2022)    estradioL (ESTRACE) 1 MG tablet Take 1 mg by mouth once daily.    estradiol (ESTRACE) 2 MG tablet estradiol 2 mg tablet   Take 1 tablet every day by oral route.    fexofenadine (ALLEGRA) 180 MG tablet Take 180 mg by mouth once daily.    fluticasone (FLONASE) 50 mcg/actuation nasal spray fluticasone 50 mcg/actuation nasal spray,suspension    fluticasone-vilanterol (BREO) 200-25 mcg/dose DsDv diskus inhaler Inhale 1 puff into the lungs once daily. Controller    hydroCHLOROthiazide (HYDRODIURIL) 25 MG tablet Take 1 tablet (25 mg total) by mouth once daily.    ipratropium/albuterol sulfate (IPRATROPIUM-ALBUTEROL INHL) Inhale into the lungs as needed.    levocetirizine (XYZAL) 5 MG tablet Take 5 mg by mouth once daily.    losartan (COZAAR) 100 MG tablet Take 1 tablet (100 mg total) by mouth once daily.    metoprolol succinate (TOPROL-XL) 50 MG 24 hr tablet Take 50 mg by mouth once daily.    montelukast (SINGULAIR) 10 mg tablet Take 10 mg by mouth every evening.    olopatadine (PATANOL) 0.1 % ophthalmic solution SMARTSI Drop(s) In Eye(s) Twice Daily    omeprazole (PRILOSEC) 40 MG capsule Take 40 mg by mouth once daily.    predniSONE (DELTASONE)  2.5 MG tablet Take 1 tablet (2.5 mg total) by mouth once daily.    pregabalin (LYRICA) 50 MG capsule Take 1 capsule (50 mg total) by mouth 3 (three) times daily.    REPATHA SURECLICK 140 mg/mL PnIj Inject 140 mg into the skin every 14 (fourteen) days.    SYMBICORT 160-4.5 mcg/actuation HFAA INHALE 2 PUFFS PO BID    terbinafine HCL (LAMISIL) 250 mg tablet terbinafine HCl 250 mg tablet   Last reviewed on 7/13/2022  2:40 PM by Corinne Cundiff, MA    Review of patient's allergies indicates:   Allergen Reactions    Shellfish containing products Anaphylaxis    Cabbage (brassica oleracea)     Chocolate flavor     Duckweed     Kale Swelling    Mustard     Nuts [tree nut]     Oranges [orange]     Sulfa (sulfonamide antibiotics)     Sulfacetamide sodium Swelling    Tomato (solanum lycopersicum)     Weed pollen      Patient reported allergy to weed pollen, grass, trees, duckweed, cockroaches    Last reviewed on  7/13/2022 2:34 PM by Corinne Cundiff      Tasks added this encounter   8/22/2022 - Refill Call (Auto Added)   Tasks due within next 3 months   No tasks due.     [facility]  [facility] [address]  [facility] [address]  Phone: [phone]  Fax: [phone]

## 2022-08-22 ENCOUNTER — PATIENT MESSAGE (OUTPATIENT)
Dept: PHARMACY | Facility: CLINIC | Age: 82
End: 2022-08-22
Payer: MEDICARE

## 2022-08-23 ENCOUNTER — SPECIALTY PHARMACY (OUTPATIENT)
Dept: PHARMACY | Facility: CLINIC | Age: 82
End: 2022-08-23
Payer: MEDICARE

## 2022-08-23 NOTE — TELEPHONE ENCOUNTER
Specialty Pharmacy - Refill Coordination    Specialty Medication Orders Linked to Encounter    Flowsheet Row Most Recent Value   Medication #1 abatacept (ORENCIA CLICKJECT) 125 mg/mL AtIn (Order#[id number], Rx#[id number])          Refill Questions - Documented Responses    Flowsheet Row Most Recent Value   Patient Availability and HIPAA Verification    Does patient want to proceed with activity? Yes   HIPAA/medical authority confirmed? Yes   Relationship to patient of person spoken to? Self   Refill Screening Questions    Changes to allergies? No   Changes to medications? No   New conditions since last clinic visit? No   Unplanned office visit, urgent care, ED, or hospital admission in the last 4 weeks? No   How does patient/caregiver feel medication is working? Good   Financial problems or insurance changes? No   How many doses of your specialty medications were missed in the last 4 weeks? 0   Would patient like to speak to a pharmacist? No   When does the patient need to receive the medication? 08/29/22   Refill Delivery Questions    How will the patient receive the medication? Delivery Aidee   When does the patient need to receive the medication? 08/29/22   Shipping Address Home   Address in [facility] confirmed and updated if neccessary? Yes   Expected Copay ($) 0   Is the patient able to afford the medication copay? Yes   Payment Method zero copay   Days supply of Refill 28   Supplies needed? No supplies needed   Refill activity completed? Yes   Refill activity plan Refill scheduled   Shipment/Pickup Date: 08/24/22          Current Outpatient Medications   Medication Sig    abatacept (ORENCIA CLICKJECT) 125 mg/mL AtIn Inject 1 mL into the skin every 7 days.    ACETAMINOPHEN ORAL Take by mouth.    albuterol (PROVENTIL) 2.5 mg /3 mL (0.083 %) nebulizer solution Take 2.5 mg by nebulization every 6 (six) hours as needed for Wheezing. Rescue    allopurinoL (ZYLOPRIM) 100 MG tablet Take 2 tablets (200 mg  total) by mouth once daily.    amLODIPine (NORVASC) 10 MG tablet Take 1 tablet (10 mg total) by mouth once daily.    azelastine 205.5 mcg (0.15 %) Spry 2 sprays by Each Nostril route 2 (two) times daily.    betamethasone dipropionate 0.05 % cream SMARTSIG:Sparingly Topical Twice Daily    bismuth subsalicylate (PEPTO-BISMOL ORAL) Take by mouth.    carvediloL (COREG) 6.25 MG tablet Take 1 tablet (6.25 mg total) by mouth 2 (two) times daily with meals.    colchicine (COLCRYS) 0.6 mg tablet Take 1 tablet (0.6 mg total) by mouth once daily.    diphenhydrAMINE (BENADRYL) 25 mg capsule Take 25 mg by mouth every 6 (six) hours as needed for Itching.    DULoxetine (CYMBALTA) 30 MG capsule TAKE 1 CAPSULE BY MOUTH DAILY (Patient not taking: Reported on 2022)    estradioL (ESTRACE) 1 MG tablet Take 1 mg by mouth once daily.    estradiol (ESTRACE) 2 MG tablet estradiol 2 mg tablet   Take 1 tablet every day by oral route.    fexofenadine (ALLEGRA) 180 MG tablet Take 180 mg by mouth once daily.    fluticasone (FLONASE) 50 mcg/actuation nasal spray fluticasone 50 mcg/actuation nasal spray,suspension    fluticasone-vilanterol (BREO) 200-25 mcg/dose DsDv diskus inhaler Inhale 1 puff into the lungs once daily. Controller    hydroCHLOROthiazide (HYDRODIURIL) 25 MG tablet Take 1 tablet (25 mg total) by mouth once daily.    ipratropium/albuterol sulfate (IPRATROPIUM-ALBUTEROL INHL) Inhale into the lungs as needed.    levocetirizine (XYZAL) 5 MG tablet Take 5 mg by mouth once daily.    losartan (COZAAR) 100 MG tablet Take 1 tablet (100 mg total) by mouth once daily.    metoprolol succinate (TOPROL-XL) 50 MG 24 hr tablet Take 50 mg by mouth once daily.    montelukast (SINGULAIR) 10 mg tablet Take 10 mg by mouth every evening.    olopatadine (PATANOL) 0.1 % ophthalmic solution SMARTSI Drop(s) In Eye(s) Twice Daily    omeprazole (PRILOSEC) 40 MG capsule Take 40 mg by mouth once daily.    predniSONE (DELTASONE)  2.5 MG tablet Take 1 tablet (2.5 mg total) by mouth once daily.    pregabalin (LYRICA) 50 MG capsule Take 1 capsule (50 mg total) by mouth 3 (three) times daily.    REPATHA SURECLICK 140 mg/mL PnIj Inject 140 mg into the skin every 14 (fourteen) days.    SYMBICORT 160-4.5 mcg/actuation HFAA INHALE 2 PUFFS PO BID    terbinafine HCL (LAMISIL) 250 mg tablet terbinafine HCl 250 mg tablet   Last reviewed on 7/13/2022  2:40 PM by Corinne Cundiff, MA    Review of patient's allergies indicates:   Allergen Reactions    Shellfish containing products Anaphylaxis    Cabbage (brassica oleracea)     Chocolate flavor     Duckweed     Kale Swelling    Mustard     Nuts [tree nut]     Oranges [orange]     Sulfa (sulfonamide antibiotics)     Sulfacetamide sodium Swelling    Tomato (solanum lycopersicum)     Weed pollen      Patient reported allergy to weed pollen, grass, trees, duckweed, cockroaches    Last reviewed on  7/13/2022 2:34 PM by Corinne Cundiff      Tasks added this encounter   No tasks added.   Tasks due within next 3 months   8/22/2022 - Refill Call (Auto Added)     Kristie Cameron, PharmD  [facility]  [address] [place] [id number]  Phone: [phone]  Fax: [phone]

## 2022-09-19 ENCOUNTER — SPECIALTY PHARMACY (OUTPATIENT)
Dept: PHARMACY | Facility: CLINIC | Age: 82
End: 2022-09-19
Payer: MEDICARE

## 2022-09-19 NOTE — TELEPHONE ENCOUNTER
Specialty Pharmacy - Refill Coordination    Specialty Medication Orders Linked to Encounter      Flowsheet Row Most Recent Value   Medication #1 abatacept (ORENCIA CLICKJECT) 125 mg/mL AtIn (Order#[id number], Rx#[id number])            Refill Questions - Documented Responses      Flowsheet Row Most Recent Value   Patient Availability and HIPAA Verification    Does patient want to proceed with activity? Yes   HIPAA/medical authority confirmed? Yes   Relationship to patient of person spoken to? Self   Refill Screening Questions    Changes to allergies? No   Changes to medications? No   New conditions since last clinic visit? No   Unplanned office visit, urgent care, ED, or hospital admission in the last 4 weeks? No   How does patient/caregiver feel medication is working? Very good   Financial problems or insurance changes? No   How many doses of your specialty medications were missed in the last 4 weeks? 0   Would patient like to speak to a pharmacist? No   When does the patient need to receive the medication? 09/28/22   Refill Delivery Questions    How will the patient receive the medication? Delivery Aidee   When does the patient need to receive the medication? 09/28/22   Shipping Address Home   Address in [facility] confirmed and updated if neccessary? Yes   Expected Copay ($) 0   Is the patient able to afford the medication copay? Yes   Payment Method zero copay   Days supply of Refill 28   Supplies needed? No supplies needed   Refill activity completed? Yes   Refill activity plan Refill scheduled   Shipment/Pickup Date: 09/27/22            Current Outpatient Medications   Medication Sig    abatacept (ORENCIA CLICKJECT) 125 mg/mL AtIn Inject 1 mL into the skin every 7 days.    ACETAMINOPHEN ORAL Take by mouth.    albuterol (PROVENTIL) 2.5 mg /3 mL (0.083 %) nebulizer solution Take 2.5 mg by nebulization every 6 (six) hours as needed for Wheezing. Rescue    allopurinoL (ZYLOPRIM) 100 MG tablet Take 2 tablets  (200 mg total) by mouth once daily.    amLODIPine (NORVASC) 10 MG tablet Take 1 tablet (10 mg total) by mouth once daily.    azelastine 205.5 mcg (0.15 %) Spry 2 sprays by Each Nostril route 2 (two) times daily.    betamethasone dipropionate 0.05 % cream SMARTSIG:Sparingly Topical Twice Daily    bismuth subsalicylate (PEPTO-BISMOL ORAL) Take by mouth.    carvediloL (COREG) 6.25 MG tablet Take 1 tablet (6.25 mg total) by mouth 2 (two) times daily with meals.    colchicine (COLCRYS) 0.6 mg tablet Take 1 tablet (0.6 mg total) by mouth once daily.    diphenhydrAMINE (BENADRYL) 25 mg capsule Take 25 mg by mouth every 6 (six) hours as needed for Itching.    DULoxetine (CYMBALTA) 30 MG capsule TAKE 1 CAPSULE BY MOUTH DAILY    estradioL (ESTRACE) 1 MG tablet Take 1 mg by mouth once daily.    estradiol (ESTRACE) 2 MG tablet estradiol 2 mg tablet   Take 1 tablet every day by oral route.    fexofenadine (ALLEGRA) 180 MG tablet Take 180 mg by mouth once daily.    fluticasone (FLONASE) 50 mcg/actuation nasal spray fluticasone 50 mcg/actuation nasal spray,suspension    fluticasone-vilanterol (BREO) 200-25 mcg/dose DsDv diskus inhaler Inhale 1 puff into the lungs once daily. Controller    hydroCHLOROthiazide (HYDRODIURIL) 25 MG tablet Take 1 tablet (25 mg total) by mouth once daily.    ipratropium/albuterol sulfate (IPRATROPIUM-ALBUTEROL INHL) Inhale into the lungs as needed.    levocetirizine (XYZAL) 5 MG tablet Take 5 mg by mouth once daily.    losartan (COZAAR) 100 MG tablet Take 1 tablet (100 mg total) by mouth once daily.    metoprolol succinate (TOPROL-XL) 50 MG 24 hr tablet Take 50 mg by mouth once daily.    montelukast (SINGULAIR) 10 mg tablet Take 10 mg by mouth every evening.    olopatadine (PATANOL) 0.1 % ophthalmic solution SMARTSI Drop(s) In Eye(s) Twice Daily    omeprazole (PRILOSEC) 40 MG capsule Take 40 mg by mouth once daily.    predniSONE (DELTASONE) 2.5 MG tablet Take 1 tablet (2.5 mg total) by mouth once  daily.    pregabalin (LYRICA) 50 MG capsule Take 1 capsule (50 mg total) by mouth 3 (three) times daily.    REPATHA SURECLICK 140 mg/mL PnIj Inject 140 mg into the skin every 14 (fourteen) days.    SYMBICORT 160-4.5 mcg/actuation HFAA INHALE 2 PUFFS PO BID    terbinafine HCL (LAMISIL) 250 mg tablet terbinafine HCl 250 mg tablet   Last reviewed on 7/13/2022  2:40 PM by Corinne Cundiff, MA    Review of patient's allergies indicates:   Allergen Reactions    Shellfish containing products Anaphylaxis    Cabbage (brassica oleracea)     Chocolate flavor     Duckweed     Kale Swelling    Mustard     Nuts [tree nut]     Oranges [orange]     Sulfa (sulfonamide antibiotics)     Sulfacetamide sodium Swelling    Tomato (solanum lycopersicum)     Weed pollen      Patient reported allergy to weed pollen, grass, trees, duckweed, cockroaches    Last reviewed on  7/13/2022 2:34 PM by Corinne Cundiff      Tasks added this encounter   10/19/2022 - Refill Call (Auto Added)   Tasks due within next 3 months   No tasks due.     [facility]  [address]  [facility] [address]  Phone: [phone]  Fax: [phone]

## 2022-10-15 DIAGNOSIS — E78.5 HYPERLIPEMIA: Primary | ICD-10-CM

## 2022-10-20 RX ORDER — ABATACEPT 125 MG/ML
1 INJECTION, SOLUTION SUBCUTANEOUS
Qty: 4 ML | Refills: 11 | Status: SHIPPED | OUTPATIENT
Start: 2022-10-20 | End: 2023-09-28 | Stop reason: SDUPTHER

## 2022-10-25 ENCOUNTER — SPECIALTY PHARMACY (OUTPATIENT)
Dept: PHARMACY | Facility: CLINIC | Age: 82
End: 2022-10-25
Payer: MEDICARE

## 2022-10-25 NOTE — TELEPHONE ENCOUNTER
Specialty Pharmacy - Refill Coordination    Specialty Medication Orders Linked to Encounter      Flowsheet Row Most Recent Value   Medication #1 abatacept (ORENCIA CLICKJECT) 125 mg/mL AtIn (Order#[id number], Rx#[id number])            Refill Questions - Documented Responses      Flowsheet Row Most Recent Value   Patient Availability and HIPAA Verification    Does patient want to proceed with activity? Yes   HIPAA/medical authority confirmed? Yes   Relationship to patient of person spoken to? Self   Refill Screening Questions    Changes to allergies? No   Changes to medications? No   New conditions since last clinic visit? No   Unplanned office visit, urgent care, ED, or hospital admission in the last 4 weeks? No   How does patient/caregiver feel medication is working? Good   Financial problems or insurance changes? No   How many doses of your specialty medications were missed in the last 4 weeks? 0   Would patient like to speak to a pharmacist? No   When does the patient need to receive the medication? 10/31/22   Refill Delivery Questions    How will the patient receive the medication? MEDRx   When does the patient need to receive the medication? 10/31/22   Shipping Address Home   Address in [facility] confirmed and updated if neccessary? Yes   Expected Copay ($) 0   Is the patient able to afford the medication copay? Yes   Payment Method zero copay   Days supply of Refill 28   Supplies needed? No supplies needed   Refill activity completed? Yes   Refill activity plan Refill scheduled   Shipment/Pickup Date: 10/27/22            Current Outpatient Medications   Medication Sig    abatacept (ORENCIA CLICKJECT) 125 mg/mL AtIn Inject 1 mL into the skin every 7 days.    ACETAMINOPHEN ORAL Take by mouth.    albuterol (PROVENTIL) 2.5 mg /3 mL (0.083 %) nebulizer solution Take 2.5 mg by nebulization every 6 (six) hours as needed for Wheezing. Rescue    amLODIPine (NORVASC) 10 MG tablet Take 1 tablet (10 mg total) by  mouth once daily.    azelastine 205.5 mcg (0.15 %) Spry 2 sprays by Each Nostril route 2 (two) times daily.    betamethasone dipropionate 0.05 % cream SMARTSIG:Sparingly Topical Twice Daily    bismuth subsalicylate (PEPTO-BISMOL ORAL) Take by mouth.    carvediloL (COREG) 6.25 MG tablet Take 1 tablet (6.25 mg total) by mouth 2 (two) times daily with meals.    colchicine (COLCRYS) 0.6 mg tablet Take 1 tablet (0.6 mg total) by mouth once daily.    diphenhydrAMINE (BENADRYL) 25 mg capsule Take 25 mg by mouth every 6 (six) hours as needed for Itching.    DULoxetine (CYMBALTA) 30 MG capsule TAKE 1 CAPSULE BY MOUTH DAILY    estradioL (ESTRACE) 1 MG tablet Take 1 mg by mouth once daily.    estradiol (ESTRACE) 2 MG tablet estradiol 2 mg tablet   Take 1 tablet every day by oral route.    fexofenadine (ALLEGRA) 180 MG tablet Take 180 mg by mouth once daily.    fluticasone (FLONASE) 50 mcg/actuation nasal spray fluticasone 50 mcg/actuation nasal spray,suspension    fluticasone-vilanterol (BREO) 200-25 mcg/dose DsDv diskus inhaler Inhale 1 puff into the lungs once daily. Controller    hydroCHLOROthiazide (HYDRODIURIL) 25 MG tablet Take 1 tablet (25 mg total) by mouth once daily.    ipratropium/albuterol sulfate (IPRATROPIUM-ALBUTEROL INHL) Inhale into the lungs as needed.    levocetirizine (XYZAL) 5 MG tablet Take 5 mg by mouth once daily.    losartan (COZAAR) 100 MG tablet Take 1 tablet (100 mg total) by mouth once daily.    metoprolol succinate (TOPROL-XL) 50 MG 24 hr tablet Take 50 mg by mouth once daily.    montelukast (SINGULAIR) 10 mg tablet Take 10 mg by mouth every evening.    olopatadine (PATANOL) 0.1 % ophthalmic solution SMARTSI Drop(s) In Eye(s) Twice Daily    omeprazole (PRILOSEC) 40 MG capsule Take 40 mg by mouth once daily.    predniSONE (DELTASONE) 2.5 MG tablet TAKE 1 TABLET(2.5 MG) BY MOUTH EVERY DAY    pregabalin (LYRICA) 50 MG capsule Take 1 capsule (50 mg total) by mouth 3 (three) times daily.    REPATHA  SURECLICK 140 mg/mL PnIj Inject 140 mg into the skin every 14 (fourteen) days.    SYMBICORT 160-4.5 mcg/actuation HFAA INHALE 2 PUFFS PO BID    terbinafine HCL (LAMISIL) 250 mg tablet terbinafine HCl 250 mg tablet   Last reviewed on 7/13/2022  2:40 PM by Corinne Cundiff, MA    Review of patient's allergies indicates:   Allergen Reactions    Shellfish containing products Anaphylaxis    Cabbage (brassica oleracea)     Chocolate flavor     Duckweed     Kale Swelling    Mustard     Nuts [tree nut]     Oranges [orange]     Sulfa (sulfonamide antibiotics)     Sulfacetamide sodium Swelling    Tomato (solanum lycopersicum)     Weed pollen      Patient reported allergy to weed pollen, grass, trees, duckweed, cockroaches    Last reviewed on  7/13/2022 2:34 PM by Corinne Cundiff      Tasks added this encounter   11/21/2022 - Refill Call (Auto Added)   Tasks due within next 3 months   No tasks due.     [facility] - [facility]ty Pharmacy  [address] [facility] [address]  Phone: [phone]  Fax: [phone]

## 2022-11-21 ENCOUNTER — SPECIALTY PHARMACY (OUTPATIENT)
Dept: PHARMACY | Facility: CLINIC | Age: 82
End: 2022-11-21
Payer: MEDICARE

## 2022-11-21 NOTE — TELEPHONE ENCOUNTER
Specialty Pharmacy - Refill Coordination    Specialty Medication Orders Linked to Encounter      Flowsheet Row Most Recent Value   Medication #1 abatacept (ORENCIA CLICKJECT) 125 mg/mL AtIn (Order#[id number], Rx#[id number])            Refill Questions - Documented Responses      Flowsheet Row Most Recent Value   Patient Availability and HIPAA Verification    Does patient want to proceed with activity? Yes   HIPAA/medical authority confirmed? Yes   Relationship to patient of person spoken to? Self   Refill Screening Questions    Changes to allergies? No   Changes to medications? No   New conditions since last clinic visit? No   Unplanned office visit, urgent care, ED, or hospital admission in the last 4 weeks? No   How does patient/caregiver feel medication is working? Good   Financial problems or insurance changes? No   How many doses of your specialty medications were missed in the last 4 weeks? 0   Would patient like to speak to a pharmacist? No   When does the patient need to receive the medication? 11/29/22   Refill Delivery Questions    How will the patient receive the medication? MEDRx   When does the patient need to receive the medication? 11/29/22   Shipping Address Home   Address in [facility] confirmed and updated if neccessary? Yes   Expected Copay ($) 0   Is the patient able to afford the medication copay? Yes   Payment Method zero copay   Days supply of Refill 28   Supplies needed? No supplies needed   Refill activity completed? Yes   Refill activity plan Refill scheduled   Shipment/Pickup Date: 11/28/22            Current Outpatient Medications   Medication Sig    abatacept (ORENCIA CLICKJECT) 125 mg/mL AtIn Inject 1 mL into the skin every 7 days.    ACETAMINOPHEN ORAL Take by mouth.    albuterol (PROVENTIL) 2.5 mg /3 mL (0.083 %) nebulizer solution Take 2.5 mg by nebulization every 6 (six) hours as needed for Wheezing. Rescue    amLODIPine (NORVASC) 10 MG tablet Take 1 tablet (10 mg total) by  mouth once daily.    azelastine 205.5 mcg (0.15 %) Spry 2 sprays by Each Nostril route 2 (two) times daily.    betamethasone dipropionate 0.05 % cream SMARTSIG:Sparingly Topical Twice Daily    bismuth subsalicylate (PEPTO-BISMOL ORAL) Take by mouth.    carvediloL (COREG) 6.25 MG tablet Take 1 tablet (6.25 mg total) by mouth 2 (two) times daily with meals.    colchicine (COLCRYS) 0.6 mg tablet Take 1 tablet (0.6 mg total) by mouth once daily.    diphenhydrAMINE (BENADRYL) 25 mg capsule Take 25 mg by mouth every 6 (six) hours as needed for Itching.    DULoxetine (CYMBALTA) 30 MG capsule TAKE 1 CAPSULE BY MOUTH DAILY    estradioL (ESTRACE) 1 MG tablet Take 1 mg by mouth once daily.    estradiol (ESTRACE) 2 MG tablet estradiol 2 mg tablet   Take 1 tablet every day by oral route.    fexofenadine (ALLEGRA) 180 MG tablet Take 180 mg by mouth once daily.    fluticasone (FLONASE) 50 mcg/actuation nasal spray fluticasone 50 mcg/actuation nasal spray,suspension    fluticasone-vilanterol (BREO) 200-25 mcg/dose DsDv diskus inhaler Inhale 1 puff into the lungs once daily. Controller    hydroCHLOROthiazide (HYDRODIURIL) 25 MG tablet Take 1 tablet (25 mg total) by mouth once daily.    ipratropium/albuterol sulfate (IPRATROPIUM-ALBUTEROL INHL) Inhale into the lungs as needed.    levocetirizine (XYZAL) 5 MG tablet Take 5 mg by mouth once daily.    losartan (COZAAR) 100 MG tablet Take 1 tablet (100 mg total) by mouth once daily.    metoprolol succinate (TOPROL-XL) 50 MG 24 hr tablet Take 50 mg by mouth once daily.    montelukast (SINGULAIR) 10 mg tablet Take 10 mg by mouth every evening.    olopatadine (PATANOL) 0.1 % ophthalmic solution SMARTSI Drop(s) In Eye(s) Twice Daily    omeprazole (PRILOSEC) 40 MG capsule Take 40 mg by mouth once daily.    predniSONE (DELTASONE) 2.5 MG tablet TAKE 1 TABLET(2.5 MG) BY MOUTH EVERY DAY    pregabalin (LYRICA) 50 MG capsule Take 1 capsule (50 mg total) by mouth 3 (three) times daily.    REPATHA  SURECLICK 140 mg/mL PnIj Inject 140 mg into the skin every 14 (fourteen) days.    SYMBICORT 160-4.5 mcg/actuation HFAA INHALE 2 PUFFS PO BID    terbinafine HCL (LAMISIL) 250 mg tablet terbinafine HCl 250 mg tablet   Last reviewed on 7/13/2022  2:40 PM by Corinne Cundiff, MA    Review of patient's allergies indicates:   Allergen Reactions    Shellfish containing products Anaphylaxis    Cabbage (brassica oleracea)     Chocolate flavor     Duckweed     Kale Swelling    Mustard     Nuts [tree nut]     Oranges [orange]     Sulfa (sulfonamide antibiotics)     Sulfacetamide sodium Swelling    Tomato (solanum lycopersicum)     Weed pollen      Patient reported allergy to weed pollen, grass, trees, duckweed, cockroaches    Last reviewed on  7/13/2022 2:34 PM by Corinne Cundiff      Tasks added this encounter   12/20/2022 - Refill Call (Auto Added)   Tasks due within next 3 months   No tasks due.     [facility] - Specialty Pharmacy  [address] [facility] [address]  Phone: [phone]  Fax: [phone]

## 2022-11-25 ENCOUNTER — PATIENT MESSAGE (OUTPATIENT)
Dept: ADMINISTRATIVE | Facility: HOSPITAL | Age: 82
End: 2022-11-25
Payer: MEDICARE

## 2022-11-25 ENCOUNTER — PATIENT OUTREACH (OUTPATIENT)
Dept: ADMINISTRATIVE | Facility: HOSPITAL | Age: 82
End: 2022-11-25
Payer: MEDICARE

## 2022-11-29 ENCOUNTER — OFFICE VISIT (OUTPATIENT)
Dept: CARDIOLOGY | Facility: CLINIC | Age: 82
End: 2022-11-29
Payer: MEDICARE

## 2022-11-29 VITALS
DIASTOLIC BLOOD PRESSURE: 70 MMHG | SYSTOLIC BLOOD PRESSURE: 138 MMHG | HEART RATE: 98 BPM | OXYGEN SATURATION: 97 % | WEIGHT: 159.19 LBS | BODY MASS INDEX: 29.3 KG/M2 | HEIGHT: 62 IN

## 2022-11-29 DIAGNOSIS — E78.2 MIXED HYPERLIPIDEMIA: ICD-10-CM

## 2022-11-29 DIAGNOSIS — I10 BENIGN ESSENTIAL HYPERTENSION: Primary | ICD-10-CM

## 2022-11-29 DIAGNOSIS — E78.5 HYPERLIPEMIA: ICD-10-CM

## 2022-11-29 PROCEDURE — 1159F PR MEDICATION LIST DOCUMENTED IN MEDICAL RECORD: ICD-10-PCS | Mod: CPTII,S$GLB,, | Performed by: INTERNAL MEDICINE

## 2022-11-29 PROCEDURE — 3078F PR MOST RECENT DIASTOLIC BLOOD PRESSURE < 80 MM HG: ICD-10-PCS | Mod: CPTII,S$GLB,, | Performed by: INTERNAL MEDICINE

## 2022-11-29 PROCEDURE — 93000 ELECTROCARDIOGRAM COMPLETE: CPT | Mod: S$GLB,,, | Performed by: INTERNAL MEDICINE

## 2022-11-29 PROCEDURE — 93000 EKG 12-LEAD: ICD-10-PCS | Mod: S$GLB,,, | Performed by: INTERNAL MEDICINE

## 2022-11-29 PROCEDURE — 3288F PR FALLS RISK ASSESSMENT DOCUMENTED: ICD-10-PCS | Mod: CPTII,S$GLB,, | Performed by: INTERNAL MEDICINE

## 2022-11-29 PROCEDURE — 3078F DIAST BP <80 MM HG: CPT | Mod: CPTII,S$GLB,, | Performed by: INTERNAL MEDICINE

## 2022-11-29 PROCEDURE — 3288F FALL RISK ASSESSMENT DOCD: CPT | Mod: CPTII,S$GLB,, | Performed by: INTERNAL MEDICINE

## 2022-11-29 PROCEDURE — 1125F AMNT PAIN NOTED PAIN PRSNT: CPT | Mod: CPTII,S$GLB,, | Performed by: INTERNAL MEDICINE

## 2022-11-29 PROCEDURE — 99999 PR PBB SHADOW E&M-EST. PATIENT-LVL V: ICD-10-PCS | Mod: PBBFAC,,, | Performed by: INTERNAL MEDICINE

## 2022-11-29 PROCEDURE — 1101F PT FALLS ASSESS-DOCD LE1/YR: CPT | Mod: CPTII,S$GLB,, | Performed by: INTERNAL MEDICINE

## 2022-11-29 PROCEDURE — 1125F PR PAIN SEVERITY QUANTIFIED, PAIN PRESENT: ICD-10-PCS | Mod: CPTII,S$GLB,, | Performed by: INTERNAL MEDICINE

## 2022-11-29 PROCEDURE — 3075F SYST BP GE 130 - 139MM HG: CPT | Mod: CPTII,S$GLB,, | Performed by: INTERNAL MEDICINE

## 2022-11-29 PROCEDURE — 99204 OFFICE O/P NEW MOD 45 MIN: CPT | Mod: S$GLB,,, | Performed by: INTERNAL MEDICINE

## 2022-11-29 PROCEDURE — 1101F PR PT FALLS ASSESS DOC 0-1 FALLS W/OUT INJ PAST YR: ICD-10-PCS | Mod: CPTII,S$GLB,, | Performed by: INTERNAL MEDICINE

## 2022-11-29 PROCEDURE — 99999 PR PBB SHADOW E&M-EST. PATIENT-LVL V: CPT | Mod: PBBFAC,,, | Performed by: INTERNAL MEDICINE

## 2022-11-29 PROCEDURE — 3075F PR MOST RECENT SYSTOLIC BLOOD PRESS GE 130-139MM HG: ICD-10-PCS | Mod: CPTII,S$GLB,, | Performed by: INTERNAL MEDICINE

## 2022-11-29 PROCEDURE — 1159F MED LIST DOCD IN RCRD: CPT | Mod: CPTII,S$GLB,, | Performed by: INTERNAL MEDICINE

## 2022-11-29 PROCEDURE — 99204 PR OFFICE/OUTPT VISIT, NEW, LEVL IV, 45-59 MIN: ICD-10-PCS | Mod: S$GLB,,, | Performed by: INTERNAL MEDICINE

## 2022-11-29 RX ORDER — PREGABALIN 50 MG/1
50 CAPSULE ORAL 3 TIMES DAILY
COMMUNITY
End: 2023-01-18

## 2022-11-29 RX ORDER — ALLOPURINOL 100 MG/1
200 TABLET ORAL DAILY
COMMUNITY
End: 2023-01-18

## 2022-11-29 NOTE — PROGRESS NOTES
Cardiology    11/29/2022  2:54 PM    Problem list  Patient Active Problem List   Diagnosis    Essential hypertension    Chronic allergic rhinitis    Chronic kidney disease, stage II (mild)    GERD (gastroesophageal reflux disease)    PMR (polymyalgia rheumatica)    Rheumatoid arthritis    Fatigue    Obesity    Current use of steroid medication    Chest congestion    Fibromyalgia    Elevated C-reactive protein (CRP)    ESR raised    Nuclear sclerosis    H/O mammogram    Moderate persistent asthma without complication    Bulla of lung    Calcified lymph nodes    Right hand pain    Swelling of right hand    EMILIO (obstructive sleep apnea)    Left foot pain    Dysphagia    Abnormal renal function    Allergic rhinitis due to pollen    Benign paroxysmal positional vertigo    Bilateral cataracts    Body mass index (BMI) of 25.0 to 29.9    Changing skin lesion    COPD bronchitis    Joint pain    Mixed hyperlipidemia    Need for prophylactic vaccination and inoculation against other viral diseases    Red eye    Reflux esophagitis    Type 2 or unspecified type diabetes mellitus    Shoulder pain    Vertigo    Allergic rhinitis    Asthma exacerbation    Extrinsic asthma    Benign essential hypertension    Polymyalgia rheumatica    Chronic night sweats    Multiple thyroid nodules    Cervical radiculopathy    Immunosuppression    Cervical myelopathy       CC:  Follow-up    HPI:  Patient was referred by her PCP Dr. Stanley for evaluation.  Patient was previously under the care of Dr. Flores.  She has history of hypertension hyperlipidemia.  She can not tolerate statins so she has been taking Repatha.  In 1999, she was told that she had right carotid stenosis and she decided to take medication, follow a low-cholesterol diet and stopped smoking.  She states that she is doing well.  She denies any chest pain, shortness of breath, palpitation or syncope.  She denies any TIA or CVA.    Medications  Current Outpatient Medications    Medication Sig Dispense Refill    abatacept (ORENCIA CLICKJECT) 125 mg/mL AtIn Inject 1 mL into the skin every 7 days. 4 mL 11    albuterol (PROVENTIL) 2.5 mg /3 mL (0.083 %) nebulizer solution Take 2.5 mg by nebulization every 6 (six) hours as needed for Wheezing. Rescue      allopurinoL (ZYLOPRIM) 100 MG tablet Take 200 mg by mouth once daily.      azelastine 205.5 mcg (0.15 %) Spry 2 sprays by Each Nostril route 2 (two) times daily.      bismuth subsalicylate (PEPTO-BISMOL ORAL) Take by mouth.      diphenhydrAMINE (BENADRYL) 25 mg capsule Take 25 mg by mouth every 6 (six) hours as needed for Itching.      DULoxetine (CYMBALTA) 30 MG capsule TAKE 1 CAPSULE BY MOUTH DAILY 30 capsule 1    estradioL (ESTRACE) 1 MG tablet Take 1 mg by mouth once daily.      hydroCHLOROthiazide (HYDRODIURIL) 25 MG tablet Take 1 tablet (25 mg total) by mouth once daily. 90 tablet 3    levocetirizine (XYZAL) 5 MG tablet Take 5 mg by mouth once daily.      losartan (COZAAR) 100 MG tablet Take 1 tablet (100 mg total) by mouth once daily. 90 tablet 3    montelukast (SINGULAIR) 10 mg tablet Take 10 mg by mouth every evening.      olopatadine (PATANOL) 0.1 % ophthalmic solution SMARTSI Drop(s) In Eye(s) Twice Daily      predniSONE (DELTASONE) 2.5 MG tablet TAKE 1 TABLET(2.5 MG) BY MOUTH EVERY DAY 30 tablet 4    pregabalin (LYRICA) 50 MG capsule Take 1 capsule (50 mg total) by mouth 3 (three) times daily. 90 capsule 6    pregabalin (LYRICA) 50 MG capsule Take 50 mg by mouth 3 (three) times daily.      REPATHA SURECLICK 140 mg/mL PnIj Inject 140 mg into the skin every 14 (fourteen) days.      terbinafine HCL (LAMISIL) 250 mg tablet terbinafine HCl 250 mg tablet      ACETAMINOPHEN ORAL Take by mouth.      amLODIPine (NORVASC) 10 MG tablet Take 1 tablet (10 mg total) by mouth once daily. 90 tablet 3    betamethasone dipropionate 0.05 % cream SMARTSIG:Sparingly Topical Twice Daily      fluticasone (FLONASE) 50 mcg/actuation nasal spray  fluticasone 50 mcg/actuation nasal spray,suspension      ipratropium/albuterol sulfate (IPRATROPIUM-ALBUTEROL INHL) Inhale into the lungs as needed.       Current Facility-Administered Medications   Medication Dose Route Frequency Provider Last Rate Last Admin    sodium chloride 0.9% flush 10 mL  10 mL Intravenous PRN Yeimy Tellez DPM          Prior to Admission medications    Medication Sig Start Date End Date Taking? Authorizing Provider   abatacept (ORENCIA CLICKJECT) 125 mg/mL AtIn Inject 1 mL into the skin every 7 days. 10/20/22  Yes Gaagn Alvarez MD   albuterol (PROVENTIL) 2.5 mg /3 mL (0.083 %) nebulizer solution Take 2.5 mg by nebulization every 6 (six) hours as needed for Wheezing. Rescue   Yes Historical Provider   allopurinoL (ZYLOPRIM) 100 MG tablet Take 200 mg by mouth once daily.   Yes Historical Provider   azelastine 205.5 mcg (0.15 %) Spry 2 sprays by Each Nostril route 2 (two) times daily. 4/10/22  Yes Historical Provider   bismuth subsalicylate (PEPTO-BISMOL ORAL) Take by mouth.   Yes Historical Provider   diphenhydrAMINE (BENADRYL) 25 mg capsule Take 25 mg by mouth every 6 (six) hours as needed for Itching.   Yes Historical Provider   DULoxetine (CYMBALTA) 30 MG capsule TAKE 1 CAPSULE BY MOUTH DAILY 9/3/22  Yes Gagan Alvarez MD   estradioL (ESTRACE) 1 MG tablet Take 1 mg by mouth once daily. 21  Yes Historical Provider   hydroCHLOROthiazide (HYDRODIURIL) 25 MG tablet Take 1 tablet (25 mg total) by mouth once daily. 22  Yes Moustapha English MD   levocetirizine (XYZAL) 5 MG tablet Take 5 mg by mouth once daily. 22  Yes Historical Provider   losartan (COZAAR) 100 MG tablet Take 1 tablet (100 mg total) by mouth once daily. 22 Yes Moustapha English MD   montelukast (SINGULAIR) 10 mg tablet Take 10 mg by mouth every evening.   Yes Historical Provider   olopatadine (PATANOL) 0.1 % ophthalmic solution SMARTSI Drop(s) In Eye(s) Twice Daily 21   Yes Historical Provider   predniSONE (DELTASONE) 2.5 MG tablet TAKE 1 TABLET(2.5 MG) BY MOUTH EVERY DAY 10/9/22  Yes Gagan Alvarez MD   pregabalin (LYRICA) 50 MG capsule Take 1 capsule (50 mg total) by mouth 3 (three) times daily. 7/13/22 1/11/23 Yes Gagan Alvarez MD   pregabalin (LYRICA) 50 MG capsule Take 50 mg by mouth 3 (three) times daily.   Yes Historical Provider   REPATHA SURECLICK 140 mg/mL PnIj Inject 140 mg into the skin every 14 (fourteen) days. 6/15/22  Yes Historical Provider   terbinafine HCL (LAMISIL) 250 mg tablet terbinafine HCl 250 mg tablet   Yes Historical Provider   SYMBICORT 160-4.5 mcg/actuation HFAA INHALE 2 PUFFS PO BID 1/29/20 11/29/22 Yes Historical Provider   ACETAMINOPHEN ORAL Take by mouth.    Historical Provider   amLODIPine (NORVASC) 10 MG tablet Take 1 tablet (10 mg total) by mouth once daily. 4/12/21 7/13/22  Moustapha English MD   betamethasone dipropionate 0.05 % cream SMARTSIG:Sparingly Topical Twice Daily 6/13/22   Historical Provider   fluticasone (FLONASE) 50 mcg/actuation nasal spray fluticasone 50 mcg/actuation nasal spray,suspension    Historical Provider   ipratropium/albuterol sulfate (IPRATROPIUM-ALBUTEROL INHL) Inhale into the lungs as needed.    Historical Provider   carvediloL (COREG) 6.25 MG tablet Take 1 tablet (6.25 mg total) by mouth 2 (two) times daily with meals. 4/11/22 11/29/22  Moustapha English MD   colchicine (COLCRYS) 0.6 mg tablet Take 1 tablet (0.6 mg total) by mouth once daily. 7/13/22 11/29/22  Gagan Alvarez MD   estradiol (ESTRACE) 2 MG tablet estradiol 2 mg tablet   Take 1 tablet every day by oral route.  11/29/22  Historical Provider   fexofenadine (ALLEGRA) 180 MG tablet Take 180 mg by mouth once daily.  11/29/22  Historical Provider   fluticasone-vilanterol (BREO) 200-25 mcg/dose DsDv diskus inhaler Inhale 1 puff into the lungs once daily. Controller  11/29/22  Historical Provider   metoprolol succinate  (TOPROL-XL) 50 MG 24 hr tablet Take 50 mg by mouth once daily. 4/10/22 11/29/22  Historical Provider   omeprazole (PRILOSEC) 40 MG capsule Take 40 mg by mouth once daily.  22  Historical Provider         History  Past Medical History:   Diagnosis Date    Allergy     Arthritis     Cataract     CKD (chronic kidney disease) stage 3, GFR 30-59 ml/min     Fibromyalgia     GERD (gastroesophageal reflux disease)     Hyperlipidemia     Hypertension     Polymyalgia rheumatica     RA (rheumatoid arthritis)      Past Surgical History:   Procedure Laterality Date    APPENDECTOMY      CATARACT EXTRACTION W/  INTRAOCULAR LENS IMPLANT Left     Dr. Cedeño     CATARACT EXTRACTION W/  INTRAOCULAR LENS IMPLANT Right 2017    Dr. Sena     SECTION      x2    CHOLECYSTECTOMY      COSMETIC SURGERY      Rhinoplasty    EPIDURAL STEROID INJECTION INTO CERVICAL SPINE Bilateral 12/10/2020    Procedure: CERVICAL  DORIS DIRECT REFERRAL;  Surgeon: Inga Blackburn MD;  Location: [facility];  Service: Pain Management;  Laterality: Bilateral;  NEEDS CONSENT    ESOPHAGEAL MANOMETRY WITH MEASUREMENT OF IMPEDANCE N/A 2019    Procedure: MANOMETRY, ESOPHAGUS, WITH IMPEDANCE MEASUREMENT;  Surgeon: Roger De Luna MD;  Location: [facility] ([address] [place] [address]);  Service: Endoscopy;  Laterality: N/A;  Prep instructions mailed to Pt - ERW    ESOPHAGOGASTRODUODENOSCOPY N/A 2019    Procedure: EGD (ESOPHAGOGASTRODUODENOSCOPY);  Surgeon: Carlos Rodgers MD;  Location: [facility] ([address] [place] [address]);  Service: Endoscopy;  Laterality: N/A;    EYE SURGERY      left eye Lens Placed    HYSTERECTOMY      RHINOPLASTY TIP      TONSILLECTOMY      TUBAL LIGATION       Social History     Socioeconomic History    Marital status: Single   Tobacco Use    Smoking status: Former     Packs/day: 1.00     Years: 20.00     Pack years: 20.00     Types: Cigarettes     Start date: 1970     Quit date: 1999     Years since quittin.2    Smokeless tobacco:  Never    Tobacco comments:     Retired ; ; 4 children healthy   Substance and Sexual Activity    Alcohol use: Yes     Alcohol/week: 1.0 standard drink     Types: 1 Glasses of wine per week     Comment: social    Drug use: No    Sexual activity: Yes     Partners: Male         Allergies  Review of patient's allergies indicates:   Allergen Reactions    Shellfish containing products Anaphylaxis    Cabbage (brassica oleracea)     Chocolate flavor     Duckweed     Kale Swelling    Mustard     Nuts [tree nut]     Oranges [orange]     Statins-hmg-coa reductase inhibitors     Sulfa (sulfonamide antibiotics)     Sulfacetamide sodium Swelling    Tomato (solanum lycopersicum)     Weed pollen      Patient reported allergy to weed pollen, grass, trees, duckweed, cockroaches         Review of Systems   Review of Systems   Constitutional: Negative for decreased appetite, fever and weight loss.   HENT:  Negative for congestion and nosebleeds.    Eyes:  Negative for double vision, vision loss in left eye, vision loss in right eye and visual disturbance.   Cardiovascular:  Negative for chest pain, claudication, cyanosis, dyspnea on exertion, irregular heartbeat, leg swelling, near-syncope, orthopnea, palpitations, paroxysmal nocturnal dyspnea and syncope.   Respiratory:  Negative for cough, hemoptysis, shortness of breath, sleep disturbances due to breathing, snoring, sputum production and wheezing.    Endocrine: Negative for cold intolerance and heat intolerance.   Skin:  Negative for nail changes and rash.   Musculoskeletal:  Negative for joint pain, muscle cramps, muscle weakness and myalgias.   Gastrointestinal:  Negative for change in bowel habit, heartburn, hematemesis, hematochezia, hemorrhoids and melena.   Neurological:  Negative for dizziness, focal weakness and headaches.       Physical Exam  Wt Readings from Last 1 Encounters:   11/29/22 72.2 kg (159 lb 2.8 oz)     BP Readings from Last 3  Encounters:   11/29/22 138/70   07/13/22 (!) 144/77   12/22/21 (!) 158/82     Pulse Readings from Last 1 Encounters:   11/29/22 98     Body mass index is 29.11 kg/m².    Physical Exam  Constitutional:       Appearance: She is well-developed.   HENT:      Head: Atraumatic.   Eyes:      General: No scleral icterus.  Neck:      Vascular: Normal carotid pulses. No carotid bruit, hepatojugular reflux or JVD.   Cardiovascular:      Rate and Rhythm: Normal rate and regular rhythm.      Chest Wall: PMI is not displaced.      Pulses: Intact distal pulses.           Carotid pulses are 2+ on the right side and 2+ on the left side.       Radial pulses are 2+ on the right side and 2+ on the left side.        Dorsalis pedis pulses are 2+ on the right side and 2+ on the left side.      Heart sounds: Normal heart sounds, S1 normal and S2 normal. No murmur heard.    No friction rub.   Pulmonary:      Effort: Pulmonary effort is normal. No respiratory distress.      Breath sounds: Normal breath sounds. No stridor. No wheezing or rales.   Chest:      Chest wall: No tenderness.   Abdominal:      General: Bowel sounds are normal.      Palpations: Abdomen is soft.   Musculoskeletal:      Cervical back: Neck supple. No edema.   Skin:     General: Skin is warm and dry.      Nails: There is no clubbing.   Neurological:      Mental Status: She is alert and oriented to person, place, and time.   Psychiatric:         Behavior: Behavior normal.         Thought Content: Thought content normal.           Assessment  1. Hyperlipemia  On Repatha  - Ambulatory referral/consult to Cardiology    2. Benign essential hypertension  Well controlled    3. H/o carotid stenosis  stable        Plan and Discussion  Continue current medications.  Discussed her EKG today showed normal sinus rhythm rate of 98.  Will request her records from her previous cardiologist to see if any recent echo or carotid Doppler were done.      Follow Up  2 months      Porter M.  MD Martha, F.A.C.C, F.S.C.A.I.        35 minutes were spent in chart review, documentation and review of results, and evaluation, treatment, and counseling of patient on the same day of service.

## 2022-12-02 ENCOUNTER — PES CALL (OUTPATIENT)
Dept: ADMINISTRATIVE | Facility: CLINIC | Age: 82
End: 2022-12-02
Payer: MEDICARE

## 2022-12-09 ENCOUNTER — PES CALL (OUTPATIENT)
Dept: ADMINISTRATIVE | Facility: CLINIC | Age: 82
End: 2022-12-09
Payer: MEDICARE

## 2022-12-09 ENCOUNTER — TELEPHONE (OUTPATIENT)
Dept: CARDIOLOGY | Facility: CLINIC | Age: 82
End: 2022-12-09
Payer: MEDICARE

## 2022-12-09 DIAGNOSIS — E78.2 MIXED HYPERLIPIDEMIA: Primary | ICD-10-CM

## 2022-12-09 NOTE — TELEPHONE ENCOUNTER
QUE for pt. Received labs from Dr. Mejia. Instructed pt to have repeat fasting labs prior to next appointment with Dr. Thomas. Will schedule appointment for labs at the [facility] office. Instructed pt to check [facility] account and call the office back with any questions.

## 2022-12-20 ENCOUNTER — SPECIALTY PHARMACY (OUTPATIENT)
Dept: PHARMACY | Facility: CLINIC | Age: 82
End: 2022-12-20
Payer: MEDICARE

## 2022-12-20 NOTE — TELEPHONE ENCOUNTER
Specialty Pharmacy - Refill Coordination    Specialty Medication Orders Linked to Encounter      Flowsheet Row Most Recent Value   Medication #1 abatacept (ORENCIA CLICKJECT) 125 mg/mL AtIn (Order#[id number], Rx#[id number])            Refill Questions - Documented Responses      Flowsheet Row Most Recent Value   Patient Availability and HIPAA Verification    Does patient want to proceed with activity? Yes   HIPAA/medical authority confirmed? Yes   Relationship to patient of person spoken to? Self   Refill Screening Questions    Changes to allergies? No   Changes to medications? Yes   New conditions since last clinic visit? No   Unplanned office visit, urgent care, ED, or hospital admission in the last 4 weeks? No   How does patient/caregiver feel medication is working? Good   Financial problems or insurance changes? No   How many doses of your specialty medications were missed in the last 4 weeks? 0   Would patient like to speak to a pharmacist? No   When does the patient need to receive the medication? 12/26/22   Refill Delivery Questions    How will the patient receive the medication? Mail   When does the patient need to receive the medication? 12/26/22   Shipping Address Home   Address in [facility] confirmed and updated if neccessary? Yes   Expected Copay ($) 0   Is the patient able to afford the medication copay? Yes   Payment Method zero copay   Days supply of Refill 28   Supplies needed? No supplies needed   Refill activity completed? Yes   Refill activity plan Refill scheduled   Shipment/Pickup Date: 12/27/22            Current Outpatient Medications   Medication Sig    abatacept (ORENCIA CLICKJECT) 125 mg/mL AtIn Inject 1 mL into the skin every 7 days.    ACETAMINOPHEN ORAL Take by mouth.    albuterol (PROVENTIL) 2.5 mg /3 mL (0.083 %) nebulizer solution Take 2.5 mg by nebulization every 6 (six) hours as needed for Wheezing. Rescue    allopurinoL (ZYLOPRIM) 100 MG tablet Take 200 mg by mouth once  daily.    amLODIPine (NORVASC) 10 MG tablet Take 1 tablet (10 mg total) by mouth once daily.    azelastine 205.5 mcg (0.15 %) Spry 2 sprays by Each Nostril route 2 (two) times daily.    betamethasone dipropionate 0.05 % cream SMARTSIG:Sparingly Topical Twice Daily    bismuth subsalicylate (PEPTO-BISMOL ORAL) Take by mouth.    diphenhydrAMINE (BENADRYL) 25 mg capsule Take 25 mg by mouth every 6 (six) hours as needed for Itching.    DULoxetine (CYMBALTA) 30 MG capsule TAKE 1 CAPSULE BY MOUTH DAILY    estradioL (ESTRACE) 1 MG tablet Take 1 mg by mouth once daily.    fluticasone (FLONASE) 50 mcg/actuation nasal spray fluticasone 50 mcg/actuation nasal spray,suspension    hydroCHLOROthiazide (HYDRODIURIL) 25 MG tablet Take 1 tablet (25 mg total) by mouth once daily.    ipratropium/albuterol sulfate (IPRATROPIUM-ALBUTEROL INHL) Inhale into the lungs as needed.    levocetirizine (XYZAL) 5 MG tablet Take 5 mg by mouth once daily.    losartan (COZAAR) 100 MG tablet Take 1 tablet (100 mg total) by mouth once daily.    montelukast (SINGULAIR) 10 mg tablet Take 10 mg by mouth every evening.    olopatadine (PATANOL) 0.1 % ophthalmic solution SMARTSI Drop(s) In Eye(s) Twice Daily    predniSONE (DELTASONE) 2.5 MG tablet TAKE 1 TABLET(2.5 MG) BY MOUTH EVERY DAY    pregabalin (LYRICA) 50 MG capsule Take 1 capsule (50 mg total) by mouth 3 (three) times daily.    pregabalin (LYRICA) 50 MG capsule Take 50 mg by mouth 3 (three) times daily.    REPATHA SURECLICK 140 mg/mL PnIj Inject 140 mg into the skin every 14 (fourteen) days.    terbinafine HCL (LAMISIL) 250 mg tablet terbinafine HCl 250 mg tablet   Last reviewed on 2022  2:31 PM by Joycelyn Nayak RN    Review of patient's allergies indicates:   Allergen Reactions    Shellfish containing products Anaphylaxis    Cabbage (brassica oleracea)     Chocolate flavor     Duckweed     Kale Swelling    Mustard     Nuts [tree nut]     Oranges [orange]     Statins-hmg-coa reductase  inhibitors     Sulfa (sulfonamide antibiotics)     Sulfacetamide sodium Swelling    Tomato (solanum lycopersicum)     Weed pollen      Patient reported allergy to weed pollen, grass, trees, duckweed, cockroaches    Last reviewed on  11/29/2022 2:42 PM by Joycelyn Nayak      Tasks added this encounter   1/16/2023 - Refill Call (Auto Added)   Tasks due within next 3 months   3/15/2023 - Clinical - Follow Up Assesement (Annual)     Jaswant Ellison, PharmD  [facility]  [address] [place], [place] [address]  [facility] [address]  Phone: [phone]  Fax: [phone]

## 2022-12-20 NOTE — TELEPHONE ENCOUNTER
Outgoing call regarding orencia refill; per pt, she's due to inject on 12/26; pt informed she was put on new med; transferred to [facility]

## 2023-01-17 ENCOUNTER — SPECIALTY PHARMACY (OUTPATIENT)
Dept: PHARMACY | Facility: CLINIC | Age: 83
End: 2023-01-17
Payer: MEDICARE

## 2023-01-17 NOTE — TELEPHONE ENCOUNTER
Specialty Pharmacy - Refill Coordination    Specialty Medication Orders Linked to Encounter      Flowsheet Row Most Recent Value   Medication #1 abatacept (ORENCIA CLICKJECT) 125 mg/mL AtIn (Order#[id number], Rx#[id number])            Refill Questions - Documented Responses      Flowsheet Row Most Recent Value   Patient Availability and HIPAA Verification    Does patient want to proceed with activity? Yes   HIPAA/medical authority confirmed? Yes   Relationship to patient of person spoken to? Self   Refill Screening Questions    Changes to allergies? No   Changes to medications? No   New conditions since last clinic visit? No   Unplanned office visit, urgent care, ED, or hospital admission in the last 4 weeks? No   How does patient/caregiver feel medication is working? Good   Financial problems or insurance changes? No   How many doses of your specialty medications were missed in the last 4 weeks? 0   Would patient like to speak to a pharmacist? No   When does the patient need to receive the medication? 01/23/23   Refill Delivery Questions    How will the patient receive the medication? MEDRx   When does the patient need to receive the medication? 01/23/23   Shipping Address Home   Address in [facility] confirmed and updated if neccessary? Yes   Expected Copay ($) 0   Is the patient able to afford the medication copay? Yes   Payment Method zero copay   Days supply of Refill 28   Supplies needed? No supplies needed   Refill activity completed? Yes   Refill activity plan Refill scheduled   Shipment/Pickup Date: 01/20/23            Current Outpatient Medications   Medication Sig    abatacept (ORENCIA CLICKJECT) 125 mg/mL AtIn Inject 1 mL into the skin every 7 days.    ACETAMINOPHEN ORAL Take by mouth.    albuterol (PROVENTIL) 2.5 mg /3 mL (0.083 %) nebulizer solution Take 2.5 mg by nebulization every 6 (six) hours as needed for Wheezing. Rescue    allopurinoL (ZYLOPRIM) 100 MG tablet Take 200 mg by mouth once  daily.    amLODIPine (NORVASC) 10 MG tablet Take 1 tablet (10 mg total) by mouth once daily.    azelastine 205.5 mcg (0.15 %) Spry 2 sprays by Each Nostril route 2 (two) times daily.    betamethasone dipropionate 0.05 % cream SMARTSIG:Sparingly Topical Twice Daily    bismuth subsalicylate (PEPTO-BISMOL ORAL) Take by mouth.    diphenhydrAMINE (BENADRYL) 25 mg capsule Take 25 mg by mouth every 6 (six) hours as needed for Itching.    DULoxetine (CYMBALTA) 30 MG capsule TAKE 1 CAPSULE BY MOUTH DAILY    estradioL (ESTRACE) 1 MG tablet Take 1 mg by mouth once daily.    fluticasone (FLONASE) 50 mcg/actuation nasal spray fluticasone 50 mcg/actuation nasal spray,suspension    hydroCHLOROthiazide (HYDRODIURIL) 25 MG tablet Take 1 tablet (25 mg total) by mouth once daily.    ipratropium/albuterol sulfate (IPRATROPIUM-ALBUTEROL INHL) Inhale into the lungs as needed.    levocetirizine (XYZAL) 5 MG tablet Take 5 mg by mouth once daily.    losartan (COZAAR) 100 MG tablet Take 1 tablet (100 mg total) by mouth once daily.    montelukast (SINGULAIR) 10 mg tablet Take 10 mg by mouth every evening.    olopatadine (PATANOL) 0.1 % ophthalmic solution SMARTSI Drop(s) In Eye(s) Twice Daily    predniSONE (DELTASONE) 2.5 MG tablet TAKE 1 TABLET(2.5 MG) BY MOUTH EVERY DAY    pregabalin (LYRICA) 50 MG capsule Take 50 mg by mouth 3 (three) times daily.    REPATHA SURECLICK 140 mg/mL PnIj Inject 140 mg into the skin every 14 (fourteen) days.    terbinafine HCL (LAMISIL) 250 mg tablet terbinafine HCl 250 mg tablet   Last reviewed on 2022  2:31 PM by Joycelyn Nayak RN    Review of patient's allergies indicates:   Allergen Reactions    Shellfish containing products Anaphylaxis    Cabbage (brassica oleracea)     Chocolate flavor     Duckweed     Kale Swelling    Mustard     Nuts [tree nut]     Oranges [orange]     Statins-hmg-coa reductase inhibitors     Sulfa (sulfonamide antibiotics)     Sulfacetamide sodium Swelling    Tomato (solanum  lycopersicum)     Weed pollen      Patient reported allergy to weed pollen, grass, trees, duckweed, cockroaches    Last reviewed on  11/29/2022 2:42 PM by Joycelyn Nayak      Tasks added this encounter   2/13/2023 - Refill Call (Auto Added)   Tasks due within next 3 months   3/15/2023 - Clinical - Follow Up Assesement (Annual)     [facility]  [facility]  [facility] [address]  Phone: [phone]  Fax: [phone]

## 2023-01-18 ENCOUNTER — HOSPITAL ENCOUNTER (OUTPATIENT)
Dept: RADIOLOGY | Facility: HOSPITAL | Age: 83
Discharge: HOME | End: 2023-01-18
Attending: INTERNAL MEDICINE
Payer: MEDICARE

## 2023-01-18 ENCOUNTER — OFFICE VISIT (OUTPATIENT)
Dept: RHEUMATOLOGY | Facility: CLINIC | Age: 83
End: 2023-01-18
Payer: MEDICARE

## 2023-01-18 VITALS
BODY MASS INDEX: 29.74 KG/M2 | WEIGHT: 161.63 LBS | HEART RATE: 94 BPM | SYSTOLIC BLOOD PRESSURE: 118 MMHG | DIASTOLIC BLOOD PRESSURE: 71 MMHG | HEIGHT: 62 IN

## 2023-01-18 DIAGNOSIS — D84.9 IMMUNOSUPPRESSION: ICD-10-CM

## 2023-01-18 DIAGNOSIS — M54.42 CHRONIC MIDLINE LOW BACK PAIN WITH LEFT-SIDED SCIATICA: ICD-10-CM

## 2023-01-18 DIAGNOSIS — M47.812 OSTEOARTHRITIS OF CERVICAL SPINE, UNSPECIFIED SPINAL OSTEOARTHRITIS COMPLICATION STATUS: ICD-10-CM

## 2023-01-18 DIAGNOSIS — E11.69 TYPE 2 DIABETES MELLITUS WITH OTHER SPECIFIED COMPLICATION, WITHOUT LONG-TERM CURRENT USE OF INSULIN: ICD-10-CM

## 2023-01-18 DIAGNOSIS — G89.29 CHRONIC MIDLINE LOW BACK PAIN WITH LEFT-SIDED SCIATICA: ICD-10-CM

## 2023-01-18 DIAGNOSIS — M05.741 RHEUMATOID ARTHRITIS INVOLVING BOTH HANDS WITH POSITIVE RHEUMATOID FACTOR: Primary | ICD-10-CM

## 2023-01-18 DIAGNOSIS — M1A.09X0 IDIOPATHIC CHRONIC GOUT OF MULTIPLE SITES WITHOUT TOPHUS: ICD-10-CM

## 2023-01-18 DIAGNOSIS — M05.742 RHEUMATOID ARTHRITIS INVOLVING BOTH HANDS WITH POSITIVE RHEUMATOID FACTOR: Primary | ICD-10-CM

## 2023-01-18 DIAGNOSIS — N18.32 STAGE 3B CHRONIC KIDNEY DISEASE: ICD-10-CM

## 2023-01-18 PROCEDURE — 3074F SYST BP LT 130 MM HG: CPT | Mod: CPTII,S$GLB,, | Performed by: INTERNAL MEDICINE

## 2023-01-18 PROCEDURE — 1101F PR PT FALLS ASSESS DOC 0-1 FALLS W/OUT INJ PAST YR: ICD-10-PCS | Mod: CPTII,S$GLB,, | Performed by: INTERNAL MEDICINE

## 2023-01-18 PROCEDURE — 3288F FALL RISK ASSESSMENT DOCD: CPT | Mod: CPTII,S$GLB,, | Performed by: INTERNAL MEDICINE

## 2023-01-18 PROCEDURE — 3074F PR MOST RECENT SYSTOLIC BLOOD PRESSURE < 130 MM HG: ICD-10-PCS | Mod: CPTII,S$GLB,, | Performed by: INTERNAL MEDICINE

## 2023-01-18 PROCEDURE — 3078F PR MOST RECENT DIASTOLIC BLOOD PRESSURE < 80 MM HG: ICD-10-PCS | Mod: CPTII,S$GLB,, | Performed by: INTERNAL MEDICINE

## 2023-01-18 PROCEDURE — 72100 X-RAY EXAM L-S SPINE 2/3 VWS: CPT | Mod: TC

## 2023-01-18 PROCEDURE — 1159F PR MEDICATION LIST DOCUMENTED IN MEDICAL RECORD: ICD-10-PCS | Mod: CPTII,S$GLB,, | Performed by: INTERNAL MEDICINE

## 2023-01-18 PROCEDURE — 1125F PR PAIN SEVERITY QUANTIFIED, PAIN PRESENT: ICD-10-PCS | Mod: CPTII,S$GLB,, | Performed by: INTERNAL MEDICINE

## 2023-01-18 PROCEDURE — 3288F PR FALLS RISK ASSESSMENT DOCUMENTED: ICD-10-PCS | Mod: CPTII,S$GLB,, | Performed by: INTERNAL MEDICINE

## 2023-01-18 PROCEDURE — 72100 XR LUMBAR SPINE AP AND LATERAL: ICD-10-PCS | Mod: 26,,, | Performed by: RADIOLOGY

## 2023-01-18 PROCEDURE — 1101F PT FALLS ASSESS-DOCD LE1/YR: CPT | Mod: CPTII,S$GLB,, | Performed by: INTERNAL MEDICINE

## 2023-01-18 PROCEDURE — 99214 OFFICE O/P EST MOD 30 MIN: CPT | Mod: S$GLB,,, | Performed by: INTERNAL MEDICINE

## 2023-01-18 PROCEDURE — 99999 PR PBB SHADOW E&M-EST. PATIENT-LVL II: ICD-10-PCS | Mod: PBBFAC,,, | Performed by: INTERNAL MEDICINE

## 2023-01-18 PROCEDURE — 99214 PR OFFICE/OUTPT VISIT, EST, LEVL IV, 30-39 MIN: ICD-10-PCS | Mod: S$GLB,,, | Performed by: INTERNAL MEDICINE

## 2023-01-18 PROCEDURE — 99999 PR PBB SHADOW E&M-EST. PATIENT-LVL II: CPT | Mod: PBBFAC,,, | Performed by: INTERNAL MEDICINE

## 2023-01-18 PROCEDURE — 1159F MED LIST DOCD IN RCRD: CPT | Mod: CPTII,S$GLB,, | Performed by: INTERNAL MEDICINE

## 2023-01-18 PROCEDURE — 3078F DIAST BP <80 MM HG: CPT | Mod: CPTII,S$GLB,, | Performed by: INTERNAL MEDICINE

## 2023-01-18 PROCEDURE — 1160F PR REVIEW ALL MEDS BY PRESCRIBER/CLIN PHARMACIST DOCUMENTED: ICD-10-PCS | Mod: CPTII,S$GLB,, | Performed by: INTERNAL MEDICINE

## 2023-01-18 PROCEDURE — 1125F AMNT PAIN NOTED PAIN PRSNT: CPT | Mod: CPTII,S$GLB,, | Performed by: INTERNAL MEDICINE

## 2023-01-18 PROCEDURE — 72100 X-RAY EXAM L-S SPINE 2/3 VWS: CPT | Mod: 26,,, | Performed by: RADIOLOGY

## 2023-01-18 PROCEDURE — 1160F RVW MEDS BY RX/DR IN RCRD: CPT | Mod: CPTII,S$GLB,, | Performed by: INTERNAL MEDICINE

## 2023-01-18 RX ORDER — ATORVASTATIN CALCIUM 10 MG/1
TABLET, FILM COATED ORAL
COMMUNITY
End: 2024-03-05 | Stop reason: SINTOL

## 2023-01-18 RX ORDER — AZITHROMYCIN 250 MG/1
250 TABLET, FILM COATED ORAL
COMMUNITY
Start: 2022-12-15 | End: 2023-05-17 | Stop reason: ALTCHOICE

## 2023-01-19 PROBLEM — N18.32 STAGE 3B CHRONIC KIDNEY DISEASE: Status: ACTIVE | Noted: 2023-01-19

## 2023-01-19 RX ORDER — TRAMADOL HYDROCHLORIDE 50 MG/1
TABLET ORAL
Qty: 60 TABLET | Refills: 1 | Status: SHIPPED | OUTPATIENT
Start: 2023-01-19 | End: 2023-05-07

## 2023-01-19 RX ORDER — TRAMADOL HYDROCHLORIDE 50 MG/1
TABLET ORAL
Qty: 60 TABLET | Refills: 1 | Status: SHIPPED | OUTPATIENT
Start: 2023-01-19 | End: 2023-01-19

## 2023-02-14 ENCOUNTER — SPECIALTY PHARMACY (OUTPATIENT)
Dept: PHARMACY | Facility: CLINIC | Age: 83
End: 2023-02-14
Payer: MEDICARE

## 2023-02-14 NOTE — TELEPHONE ENCOUNTER
Specialty Pharmacy - Refill Coordination    Specialty Medication Orders Linked to Encounter      Flowsheet Row Most Recent Value   Medication #1 abatacept (ORENCIA CLICKJECT) 125 mg/mL AtIn (Order#[id number], Rx#[id number])            Refill Questions - Documented Responses      Flowsheet Row Most Recent Value   Patient Availability and HIPAA Verification    Does patient want to proceed with activity? Yes   HIPAA/medical authority confirmed? Yes   Relationship to patient of person spoken to? Self   Refill Screening Questions    Changes to allergies? No   Changes to medications? No   New conditions since last clinic visit? No   Unplanned office visit, urgent care, ED, or hospital admission in the last 4 weeks? No   How does patient/caregiver feel medication is working? Good   Financial problems or insurance changes? No   How many doses of your specialty medications were missed in the last 4 weeks? 0   Would patient like to speak to a pharmacist? No   When does the patient need to receive the medication? 02/20/23   Refill Delivery Questions    How will the patient receive the medication? MED[facility]   When does the patient need to receive the medication? 02/20/23   Shipping Address Home   Address in [facility] confirmed and updated if neccessary? Yes   Expected Copay ($) 0   Is the patient able to afford the medication copay? Yes   Payment Method zero copay   Days supply of Refill 28   Supplies needed? No supplies needed   Refill activity completed? Yes   Refill activity plan Refill scheduled   Shipment/Pickup Date: 02/15/23            Current Outpatient Medications   Medication Sig    abatacept (ORENCIA CLICKJECT) 125 mg/mL AtIn Inject 1 mL into the skin every 7 days.    ACETAMINOPHEN ORAL Take by mouth.    albuterol (PROVENTIL) 2.5 mg /3 mL (0.083 %) nebulizer solution Take 2.5 mg by nebulization every 6 (six) hours as needed for Wheezing. Rescue    amLODIPine (NORVASC) 10 MG tablet Take 1 tablet (10 mg total) by  mouth once daily.    atorvastatin (LIPITOR) 10 MG tablet atorvastatin 10 mg tablet    azelastine 205.5 mcg (0.15 %) Spry 2 sprays by Each Nostril route 2 (two) times daily.    azithromycin (Z-RAMSEY) 250 MG tablet Take 250 mg by mouth.    betamethasone dipropionate 0.05 % cream SMARTSIG:Sparingly Topical Twice Daily    bismuth subsalicylate (PEPTO-BISMOL ORAL) Take by mouth.    diphenhydrAMINE (BENADRYL) 25 mg capsule Take 25 mg by mouth every 6 (six) hours as needed for Itching.    estradioL (ESTRACE) 1 MG tablet Take 1 mg by mouth once daily.    fluticasone (FLONASE) 50 mcg/actuation nasal spray fluticasone 50 mcg/actuation nasal spray,suspension    hydroCHLOROthiazide (HYDRODIURIL) 25 MG tablet Take 1 tablet (25 mg total) by mouth once daily.    ipratropium/albuterol sulfate (IPRATROPIUM-ALBUTEROL INHL) Inhale into the lungs as needed.    levocetirizine (XYZAL) 5 MG tablet Take 5 mg by mouth once daily.    losartan (COZAAR) 100 MG tablet Take 1 tablet (100 mg total) by mouth once daily.    montelukast (SINGULAIR) 10 mg tablet Take 10 mg by mouth every evening.    olopatadine (PATANOL) 0.1 % ophthalmic solution SMARTSI Drop(s) In Eye(s) Twice Daily    predniSONE (DELTASONE) 2.5 MG tablet TAKE 1 TABLET(2.5 MG) BY MOUTH EVERY DAY    REPATHA SURECLICK 140 mg/mL PnIj Inject 140 mg into the skin every 14 (fourteen) days.    terbinafine HCL (LAMISIL) 250 mg tablet terbinafine HCl 250 mg tablet    traMADoL (ULTRAM) 50 mg tablet 1 to 2 tablets a day only for severe pain   Last reviewed on 2023  1:05 PM by Gagan Alvarez MD    Review of patient's allergies indicates:   Allergen Reactions    Shellfish containing products Anaphylaxis    Cabbage (brassica oleracea)     Chocolate flavor     Duckweed     Kale Swelling    Mustard     Nuts [tree nut]     Oranges [orange]     Statins-hmg-coa reductase inhibitors     Sulfa (sulfonamide antibiotics)     Sulfacetamide sodium Swelling    Tomato (solanum lycopersicum)      Weed pollen      Patient reported allergy to weed pollen, grass, trees, duckweed, cockroaches    Last reviewed on  1/18/2023 2:46 PM by Lin Aguilar      Tasks added this encounter   3/13/2023 - Refill Call (Auto Added)   Tasks due within next 3 months   3/15/2023 - Clinical - Follow Up Assesement (Annual)     [facility]  [facility] [address]  [facility] [address]  Phone: [phone]  Fax: [phone]

## 2023-02-24 ENCOUNTER — LAB VISIT (OUTPATIENT)
Dept: PRIMARY CARE CLINIC | Facility: CLINIC | Age: 83
End: 2023-02-24
Payer: MEDICARE

## 2023-02-24 DIAGNOSIS — E78.2 MIXED HYPERLIPIDEMIA: ICD-10-CM

## 2023-02-24 LAB
ALBUMIN SERPL BCP-MCNC: 3.9 G/DL (ref 3.5–5.2)
ALP SERPL-CCNC: 73 U/L (ref 55–135)
ALT SERPL W/O P-5'-P-CCNC: 16 U/L (ref 10–44)
ANION GAP SERPL CALC-SCNC: 12 MMOL/L (ref 8–16)
AST SERPL-CCNC: 20 U/L (ref 10–40)
BILIRUB SERPL-MCNC: 0.4 MG/DL (ref 0.1–1)
BUN SERPL-MCNC: 12 MG/DL (ref 8–23)
CALCIUM SERPL-MCNC: 11.1 MG/DL (ref 8.7–10.5)
CHLORIDE SERPL-SCNC: 105 MMOL/L (ref 95–110)
CHOLEST SERPL-MCNC: 233 MG/DL (ref 120–199)
CHOLEST/HDLC SERPL: 2.8 {RATIO} (ref 2–5)
CO2 SERPL-SCNC: 28 MMOL/L (ref 23–29)
CREAT SERPL-MCNC: 1.5 MG/DL (ref 0.5–1.4)
EST. GFR  (NO RACE VARIABLE): 34.8 ML/MIN/1.73 M^2
GLUCOSE SERPL-MCNC: 88 MG/DL (ref 70–110)
HDLC SERPL-MCNC: 83 MG/DL (ref 40–75)
HDLC SERPL: 35.6 % (ref 20–50)
LDLC SERPL CALC-MCNC: 121.4 MG/DL (ref 63–159)
NONHDLC SERPL-MCNC: 150 MG/DL
POTASSIUM SERPL-SCNC: 3.9 MMOL/L (ref 3.5–5.1)
PROT SERPL-MCNC: 7.5 G/DL (ref 6–8.4)
SODIUM SERPL-SCNC: 145 MMOL/L (ref 136–145)
TRIGL SERPL-MCNC: 143 MG/DL (ref 30–150)

## 2023-02-24 PROCEDURE — 80061 LIPID PANEL: CPT | Performed by: INTERNAL MEDICINE

## 2023-02-24 PROCEDURE — 80053 COMPREHEN METABOLIC PANEL: CPT | Performed by: INTERNAL MEDICINE

## 2023-02-24 PROCEDURE — 36415 COLL VENOUS BLD VENIPUNCTURE: CPT | Performed by: INTERNAL MEDICINE

## 2023-02-28 ENCOUNTER — OFFICE VISIT (OUTPATIENT)
Dept: CARDIOLOGY | Facility: CLINIC | Age: 83
End: 2023-02-28
Payer: MEDICARE

## 2023-02-28 VITALS
HEART RATE: 92 BPM | HEIGHT: 62 IN | SYSTOLIC BLOOD PRESSURE: 126 MMHG | DIASTOLIC BLOOD PRESSURE: 66 MMHG | WEIGHT: 157.44 LBS | OXYGEN SATURATION: 98 % | BODY MASS INDEX: 28.97 KG/M2

## 2023-02-28 DIAGNOSIS — E78.2 MIXED HYPERLIPIDEMIA: ICD-10-CM

## 2023-02-28 DIAGNOSIS — I10 BENIGN ESSENTIAL HYPERTENSION: Primary | ICD-10-CM

## 2023-02-28 PROCEDURE — 3074F SYST BP LT 130 MM HG: CPT | Mod: CPTII,S$GLB,, | Performed by: INTERNAL MEDICINE

## 2023-02-28 PROCEDURE — 3288F FALL RISK ASSESSMENT DOCD: CPT | Mod: CPTII,S$GLB,, | Performed by: INTERNAL MEDICINE

## 2023-02-28 PROCEDURE — 3078F DIAST BP <80 MM HG: CPT | Mod: CPTII,S$GLB,, | Performed by: INTERNAL MEDICINE

## 2023-02-28 PROCEDURE — 1101F PR PT FALLS ASSESS DOC 0-1 FALLS W/OUT INJ PAST YR: ICD-10-PCS | Mod: CPTII,S$GLB,, | Performed by: INTERNAL MEDICINE

## 2023-02-28 PROCEDURE — 1101F PT FALLS ASSESS-DOCD LE1/YR: CPT | Mod: CPTII,S$GLB,, | Performed by: INTERNAL MEDICINE

## 2023-02-28 PROCEDURE — 99999 PR PBB SHADOW E&M-EST. PATIENT-LVL IV: CPT | Mod: PBBFAC,,, | Performed by: INTERNAL MEDICINE

## 2023-02-28 PROCEDURE — 1125F AMNT PAIN NOTED PAIN PRSNT: CPT | Mod: CPTII,S$GLB,, | Performed by: INTERNAL MEDICINE

## 2023-02-28 PROCEDURE — 1159F PR MEDICATION LIST DOCUMENTED IN MEDICAL RECORD: ICD-10-PCS | Mod: CPTII,S$GLB,, | Performed by: INTERNAL MEDICINE

## 2023-02-28 PROCEDURE — 99214 OFFICE O/P EST MOD 30 MIN: CPT | Mod: S$GLB,,, | Performed by: INTERNAL MEDICINE

## 2023-02-28 PROCEDURE — 1159F MED LIST DOCD IN RCRD: CPT | Mod: CPTII,S$GLB,, | Performed by: INTERNAL MEDICINE

## 2023-02-28 PROCEDURE — 99214 PR OFFICE/OUTPT VISIT, EST, LEVL IV, 30-39 MIN: ICD-10-PCS | Mod: S$GLB,,, | Performed by: INTERNAL MEDICINE

## 2023-02-28 PROCEDURE — 3078F PR MOST RECENT DIASTOLIC BLOOD PRESSURE < 80 MM HG: ICD-10-PCS | Mod: CPTII,S$GLB,, | Performed by: INTERNAL MEDICINE

## 2023-02-28 PROCEDURE — 3074F PR MOST RECENT SYSTOLIC BLOOD PRESSURE < 130 MM HG: ICD-10-PCS | Mod: CPTII,S$GLB,, | Performed by: INTERNAL MEDICINE

## 2023-02-28 PROCEDURE — 1125F PR PAIN SEVERITY QUANTIFIED, PAIN PRESENT: ICD-10-PCS | Mod: CPTII,S$GLB,, | Performed by: INTERNAL MEDICINE

## 2023-02-28 PROCEDURE — 3288F PR FALLS RISK ASSESSMENT DOCUMENTED: ICD-10-PCS | Mod: CPTII,S$GLB,, | Performed by: INTERNAL MEDICINE

## 2023-02-28 PROCEDURE — 99999 PR PBB SHADOW E&M-EST. PATIENT-LVL IV: ICD-10-PCS | Mod: PBBFAC,,, | Performed by: INTERNAL MEDICINE

## 2023-02-28 NOTE — PROGRESS NOTES
Cardiology    2/28/2023  2:46 PM    Problem list  Patient Active Problem List   Diagnosis    Essential hypertension    Chronic allergic rhinitis    Chronic kidney disease, stage II (mild)    GERD (gastroesophageal reflux disease)    PMR (polymyalgia rheumatica)    Rheumatoid arthritis    Fatigue    Obesity    Current use of steroid medication    Chest congestion    Fibromyalgia    Elevated C-reactive protein (CRP)    ESR raised    Nuclear sclerosis    H/O mammogram    Moderate persistent asthma without complication    Bulla of lung    Calcified lymph nodes    Right hand pain    Swelling of right hand    EMILIO (obstructive sleep apnea)    Left foot pain    Dysphagia    Abnormal renal function    Allergic rhinitis due to pollen    Benign paroxysmal positional vertigo    Bilateral cataracts    Body mass index (BMI) of 25.0 to 29.9    Changing skin lesion    COPD bronchitis    Joint pain    Mixed hyperlipidemia    Need for prophylactic vaccination and inoculation against other viral diseases    Red eye    Reflux esophagitis    Type 2 diabetes mellitus with other specified complication, without long-term current use of insulin    Shoulder pain    Vertigo    Allergic rhinitis    Asthma exacerbation    Extrinsic asthma    Benign essential hypertension    Polymyalgia rheumatica    Chronic night sweats    Multiple thyroid nodules    Cervical radiculopathy    Immunosuppression    Cervical myelopathy    Stage 3b chronic kidney disease       CC:  fu    HPI:  She is doing well.  She has no complaints.  She denies any chest pain, shortness of breath, palpitation, TIA.      Medications  Current Outpatient Medications   Medication Sig Dispense Refill    abatacept (ORENCIA CLICKJECT) 125 mg/mL AtIn Inject 1 mL into the skin every 7 days. 4 mL 11    albuterol (PROVENTIL) 2.5 mg /3 mL (0.083 %) nebulizer solution Take 2.5 mg by nebulization every 6 (six) hours as needed for Wheezing. Rescue      bismuth subsalicylate  (PEPTO-BISMOL ORAL) Take by mouth.      diphenhydrAMINE (BENADRYL) 25 mg capsule Take 25 mg by mouth every 6 (six) hours as needed for Itching.      estradioL (ESTRACE) 1 MG tablet Take 1 mg by mouth once daily.      fluticasone (FLONASE) 50 mcg/actuation nasal spray fluticasone 50 mcg/actuation nasal spray,suspension      hydroCHLOROthiazide (HYDRODIURIL) 25 MG tablet Take 1 tablet (25 mg total) by mouth once daily. 90 tablet 3    levocetirizine (XYZAL) 5 MG tablet Take 5 mg by mouth once daily.      losartan (COZAAR) 100 MG tablet Take 1 tablet (100 mg total) by mouth once daily. 90 tablet 3    montelukast (SINGULAIR) 10 mg tablet Take 10 mg by mouth every evening.      predniSONE (DELTASONE) 2.5 MG tablet TAKE 1 TABLET(2.5 MG) BY MOUTH EVERY DAY 30 tablet 4    REPATHA SURECLICK 140 mg/mL PnIj Inject 140 mg into the skin every 14 (fourteen) days.      terbinafine HCL (LAMISIL) 250 mg tablet terbinafine HCl 250 mg tablet      traMADoL (ULTRAM) 50 mg tablet 1 to 2 tablets a day only for severe pain 60 tablet 1    ACETAMINOPHEN ORAL Take by mouth.      amLODIPine (NORVASC) 10 MG tablet Take 1 tablet (10 mg total) by mouth once daily. 90 tablet 3    atorvastatin (LIPITOR) 10 MG tablet atorvastatin 10 mg tablet      azelastine 205.5 mcg (0.15 %) Spry 2 sprays by Each Nostril route 2 (two) times daily.      azithromycin (Z-RAMSEY) 250 MG tablet Take 250 mg by mouth.      betamethasone dipropionate 0.05 % cream SMARTSIG:Sparingly Topical Twice Daily      ipratropium/albuterol sulfate (IPRATROPIUM-ALBUTEROL INHL) Inhale into the lungs as needed.       Current Facility-Administered Medications   Medication Dose Route Frequency Provider Last Rate Last Admin    sodium chloride 0.9% flush 10 mL  10 mL Intravenous PRN Yeimy Tellez DPM          Prior to Admission medications    Medication Sig Start Date End Date Taking? Authorizing Provider   abatacept (ORENCIA CLICKJECT) 125 mg/mL AtIn Inject 1 mL into the skin every 7 days.  10/20/22  Yes Gagan Alvarez MD   albuterol (PROVENTIL) 2.5 mg /3 mL (0.083 %) nebulizer solution Take 2.5 mg by nebulization every 6 (six) hours as needed for Wheezing. Rescue   Yes Historical Provider   bismuth subsalicylate (PEPTO-BISMOL ORAL) Take by mouth.   Yes Historical Provider   diphenhydrAMINE (BENADRYL) 25 mg capsule Take 25 mg by mouth every 6 (six) hours as needed for Itching.   Yes Historical Provider   estradioL (ESTRACE) 1 MG tablet Take 1 mg by mouth once daily. 8/7/21  Yes Historical Provider   fluticasone (FLONASE) 50 mcg/actuation nasal spray fluticasone 50 mcg/actuation nasal spray,suspension   Yes Historical Provider   hydroCHLOROthiazide (HYDRODIURIL) 25 MG tablet Take 1 tablet (25 mg total) by mouth once daily. 4/11/22  Yes Moustapha English MD   levocetirizine (XYZAL) 5 MG tablet Take 5 mg by mouth once daily. 6/24/22  Yes Historical Provider   losartan (COZAAR) 100 MG tablet Take 1 tablet (100 mg total) by mouth once daily. 5/12/22 5/12/23 Yes Moustapha English MD   montelukast (SINGULAIR) 10 mg tablet Take 10 mg by mouth every evening.   Yes Historical Provider   predniSONE (DELTASONE) 2.5 MG tablet TAKE 1 TABLET(2.5 MG) BY MOUTH EVERY DAY 2/22/23  Yes Gagan Alvarez MD   REPATHA SURECLICK 140 mg/mL PnIj Inject 140 mg into the skin every 14 (fourteen) days. 6/15/22  Yes Historical Provider   terbinafine HCL (LAMISIL) 250 mg tablet terbinafine HCl 250 mg tablet   Yes Historical Provider   traMADoL (ULTRAM) 50 mg tablet 1 to 2 tablets a day only for severe pain 1/19/23  Yes Gagan Alvarez MD   ACETAMINOPHEN ORAL Take by mouth.    Historical Provider   amLODIPine (NORVASC) 10 MG tablet Take 1 tablet (10 mg total) by mouth once daily. 4/12/21 7/13/22  Moustapha English MD   atorvastatin (LIPITOR) 10 MG tablet atorvastatin 10 mg tablet    Historical Provider   azelastine 205.5 mcg (0.15 %) Spry 2 sprays by Each Nostril route 2 (two) times daily.  4/10/22   Historical Provider   azithromycin (Z-RAMSEY) 250 MG tablet Take 250 mg by mouth. 12/15/22   Historical Provider   betamethasone dipropionate 0.05 % cream SMARTSIG:Sparingly Topical Twice Daily 22   Historical Provider   ipratropium/albuterol sulfate (IPRATROPIUM-ALBUTEROL INHL) Inhale into the lungs as needed.    Historical Provider   olopatadine (PATANOL) 0.1 % ophthalmic solution SMARTSI Drop(s) In Eye(s) Twice Daily 21  Historical Provider         History  Past Medical History:   Diagnosis Date    Allergy     Arthritis     Cataract     CKD (chronic kidney disease) stage 3, GFR 30-59 ml/min     Fibromyalgia     GERD (gastroesophageal reflux disease)     Hyperlipidemia     Hypertension     Polymyalgia rheumatica     RA (rheumatoid arthritis)      Past Surgical History:   Procedure Laterality Date    APPENDECTOMY      CATARACT EXTRACTION W/  INTRAOCULAR LENS IMPLANT Left     Dr. Cedeño     CATARACT EXTRACTION W/  INTRAOCULAR LENS IMPLANT Right 2017    Dr. eSna     SECTION      x2    CHOLECYSTECTOMY      COSMETIC SURGERY      Rhinoplasty    EPIDURAL STEROID INJECTION INTO CERVICAL SPINE Bilateral 12/10/2020    Procedure: CERVICAL  DORIS DIRECT REFERRAL;  Surgeon: Inga Blackburn MD;  [facility];  Service: Pain Management;  Laterality: Bilateral;  NEEDS CONSENT    ESOPHAGEAL MANOMETRY WITH MEASUREMENT OF IMPEDANCE N/A 2019    Procedure: MANOMETRY, ESOPHAGUS, WITH IMPEDANCE MEASUREMENT;  Surgeon: Roger De Luna MD;  [facility]  Service: Endoscopy;  Laterality: N/A;  Prep instructions mailed to Pt - ERW    ESOPHAGOGASTRODUODENOSCOPY N/A 2019    Procedure: EGD (ESOPHAGOGASTRODUODENOSCOPY);  Surgeon: Carlos Rodgers MD;  Location: [facility] ([address] [place] [address]);  Service: Endoscopy;  Laterality: N/A;    EYE SURGERY      left eye Lens Placed    HYSTERECTOMY      RHINOPLASTY TIP      TONSILLECTOMY      TUBAL LIGATION       Social History      Socioeconomic History    Marital status: Single   Tobacco Use    Smoking status: Former     Packs/day: 1.00     Years: 20.00     Pack years: 20.00     Types: Cigarettes     Start date: 1970     Quit date: 1999     Years since quittin.5    Smokeless tobacco: Never    Tobacco comments:     Retired ; ; 4 children healthy   Substance and Sexual Activity    Alcohol use: Yes     Alcohol/week: 1.0 standard drink     Types: 1 Glasses of wine per week     Comment: social    Drug use: No    Sexual activity: Yes     Partners: Male         Allergies  Review of patient's allergies indicates:   Allergen Reactions    Shellfish containing products Anaphylaxis    Cabbage (brassica oleracea)     Chocolate flavor     Duckweed     Kale Swelling    Mustard     Nuts [tree nut]     Oranges [orange]     Statins-hmg-coa reductase inhibitors     Sulfa (sulfonamide antibiotics)     Sulfacetamide sodium Swelling    Tomato (solanum lycopersicum)     Weed pollen      Patient reported allergy to weed pollen, grass, trees, duckweed, cockroaches         Review of Systems   Review of Systems   Constitutional: Negative for decreased appetite, fever and weight loss.   HENT:  Negative for congestion and nosebleeds.    Eyes:  Negative for double vision, vision loss in left eye, vision loss in right eye and visual disturbance.   Cardiovascular:  Negative for chest pain, claudication, cyanosis, dyspnea on exertion, irregular heartbeat, leg swelling, near-syncope, orthopnea, palpitations, paroxysmal nocturnal dyspnea and syncope.   Respiratory:  Negative for cough, hemoptysis, shortness of breath, sleep disturbances due to breathing, snoring, sputum production and wheezing.    Endocrine: Negative for cold intolerance and heat intolerance.   Skin:  Negative for nail changes and rash.   Musculoskeletal:  Negative for joint pain, muscle cramps, muscle weakness and myalgias.   Gastrointestinal:  Negative for change  in bowel habit, heartburn, hematemesis, hematochezia, hemorrhoids and melena.   Neurological:  Negative for dizziness, focal weakness and headaches.       Physical Exam  Wt Readings from Last 1 Encounters:   02/28/23 71.4 kg (157 lb 6.5 oz)     BP Readings from Last 3 Encounters:   02/28/23 126/66   01/18/23 118/71   11/29/22 138/70     Pulse Readings from Last 1 Encounters:   02/28/23 92     Body mass index is 28.79 kg/m².    Physical Exam  Constitutional:       Appearance: She is well-developed.   HENT:      Head: Atraumatic.   Eyes:      General: No scleral icterus.  Neck:      Vascular: Normal carotid pulses. No carotid bruit, hepatojugular reflux or JVD.   Cardiovascular:      Rate and Rhythm: Normal rate and regular rhythm.      Chest Wall: PMI is not displaced.      Pulses: Intact distal pulses.           Carotid pulses are 2+ on the right side and 2+ on the left side.       Radial pulses are 2+ on the right side and 2+ on the left side.        Dorsalis pedis pulses are 2+ on the right side and 2+ on the left side.      Heart sounds: Normal heart sounds, S1 normal and S2 normal. No murmur heard.    No friction rub.   Pulmonary:      Effort: Pulmonary effort is normal. No respiratory distress.      Breath sounds: Normal breath sounds. No stridor. No wheezing or rales.   Chest:      Chest wall: No tenderness.   Abdominal:      General: Bowel sounds are normal.      Palpations: Abdomen is soft.   Musculoskeletal:      Cervical back: Neck supple. No edema.   Skin:     General: Skin is warm and dry.      Nails: There is no clubbing.   Neurological:      Mental Status: She is alert and oriented to person, place, and time.   Psychiatric:         Behavior: Behavior normal.         Thought Content: Thought content normal.           Assessment  1. Benign essential hypertension  stable    2. Mixed hyperlipidemia  On repatha, discussed her cholesterol which is not at goal        Plan and Discussion  Discussed her lipid  profile which is not at goal.  Patient reports that she is compliant and uses Repatha appropriately.  Echo done in April 2022 at Dr Flores's office showed EF > 55%, mild MR, grade 1 DD.  Her carotid doppler in April 2022 showed no hemodynamically significant stenosis.  Continue current meds.    Follow Up  4 months      Porter Thomas MD, F.A.C.C, F.S.C.A.I.        35 minutes were spent in chart review, documentation and review of results, and evaluation, treatment, and counseling of patient on the same day of service.    Disclaimer: This document was created using voice recognition software ([employer]). Although it may be edited, this document may contain errors related to incorrect recognition of the spoken word. Please call the physician if clarification is needed.

## 2023-03-13 ENCOUNTER — SPECIALTY PHARMACY (OUTPATIENT)
Dept: PHARMACY | Facility: CLINIC | Age: 83
End: 2023-03-13
Payer: MEDICARE

## 2023-03-13 NOTE — TELEPHONE ENCOUNTER
Outgoing call: Patient is due to inject on 3/20, patient stated she think she is having a reaction to the injection. Pain in leg. Transferring to [facility].

## 2023-03-13 NOTE — TELEPHONE ENCOUNTER
Spoke with pt- she believes Orencia may be causing leg cramps.  Reviewed with pt that leg cramps are not commonly associated with this medication.  Pt confirmed she is not taking a statin at this time.  Pt insists on holding therapy to see if her symptoms improve.  She is aware that her RA symptoms can worsen from holding therapy.  Pt due for dose today.  Pt will see internal med MD on Wednesday to assess leg cramps.      Pt agreed to [facility] follow up call in 1 week.

## 2023-03-20 NOTE — TELEPHONE ENCOUNTER
Specialty Pharmacy - Refill Coordination    Specialty Medication Orders Linked to Encounter      Flowsheet Row Most Recent Value   Medication #1 abatacept (ORENCIA CLICKJECT) 125 mg/mL AtIn (Order#[id number], Rx#[id number])          Patient Diagnosis   M06.9 - Rheumatoid arthritis    Betzaida Neumann is a 83 y.o. female, who is followed by the specialty pharmacy service for management and education of her RA.  She has been on therapy with Orencia since 10/2021.   I have reviewed her electronic medical record and current medication list and determined that specialty medication adjustment Is not needed at this time.    Patient has experienced potential adverse effects such as leg cramps- She is undergoing evaluation with PCP to determine underlying cause of leg cramps (medication versus other).  She Is adherent reporting 2 missed doses since last review.  She is currently holding therapy due to concerns about leg cramps.  Adherence has been encouraged with the following mechanism(s): proactive refill calls.  She is meeting goals of therapy and will hold treatment as directed by PCP until cause of leg cramps is determined- Xrays pending.         2/14/2023 1/17/2023 12/20/2022 11/21/2022 10/25/2022 9/19/2022 8/23/2022   Follow Up Review   # of missed doses 0 0 0 0 0 0 0   New Medications? No No Yes No No No No   New Conditions? No No No No No No No   New Allergies? No No No No No No No   Med Effective? Good Good Good Good Good Very good Good   Urgent Care? No No No No No No No   Requested Pharmacist? No No No No No No No            Therapy is appropriate to hold.    Therapy is effective: Yes  On scale of 1 to 10, how does patient rank quality of life? (10 - Best): 8  Recommendations:  Pharmacist will follow up on provider determination of underlying cause of leg cramps and assist patient with resuming Orencia if appropriate.   Review Method: Patient Contact    Tasks added this encounter   No tasks added.   Tasks due  within next 3 months   3/15/2023 - Clinical - Follow Up Assesement (Annual)  3/13/2023 - Refill Call (Auto Added)     Amrita Waldrop, PharmD  [facility]  [address]  [facility] [address]  Phone: [phone]  Fax: [phone]      Spoke with pt- she is holding Orencia until Xrays completed per PCP.

## 2023-03-22 DIAGNOSIS — I10 HYPERTENSION, ESSENTIAL: ICD-10-CM

## 2023-03-22 NOTE — TELEPHONE ENCOUNTER
Refill Routing Note   Medication(s) are not appropriate for processing by [facility] for the following reason(s):      Non-participating provider    [facility] action(s):  Route            Appointments  past 12m or future 3m with PCP    Date Provider   Last Visit   Visit date not found Sidney Stanley MD   Next Visit   Visit date not found Sidney Stanley MD   ED visits in past 90 days: 0        Note composed:10:07 AM 03/22/2023

## 2023-03-27 NOTE — TELEPHONE ENCOUNTER
Specialty Pharmacy - Refill Coordination  Specialty Pharmacy - Clinical Reassessment    Specialty Medication Orders Linked to Encounter      Flowsheet Row Most Recent Value   Medication #1 abatacept (ORENCIA CLICKJECT) 125 mg/mL AtIn (Order#[id number], Rx#[id number])     Pt had Xrays done with PCP to evaluate cause of leg cramps. Pt still has leg cramps despite holding the Orencia.  Leg cramps deemed to be not related to Orencia.  Appropriate to proceed with refill.   Reviewed missed dose instructions.      Refill Questions - Documented Responses      Flowsheet Row Most Recent Value   Patient Availability and HIPAA Verification    Does patient want to proceed with activity? Yes   HIPAA/medical authority confirmed? Yes   Relationship to patient of person spoken to? Self   Refill Screening Questions    Changes to allergies? No   Changes to medications? No   New conditions since last clinic visit? Yes  [leg cramps- evaluated by PCP]   Unplanned office visit, urgent care, ED, or hospital admission in the last 4 weeks? Yes   How does patient/caregiver feel medication is working? Good   Financial problems or insurance changes? No   How many doses of your specialty medications were missed in the last 4 weeks? 1   Why were doses missed? Wanted to avoid side effects   Would patient like to speak to a pharmacist? Yes   When does the patient need to receive the medication? 04/03/23   Refill Delivery Questions    How will the patient receive the medication? MEDRx   When does the patient need to receive the medication? 04/03/23   Shipping Address Home   Address in [facility] confirmed and updated if neccessary? Yes   Expected Copay ($) 0   Is the patient able to afford the medication copay? Yes   Payment Method zero copay   Days supply of Refill 28   Supplies needed? No supplies needed   Refill activity completed? Yes   Refill activity plan Refill scheduled   Shipment/Pickup Date: 03/29/23            Current Outpatient  Medications   Medication Sig    abatacept (ORENCIA CLICKJECT) 125 mg/mL AtIn Inject 1 mL into the skin every 7 days.    ACETAMINOPHEN ORAL Take by mouth.    albuterol (PROVENTIL) 2.5 mg /3 mL (0.083 %) nebulizer solution Take 2.5 mg by nebulization every 6 (six) hours as needed for Wheezing. Rescue    amLODIPine (NORVASC) 10 MG tablet Take 1 tablet (10 mg total) by mouth once daily.    atorvastatin (LIPITOR) 10 MG tablet atorvastatin 10 mg tablet    azelastine 205.5 mcg (0.15 %) Spry 2 sprays by Each Nostril route 2 (two) times daily.    azithromycin (Z-RAMSEY) 250 MG tablet Take 250 mg by mouth.    betamethasone dipropionate 0.05 % cream SMARTSIG:Sparingly Topical Twice Daily    bismuth subsalicylate (PEPTO-BISMOL ORAL) Take by mouth.    diphenhydrAMINE (BENADRYL) 25 mg capsule Take 25 mg by mouth every 6 (six) hours as needed for Itching.    estradioL (ESTRACE) 1 MG tablet Take 1 mg by mouth once daily.    fluticasone (FLONASE) 50 mcg/actuation nasal spray fluticasone 50 mcg/actuation nasal spray,suspension    hydroCHLOROthiazide (HYDRODIURIL) 25 MG tablet Take 1 tablet (25 mg total) by mouth once daily.    ipratropium/albuterol sulfate (IPRATROPIUM-ALBUTEROL INHL) Inhale into the lungs as needed.    levocetirizine (XYZAL) 5 MG tablet Take 5 mg by mouth once daily.    losartan (COZAAR) 100 MG tablet Take 1 tablet (100 mg total) by mouth once daily.    montelukast (SINGULAIR) 10 mg tablet Take 10 mg by mouth every evening.    predniSONE (DELTASONE) 2.5 MG tablet TAKE 1 TABLET(2.5 MG) BY MOUTH EVERY DAY    REPATHA SURECLICK 140 mg/mL PnIj Inject 140 mg into the skin every 14 (fourteen) days.    terbinafine HCL (LAMISIL) 250 mg tablet terbinafine HCl 250 mg tablet    traMADoL (ULTRAM) 50 mg tablet 1 to 2 tablets a day only for severe pain   Last reviewed on 2/28/2023  2:28 PM by Joycelyn Nayak, RN    Review of patient's allergies indicates:   Allergen Reactions    Shellfish containing products Anaphylaxis    Cabbage  (brassica oleracea)     Chocolate flavor     Duckweed     Kale Swelling    Mustard     Nuts [tree nut]     Oranges [orange]     Statins-hmg-coa reductase inhibitors     Sulfa (sulfonamide antibiotics)     Sulfacetamide sodium Swelling    Tomato (solanum lycopersicum)     Weed pollen      Patient reported allergy to weed pollen, grass, trees, duckweed, cockroaches    Last reviewed on  2/28/2023 2:27 PM by Joycelyn Nayak    Interventions added this encounter   Open: [facility] Patient Intervention - Drug safety: abatacept (ORENCIA CLICKJECT) 125 mg/mL AtIn     Tasks added this encounter   12/20/2023 - Clinical - Follow Up Assesement (Annual)  4/24/2023 - Refill Call (Auto Added)   Tasks due within next 3 months   No tasks due.     Amrita Waldrop, PharmD  [facility]  [address] [place] [address]  Phone: [phone]  Fax: [phone]

## 2023-04-25 ENCOUNTER — SPECIALTY PHARMACY (OUTPATIENT)
Dept: PHARMACY | Facility: CLINIC | Age: 83
End: 2023-04-25
Payer: MEDICARE

## 2023-04-25 NOTE — TELEPHONE ENCOUNTER
Specialty Pharmacy - Refill Coordination    Specialty Medication Orders Linked to Encounter      Flowsheet Row Most Recent Value   Medication #1 abatacept (ORENCIA CLICKJECT) 125 mg/mL AtIn (Order#[id number], Rx#[id number])            Refill Questions - Documented Responses      Flowsheet Row Most Recent Value   Patient Availability and HIPAA Verification    Does patient want to proceed with activity? Yes   HIPAA/medical authority confirmed? Yes   Relationship to patient of person spoken to? Self   Refill Screening Questions    Changes to allergies? No   Changes to medications? No   New conditions since last clinic visit? No   Unplanned office visit, urgent care, ED, or hospital admission in the last 4 weeks? No   How does patient/caregiver feel medication is working? Good   Financial problems or insurance changes? No   How many doses of your specialty medications were missed in the last 4 weeks? 0   Would patient like to speak to a pharmacist? No   When does the patient need to receive the medication? 05/01/23   Refill Delivery Questions    How will the patient receive the medication? MEDRx   When does the patient need to receive the medication? 05/01/23   Shipping Address Home   Address in [facility] confirmed and updated if neccessary? Yes   Expected Copay ($) 0   Is the patient able to afford the medication copay? Yes   Payment Method zero copay   Days supply of Refill 28   Supplies needed? No supplies needed   Refill activity completed? Yes   Refill activity plan Refill scheduled   Shipment/Pickup Date: 04/26/23            Current Outpatient Medications   Medication Sig    abatacept (ORENCIA CLICKJECT) 125 mg/mL AtIn Inject 1 mL into the skin every 7 days.    ACETAMINOPHEN ORAL Take by mouth.    albuterol (PROVENTIL) 2.5 mg /3 mL (0.083 %) nebulizer solution Take 2.5 mg by nebulization every 6 (six) hours as needed for Wheezing. Rescue    amLODIPine (NORVASC) 10 MG tablet Take 1 tablet (10 mg total) by  mouth once daily.    atorvastatin (LIPITOR) 10 MG tablet atorvastatin 10 mg tablet    azelastine 205.5 mcg (0.15 %) Spry 2 sprays by Each Nostril route 2 (two) times daily.    azithromycin (Z-RAMSEY) 250 MG tablet Take 250 mg by mouth.    betamethasone dipropionate 0.05 % cream SMARTSIG:Sparingly Topical Twice Daily    bismuth subsalicylate (PEPTO-BISMOL ORAL) Take by mouth.    diphenhydrAMINE (BENADRYL) 25 mg capsule Take 25 mg by mouth every 6 (six) hours as needed for Itching.    estradioL (ESTRACE) 1 MG tablet Take 1 mg by mouth once daily.    fluticasone (FLONASE) 50 mcg/actuation nasal spray fluticasone 50 mcg/actuation nasal spray,suspension    hydroCHLOROthiazide (HYDRODIURIL) 25 MG tablet Take 1 tablet (25 mg total) by mouth once daily.    ipratropium/albuterol sulfate (IPRATROPIUM-ALBUTEROL INHL) Inhale into the lungs as needed.    levocetirizine (XYZAL) 5 MG tablet Take 5 mg by mouth once daily.    losartan (COZAAR) 100 MG tablet Take 1 tablet (100 mg total) by mouth once daily.    montelukast (SINGULAIR) 10 mg tablet Take 10 mg by mouth every evening.    predniSONE (DELTASONE) 2.5 MG tablet TAKE 1 TABLET(2.5 MG) BY MOUTH EVERY DAY    REPATHA SURECLICK 140 mg/mL PnIj Inject 140 mg into the skin every 14 (fourteen) days.    terbinafine HCL (LAMISIL) 250 mg tablet terbinafine HCl 250 mg tablet    traMADoL (ULTRAM) 50 mg tablet 1 to 2 tablets a day only for severe pain   Last reviewed on 2/28/2023  2:28 PM by Joycelyn Nayak RN    Review of patient's allergies indicates:   Allergen Reactions    Shellfish containing products Anaphylaxis    Cabbage (brassica oleracea)     Chocolate flavor     Duckweed     Kale Swelling    Mustard     Nuts [tree nut]     Oranges [orange]     Statins-hmg-coa reductase inhibitors     Sulfa (sulfonamide antibiotics)     Sulfacetamide sodium Swelling    Tomato (solanum lycopersicum)     Weed pollen      Patient reported allergy to weed pollen, grass, trees, duckweed, cockroaches    Last  reviewed on  2/28/2023 2:27 PM by Joycelyn Nayak      Tasks added this encounter   No tasks added.   Tasks due within next 3 months   4/24/2023 - Refill Coordination Outreach (1 time occurrence)     [facility]  [address]  [facility] [address]  Phone: [phone]  Fax: [phone]

## 2023-05-07 RX ORDER — TRAMADOL HYDROCHLORIDE 50 MG/1
TABLET ORAL
Qty: 60 TABLET | Refills: 1 | Status: SHIPPED | OUTPATIENT
Start: 2023-05-07 | End: 2024-03-05

## 2023-05-17 ENCOUNTER — SPECIALTY PHARMACY (OUTPATIENT)
Dept: PHARMACY | Facility: CLINIC | Age: 83
End: 2023-05-17
Payer: MEDICARE

## 2023-05-17 RX ORDER — APIXABAN 5 MG/1
TABLET, FILM COATED ORAL
COMMUNITY
Start: 2023-05-02 | End: 2024-03-05

## 2023-05-17 NOTE — TELEPHONE ENCOUNTER
Specialty Pharmacy - Refill Coordination    Specialty Medication Orders Linked to Encounter      Flowsheet Row Most Recent Value   Medication #1 abatacept (ORENCIA CLICKJECT) 125 mg/mL AtIn (Order#[id number], Rx#[id number])     Orencia refill with new med/condition/recent hospital visit.  Pt diagnosed with blood clot in legs.  Prescribed Eliquis.  Updated med list in Epic.  Confirmed no significant DDI.  Appropriate to proceed wth Orencia refill. No contraindications or problems with continuation of therapy.       Refill Questions - Documented Responses      Flowsheet Row Most Recent Value   Patient Availability and HIPAA Verification    Does patient want to proceed with activity? Yes   HIPAA/medical authority confirmed? Yes   Relationship to patient of person spoken to? Self   Refill Screening Questions    Changes to allergies? No   Changes to medications? Yes  [eliquis]   New conditions since last clinic visit? Yes  [blood clot]   Unplanned office visit, urgent care, ED, or hospital admission in the last 4 weeks? Yes  [hospital: for blood clots]   How does patient/caregiver feel medication is working? Very good   Financial problems or insurance changes? No   How many doses of your specialty medications were missed in the last 4 weeks? 0   Would patient like to speak to a pharmacist? Yes   When does the patient need to receive the medication? 05/29/23   Refill Delivery Questions    How will the patient receive the medication? MEDRx   When does the patient need to receive the medication? 05/29/23   Shipping Address Home   Address in [facility] confirmed and updated if neccessary? Yes   Expected Copay ($) 0   Is the patient able to afford the medication copay? Yes   Payment Method zero copay   Days supply of Refill 28   Supplies needed? No supplies needed   Refill activity completed? Yes   Refill activity plan Refill scheduled   Shipment/Pickup Date: 05/23/23            Current Outpatient Medications    Medication Sig    abatacept (ORENCIA CLICKJECT) 125 mg/mL AtIn Inject 1 mL into the skin every 7 days.    ACETAMINOPHEN ORAL Take by mouth.    albuterol (PROVENTIL) 2.5 mg /3 mL (0.083 %) nebulizer solution Take 2.5 mg by nebulization every 6 (six) hours as needed for Wheezing. Rescue    amLODIPine (NORVASC) 10 MG tablet Take 1 tablet (10 mg total) by mouth once daily.    atorvastatin (LIPITOR) 10 MG tablet atorvastatin 10 mg tablet    azelastine 205.5 mcg (0.15 %) Spry 2 sprays by Each Nostril route 2 (two) times daily.    betamethasone dipropionate 0.05 % cream SMARTSIG:Sparingly Topical Twice Daily    bismuth subsalicylate (PEPTO-BISMOL ORAL) Take by mouth.    diphenhydrAMINE (BENADRYL) 25 mg capsule Take 25 mg by mouth every 6 (six) hours as needed for Itching.    ELIQUIS 5 mg Tab Take by mouth.    estradioL (ESTRACE) 1 MG tablet Take 1 mg by mouth once daily.    fluticasone (FLONASE) 50 mcg/actuation nasal spray fluticasone 50 mcg/actuation nasal spray,suspension    hydroCHLOROthiazide (HYDRODIURIL) 25 MG tablet Take 1 tablet (25 mg total) by mouth once daily.    ipratropium/albuterol sulfate (IPRATROPIUM-ALBUTEROL INHL) Inhale into the lungs as needed.    levocetirizine (XYZAL) 5 MG tablet Take 5 mg by mouth once daily.    losartan (COZAAR) 100 MG tablet Take 1 tablet (100 mg total) by mouth once daily.    montelukast (SINGULAIR) 10 mg tablet Take 10 mg by mouth every evening.    predniSONE (DELTASONE) 2.5 MG tablet TAKE 1 TABLET(2.5 MG) BY MOUTH EVERY DAY    REPATHA SURECLICK 140 mg/mL PnIj Inject 140 mg into the skin every 14 (fourteen) days.    terbinafine HCL (LAMISIL) 250 mg tablet terbinafine HCl 250 mg tablet    traMADoL (ULTRAM) 50 mg tablet TAKE 1 TO 2 TABLETS BY MOUTH EVERY DAY FOR SEVERE PAIN   Last reviewed on 2/28/2023  2:28 PM by Joycelyn Nayak RN    Review of patient's allergies indicates:   Allergen Reactions    Shellfish containing products Anaphylaxis    Cabbage (brassica oleracea)      Chocolate flavor     Duckweed     Kale Swelling    Mustard     Nuts [tree nut]     Oranges [orange]     Statins-hmg-coa reductase inhibitors     Sulfa (sulfonamide antibiotics)     Sulfacetamide sodium Swelling    Tomato (solanum lycopersicum)     Weed pollen      Patient reported allergy to weed pollen, grass, trees, duckweed, cockroaches    Last reviewed on  2/28/2023 2:27 PM by Joyceyln Nayak      Tasks added this encounter   No tasks added.   Tasks due within next 3 months   No tasks due.     Amrita Waldrop, PharmD  [facility]  [address] [place] [address]  Phone: [phone]  Fax: [phone]

## 2023-06-13 ENCOUNTER — SPECIALTY PHARMACY (OUTPATIENT)
Dept: PHARMACY | Facility: CLINIC | Age: 83
End: 2023-06-13
Payer: MEDICARE

## 2023-06-13 NOTE — TELEPHONE ENCOUNTER
Specialty Pharmacy - Refill Coordination    Specialty Medication Orders Linked to Encounter      Flowsheet Row Most Recent Value   Medication #1 abatacept (ORENCIA CLICKJECT) 125 mg/mL AtIn (Order#[id number], Rx#[id number])            Refill Questions - Documented Responses      Flowsheet Row Most Recent Value   Patient Availability and HIPAA Verification    Does patient want to proceed with activity? Yes   HIPAA/medical authority confirmed? Yes   Relationship to patient of person spoken to? Self   Refill Screening Questions    Changes to allergies? No   Changes to medications? No   New conditions since last clinic visit? No   Unplanned office visit, urgent care, ED, or hospital admission in the last 4 weeks? No   How does patient/caregiver feel medication is working? Good   Financial problems or insurance changes? No   How many doses of your specialty medications were missed in the last 4 weeks? 0   Would patient like to speak to a pharmacist? No   When does the patient need to receive the medication? 06/19/23   Refill Delivery Questions    How will the patient receive the medication? MEDRx   When does the patient need to receive the medication? 06/19/23   Shipping Address Home   Address in [facility] confirmed and updated if neccessary? Yes   Expected Copay ($) 0   Is the patient able to afford the medication copay? Yes   Payment Method zero copay   Days supply of Refill 28   Supplies needed? No supplies needed   Refill activity completed? Yes   Refill activity plan Refill scheduled   Shipment/Pickup Date: 06/15/23            Current Outpatient Medications   Medication Sig    abatacept (ORENCIA CLICKJECT) 125 mg/mL AtIn Inject 1 mL into the skin every 7 days.    ACETAMINOPHEN ORAL Take by mouth.    albuterol (PROVENTIL) 2.5 mg /3 mL (0.083 %) nebulizer solution Take 2.5 mg by nebulization every 6 (six) hours as needed for Wheezing. Rescue    amLODIPine (NORVASC) 10 MG tablet Take 1 tablet (10 mg total) by  mouth once daily.    atorvastatin (LIPITOR) 10 MG tablet atorvastatin 10 mg tablet    azelastine 205.5 mcg (0.15 %) Spry 2 sprays by Each Nostril route 2 (two) times daily.    betamethasone dipropionate 0.05 % cream SMARTSIG:Sparingly Topical Twice Daily    bismuth subsalicylate (PEPTO-BISMOL ORAL) Take by mouth.    diphenhydrAMINE (BENADRYL) 25 mg capsule Take 25 mg by mouth every 6 (six) hours as needed for Itching.    ELIQUIS 5 mg Tab Take by mouth.    estradioL (ESTRACE) 1 MG tablet Take 1 mg by mouth once daily.    fluticasone (FLONASE) 50 mcg/actuation nasal spray fluticasone 50 mcg/actuation nasal spray,suspension    hydroCHLOROthiazide (HYDRODIURIL) 25 MG tablet Take 1 tablet (25 mg total) by mouth once daily.    ipratropium/albuterol sulfate (IPRATROPIUM-ALBUTEROL INHL) Inhale into the lungs as needed.    levocetirizine (XYZAL) 5 MG tablet Take 5 mg by mouth once daily.    losartan (COZAAR) 100 MG tablet Take 1 tablet (100 mg total) by mouth once daily.    montelukast (SINGULAIR) 10 mg tablet Take 10 mg by mouth every evening.    predniSONE (DELTASONE) 2.5 MG tablet TAKE 1 TABLET(2.5 MG) BY MOUTH EVERY DAY    REPATHA SURECLICK 140 mg/mL PnIj Inject 140 mg into the skin every 14 (fourteen) days.    terbinafine HCL (LAMISIL) 250 mg tablet terbinafine HCl 250 mg tablet    traMADoL (ULTRAM) 50 mg tablet TAKE 1 TO 2 TABLETS BY MOUTH EVERY DAY FOR SEVERE PAIN   Last reviewed on 2/28/2023  2:28 PM by Joycelyn Nayak RN    Review of patient's allergies indicates:   Allergen Reactions    Shellfish containing products Anaphylaxis    Cabbage (brassica oleracea)     Chocolate flavor     Duckweed     Kale Swelling    Mustard     Nuts [tree nut]     Oranges [orange]     Statins-hmg-coa reductase inhibitors     Sulfa (sulfonamide antibiotics)     Sulfacetamide sodium Swelling    Tomato (solanum lycopersicum)     Weed pollen      Patient reported allergy to weed pollen, grass, trees, duckweed, cockroaches    Last reviewed on   2/28/2023 2:27 PM by Joycelyn Nayak      Tasks added this encounter   No tasks added.   Tasks due within next 3 months   6/13/2023 - Refill Coordination Outreach (1 time occurrence)     [facility]  [address]  [facility] [address]  Phone: [phone]  Fax: [phone]

## 2023-06-20 RX ORDER — ALLOPURINOL 100 MG/1
TABLET ORAL
Qty: 180 TABLET | Refills: 1 | Status: SHIPPED | OUTPATIENT
Start: 2023-06-20 | End: 2023-12-17

## 2023-06-30 ENCOUNTER — TELEPHONE (OUTPATIENT)
Dept: RHEUMATOLOGY | Facility: CLINIC | Age: 83
End: 2023-06-30
Payer: MEDICARE

## 2023-07-06 ENCOUNTER — SPECIALTY PHARMACY (OUTPATIENT)
Dept: PHARMACY | Facility: CLINIC | Age: 83
End: 2023-07-06
Payer: MEDICARE

## 2023-07-06 NOTE — TELEPHONE ENCOUNTER
Specialty Pharmacy - Refill Coordination    Specialty Medication Orders Linked to Encounter      Flowsheet Row Most Recent Value   Medication #1 abatacept (ORENCIA CLICKJECT) 125 mg/mL AtIn (Order#[id number], Rx#[id number])            Refill Questions - Documented Responses      Flowsheet Row Most Recent Value   Patient Availability and HIPAA Verification    Does patient want to proceed with activity? Yes   HIPAA/medical authority confirmed? Yes   Relationship to patient of person spoken to? Self   Refill Screening Questions    Changes to allergies? No   Changes to medications? No   New conditions since last clinic visit? No   Unplanned office visit, urgent care, ED, or hospital admission in the last 4 weeks? No   How does patient/caregiver feel medication is working? Good   Financial problems or insurance changes? No   How many doses of your specialty medications were missed in the last 4 weeks? 0   Would patient like to speak to a pharmacist? No   When does the patient need to receive the medication? 07/16/23   Refill Delivery Questions    How will the patient receive the medication? MEDRx   When does the patient need to receive the medication? 07/16/23   Shipping Address Home   Address in [facility] confirmed and updated if neccessary? Yes   Expected Copay ($) 0   Is the patient able to afford the medication copay? Yes   Payment Method zero copay   Days supply of Refill 28   Supplies needed? No supplies needed   Refill activity completed? Yes   Refill activity plan Refill scheduled   Shipment/Pickup Date: 07/13/23            Current Outpatient Medications   Medication Sig    abatacept (ORENCIA CLICKJECT) 125 mg/mL AtIn Inject 1 mL into the skin every 7 days.    ACETAMINOPHEN ORAL Take by mouth.    albuterol (PROVENTIL) 2.5 mg /3 mL (0.083 %) nebulizer solution Take 2.5 mg by nebulization every 6 (six) hours as needed for Wheezing. Rescue    allopurinoL (ZYLOPRIM) 100 MG tablet TAKE 2 TABLETS(200 MG) BY  MOUTH EVERY DAY    amLODIPine (NORVASC) 10 MG tablet Take 1 tablet (10 mg total) by mouth once daily.    atorvastatin (LIPITOR) 10 MG tablet atorvastatin 10 mg tablet    azelastine 205.5 mcg (0.15 %) Spry 2 sprays by Each Nostril route 2 (two) times daily.    betamethasone dipropionate 0.05 % cream SMARTSIG:Sparingly Topical Twice Daily    bismuth subsalicylate (PEPTO-BISMOL ORAL) Take by mouth.    diphenhydrAMINE (BENADRYL) 25 mg capsule Take 25 mg by mouth every 6 (six) hours as needed for Itching.    ELIQUIS 5 mg Tab Take by mouth.    estradioL (ESTRACE) 1 MG tablet Take 1 mg by mouth once daily.    fluticasone (FLONASE) 50 mcg/actuation nasal spray fluticasone 50 mcg/actuation nasal spray,suspension    hydroCHLOROthiazide (HYDRODIURIL) 25 MG tablet Take 1 tablet (25 mg total) by mouth once daily.    ipratropium/albuterol sulfate (IPRATROPIUM-ALBUTEROL INHL) Inhale into the lungs as needed.    levocetirizine (XYZAL) 5 MG tablet Take 5 mg by mouth once daily.    losartan (COZAAR) 100 MG tablet Take 1 tablet (100 mg total) by mouth once daily.    montelukast (SINGULAIR) 10 mg tablet Take 10 mg by mouth every evening.    predniSONE (DELTASONE) 2.5 MG tablet TAKE 1 TABLET(2.5 MG) BY MOUTH EVERY DAY    REPATHA SURECLICK 140 mg/mL PnIj Inject 140 mg into the skin every 14 (fourteen) days.    terbinafine HCL (LAMISIL) 250 mg tablet terbinafine HCl 250 mg tablet    traMADoL (ULTRAM) 50 mg tablet TAKE 1 TO 2 TABLETS BY MOUTH EVERY DAY FOR SEVERE PAIN   Last reviewed on 2/28/2023  2:28 PM by Joycelyn Nayak RN    Review of patient's allergies indicates:   Allergen Reactions    Shellfish containing products Anaphylaxis    Cabbage (brassica oleracea)     Chocolate flavor     Duckweed     Kale Swelling    Mustard     Nuts [tree nut]     Oranges [orange]     Statins-hmg-coa reductase inhibitors     Sulfa (sulfonamide antibiotics)     Sulfacetamide sodium Swelling    Tomato (solanum lycopersicum)     Weed pollen      Patient  reported allergy to weed pollen, grass, trees, duckweed, cockroaches    Last reviewed on  2/28/2023 2:27 PM by Joycelyn Nayak      Tasks added this encounter   No tasks added.   Tasks due within next 3 months   No tasks due.     [facility]  [address] [facility] [address]  Phone: [phone]  Fax: [phone]

## 2023-07-18 ENCOUNTER — OFFICE VISIT (OUTPATIENT)
Dept: CARDIOLOGY | Facility: CLINIC | Age: 83
End: 2023-07-18
Payer: MEDICARE

## 2023-07-18 VITALS
BODY MASS INDEX: 24.68 KG/M2 | DIASTOLIC BLOOD PRESSURE: 72 MMHG | SYSTOLIC BLOOD PRESSURE: 142 MMHG | HEART RATE: 87 BPM | OXYGEN SATURATION: 99 % | WEIGHT: 134.94 LBS

## 2023-07-18 DIAGNOSIS — I10 BENIGN ESSENTIAL HYPERTENSION: Primary | ICD-10-CM

## 2023-07-18 DIAGNOSIS — E78.2 MIXED HYPERLIPIDEMIA: ICD-10-CM

## 2023-07-18 DIAGNOSIS — N18.32 STAGE 3B CHRONIC KIDNEY DISEASE: ICD-10-CM

## 2023-07-18 PROCEDURE — 3077F PR MOST RECENT SYSTOLIC BLOOD PRESSURE >= 140 MM HG: ICD-10-PCS | Mod: CPTII,S$GLB,, | Performed by: INTERNAL MEDICINE

## 2023-07-18 PROCEDURE — 1126F PR PAIN SEVERITY QUANTIFIED, NO PAIN PRESENT: ICD-10-PCS | Mod: CPTII,S$GLB,, | Performed by: INTERNAL MEDICINE

## 2023-07-18 PROCEDURE — 3288F PR FALLS RISK ASSESSMENT DOCUMENTED: ICD-10-PCS | Mod: CPTII,S$GLB,, | Performed by: INTERNAL MEDICINE

## 2023-07-18 PROCEDURE — 1159F MED LIST DOCD IN RCRD: CPT | Mod: CPTII,S$GLB,, | Performed by: INTERNAL MEDICINE

## 2023-07-18 PROCEDURE — 99214 OFFICE O/P EST MOD 30 MIN: CPT | Mod: S$GLB,,, | Performed by: INTERNAL MEDICINE

## 2023-07-18 PROCEDURE — 99999 PR PBB SHADOW E&M-EST. PATIENT-LVL IV: ICD-10-PCS | Mod: PBBFAC,,, | Performed by: INTERNAL MEDICINE

## 2023-07-18 PROCEDURE — 3288F FALL RISK ASSESSMENT DOCD: CPT | Mod: CPTII,S$GLB,, | Performed by: INTERNAL MEDICINE

## 2023-07-18 PROCEDURE — 3078F DIAST BP <80 MM HG: CPT | Mod: CPTII,S$GLB,, | Performed by: INTERNAL MEDICINE

## 2023-07-18 PROCEDURE — 93000 ELECTROCARDIOGRAM COMPLETE: CPT | Mod: S$GLB,,, | Performed by: INTERNAL MEDICINE

## 2023-07-18 PROCEDURE — 99214 PR OFFICE/OUTPT VISIT, EST, LEVL IV, 30-39 MIN: ICD-10-PCS | Mod: S$GLB,,, | Performed by: INTERNAL MEDICINE

## 2023-07-18 PROCEDURE — 1101F PT FALLS ASSESS-DOCD LE1/YR: CPT | Mod: CPTII,S$GLB,, | Performed by: INTERNAL MEDICINE

## 2023-07-18 PROCEDURE — 1126F AMNT PAIN NOTED NONE PRSNT: CPT | Mod: CPTII,S$GLB,, | Performed by: INTERNAL MEDICINE

## 2023-07-18 PROCEDURE — 3078F PR MOST RECENT DIASTOLIC BLOOD PRESSURE < 80 MM HG: ICD-10-PCS | Mod: CPTII,S$GLB,, | Performed by: INTERNAL MEDICINE

## 2023-07-18 PROCEDURE — 93000 EKG 12-LEAD: ICD-10-PCS | Mod: S$GLB,,, | Performed by: INTERNAL MEDICINE

## 2023-07-18 PROCEDURE — 3077F SYST BP >= 140 MM HG: CPT | Mod: CPTII,S$GLB,, | Performed by: INTERNAL MEDICINE

## 2023-07-18 PROCEDURE — 1101F PR PT FALLS ASSESS DOC 0-1 FALLS W/OUT INJ PAST YR: ICD-10-PCS | Mod: CPTII,S$GLB,, | Performed by: INTERNAL MEDICINE

## 2023-07-18 PROCEDURE — 99999 PR PBB SHADOW E&M-EST. PATIENT-LVL IV: CPT | Mod: PBBFAC,,, | Performed by: INTERNAL MEDICINE

## 2023-07-18 PROCEDURE — 1159F PR MEDICATION LIST DOCUMENTED IN MEDICAL RECORD: ICD-10-PCS | Mod: CPTII,S$GLB,, | Performed by: INTERNAL MEDICINE

## 2023-07-18 NOTE — PROGRESS NOTES
Cardiology    7/18/2023  3:16 PM    Problem list  Patient Active Problem List   Diagnosis    Essential hypertension    Chronic allergic rhinitis    Chronic kidney disease, stage II (mild)    GERD (gastroesophageal reflux disease)    PMR (polymyalgia rheumatica)    Rheumatoid arthritis    Fatigue    Obesity    Current use of steroid medication    Chest congestion    Fibromyalgia    Elevated C-reactive protein (CRP)    ESR raised    Nuclear sclerosis    H/O mammogram    Moderate persistent asthma without complication    Bulla of lung    Calcified lymph nodes    Right hand pain    Swelling of right hand    EMILIO (obstructive sleep apnea)    Left foot pain    Dysphagia    Abnormal renal function    Allergic rhinitis due to pollen    Benign paroxysmal positional vertigo    Bilateral cataracts    Body mass index (BMI) of 25.0 to 29.9    Changing skin lesion    COPD bronchitis    Joint pain    Mixed hyperlipidemia    Need for prophylactic vaccination and inoculation against other viral diseases    Red eye    Reflux esophagitis    Type 2 diabetes mellitus with other specified complication, without long-term current use of insulin    Shoulder pain    Vertigo    Allergic rhinitis    Asthma exacerbation    Extrinsic asthma    Benign essential hypertension    Polymyalgia rheumatica    Chronic night sweats    Multiple thyroid nodules    Cervical radiculopathy    Immunosuppression    Cervical myelopathy    Stage 3b chronic kidney disease       CC:  F/u    HPI:  She was admitted to [facility] in May and found to have DVT and started OAC.  Echo in May showed normal EF.  Tolerating meds.  SPB has been 130-140 at home.    Medications  Current Outpatient Medications   Medication Sig Dispense Refill    abatacept (ORENCIA CLICKJECT) 125 mg/mL AtIn Inject 1 mL into the skin every 7 days. 4 mL 11    ACETAMINOPHEN ORAL Take by mouth.      albuterol (PROVENTIL) 2.5 mg /3 mL (0.083 %) nebulizer solution Take 2.5 mg by nebulization every 6  (six) hours as needed for Wheezing. Rescue      allopurinoL (ZYLOPRIM) 100 MG tablet TAKE 2 TABLETS(200 MG) BY MOUTH EVERY  tablet 1    atorvastatin (LIPITOR) 10 MG tablet atorvastatin 10 mg tablet      azelastine 205.5 mcg (0.15 %) Spry 2 sprays by Each Nostril route 2 (two) times daily.      betamethasone dipropionate 0.05 % cream SMARTSIG:Sparingly Topical Twice Daily      bismuth subsalicylate (PEPTO-BISMOL ORAL) Take by mouth.      diphenhydrAMINE (BENADRYL) 25 mg capsule Take 25 mg by mouth every 6 (six) hours as needed for Itching.      ELIQUIS 5 mg Tab Take by mouth.      estradioL (ESTRACE) 1 MG tablet Take 1 mg by mouth once daily.      fluticasone (FLONASE) 50 mcg/actuation nasal spray fluticasone 50 mcg/actuation nasal spray,suspension      ipratropium/albuterol sulfate (IPRATROPIUM-ALBUTEROL INHL) Inhale into the lungs as needed.      levocetirizine (XYZAL) 5 MG tablet Take 5 mg by mouth once daily.      montelukast (SINGULAIR) 10 mg tablet Take 10 mg by mouth every evening.      REPATHA SURECLICK 140 mg/mL PnIj Inject 140 mg into the skin every 14 (fourteen) days.      terbinafine HCL (LAMISIL) 250 mg tablet terbinafine HCl 250 mg tablet      traMADoL (ULTRAM) 50 mg tablet TAKE 1 TO 2 TABLETS BY MOUTH EVERY DAY FOR SEVERE PAIN 60 tablet 1    amLODIPine (NORVASC) 10 MG tablet Take 1 tablet (10 mg total) by mouth once daily. 90 tablet 3    losartan (COZAAR) 100 MG tablet Take 1 tablet (100 mg total) by mouth once daily. 90 tablet 3     Current Facility-Administered Medications   Medication Dose Route Frequency Provider Last Rate Last Admin    sodium chloride 0.9% flush 10 mL  10 mL Intravenous PRN Yeimy Tellez DPM          Prior to Admission medications    Medication Sig Start Date End Date Taking? Authorizing Provider   abatacept (ORENCIA CLICKJECT) 125 mg/mL AtIn Inject 1 mL into the skin every 7 days. 10/20/22  Yes Gagan Alvarez MD   ACETAMINOPHEN ORAL Take by mouth.   Yes  Historical Provider   albuterol (PROVENTIL) 2.5 mg /3 mL (0.083 %) nebulizer solution Take 2.5 mg by nebulization every 6 (six) hours as needed for Wheezing. Rescue   Yes Historical Provider   allopurinoL (ZYLOPRIM) 100 MG tablet TAKE 2 TABLETS(200 MG) BY MOUTH EVERY DAY 6/20/23  Yes Gagan Alvarez MD   atorvastatin (LIPITOR) 10 MG tablet atorvastatin 10 mg tablet   Yes Historical Provider   azelastine 205.5 mcg (0.15 %) Spry 2 sprays by Each Nostril route 2 (two) times daily. 4/10/22  Yes Historical Provider   betamethasone dipropionate 0.05 % cream SMARTSIG:Sparingly Topical Twice Daily 6/13/22  Yes Historical Provider   bismuth subsalicylate (PEPTO-BISMOL ORAL) Take by mouth.   Yes Historical Provider   diphenhydrAMINE (BENADRYL) 25 mg capsule Take 25 mg by mouth every 6 (six) hours as needed for Itching.   Yes Historical Provider   ELIQUIS 5 mg Tab Take by mouth. 5/2/23  Yes Historical Provider   estradioL (ESTRACE) 1 MG tablet Take 1 mg by mouth once daily. 8/7/21  Yes Historical Provider   fluticasone (FLONASE) 50 mcg/actuation nasal spray fluticasone 50 mcg/actuation nasal spray,suspension   Yes Historical Provider   ipratropium/albuterol sulfate (IPRATROPIUM-ALBUTEROL INHL) Inhale into the lungs as needed.   Yes Historical Provider   levocetirizine (XYZAL) 5 MG tablet Take 5 mg by mouth once daily. 6/24/22  Yes Historical Provider   montelukast (SINGULAIR) 10 mg tablet Take 10 mg by mouth every evening.   Yes Historical Provider   REPATHA SURECLICK 140 mg/mL PnIj Inject 140 mg into the skin every 14 (fourteen) days. 6/15/22  Yes Historical Provider   terbinafine HCL (LAMISIL) 250 mg tablet terbinafine HCl 250 mg tablet   Yes Historical Provider   traMADoL (ULTRAM) 50 mg tablet TAKE 1 TO 2 TABLETS BY MOUTH EVERY DAY FOR SEVERE PAIN 5/7/23  Yes Gagan Alvarez MD   amLODIPine (NORVASC) 10 MG tablet Take 1 tablet (10 mg total) by mouth once daily. 4/12/21 7/13/22  Moustapha English MD    losartan (COZAAR) 100 MG tablet Take 1 tablet (100 mg total) by mouth once daily. 22  Moustapha English MD   hydroCHLOROthiazide (HYDRODIURIL) 25 MG tablet Take 1 tablet (25 mg total) by mouth once daily. 22  Moustapha English MD   predniSONE (DELTASONE) 2.5 MG tablet TAKE 1 TABLET(2.5 MG) BY MOUTH EVERY DAY 23  Gagan Alvarez MD         History  Past Medical History:   Diagnosis Date    Allergy     Arthritis     Cataract     CKD (chronic kidney disease) stage 3, GFR 30-59 ml/min     Fibromyalgia     GERD (gastroesophageal reflux disease)     Hyperlipidemia     Hypertension     Polymyalgia rheumatica     RA (rheumatoid arthritis)      Past Surgical History:   Procedure Laterality Date    APPENDECTOMY      CATARACT EXTRACTION W/  INTRAOCULAR LENS IMPLANT Left     Dr. Cedeño     CATARACT EXTRACTION W/  INTRAOCULAR LENS IMPLANT Right 2017    Dr. Sena     SECTION      x2    CHOLECYSTECTOMY      COSMETIC SURGERY      Rhinoplasty    EPIDURAL STEROID INJECTION INTO CERVICAL SPINE Bilateral 12/10/2020    Procedure: CERVICAL  DORIS DIRECT REFERRAL;  Surgeon: Inga Blackburn MD;  Location: [facility];  Service: Pain Management;  Laterality: Bilateral;  NEEDS CONSENT    ESOPHAGEAL MANOMETRY WITH MEASUREMENT OF IMPEDANCE N/A 2019    Procedure: MANOMETRY, ESOPHAGUS, WITH IMPEDANCE MEASUREMENT;  Surgeon: Roger De Luna MD;  Location: [facility] ([address] [place] [address]);  Service: Endoscopy;  Laterality: N/A;  Prep instructions mailed to Pt - ERW    ESOPHAGOGASTRODUODENOSCOPY N/A 2019    Procedure: EGD (ESOPHAGOGASTRODUODENOSCOPY);  Surgeon: Carlos Rodgers MD;  Location: [facility] ([address] [place] [address]);  Service: Endoscopy;  Laterality: N/A;    EYE SURGERY      left eye Lens Placed    HYSTERECTOMY      RHINOPLASTY TIP      TONSILLECTOMY      TUBAL LIGATION       Social History     Socioeconomic History    Marital status: Single   Tobacco Use    Smoking status: Former      Packs/day: 1.00     Years: 20.00     Pack years: 20.00     Types: Cigarettes     Start date: 1970     Quit date: 1999     Years since quittin.9    Smokeless tobacco: Never    Tobacco comments:     Retired ; ; 4 children healthy   Substance and Sexual Activity    Alcohol use: Yes     Alcohol/week: 1.0 standard drink     Types: 1 Glasses of wine per week     Comment: social    Drug use: No    Sexual activity: Yes     Partners: Male         Allergies  Review of patient's allergies indicates:   Allergen Reactions    Shellfish containing products Anaphylaxis    Cabbage (brassica oleracea)     Chocolate flavor     Duckweed     Kale Swelling    Mustard     Nuts [tree nut]     Oranges [orange]     Statins-hmg-coa reductase inhibitors     Sulfa (sulfonamide antibiotics)      swelling    Sulfacetamide sodium Swelling    Tomato (solanum lycopersicum)     Weed pollen      Patient reported allergy to weed pollen, grass, trees, duckweed, cockroaches         Review of Systems   Review of Systems   Constitutional: Negative for decreased appetite, fever and weight loss.   HENT:  Negative for congestion and nosebleeds.    Eyes:  Negative for double vision, vision loss in left eye, vision loss in right eye and visual disturbance.   Cardiovascular:  Negative for chest pain, claudication, cyanosis, dyspnea on exertion, irregular heartbeat, leg swelling, near-syncope, orthopnea, palpitations, paroxysmal nocturnal dyspnea and syncope.   Respiratory:  Negative for cough, hemoptysis, shortness of breath, sleep disturbances due to breathing, snoring, sputum production and wheezing.    Endocrine: Negative for cold intolerance and heat intolerance.   Skin:  Negative for nail changes and rash.   Musculoskeletal:  Negative for joint pain, muscle cramps, muscle weakness and myalgias.   Gastrointestinal:  Negative for change in bowel habit, heartburn, hematemesis, hematochezia, hemorrhoids and melena.    Neurological:  Negative for dizziness, focal weakness and headaches.       Physical Exam  Wt Readings from Last 1 Encounters:   07/18/23 61.2 kg (134 lb 14.7 oz)     BP Readings from Last 3 Encounters:   07/18/23 (!) 142/72   02/28/23 126/66   01/18/23 118/71     Pulse Readings from Last 1 Encounters:   07/18/23 87     Body mass index is 24.68 kg/m².    Physical Exam  Constitutional:       Appearance: She is well-developed.   HENT:      Head: Atraumatic.   Eyes:      General: No scleral icterus.  Neck:      Vascular: Normal carotid pulses. No carotid bruit, hepatojugular reflux or JVD.   Cardiovascular:      Rate and Rhythm: Normal rate and regular rhythm.      Chest Wall: PMI is not displaced.      Pulses: Intact distal pulses.           Carotid pulses are 2+ on the right side and 2+ on the left side.       Radial pulses are 2+ on the right side and 2+ on the left side.        Dorsalis pedis pulses are 2+ on the right side and 2+ on the left side.      Heart sounds: Normal heart sounds, S1 normal and S2 normal. No murmur heard.    No friction rub.   Pulmonary:      Effort: Pulmonary effort is normal. No respiratory distress.      Breath sounds: Normal breath sounds. No stridor. No wheezing or rales.   Chest:      Chest wall: No tenderness.   Abdominal:      General: Bowel sounds are normal.      Palpations: Abdomen is soft.   Musculoskeletal:      Cervical back: Neck supple. No edema.   Skin:     General: Skin is warm and dry.      Nails: There is no clubbing.   Neurological:      Mental Status: She is alert and oriented to person, place, and time.   Psychiatric:         Behavior: Behavior normal.         Thought Content: Thought content normal.           Assessment  1. Benign essential hypertension  Stable on meds, continue meds and monitor    2. Mixed hyperlipidemia  stable    3. Stage 3b chronic kidney disease  unchanged        Plan and Discussion  Continue current meds and monitor BP.  Discussed her EKG which  showed SR rate 86.    Follow Up  6 months      Porter Thomas MD, F.A.C.C, F.S.C.A.I.        30 minutes were spent in chart review, documentation and review of results, and evaluation, treatment, and counseling of patient on the same day of service.    Disclaimer: This document was created using voice recognition software ([employer]). Although it may be edited, this document may contain errors related to incorrect recognition of the spoken word. Please call the physician if clarification is needed.

## 2023-08-07 ENCOUNTER — SPECIALTY PHARMACY (OUTPATIENT)
Dept: PHARMACY | Facility: CLINIC | Age: 83
End: 2023-08-07
Payer: MEDICARE

## 2023-08-07 NOTE — TELEPHONE ENCOUNTER
Specialty Pharmacy - Refill Coordination    Specialty Medication Orders Linked to Encounter      Flowsheet Row Most Recent Value   Medication #1 abatacept (ORENCIA CLICKJECT) 125 mg/mL AtIn (Order#[id number], Rx#[id number])            Refill Questions - Documented Responses      Flowsheet Row Most Recent Value   Patient Availability and HIPAA Verification    Does patient want to proceed with activity? Yes   HIPAA/medical authority confirmed? Yes   Relationship to patient of person spoken to? Self   Refill Screening Questions    Changes to allergies? No   Changes to medications? No   New conditions since last clinic visit? No   Unplanned office visit, urgent care, ED, or hospital admission in the last 4 weeks? No   How does patient/caregiver feel medication is working? Good   Financial problems or insurance changes? No   How many doses of your specialty medications were missed in the last 4 weeks? 0   Would patient like to speak to a pharmacist? No   When does the patient need to receive the medication? 08/14/23   Refill Delivery Questions    How will the patient receive the medication? [facility]   When does the patient need to receive the medication? 08/14/23   Shipping Address Home   Address in [facility] confirmed and updated if neccessary? Yes   Expected Copay ($) 0   Is the patient able to afford the medication copay? Yes   Payment Method zero copay   Days supply of Refill 28   Supplies needed? No supplies needed   Refill activity completed? Yes   Refill activity plan Refill scheduled   Shipment/Pickup Date: 08/08/23            Current Outpatient Medications   Medication Sig    abatacept (ORENCIA CLICKJECT) 125 mg/mL AtIn Inject 1 mL into the skin every 7 days.    ACETAMINOPHEN ORAL Take by mouth.    albuterol (PROVENTIL) 2.5 mg /3 mL (0.083 %) nebulizer solution Take 2.5 mg by nebulization every 6 (six) hours as needed for Wheezing. Rescue    allopurinoL (ZYLOPRIM) 100 MG tablet TAKE 2 TABLETS(200 MG) BY  MOUTH EVERY DAY    amLODIPine (NORVASC) 10 MG tablet Take 1 tablet (10 mg total) by mouth once daily.    atorvastatin (LIPITOR) 10 MG tablet atorvastatin 10 mg tablet    azelastine 205.5 mcg (0.15 %) Spry 2 sprays by Each Nostril route 2 (two) times daily.    betamethasone dipropionate 0.05 % cream SMARTSIG:Sparingly Topical Twice Daily    bismuth subsalicylate (PEPTO-BISMOL ORAL) Take by mouth.    diphenhydrAMINE (BENADRYL) 25 mg capsule Take 25 mg by mouth every 6 (six) hours as needed for Itching.    ELIQUIS 5 mg Tab Take by mouth.    estradioL (ESTRACE) 1 MG tablet Take 1 mg by mouth once daily.    fluticasone (FLONASE) 50 mcg/actuation nasal spray fluticasone 50 mcg/actuation nasal spray,suspension    ipratropium/albuterol sulfate (IPRATROPIUM-ALBUTEROL INHL) Inhale into the lungs as needed.    levocetirizine (XYZAL) 5 MG tablet Take 5 mg by mouth once daily.    losartan (COZAAR) 100 MG tablet Take 1 tablet (100 mg total) by mouth once daily.    montelukast (SINGULAIR) 10 mg tablet Take 10 mg by mouth every evening.    REPATHA SURECLICK 140 mg/mL PnIj Inject 140 mg into the skin every 14 (fourteen) days.    terbinafine HCL (LAMISIL) 250 mg tablet terbinafine HCl 250 mg tablet    traMADoL (ULTRAM) 50 mg tablet TAKE 1 TO 2 TABLETS BY MOUTH EVERY DAY FOR SEVERE PAIN   Last reviewed on 7/18/2023  2:41 PM by Joycelyn Nayak, RN    Review of patient's allergies indicates:   Allergen Reactions    Shellfish containing products Anaphylaxis    Cabbage (brassica oleracea)     Chocolate flavor     Duckweed     Kale Swelling    Mustard     Nuts [tree nut]     Oranges [orange]     Statins-hmg-coa reductase inhibitors     Sulfa (sulfonamide antibiotics)      swelling    Sulfacetamide sodium Swelling    Tomato (solanum lycopersicum)     Weed pollen      Patient reported allergy to weed pollen, grass, trees, duckweed, cockroaches    Last reviewed on  7/18/2023 2:38 PM by Joycelyn Nayak      Tasks added this encounter   No tasks  added.   Tasks due within next 3 months   8/6/2023 - Refill Coordination Outreach (1 time occurrence)     [facility]  [address]  [facility] [address]  Phone: [phone]  Fax: [phone]

## 2023-09-28 DIAGNOSIS — M05.741 RHEUMATOID ARTHRITIS INVOLVING BOTH HANDS WITH POSITIVE RHEUMATOID FACTOR: Primary | ICD-10-CM

## 2023-09-28 DIAGNOSIS — M05.742 RHEUMATOID ARTHRITIS INVOLVING BOTH HANDS WITH POSITIVE RHEUMATOID FACTOR: Primary | ICD-10-CM

## 2023-09-28 RX ORDER — ABATACEPT 125 MG/ML
1 INJECTION, SOLUTION SUBCUTANEOUS
Qty: 4 ML | Refills: 11 | Status: ACTIVE | OUTPATIENT
Start: 2023-09-28

## 2023-12-10 RX ORDER — PREDNISONE 2.5 MG/1
TABLET ORAL
Qty: 30 TABLET | Refills: 4 | Status: SHIPPED | OUTPATIENT
Start: 2023-12-10 | End: 2024-03-05

## 2023-12-17 RX ORDER — ALLOPURINOL 100 MG/1
TABLET ORAL
Qty: 180 TABLET | Refills: 1 | Status: SHIPPED | OUTPATIENT
Start: 2023-12-17 | End: 2024-03-05

## 2024-03-05 ENCOUNTER — OFFICE VISIT (OUTPATIENT)
Dept: PRIMARY CARE CLINIC | Facility: CLINIC | Age: 84
End: 2024-03-05
Payer: MEDICARE

## 2024-03-05 VITALS
TEMPERATURE: 98 F | DIASTOLIC BLOOD PRESSURE: 74 MMHG | OXYGEN SATURATION: 99 % | WEIGHT: 145.63 LBS | BODY MASS INDEX: 26.8 KG/M2 | HEART RATE: 80 BPM | HEIGHT: 62 IN | SYSTOLIC BLOOD PRESSURE: 169 MMHG

## 2024-03-05 DIAGNOSIS — J30.9 CHRONIC ALLERGIC RHINITIS: ICD-10-CM

## 2024-03-05 DIAGNOSIS — N95.1 MENOPAUSAL SYNDROME (HOT FLASHES): Primary | ICD-10-CM

## 2024-03-05 DIAGNOSIS — J45.40 MODERATE PERSISTENT ASTHMA WITHOUT COMPLICATION: ICD-10-CM

## 2024-03-05 DIAGNOSIS — Z04.89 ASSESSMENT OF BARRIERS TO MEET CARE PLAN GOALS PERFORMED: ICD-10-CM

## 2024-03-05 DIAGNOSIS — M06.9 RHEUMATOID ARTHRITIS, INVOLVING UNSPECIFIED SITE, UNSPECIFIED WHETHER RHEUMATOID FACTOR PRESENT: ICD-10-CM

## 2024-03-05 DIAGNOSIS — E78.2 MIXED HYPERLIPIDEMIA: ICD-10-CM

## 2024-03-05 DIAGNOSIS — J32.9 RECURRENT SINUSITIS: ICD-10-CM

## 2024-03-05 DIAGNOSIS — Z98.49 HISTORY OF CATARACT EXTRACTION, UNSPECIFIED LATERALITY: ICD-10-CM

## 2024-03-05 DIAGNOSIS — I10 ESSENTIAL HYPERTENSION: ICD-10-CM

## 2024-03-05 DIAGNOSIS — N18.32 STAGE 3B CHRONIC KIDNEY DISEASE: ICD-10-CM

## 2024-03-05 PROCEDURE — 99999 PR PBB SHADOW E&M-EST. PATIENT-LVL III: CPT | Mod: PBBFAC,,, | Performed by: HOSPITALIST

## 2024-03-05 PROCEDURE — 99214 OFFICE O/P EST MOD 30 MIN: CPT | Mod: S$GLB,,, | Performed by: HOSPITALIST

## 2024-03-05 RX ORDER — FENOFIBRATE 145 MG/1
145 TABLET, FILM COATED ORAL 2 TIMES DAILY
COMMUNITY
End: 2024-03-18

## 2024-03-05 RX ORDER — PREDNISONE 2.5 MG/1
2.5 TABLET ORAL DAILY PRN
Qty: 30 TABLET | Refills: 4
Start: 2024-03-05

## 2024-03-05 RX ORDER — SERTRALINE HYDROCHLORIDE 25 MG/1
25 TABLET, FILM COATED ORAL DAILY
Qty: 50 TABLET | Refills: 0 | Status: SHIPPED | OUTPATIENT
Start: 2024-03-05 | End: 2024-03-18

## 2024-03-05 RX ORDER — FERROUS SULFATE 325(65) MG
325 TABLET, DELAYED RELEASE (ENTERIC COATED) ORAL DAILY
COMMUNITY

## 2024-03-05 RX ORDER — DICLOFENAC SODIUM 10 MG/G
4 GEL TOPICAL 3 TIMES DAILY PRN
COMMUNITY

## 2024-03-05 RX ORDER — ALLOPURINOL 100 MG/1
200 TABLET ORAL DAILY PRN
Qty: 180 TABLET | Refills: 1
Start: 2024-03-05 | End: 2024-05-16

## 2024-03-05 RX ORDER — LORATADINE 10 MG/1
10 TABLET ORAL DAILY
COMMUNITY
End: 2024-03-05

## 2024-03-05 NOTE — PROGRESS NOTES
Primary Care Provider Appointment - [facility] & Cary Shields MD      Subjective:      Patient ID: Betzaida Neumann is a 83 y.o. female with   Past Medical History:   Diagnosis Date    Allergy     Arthritis     Cataract     CKD (chronic kidney disease) stage 3, GFR 30-59 ml/min     Fibromyalgia     GERD (gastroesophageal reflux disease)     Hyperlipidemia     Hypertension     Polymyalgia rheumatica     RA (rheumatoid arthritis)            Chief Complaint: Establish Care    Prior to this visit, patient's last encounter with PCP was Visit date not found.    Pt reports wanted to establish care.  Has HTN, HLD, CKD.  Wants to avoid HD.  BP tends to elevate at dr's office.  H/o 80% R carotid stenosis, improved w/ medical therapy only.  Former smoker.  Lives independently and mows lawn, gardens, keeps house.  Considering joining gym.  Had an illness last year and lost about 25 lbs.  Had no appetite, wasn't eating.  Was hospitalized.  Doing better now, but not back to 100% since.  Has some fatigue.  Tries to stay busy around house due to fear of crime so doesn't get out much.    Her gynecologist retired, who was Rx-ing HRT.  Has been out of estradiol about a month and having hot flashes, sweats, etc.  Has been on it since 1979 when she had a hysterectomy.    On Orencia for RA w/ good results.  No joint pains now.    Checks BPs at home 2-3x/wk, usually in 120s systolic, never higher than 140.    4Ms for Medical Decision-Making in Older Adults    Last Completed EAWV: None    MOBILITY:  Get Up and Go:       No data to display              Activities of Daily Living:       No data to display              Whisper Test:       No data to display              Disability Status:      4/20/2017     9:00 AM   Disability Status   Are you deaf or do you have serious difficulty hearing? N   Are you blind or do you have serious difficulty seeing, even when wearing glasses? N   Because of a physical, mental, or emotional  "condition, do you have serious difficulty concentrating, remembering, or making decisions? N   Do you have serious difficulty walking or climbing stairs? N   Do you have difficulty dressing or bathing? N   Because of a physical, mental, or emotional condition, do you have difficulty doing errands alone such as visiting a doctor's office or shopping? N     Nutrition Screening:       No data to display             Screening Score: 0-7 Malnourished, 8-11 At Risk, 12-14 Normal    MENTATION:   Depression Patient Health Questionnaire:      3/5/2024     2:07 PM   Depression Patient Health Questionnaire   Over the last two weeks how often have you been bothered by little interest or pleasure in doing things Not at all   Over the last two weeks how often have you been bothered by feeling down, depressed or hopeless Not at all   PHQ-2 Total Score 0     Has Dementia Dx: No    Cognitive Function Screening:       No data to display              Cognitive Function Screening Total - Less than 4 = Abnormal,  Greater than or equal to 4 = Normal    MEDICATIONS:  High Risk Medications:  Total Active Medications: 1  traMADoL - 50 mg    WHAT MATTERS MOST:  Advance Care Planning   ACP Status:   Patient does not have an ACP conversation on file  Living Will: No  Power of : No  LaPOST: No    What is most important right now is to focus on avoiding the hospital and remaining as independent as possible    Accordingly, we have decided that the best plan to meet the patient's goals includes continuing with treatment      What matters most to patient today is: getting established with a PCP who is accessible and will personalize care consistent with her values.             Date: 03/05/2024  Patient did not wish or was not able to name a surrogate decision maker or provide an Advance Care Plan. "Five Wishes" provided to patient to fill out and bring to return visit.      Social History     Socioeconomic History    Marital status: Single " "  Tobacco Use    Smoking status: Former     Current packs/day: 0.00     Average packs/day: 1 pack/day for 29.0 years (29.0 ttl pk-yrs)     Types: Cigarettes     Start date: 1970     Quit date: 1999     Years since quittin.5    Smokeless tobacco: Never    Tobacco comments:     Retired ; ; 4 children healthy   Substance and Sexual Activity    Alcohol use: Yes     Alcohol/week: 1.0 standard drink of alcohol     Types: 1 Glasses of wine per week     Comment: social    Drug use: No    Sexual activity: Yes     Partners: Male       Review of Systems   Constitutional:  Positive for fatigue. Negative for activity change, appetite change, chills and fever.   HENT:  Positive for nasal congestion, postnasal drip and sinus pressure/congestion. Negative for sore throat.    Eyes:  Negative for photophobia and visual disturbance.   Respiratory:  Negative for cough and shortness of breath.    Cardiovascular:  Negative for chest pain and leg swelling.   Gastrointestinal:  Negative for abdominal pain, constipation, diarrhea, nausea and vomiting.   Genitourinary:  Positive for hot flashes. Negative for dysuria and hematuria.   Musculoskeletal:  Negative for arthralgias and myalgias.   Integumentary:  Negative for rash and wound.   Neurological:  Negative for dizziness and weakness.        Objective:   BP (!) 169/74 (BP Location: Left arm, Patient Position: Sitting, BP Method: Medium (Automatic))   Pulse 80   Temp 98.4 °F (36.9 °C)   Ht 5' 2" (1.575 m)   Wt 66 kg (145 lb 9.8 oz)   SpO2 99%   BMI 26.63 kg/m²     Physical Exam  Vitals reviewed.   Constitutional:       General: She is not in acute distress.     Appearance: Normal appearance.   HENT:      Head: Normocephalic and atraumatic.      Right Ear: Tympanic membrane, ear canal and external ear normal.      Left Ear: Tympanic membrane, ear canal and external ear normal.      Nose: Nose normal.      Mouth/Throat:      Mouth: Mucous " membranes are moist.      Pharynx: Oropharynx is clear.   Eyes:      General: No scleral icterus.     Conjunctiva/sclera: Conjunctivae normal.   Cardiovascular:      Rate and Rhythm: Normal rate and regular rhythm.      Pulses: Normal pulses.      Heart sounds: Normal heart sounds.   Pulmonary:      Effort: Pulmonary effort is normal. No respiratory distress.      Breath sounds: Normal breath sounds.   Abdominal:      General: There is no distension.      Palpations: Abdomen is soft.      Tenderness: There is no abdominal tenderness. There is no guarding.   Musculoskeletal:         General: Normal range of motion.      Cervical back: Normal range of motion and neck supple.      Right lower leg: No edema.      Left lower leg: No edema.   Lymphadenopathy:      Cervical: No cervical adenopathy.   Skin:     General: Skin is warm and dry.      Findings: No rash.   Neurological:      General: No focal deficit present.      Mental Status: She is alert and oriented to person, place, and time.              Lab Results   Component Value Date    WBC 6.7 05/09/2023    HGB 8.9 (L) 05/09/2023    HCT 28.1 (L) 05/09/2023     01/18/2023    CHOL 180 05/01/2023    TRIG 220 (H) 05/01/2023    HDL 33 (L) 05/01/2023    ALT 31 05/09/2023    AST 49 (H) 05/09/2023     05/09/2023    K 4.1 05/09/2023     02/24/2023    CREATININE 1.27 (H) 05/09/2023    BUN 15.0 05/09/2023    CO2 25 05/09/2023    TSH 1.94 04/30/2023    INR 1.1 04/30/2023    HGBA1C 5.4 05/01/2023       Current Outpatient Medications on File Prior to Visit   Medication Sig Dispense Refill    abatacept (ORENCIA CLICKJECT) 125 mg/mL AtIn Inject 1 mL into the skin every 7 days. 4 mL 11    ACETAMINOPHEN ORAL Take by mouth as needed.      albuterol (PROVENTIL) 2.5 mg /3 mL (0.083 %) nebulizer solution Take 2.5 mg by nebulization every 6 (six) hours as needed for Wheezing. Rescue      allopurinoL (ZYLOPRIM) 100 MG tablet TAKE 2 TABLETS(200 MG) BY MOUTH EVERY   tablet 1    azelastine 205.5 mcg (0.15 %) Spry 2 sprays by Each Nostril route 2 (two) times daily.      diclofenac sodium (VOLTAREN) 1 % Gel Apply 2 g topically once daily.      ferrous sulfate 325 (65 FE) MG EC tablet Take 325 mg by mouth once daily.      fluticasone (FLONASE) 50 mcg/actuation nasal spray fluticasone 50 mcg/actuation nasal spray,suspension      levocetirizine (XYZAL) 5 MG tablet Take 5 mg by mouth once daily.      loratadine (CLARITIN) 10 mg tablet Take 10 mg by mouth once daily.      montelukast (SINGULAIR) 10 mg tablet Take 10 mg by mouth every evening.      predniSONE (DELTASONE) 2.5 MG tablet TAKE 1 TABLET(2.5 MG) BY MOUTH EVERY DAY 30 tablet 4    amLODIPine (NORVASC) 10 MG tablet Take 1 tablet (10 mg total) by mouth once daily. (Patient not taking: Reported on 3/5/2024) 90 tablet 3    atorvastatin (LIPITOR) 10 MG tablet atorvastatin 10 mg tablet      betamethasone dipropionate 0.05 % cream SMARTSIG:Sparingly Topical Twice Daily      bismuth subsalicylate (PEPTO-BISMOL ORAL) Take by mouth.      diphenhydrAMINE (BENADRYL) 25 mg capsule Take 25 mg by mouth every 6 (six) hours as needed for Itching.      ELIQUIS 5 mg Tab Take by mouth.      estradioL (ESTRACE) 1 MG tablet Take 1 mg by mouth once daily.      ipratropium/albuterol sulfate (IPRATROPIUM-ALBUTEROL INHL) Inhale into the lungs as needed.      losartan (COZAAR) 100 MG tablet Take 1 tablet (100 mg total) by mouth once daily. (Patient not taking: Reported on 3/5/2024) 90 tablet 3    REPATHA SURECLICK 140 mg/mL PnIj Inject 140 mg into the skin every 14 (fourteen) days.      terbinafine HCL (LAMISIL) 250 mg tablet terbinafine HCl 250 mg tablet      traMADoL (ULTRAM) 50 mg tablet TAKE 1 TO 2 TABLETS BY MOUTH EVERY DAY FOR SEVERE PAIN (Patient not taking: Reported on 3/5/2024) 60 tablet 1     Current Facility-Administered Medications on File Prior to Visit   Medication Dose Route Frequency Provider Last Rate Last Admin    sodium chloride 0.9%  flush 10 mL  10 mL Intravenous PRN Yeimy Tellez DPM             Assessment:   83 y.o. female with multiple co-morbid illnesses here to establish care.    Plan:     Problem List Items Addressed This Visit       Essential hypertension     Patient endorses white coat hypertension which likely accounts for her elevated blood pressure in office today.  Home blood pressure measurements mostly appear to be at goal per her report.  Continue losartan 100 mg for now.         Chronic allergic rhinitis     On 2 nasal sprays, levocetirizine, and Singulair with ongoing symptoms.  She requests ENT referral, which was placed.         Rheumatoid arthritis     Patient has been well-controlled on Orencia without any symptoms for a long time.  She has a small supply of prednisone to use as needed for flare-ups.         Moderate persistent asthma without complication     On Singulair as monotherapy for controller with p.r.n. nebulizers.  Seems to be well controlled at this time.         Mixed hyperlipidemia     On fenofibrate; does not tolerate statins.  Last lipid panel reviewed.  We will recheck at follow-up visit to reassess and discuss further treatment if indicated.         Stage 3b chronic kidney disease     Laboratory trends reviewed; will check new labs and follow-up visit to monitor trajectory.  Patient's lifestyle changes over the past year will likely reduce her risk of further progression.         Menopausal syndrome (hot flashes) - Primary     Counseled patient on risks of long-term HRT, including increased gyn cancer risks, increased risk of adverse cardiovascular events, and increased risk of thrombosis.  She has been maintained on systemic estrogen replacement since her hysterectomy many years ago.  Advised discontinuation of this medication as the appropriateness of this therapy decreases with advancing age, she is willing to give this a try with symptom management.  Starting sertraline 25 mg daily with instructions  to increase to 50 mg after 1 week.  Plan to discuss topical estrogen therapy to prevent atrophic vaginitis at return visit.         Relevant Medications    sertraline (ZOLOFT) 25 MG tablet    Recurrent sinusitis     ENT referral placed per patient request.  It is possible she may have anatomic features contributing to obstruction.         Relevant Orders    Ambulatory referral/consult to ENT    History of cataract extraction     Patient reports being due for eye exam and requests optometry referral, which was placed.         Relevant Orders    Ambulatory referral/consult to Optometry    Assessment of barriers to meet care plan goals performed     After review of social determinants of health anticipate minimal barriers to accessing care, although patient does have some isolation being reluctant to venture out due to fear of crime.              Health Maintenance         Date Due Completion Date    RSV Vaccine (Age 60+ and Pregnant patients) (1 - 1-dose 60+ series) Never done ---    COVID-19 Vaccine (4 - 2023-24 season) 09/01/2023 10/6/2022    DEXA Scan 08/18/2025 8/18/2022    Override on 5/3/2016: Declined    Lipid Panel 05/01/2028 5/1/2023    TETANUS VACCINE 08/26/2029 8/26/2019            Future Appointments   Date Time Provider Department Center   3/18/2024  2:40 PM Roger Shields MD [facility]   7/5/2024  1:00 PM [facility]         Follow up in about 2 weeks (around 3/19/2024). Total clinical care time was 60 min.    Roger Shields MD   [facility] and [facility]

## 2024-03-08 PROBLEM — Z04.89 ASSESSMENT OF BARRIERS TO MEET CARE PLAN GOALS PERFORMED: Status: ACTIVE | Noted: 2024-03-08

## 2024-03-08 PROBLEM — N95.1 MENOPAUSAL SYNDROME (HOT FLASHES): Status: ACTIVE | Noted: 2024-03-08

## 2024-03-08 PROBLEM — M79.672 LEFT FOOT PAIN: Status: RESOLVED | Noted: 2019-01-22 | Resolved: 2024-03-08

## 2024-03-08 PROBLEM — R79.82 ELEVATED C-REACTIVE PROTEIN (CRP): Status: RESOLVED | Noted: 2017-03-24 | Resolved: 2024-03-08

## 2024-03-08 PROBLEM — M79.89 SWELLING OF RIGHT HAND: Status: RESOLVED | Noted: 2018-02-20 | Resolved: 2024-03-08

## 2024-03-08 PROBLEM — M79.641 RIGHT HAND PAIN: Status: RESOLVED | Noted: 2018-02-20 | Resolved: 2024-03-08

## 2024-03-08 PROBLEM — Z23 NEED FOR PROPHYLACTIC VACCINATION AND INOCULATION AGAINST OTHER VIRAL DISEASES: Status: RESOLVED | Noted: 2019-06-13 | Resolved: 2024-03-08

## 2024-03-08 PROBLEM — J32.9 RECURRENT SINUSITIS: Status: ACTIVE | Noted: 2024-03-08

## 2024-03-08 PROBLEM — Z98.49 HISTORY OF CATARACT EXTRACTION: Status: ACTIVE | Noted: 2024-03-08

## 2024-03-08 NOTE — ASSESSMENT & PLAN NOTE
On 2 nasal sprays, levocetirizine, and Singulair with ongoing symptoms.  She requests ENT referral, which was placed.

## 2024-03-08 NOTE — ASSESSMENT & PLAN NOTE
Laboratory trends reviewed; will check new labs and follow-up visit to monitor trajectory.  Patient's lifestyle changes over the past year will likely reduce her risk of further progression.

## 2024-03-08 NOTE — ASSESSMENT & PLAN NOTE
ENT referral placed per patient request.  It is possible she may have anatomic features contributing to obstruction.

## 2024-03-08 NOTE — ASSESSMENT & PLAN NOTE
On fenofibrate; does not tolerate statins.  Last lipid panel reviewed.  We will recheck at follow-up visit to reassess and discuss further treatment if indicated.

## 2024-03-08 NOTE — ASSESSMENT & PLAN NOTE
Patient has been well-controlled on Orencia without any symptoms for a long time.  She has a small supply of prednisone to use as needed for flare-ups.

## 2024-03-08 NOTE — ASSESSMENT & PLAN NOTE
Patient endorses white coat hypertension which likely accounts for her elevated blood pressure in office today.  Home blood pressure measurements mostly appear to be at goal per her report.  Continue losartan 100 mg for now.

## 2024-03-08 NOTE — ASSESSMENT & PLAN NOTE
After review of social determinants of health anticipate minimal barriers to accessing care, although patient does have some isolation being reluctant to venture out due to fear of crime.

## 2024-03-08 NOTE — ASSESSMENT & PLAN NOTE
Counseled patient on risks of long-term HRT, including increased gyn cancer risks, increased risk of adverse cardiovascular events, and increased risk of thrombosis.  She has been maintained on systemic estrogen replacement since her hysterectomy many years ago.  Advised discontinuation of this medication as the appropriateness of this therapy decreases with advancing age, she is willing to give this a try with symptom management.  Starting sertraline 25 mg daily with instructions to increase to 50 mg after 1 week.  Plan to discuss topical estrogen therapy to prevent atrophic vaginitis at return visit.

## 2024-03-08 NOTE — ASSESSMENT & PLAN NOTE
On Singulair as monotherapy for controller with p.r.n. nebulizers.  Seems to be well controlled at this time.

## 2024-03-18 ENCOUNTER — LAB VISIT (OUTPATIENT)
Dept: PRIMARY CARE CLINIC | Facility: CLINIC | Age: 84
End: 2024-03-18
Payer: MEDICARE

## 2024-03-18 ENCOUNTER — OFFICE VISIT (OUTPATIENT)
Dept: PRIMARY CARE CLINIC | Facility: CLINIC | Age: 84
End: 2024-03-18
Payer: MEDICARE

## 2024-03-18 VITALS
WEIGHT: 146.5 LBS | BODY MASS INDEX: 26.79 KG/M2 | TEMPERATURE: 98 F | DIASTOLIC BLOOD PRESSURE: 62 MMHG | OXYGEN SATURATION: 97 % | HEART RATE: 88 BPM | SYSTOLIC BLOOD PRESSURE: 139 MMHG

## 2024-03-18 DIAGNOSIS — M85.851 OSTEOPENIA OF BOTH HIPS: ICD-10-CM

## 2024-03-18 DIAGNOSIS — I10 HYPERTENSION, ESSENTIAL: ICD-10-CM

## 2024-03-18 DIAGNOSIS — E78.2 MIXED HYPERLIPIDEMIA: ICD-10-CM

## 2024-03-18 DIAGNOSIS — Z78.9 STATIN INTOLERANCE: ICD-10-CM

## 2024-03-18 DIAGNOSIS — M70.62 TROCHANTERIC BURSITIS OF LEFT HIP: ICD-10-CM

## 2024-03-18 DIAGNOSIS — M85.852 OSTEOPENIA OF BOTH HIPS: ICD-10-CM

## 2024-03-18 DIAGNOSIS — N18.32 STAGE 3B CHRONIC KIDNEY DISEASE: Primary | ICD-10-CM

## 2024-03-18 DIAGNOSIS — N18.32 STAGE 3B CHRONIC KIDNEY DISEASE: ICD-10-CM

## 2024-03-18 DIAGNOSIS — N95.1 MENOPAUSAL SYNDROME (HOT FLASHES): ICD-10-CM

## 2024-03-18 DIAGNOSIS — I10 ESSENTIAL HYPERTENSION: ICD-10-CM

## 2024-03-18 LAB
25(OH)D3+25(OH)D2 SERPL-MCNC: 96 NG/ML (ref 30–96)
ALBUMIN SERPL BCP-MCNC: 3.6 G/DL (ref 3.5–5.2)
ALP SERPL-CCNC: 96 U/L (ref 55–135)
ALT SERPL W/O P-5'-P-CCNC: 10 U/L (ref 10–44)
ANION GAP SERPL CALC-SCNC: 12 MMOL/L (ref 8–16)
AST SERPL-CCNC: 17 U/L (ref 10–40)
BASOPHILS # BLD AUTO: 0.03 K/UL (ref 0–0.2)
BASOPHILS NFR BLD: 0.5 % (ref 0–1.9)
BILIRUB SERPL-MCNC: 0.3 MG/DL (ref 0.1–1)
BUN SERPL-MCNC: 14 MG/DL (ref 8–23)
CALCIUM SERPL-MCNC: 9.9 MG/DL (ref 8.7–10.5)
CHLORIDE SERPL-SCNC: 108 MMOL/L (ref 95–110)
CHOLEST SERPL-MCNC: 254 MG/DL (ref 120–199)
CHOLEST/HDLC SERPL: 3 {RATIO} (ref 2–5)
CO2 SERPL-SCNC: 22 MMOL/L (ref 23–29)
CREAT SERPL-MCNC: 1.3 MG/DL (ref 0.5–1.4)
DIFFERENTIAL METHOD BLD: ABNORMAL
EOSINOPHIL # BLD AUTO: 0.2 K/UL (ref 0–0.5)
EOSINOPHIL NFR BLD: 2.6 % (ref 0–8)
ERYTHROCYTE [DISTWIDTH] IN BLOOD BY AUTOMATED COUNT: 12.8 % (ref 11.5–14.5)
EST. GFR  (NO RACE VARIABLE): 40.8 ML/MIN/1.73 M^2
GLUCOSE SERPL-MCNC: 110 MG/DL (ref 70–110)
HCT VFR BLD AUTO: 38.7 % (ref 37–48.5)
HDLC SERPL-MCNC: 85 MG/DL (ref 40–75)
HDLC SERPL: 33.5 % (ref 20–50)
HGB BLD-MCNC: 11.8 G/DL (ref 12–16)
IMM GRANULOCYTES # BLD AUTO: 0.02 K/UL (ref 0–0.04)
IMM GRANULOCYTES NFR BLD AUTO: 0.3 % (ref 0–0.5)
IRON SERPL-MCNC: 62 UG/DL (ref 30–160)
LDLC SERPL CALC-MCNC: 157.4 MG/DL (ref 63–159)
LYMPHOCYTES # BLD AUTO: 2.1 K/UL (ref 1–4.8)
LYMPHOCYTES NFR BLD: 34.1 % (ref 18–48)
MCH RBC QN AUTO: 29.8 PG (ref 27–31)
MCHC RBC AUTO-ENTMCNC: 30.5 G/DL (ref 32–36)
MCV RBC AUTO: 98 FL (ref 82–98)
MONOCYTES # BLD AUTO: 0.4 K/UL (ref 0.3–1)
MONOCYTES NFR BLD: 7.2 % (ref 4–15)
NEUTROPHILS # BLD AUTO: 3.4 K/UL (ref 1.8–7.7)
NEUTROPHILS NFR BLD: 55.3 % (ref 38–73)
NONHDLC SERPL-MCNC: 169 MG/DL
NRBC BLD-RTO: 0 /100 WBC
PLATELET # BLD AUTO: 329 K/UL (ref 150–450)
PMV BLD AUTO: 10.2 FL (ref 9.2–12.9)
POTASSIUM SERPL-SCNC: 3.9 MMOL/L (ref 3.5–5.1)
PROT SERPL-MCNC: 7.7 G/DL (ref 6–8.4)
RBC # BLD AUTO: 3.96 M/UL (ref 4–5.4)
SATURATED IRON: 17 % (ref 20–50)
SODIUM SERPL-SCNC: 142 MMOL/L (ref 136–145)
TOTAL IRON BINDING CAPACITY: 374 UG/DL (ref 250–450)
TRANSFERRIN SERPL-MCNC: 253 MG/DL (ref 200–375)
TRIGL SERPL-MCNC: 58 MG/DL (ref 30–150)
TSH SERPL DL<=0.005 MIU/L-ACNC: 1.4 UIU/ML (ref 0.4–4)
WBC # BLD AUTO: 6.15 K/UL (ref 3.9–12.7)

## 2024-03-18 PROCEDURE — 85025 COMPLETE CBC W/AUTO DIFF WBC: CPT | Performed by: HOSPITALIST

## 2024-03-18 PROCEDURE — 99999 PR PBB SHADOW E&M-EST. PATIENT-LVL V: CPT | Mod: PBBFAC,,, | Performed by: HOSPITALIST

## 2024-03-18 PROCEDURE — 80061 LIPID PANEL: CPT | Performed by: HOSPITALIST

## 2024-03-18 PROCEDURE — 82306 VITAMIN D 25 HYDROXY: CPT | Performed by: HOSPITALIST

## 2024-03-18 PROCEDURE — 99499 UNLISTED E&M SERVICE: CPT | Mod: S$GLB,,, | Performed by: HOSPITALIST

## 2024-03-18 PROCEDURE — 83540 ASSAY OF IRON: CPT | Performed by: HOSPITALIST

## 2024-03-18 PROCEDURE — 84443 ASSAY THYROID STIM HORMONE: CPT | Performed by: HOSPITALIST

## 2024-03-18 PROCEDURE — 80053 COMPREHEN METABOLIC PANEL: CPT | Performed by: HOSPITALIST

## 2024-03-18 RX ORDER — TERBINAFINE HYDROCHLORIDE 250 MG/1
TABLET ORAL
COMMUNITY
End: 2024-03-18

## 2024-03-18 RX ORDER — IPRATROPIUM BROMIDE 42 UG/1
SPRAY, METERED NASAL
COMMUNITY

## 2024-03-18 RX ORDER — CLARITHROMYCIN 500 MG/1
TABLET, FILM COATED ORAL
COMMUNITY
End: 2024-03-18 | Stop reason: ALTCHOICE

## 2024-03-18 RX ORDER — HYDROXYCHLOROQUINE SULFATE 200 MG/1
TABLET, FILM COATED ORAL
COMMUNITY
End: 2024-03-18

## 2024-03-18 RX ORDER — ATORVASTATIN CALCIUM 10 MG/1
TABLET, FILM COATED ORAL
COMMUNITY
End: 2024-03-18

## 2024-03-18 RX ORDER — EVOLOCUMAB 140 MG/ML
INJECTION, SOLUTION SUBCUTANEOUS
COMMUNITY
End: 2024-03-18

## 2024-03-18 RX ORDER — BETAMETHASONE DIPROPIONATE 0.5 MG/G
CREAM TOPICAL
COMMUNITY

## 2024-03-18 RX ORDER — OLOPATADINE HYDROCHLORIDE 1 MG/ML
SOLUTION/ DROPS OPHTHALMIC
COMMUNITY
End: 2024-03-18

## 2024-03-18 RX ORDER — PREGABALIN 50 MG/1
CAPSULE ORAL
COMMUNITY
End: 2024-03-18

## 2024-03-18 RX ORDER — METOPROLOL SUCCINATE 50 MG/1
TABLET, EXTENDED RELEASE ORAL
COMMUNITY
End: 2024-03-18

## 2024-03-18 RX ORDER — TRAMADOL HYDROCHLORIDE 50 MG/1
TABLET ORAL
COMMUNITY
End: 2024-03-18

## 2024-03-18 RX ORDER — DULOXETIN HYDROCHLORIDE 30 MG/1
1 CAPSULE, DELAYED RELEASE ORAL DAILY
COMMUNITY
End: 2024-03-18

## 2024-03-18 RX ORDER — AZITHROMYCIN 250 MG/1
TABLET, FILM COATED ORAL
COMMUNITY
End: 2024-03-18 | Stop reason: ALTCHOICE

## 2024-03-18 RX ORDER — PNEUMOCOCCAL VACCINE POLYVALENT 25; 25; 25; 25; 25; 25; 25; 25; 25; 25; 25; 25; 25; 25; 25; 25; 25; 25; 25; 25; 25; 25; 25 UG/.5ML; UG/.5ML; UG/.5ML; UG/.5ML; UG/.5ML; UG/.5ML; UG/.5ML; UG/.5ML; UG/.5ML; UG/.5ML; UG/.5ML; UG/.5ML; UG/.5ML; UG/.5ML; UG/.5ML; UG/.5ML; UG/.5ML; UG/.5ML; UG/.5ML; UG/.5ML; UG/.5ML; UG/.5ML; UG/.5ML
INJECTION, SOLUTION INTRAMUSCULAR; SUBCUTANEOUS
COMMUNITY
End: 2024-03-18 | Stop reason: ALTCHOICE

## 2024-03-18 RX ORDER — NITROFURANTOIN 25; 75 MG/1; MG/1
CAPSULE ORAL
COMMUNITY
End: 2024-03-18 | Stop reason: ALTCHOICE

## 2024-03-18 RX ORDER — METOPROLOL TARTRATE 100 MG/1
1 TABLET ORAL 2 TIMES DAILY
COMMUNITY
End: 2024-03-18

## 2024-03-18 RX ORDER — CARVEDILOL 6.25 MG/1
TABLET ORAL
COMMUNITY
End: 2024-03-18

## 2024-03-18 RX ORDER — METHOTREXATE 2.5 MG/1
TABLET ORAL
COMMUNITY
End: 2024-03-18

## 2024-03-18 RX ORDER — SIMVASTATIN 20 MG/1
TABLET, FILM COATED ORAL
COMMUNITY
End: 2024-03-18

## 2024-03-18 RX ORDER — LOSARTAN POTASSIUM 100 MG/1
100 TABLET ORAL DAILY
Qty: 90 TABLET | Refills: 3 | Status: SHIPPED | OUTPATIENT
Start: 2024-03-18 | End: 2024-05-15 | Stop reason: SDUPTHER

## 2024-03-18 RX ORDER — SERTRALINE HYDROCHLORIDE 100 MG/1
100 TABLET, FILM COATED ORAL DAILY
Qty: 30 TABLET | Refills: 0 | Status: SHIPPED | OUTPATIENT
Start: 2024-03-18 | End: 2024-04-02

## 2024-03-18 RX ORDER — LOSARTAN POTASSIUM 100 MG/1
TABLET ORAL
Qty: 90 TABLET | Refills: 3 | OUTPATIENT
Start: 2024-03-18

## 2024-03-18 RX ORDER — GABAPENTIN 100 MG/1
CAPSULE ORAL
COMMUNITY
End: 2024-03-18

## 2024-03-18 RX ORDER — HYDROCODONE BITARTRATE AND ACETAMINOPHEN 7.5; 325 MG/1; MG/1
TABLET ORAL
COMMUNITY
End: 2024-03-18

## 2024-03-18 RX ORDER — MOXIFLOXACIN 5 MG/ML
SOLUTION/ DROPS OPHTHALMIC
COMMUNITY
End: 2024-03-18 | Stop reason: ALTCHOICE

## 2024-03-18 RX ORDER — HYDROCHLOROTHIAZIDE 25 MG/1
TABLET ORAL
COMMUNITY
End: 2024-04-22

## 2024-03-18 RX ORDER — AMLODIPINE BESYLATE 10 MG/1
1 TABLET ORAL DAILY
COMMUNITY
End: 2024-03-18

## 2024-03-18 RX ORDER — COLCHICINE 0.6 MG/1
TABLET ORAL
COMMUNITY
End: 2024-03-18

## 2024-03-18 RX ORDER — PRENATAL VIT 91/IRON/FOLIC/DHA 28-975-200
COMBINATION PACKAGE (EA) ORAL
COMMUNITY
End: 2024-03-18

## 2024-03-18 RX ORDER — FLUCONAZOLE 150 MG/1
TABLET ORAL
COMMUNITY
End: 2024-03-18 | Stop reason: ALTCHOICE

## 2024-03-18 RX ORDER — SODIUM, POTASSIUM,MAG SULFATES 17.5-3.13G
SOLUTION, RECONSTITUTED, ORAL ORAL
COMMUNITY
End: 2024-03-18 | Stop reason: ALTCHOICE

## 2024-03-18 RX ORDER — MECLIZINE HYDROCHLORIDE 25 MG/1
TABLET ORAL
COMMUNITY
End: 2024-03-18

## 2024-03-18 RX ORDER — DIFLUPREDNATE OPHTHALMIC 0.5 MG/ML
EMULSION OPHTHALMIC
COMMUNITY
End: 2024-03-18

## 2024-03-18 RX ORDER — NEPAFENAC 3 MG/ML
SUSPENSION/ DROPS OPHTHALMIC
COMMUNITY
End: 2024-03-18 | Stop reason: ALTCHOICE

## 2024-03-18 RX ORDER — CEFUROXIME AXETIL 500 MG/1
TABLET ORAL
COMMUNITY
End: 2024-03-18 | Stop reason: ALTCHOICE

## 2024-03-18 NOTE — PROGRESS NOTES
Primary Care Provider Appointment - [facility] Med[facility]ant[facility]uno Shields MD      Subjective:      Patient ID: Betzaida Neumann is a 84 y.o. female with   Past Medical History:   Diagnosis Date    Allergy     Arthritis     Cataract     CKD (chronic kidney disease) stage 3, GFR 30-59 ml/min     Elevated C-reactive protein (CRP) 03/24/2017    Fibromyalgia     GERD (gastroesophageal reflux disease)     Hyperlipidemia     Hypertension     Polymyalgia rheumatica     RA (rheumatoid arthritis)            Chief Complaint: Follow-up    Prior to this visit, patient's last encounter with PCP was 3/5/2024.    No improvement w/ menopausal sx so far.  H/o DVT x2 last year.  Also having pain in L hip.  Having to use heating pad from time to time.  Pain seems to be worse if sitting for too long.  Being active seems to help prevent it.  Also c/o changes in fingernails; having ridges on nails and seeming more brittle.      3/5: Pt reports wanted to establish care.  Has HTN, HLD, CKD.  Wants to avoid HD.  BP tends to elevate at dr's office.  H/o 80% R carotid stenosis, improved w/ medical therapy only.  Former smoker.  Lives independently and mows lawn, gardens, keeps house.  Considering joining gym.  Had an illness last year and lost about 25 lbs.  Had no appetite, wasn't eating.  Was hospitalized.  Doing better now, but not back to 100% since.  Has some fatigue.  Tries to stay busy around house due to fear of crime so doesn't get out much.    Her gynecologist retired, who was Rx-ing HRT.  Has been out of estradiol about a month and having hot flashes, sweats, etc.  Has been on it since 1979 when she had a hysterectomy.    On Orencia for RA w/ good results.  No joint pains now.    Checks BPs at home 2-3x/wk, usually in 120s systolic, never higher than 140.    4Ms for Medical Decision-Making in Older Adults    Last Completed EAWV: None    MOBILITY:  Get Up and Go:       No data to display                Activities of Daily  Living:       No data to display                Whisper Test:       No data to display                Disability Status:      4/20/2017     9:00 AM   Disability Status   Are you deaf or do you have serious difficulty hearing? N   Are you blind or do you have serious difficulty seeing, even when wearing glasses? N   Because of a physical, mental, or emotional condition, do you have serious difficulty concentrating, remembering, or making decisions? N   Do you have serious difficulty walking or climbing stairs? N   Do you have difficulty dressing or bathing? N   Because of a physical, mental, or emotional condition, do you have difficulty doing errands alone such as visiting a doctor's office or shopping? N     Nutrition Screening:       No data to display               Screening Score: 0-7 Malnourished, 8-11 At Risk, 12-14 Normal    MENTATION:   Depression Patient Health Questionnaire:      3/5/2024     2:07 PM   Depression Patient Health Questionnaire   Over the last two weeks how often have you been bothered by little interest or pleasure in doing things Not at all   Over the last two weeks how often have you been bothered by feeling down, depressed or hopeless Not at all   PHQ-2 Total Score 0     Has Dementia Dx: No    Cognitive Function Screening:       No data to display                Cognitive Function Screening Total - Less than 4 = Abnormal,  Greater than or equal to 4 = Normal    MEDICATIONS:  High Risk Medications:  Total Active Medications: 6  DULoxetine - 30 MG  gabapentin - 100 MG  HYDROcodone-acetaminophen - 7.5-325 mg  pregabalin - 50 MG  sertraline - 25 MG  traMADoL - 50 mg    WHAT MATTERS MOST:  Advance Care Planning   ACP Status:   Patient has had an ACP conversation  Living Will: No  Power of : No  LaPOST: No    What is most important right now is to focus on avoiding the hospital and remaining as independent as possible    Accordingly, we have decided that the best plan to meet the  "patient's goals includes continuing with treatment      What matters most to patient today is: getting established with a PCP who is accessible and will personalize care consistent with her values.             Date: 2024  Patient did not wish or was not able to name a surrogate decision maker or provide an Advance Care Plan. "Five Wishes" provided to patient to fill out and bring to return visit.      Social History     Socioeconomic History    Marital status: Single   Tobacco Use    Smoking status: Former     Current packs/day: 0.00     Average packs/day: 1 pack/day for 29.0 years (29.0 ttl pk-yrs)     Types: Cigarettes     Start date: 1970     Quit date: 1999     Years since quittin.5    Smokeless tobacco: Never    Tobacco comments:     Retired ; ; 4 children healthy   Substance and Sexual Activity    Alcohol use: Yes     Alcohol/week: 1.0 standard drink of alcohol     Types: 1 Glasses of wine per week     Comment: social    Drug use: No    Sexual activity: Yes     Partners: Male       Review of Systems   Constitutional:  Positive for diaphoresis and fatigue. Negative for activity change, appetite change, chills and fever.   HENT:  Negative for sore throat.    Eyes:  Negative for photophobia and visual disturbance.   Respiratory:  Negative for cough and shortness of breath.    Cardiovascular:  Negative for chest pain and leg swelling.   Gastrointestinal:  Negative for abdominal pain, constipation, diarrhea, nausea and vomiting.   Genitourinary:  Positive for hot flashes. Negative for dysuria and hematuria.   Musculoskeletal:  Positive for leg pain. Negative for arthralgias and myalgias.   Integumentary:  Negative for rash and wound.   Neurological:  Negative for dizziness and weakness.        Objective:   /62 (BP Location: Right arm, Patient Position: Sitting, BP Method: Medium (Automatic))   Pulse 88   Temp 98.2 °F (36.8 °C) (Oral)   Wt 66.4 kg (146 lb 7.9 " oz)   SpO2 97%   BMI 26.79 kg/m²     Physical Exam  Vitals reviewed.   Constitutional:       General: She is not in acute distress.     Appearance: Normal appearance.   HENT:      Head: Normocephalic and atraumatic.      Right Ear: Tympanic membrane, ear canal and external ear normal.      Left Ear: Tympanic membrane, ear canal and external ear normal.      Nose: Nose normal.      Mouth/Throat:      Mouth: Mucous membranes are moist.      Pharynx: Oropharynx is clear.   Eyes:      General: No scleral icterus.     Conjunctiva/sclera: Conjunctivae normal.   Cardiovascular:      Rate and Rhythm: Normal rate and regular rhythm.      Pulses: Normal pulses.      Heart sounds: Normal heart sounds.   Pulmonary:      Effort: Pulmonary effort is normal. No respiratory distress.      Breath sounds: Normal breath sounds.   Abdominal:      General: There is no distension.      Palpations: Abdomen is soft.      Tenderness: There is no abdominal tenderness. There is no guarding.   Musculoskeletal:         General: Tenderness (Point tenderness Over trochanteric bursa) present. Normal range of motion.      Cervical back: Normal range of motion and neck supple.      Right lower leg: No edema.      Left lower leg: No edema.   Lymphadenopathy:      Cervical: No cervical adenopathy.   Skin:     General: Skin is warm and dry.      Findings: No rash.   Neurological:      General: No focal deficit present.      Mental Status: She is alert and oriented to person, place, and time.              Lab Results   Component Value Date    WBC 6.15 03/18/2024    HGB 11.8 (L) 03/18/2024    HCT 38.7 03/18/2024     03/18/2024    CHOL 254 (H) 03/18/2024    TRIG 58 03/18/2024    HDL 85 (H) 03/18/2024    ALT 10 03/18/2024    AST 17 03/18/2024     03/18/2024    K 3.9 03/18/2024     03/18/2024    CREATININE 1.3 03/18/2024    BUN 14 03/18/2024    CO2 22 (L) 03/18/2024    TSH 1.397 03/18/2024    INR 1.1 04/30/2023    HGBA1C 5.4 05/01/2023        Current Outpatient Medications on File Prior to Visit   Medication Sig Dispense Refill    abatacept (ORENCIA CLICKJECT) 125 mg/mL AtIn Inject 1 mL into the skin every 7 days. 4 mL 11    ACETAMINOPHEN ORAL Take by mouth as needed.      albuterol (PROVENTIL) 2.5 mg /3 mL (0.083 %) nebulizer solution Take 2.5 mg by nebulization every 6 (six) hours as needed for Wheezing. Rescue      allopurinoL (ZYLOPRIM) 100 MG tablet Take 2 tablets (200 mg total) by mouth daily as needed (gout flare). 180 tablet 1    azelastine 205.5 mcg (0.15 %) Spry 2 sprays by Each Nostril route 2 (two) times daily.      betamethasone dipropionate 0.05 % cream APPLY THIN LAYER TOPICALLY TO THE AFFECTED AREA TWICE DAILY      bismuth subsalicylate (PEPTO-BISMOL ORAL) Take by mouth.      diclofenac sodium (VOLTAREN) 1 % Gel Apply 4 g topically 3 (three) times daily as needed (hip/leg pain).      diphenhydrAMINE (BENADRYL) 25 mg capsule Take 25 mg by mouth every 6 (six) hours as needed for Itching.      ferrous sulfate 325 (65 FE) MG EC tablet Take 325 mg by mouth once daily.      fluticasone (FLONASE) 50 mcg/actuation nasal spray fluticasone 50 mcg/actuation nasal spray,suspension      hydroCHLOROthiazide (HYDRODIURIL) 25 MG tablet       ipratropium (ATROVENT) 42 mcg (0.06 %) nasal spray INSTILL 2 SPRAYS IN EACH NOSTRIL TWICE DAILY WITH ASTELIN      ipratropium/albuterol sulfate (IPRATROPIUM-ALBUTEROL INHL) Inhale into the lungs as needed.      levocetirizine (XYZAL) 5 MG tablet Take 5 mg by mouth once daily.      montelukast (SINGULAIR) 10 mg tablet Take 10 mg by mouth every evening.      predniSONE (DELTASONE) 2.5 MG tablet Take 1 tablet (2.5 mg total) by mouth daily as needed (RA flare). 30 tablet 4     No current facility-administered medications on file prior to visit.         Assessment:   84 y.o. female with multiple co-morbid illnesses here to follow-up for ongoing chronic disease management and preventive health maintenance.    Plan:      Problem List Items Addressed This Visit       Essential hypertension     Blood pressure at goal.  Losartan refilled today per patient's request.         Relevant Medications    losartan (COZAAR) 100 MG tablet    Mixed hyperlipidemia     Patient has been off of any lipid lowering therapy due to statin intolerance.  She had previously been treated with Repatha, but is not sure why it was stopped.  We will check lipid panel.         Stage 3b chronic kidney disease - Primary     We will check annual labs to assess kidney function.  She does not have a nephrologist; referral placed.         Relevant Orders    Ambulatory referral/consult to Nephrology    TSH (Completed)    Comprehensive Metabolic Panel (Completed)    CBC Auto Differential (Completed)    IRON AND TIBC (Completed)    Menopausal syndrome (hot flashes)     Symptoms have not yet improved.  We will increase sertraline to 100 mg daily; if still no improvement after 2 more weeks we will change to an alternate therapy.         Relevant Medications    sertraline (ZOLOFT) 100 MG tablet    Statin intolerance    Relevant Orders    Lipid Panel (Completed)    Trochanteric bursitis of left hip     Counseled patient in proper dosing of Voltaren gel for topical application.  She reports having had issues with bursitis in her shoulders, which improved significantly after steroid injections in the past.  We will refer to Physical Medicine and Rehab to consider the same for her trochanteric bursa.         Relevant Orders    Ambulatory referral/consult to Physical Medicine Rehab    Osteopenia of both hips     Patient requesting refill of alendronate, which she had been prescribed last year based on bone density scan results.  These results were reviewed with her which showed osteopenia in both hips.  She was counseled that bisphosphonate therapy has not been shown to be effective in treatment of osteopenia and is reserved for osteoporosis.  Reviewed the role of adequate  calcium and vitamin-D supplementation and high impact exercise on maintaining bone health.  She reports being taken off of calcium and vitamin-D in the past due to elevated calcium levels; likely due to a combination of CKD and hydrochlorothiazide use.  We will check vitamin-D level and treat only if low.         Relevant Orders    Vitamin D (Completed)     Other Visit Diagnoses       Hypertension, essential        Relevant Medications    losartan (COZAAR) 100 MG tablet              Health Maintenance         Date Due Completion Date    RSV Vaccine (Age 60+ and Pregnant patients) (1 - 1-dose 60+ series) Never done ---    COVID-19 Vaccine (4 - 2023-24 season) 09/01/2023 10/6/2022    DEXA Scan 08/18/2025 8/18/2022    Override on 5/3/2016: Declined    Lipid Panel 03/18/2029 3/18/2024    TETANUS VACCINE 08/26/2029 8/26/2019            Future Appointments   Date Time Provider Department Center   3/20/2024  2:30 PM Royal Riggins PAIselaC [facility] [place]   4/2/2024  2:00 PM Roger Shields MD [facility] PRMCARE [facility]   6/3/2024  3:00 PM Melissa Galloway MD [facility] PHYSMED [facility] [address]   6/4/2024  1:40 PM Porter Thomas MD [facility] CARDIO [facility]   6/10/2024  2:00 PM LAB, [place] [facility] LAB [facility]   6/10/2024  2:15 PM SPEC[facility]AN LAB, [facility] SPECLAB [facility]   6/18/2024  2:00 PM Imelda You MD [facility] NEPHRO [address]   7/5/2024  1:00 PM Yarely Carter OD [facility]         Follow up in about 2 weeks (around 4/1/2024). Total clinical care time was 40 min.    Roger Shields MD   [facility], [facility]

## 2024-03-18 NOTE — PATIENT INSTRUCTIONS
Use 4g of voltaren on your hip; measure it out in the nozzle to the 4g line and put all of that on your hip where it hurts.

## 2024-03-18 NOTE — TELEPHONE ENCOUNTER
----- Message from Carlos Rodgers MD sent at 2/28/2019  1:43 PM CST -----  Please notify patient, the stomach biopsies did not reveal any H.pylori.    
Ma called pt to release test results no answer message left on pt vm  
10

## 2024-03-19 ENCOUNTER — TELEPHONE (OUTPATIENT)
Dept: NEPHROLOGY | Facility: CLINIC | Age: 84
End: 2024-03-19
Payer: MEDICARE

## 2024-03-19 DIAGNOSIS — N18.32 STAGE 3B CHRONIC KIDNEY DISEASE: Primary | ICD-10-CM

## 2024-03-19 PROBLEM — M85.852 OSTEOPENIA OF BOTH HIPS: Status: ACTIVE | Noted: 2024-03-19

## 2024-03-19 PROBLEM — M70.62 TROCHANTERIC BURSITIS OF LEFT HIP: Status: ACTIVE | Noted: 2024-03-19

## 2024-03-19 PROBLEM — M85.851 OSTEOPENIA OF BOTH HIPS: Status: ACTIVE | Noted: 2024-03-19

## 2024-03-19 PROBLEM — Z78.9 STATIN INTOLERANCE: Status: ACTIVE | Noted: 2024-03-19

## 2024-03-19 NOTE — ASSESSMENT & PLAN NOTE
Patient has been off of any lipid lowering therapy due to statin intolerance.  She had previously been treated with Repatha, but is not sure why it was stopped.  We will check lipid panel.

## 2024-03-19 NOTE — ASSESSMENT & PLAN NOTE
Symptoms have not yet improved.  We will increase sertraline to 100 mg daily; if still no improvement after 2 more weeks we will change to an alternate therapy.

## 2024-03-19 NOTE — ASSESSMENT & PLAN NOTE
Counseled patient in proper dosing of Voltaren gel for topical application.  She reports having had issues with bursitis in her shoulders, which improved significantly after steroid injections in the past.  We will refer to Physical Medicine and Rehab to consider the same for her trochanteric bursa.

## 2024-03-19 NOTE — ASSESSMENT & PLAN NOTE
We will check annual labs to assess kidney function.  She does not have a nephrologist; referral placed.

## 2024-03-19 NOTE — ASSESSMENT & PLAN NOTE
Patient requesting refill of alendronate, which she had been prescribed last year based on bone density scan results.  These results were reviewed with her which showed osteopenia in both hips.  She was counseled that bisphosphonate therapy has not been shown to be effective in treatment of osteopenia and is reserved for osteoporosis.  Reviewed the role of adequate calcium and vitamin-D supplementation and high impact exercise on maintaining bone health.  She reports being taken off of calcium and vitamin-D in the past due to elevated calcium levels; likely due to a combination of CKD and hydrochlorothiazide use.  We will check vitamin-D level and treat only if low.

## 2024-03-20 ENCOUNTER — OFFICE VISIT (OUTPATIENT)
Dept: OTOLARYNGOLOGY | Facility: CLINIC | Age: 84
End: 2024-03-20
Payer: MEDICARE

## 2024-03-20 VITALS
WEIGHT: 145.94 LBS | BODY MASS INDEX: 26.69 KG/M2 | SYSTOLIC BLOOD PRESSURE: 160 MMHG | HEART RATE: 82 BPM | DIASTOLIC BLOOD PRESSURE: 77 MMHG

## 2024-03-20 DIAGNOSIS — R51.9 SEVERE FRONTAL HEADACHES: Primary | ICD-10-CM

## 2024-03-20 DIAGNOSIS — G47.33 OSA (OBSTRUCTIVE SLEEP APNEA): ICD-10-CM

## 2024-03-20 DIAGNOSIS — J30.89 PERENNIAL ALLERGIC RHINITIS WITH SEASONAL VARIATION: ICD-10-CM

## 2024-03-20 DIAGNOSIS — J34.2 DEVIATED NASAL SEPTUM: ICD-10-CM

## 2024-03-20 DIAGNOSIS — J34.3 HYPERTROPHY OF INFERIOR NASAL TURBINATE: ICD-10-CM

## 2024-03-20 DIAGNOSIS — J32.9 CHRONIC RHINOSINUSITIS: ICD-10-CM

## 2024-03-20 DIAGNOSIS — J30.2 PERENNIAL ALLERGIC RHINITIS WITH SEASONAL VARIATION: ICD-10-CM

## 2024-03-20 PROCEDURE — 99999 PR PBB SHADOW E&M-EST. PATIENT-LVL V: CPT | Mod: PBBFAC,,, | Performed by: PHYSICIAN ASSISTANT

## 2024-03-20 PROCEDURE — 31575 DIAGNOSTIC LARYNGOSCOPY: CPT | Mod: S$GLB,,, | Performed by: PHYSICIAN ASSISTANT

## 2024-03-20 PROCEDURE — 99214 OFFICE O/P EST MOD 30 MIN: CPT | Mod: 25,S$GLB,, | Performed by: PHYSICIAN ASSISTANT

## 2024-03-20 NOTE — PROCEDURES
"Laryngoscopy    Date/Time: 3/20/2024 2:30 PM    Performed by: Royal Riggins PA-C  Authorized by: Royal Riggins PA-C    Time out: Immediately prior to procedure a "time out" was called to verify the correct patient, procedure, equipment, support staff and site/side marked as required.    Anesthesia:     Local anesthetic:  Lidocaine 4% and Gurinder-Synephrine 1/2%    Patient tolerance:  Patient tolerated the procedure well with no immediate complications  Laryngoscopy:     Areas examined:  Nasal cavities, nasopharynx, oropharynx, hypopharynx, larynx and vocal cords    Laryngoscope size:  4 mm  Nose External:      No external nasal deformity  Nose Intranasal:      Mucosa no polyps     Mucosa ulcers not present     No mucosa lesions present     Septum gross deformity (L septal spur)     Turbinates not enlarged  Nasopharynx:      No mucosa lesions     Adenoids not present     Posterior choanae patent     Eustachian tube patent  Larynx/hypopharynx:      No epiglottis lesions     No epiglottis edema     No AE folds lesions     No vocal cord polyps     Equal and normal bilateral     No hypopharynx lesions     No piriform sinus pooling     No piriform sinus lesions     No post cricoid edema     No post cricoid erythema     Clear and patent max sinus ostia  L MT edematous  No signficant mucous in PCs    "

## 2024-03-20 NOTE — PATIENT INSTRUCTIONS
Please contact central scheduling at [phone] or [phone] to schedule your appointment if you do not hear from referral service within the next week.      Try Xylimelts (over the counter) to help hydrate your mouth and throat to help with the post nasal drip

## 2024-03-20 NOTE — PROGRESS NOTES
Subjective:     HPI: Betzaida Neumann is a 84 y.o. female who was referred to me by Dr. Roger Shields in consultation for  chronic PND and frontal pressure .    Patient reports frequent postnasal drip and frontal/ temporal pressure.  Patient states symptoms are usually worse in the morning when she wakes up.  Patient denies any associated throat clearing, coughing, dysphagia, rhinorrhea, frequent nose blowing.   Patient reports frequent sinus infections but denies any discolored mucus or nasal obstruction with these episodes.    This has been recurring for years and has undergone workup previously with ENT in 2016.    Patient states she is undergone allergy shots and does not feel significant improvement.    Current sinonasal medications include flonase, azelastine and ipratropium-->2 SEN BID, .    She recalls previously having allergy testingwhich resulted in a course of immunotherapy.  She denies a history of asthma as a child.  She relates a history of reflux symptoms which is currently managed with unknown medication.    She relates a diagnosis of obstructive sleep apnea without regular CPAP use. H/o moderate severe EMILIO in , but mild EMILIO in  with Dr. Sawyer  She does not recall a prior history of nasal trauma other than the sinonasal surgery.  She has previously had sinonasal surgery consisting of septoplasty in .    She has had a tonsillectomy.  She is not a tobacco smoker.     Past Medical/Past Surgical History  Past Medical History:   Diagnosis Date    Allergy     Arthritis     Cataract     CKD (chronic kidney disease) stage 3, GFR 30-59 ml/min     DVT (deep venous thrombosis) 2023    x2    Elevated C-reactive protein (CRP) 2017    Fibromyalgia     GERD (gastroesophageal reflux disease)     Hyperlipidemia     Hypertension     Polymyalgia rheumatica     RA (rheumatoid arthritis)      She has a past surgical history that includes Rhinoplasty tip (); Hysterectomy;  section;  Cholecystectomy; Appendectomy; Tonsillectomy; Tubal ligation; Eye surgery; Cosmetic surgery; Cataract extraction w/  intraocular lens implant (Left); Cataract extraction w/  intraocular lens implant (Right, 04/24/2017); Esophagogastroduodenoscopy (N/A, 2/21/2019); Esophageal manometry with measurement of impedance (N/A, 6/19/2019); and Epidural steroid injection into cervical spine (Bilateral, 12/10/2020).    Family History/Social History  Her family history includes HIV in her son; Heart disease in her son; Other in her father and mother.  She reports that she quit smoking about 24 years ago. Her smoking use included cigarettes. She started smoking about 53 years ago. She has a 29.0 pack-year smoking history. She has never used smokeless tobacco. She reports current alcohol use of about 1.0 standard drink of alcohol per week. She reports that she does not use drugs.    Allergies/Immunizations  She is allergic to shellfish containing products, sulfa (sulfonamide antibiotics), cabbage (brassica oleracea), chocolate flavor, duckweed, kale, mustard, orange, statins-hmg-coa reductase inhibitors, sulfacetamide sodium, tomato (solanum lycopersicum), tree nut, and weed pollen.  Immunization History   Administered Date(s) Administered    COVID-19, MRNA, LN-S, PF (Pfizer) ([facility]) 07/19/2022    COVID-19, mRNA, LNP-S, bivalent booster, PF (PFIZER OMICRON) 10/06/2022    COVID-19, vector-nr, rS-Ad26, PF ([facility]) 03/05/2021    Influenza 10/11/2014    Influenza (FLUAD) - Quadrivalent - Adjuvanted - PF *Preferred* (65+) 10/11/2021, 09/12/2023    Influenza - High Dose - PF (65 years and older) 09/08/2013, 10/21/2015, 11/10/2016, 10/13/2017, 10/22/2018    Influenza - Quadrivalent - High Dose - PF (65 years and older) 10/05/2020, 10/06/2022    Influenza - Trivalent (ADULT) 11/27/2012    Influenza - Trivalent - PF (ADULT) 10/11/2014    Pneumococcal Conjugate - 13 Valent 11/10/2016    Pneumococcal Conjugate - 20 Valent 11/09/2022     Pneumococcal Polysaccharide - 23 Valent 09/22/2011, 10/11/2014, 10/13/2017    Td (ADULT) 02/27/2006    Tdap 08/26/2019, 08/26/2019    Zoster Recombinant 10/06/2022, 04/04/2023        Medications   ACETAMINOPHEN ORAL  albuterol  allopurinoL  azelastine Spry  betamethasone dipropionate  diclofenac sodium Gel  diphenhydrAMINE  ferrous sulfate  fluticasone propionate  hydroCHLOROthiazide  ipratropium  IPRATROPIUM-ALBUTEROL INHL  levocetirizine  losartan  montelukast  ORENCIA CLICKJECT Atin  PEPTO-BISMOL ORAL  predniSONE  sertraline     Review of Systems   HENT: Positive for ear discharge, postnasal drip, sinus infection and sinus pressure.  Negative for ear infection, ear pain, runny nose, stuffy nose, trouble swallowing and voice change.      Respiratory:  Positive for sleep apnea. Negative for cough and shortness of breath.      Neurological: Positive for headaches.           Objective:     BP (!) 160/77 (BP Location: Left arm, Patient Position: Sitting, BP Method: Large (Automatic))   Pulse 82   Wt 66.2 kg (145 lb 15.1 oz)   BMI 26.69 kg/m²      Physical Exam  Vitals reviewed.   Constitutional:       Appearance: Normal appearance. She is well-developed.   HENT:      Head: Normocephalic and atraumatic.      Right Ear: Tympanic membrane, ear canal and external ear normal.      Left Ear: Tympanic membrane, ear canal and external ear normal.      Nose: Septal deviation (left septal spur) present. No mucosal edema or rhinorrhea.      Right Nostril: No epistaxis.      Left Nostril: No epistaxis.      Right Turbinates: Not enlarged.      Left Turbinates: Not enlarged.      Right Sinus: No maxillary sinus tenderness or frontal sinus tenderness.      Left Sinus: No maxillary sinus tenderness or frontal sinus tenderness.      Mouth/Throat:      Lips: Pink. No lesions.      Dentition: Has dentures.      Tongue: No lesions. Tongue does not deviate from midline.      Palate: No mass and lesions.      Pharynx: Oropharynx is  "clear. Uvula midline. No pharyngeal swelling, oropharyngeal exudate, posterior oropharyngeal erythema or uvula swelling.      Tonsils: No tonsillar exudate or tonsillar abscesses. 0 on the right. 0 on the left.      Comments: MMP 1  Eyes:      Extraocular Movements: Extraocular movements intact.      Conjunctiva/sclera: Conjunctivae normal.   Neck:      Thyroid: No thyromegaly or thyroid tenderness.   Lymphadenopathy:      Cervical: No cervical adenopathy.   Psychiatric:         Mood and Affect: Mood normal.         Behavior: Behavior normal. Behavior is cooperative.          Procedure    Flexible laryngoscopy performed.  See procedure note.     L NV with septal spur     L MT      L ET     R NV     R  max ostium patent     R ET     Hypopharynx/larynx      VF mobility      Data Reviewed  I personally reviewed the chart, including any outside records, and pertinent data below:    I reviewed the following notes: ENt, Allergy    WBC (K/uL)   Date Value   03/18/2024 6.15     Eosinophil % (%)   Date Value   03/18/2024 2.6     Eos # (K/uL)   Date Value   03/18/2024 0.2     Platelets (K/uL)   Date Value   03/18/2024 329     Glucose (mg/dL)   Date Value   03/18/2024 110     No results found for: "IGE"    I independently reviewed the images of the CT sinuses dated 3/28/2016. Pertinent sinus findings include L septal spur, mild patchy ethmoid opacifications.    Assessment & Plan:     1. Severe frontal headaches  -     Do not suspect sinusitis as etiology of headaches  - Other etiologies include under-treated EMILIO, tension headaches, cervicogenic headache  - Ambulatory referral/consult to Neurology; Future; Expected date: 03/27/2024  -     Laryngoscopy with LPR changes, PND, or rhinitis    2. Perennial allergic rhinitis with seasonal variation  3. Chronic rhinosinusitis  -    Continue nasal sprays   - Do not suspect LPR as etiology of PND; no coughing, throat clearing or other throat symptoms    4. EMILIO (obstructive sleep " apnea)  -     Ambulatory referral/consult to Sleep Disorders; Future; Expected date: 03/27/2024    5. Deviated nasal septum  6. Hypertrophy of inferior nasal turbinate   - incidental findings    She will Follow up if symptoms worsen or fail to improve.  I had a discussion with the patient regarding her condition and the further workup and management options.    All questions were answered, and the patient is in agreement with the above.     Disclaimer:  This note may have been prepared utilizing voice recognition software which may result in occasional typographical errors in the text such as sound alike words.   If further clarification is needed, please contact the ENT department of [facility].

## 2024-04-02 ENCOUNTER — OFFICE VISIT (OUTPATIENT)
Facility: CLINIC | Age: 84
End: 2024-04-02
Payer: MEDICARE

## 2024-04-02 DIAGNOSIS — N95.1 MENOPAUSAL SYNDROME (HOT FLASHES): Primary | ICD-10-CM

## 2024-04-02 PROCEDURE — 99499 UNLISTED E&M SERVICE: CPT | Mod: 95,,, | Performed by: HOSPITALIST

## 2024-04-02 RX ORDER — CITALOPRAM 10 MG/1
10 TABLET ORAL DAILY
Qty: 30 TABLET | Refills: 0 | Status: SHIPPED | OUTPATIENT
Start: 2024-04-02 | End: 2024-04-22 | Stop reason: SINTOL

## 2024-04-02 NOTE — PROGRESS NOTES
Established Patient - Audio Only Telehealth Visit     The patient location is: home  The chief complaint leading to consultation is: f/u  Visit type: Virtual visit with audio only (telephone)  Total time spent with patient: 6 min       The reason for the audio only service rather than synchronous audio and video virtual visit was related to technical difficulties or patient preference/necessity.     Each patient to whom I provide medical services by telemedicine is:  (1) informed of the relationship between the physician and patient and the respective role of any other health care provider with respect to management of the patient; and (2) notified that they may decline to receive medical services by telemedicine and may withdraw from such care at any time. Patient verbally consented to receive this service via voice-only telephone call.       HPI:  Patient reports doing well other than ongoing menopausal symptoms.  The sertraline has not been effective at all.  She is otherwise thankful that her health has been so good thus far; most of her friends are burden with chronic health problems and are not as active.  She has resolved to get out more and be sure that she enjoys herself while she is able.     Assessment and plan:  We will stop sertraline and switch to citalopram 10 mg, and have her increase to 20 mg in 1 week.  Follow-up in 2 weeks.                        This service was not originating from a related E/M service provided within the previous 7 days nor will  to an E/M service or procedure within the next 24 hours or my soonest available appointment.  Prevailing standard of care was able to be met in this audio-only visit.

## 2024-04-22 ENCOUNTER — OFFICE VISIT (OUTPATIENT)
Facility: CLINIC | Age: 84
End: 2024-04-22
Payer: MEDICARE

## 2024-04-22 VITALS
OXYGEN SATURATION: 98 % | WEIGHT: 147.19 LBS | SYSTOLIC BLOOD PRESSURE: 141 MMHG | HEART RATE: 85 BPM | BODY MASS INDEX: 26.92 KG/M2 | DIASTOLIC BLOOD PRESSURE: 77 MMHG | TEMPERATURE: 98 F

## 2024-04-22 DIAGNOSIS — I10 ESSENTIAL HYPERTENSION: ICD-10-CM

## 2024-04-22 DIAGNOSIS — R61 CHRONIC NIGHT SWEATS: Primary | ICD-10-CM

## 2024-04-22 DIAGNOSIS — N95.1 MENOPAUSAL SYNDROME (HOT FLASHES): ICD-10-CM

## 2024-04-22 PROCEDURE — 99999 PR PBB SHADOW E&M-EST. PATIENT-LVL V: CPT | Mod: PBBFAC,,, | Performed by: HOSPITALIST

## 2024-04-22 PROCEDURE — 99499 UNLISTED E&M SERVICE: CPT | Mod: S$GLB,,, | Performed by: HOSPITALIST

## 2024-04-22 RX ORDER — AMLODIPINE BESYLATE 2.5 MG/1
2.5 TABLET ORAL DAILY
Qty: 90 TABLET | Refills: 3 | Status: SHIPPED | OUTPATIENT
Start: 2024-04-22 | End: 2024-06-04

## 2024-04-22 RX ORDER — GEMFIBROZIL 600 MG/1
600 TABLET, FILM COATED ORAL 2 TIMES DAILY
COMMUNITY
Start: 2024-03-21

## 2024-04-22 RX ORDER — GABAPENTIN 300 MG/1
300 CAPSULE ORAL NIGHTLY
Qty: 30 CAPSULE | Refills: 11 | Status: SHIPPED | OUTPATIENT
Start: 2024-04-22 | End: 2024-05-06 | Stop reason: SDUPTHER

## 2024-04-22 RX ORDER — BUDESONIDE AND FORMOTEROL FUMARATE DIHYDRATE 160; 4.5 UG/1; UG/1
2 AEROSOL RESPIRATORY (INHALATION) 2 TIMES DAILY
COMMUNITY
Start: 2024-04-02

## 2024-04-22 RX ORDER — LORATADINE 10 MG/1
10 TABLET ORAL EVERY MORNING
COMMUNITY
Start: 2024-04-02 | End: 2024-05-08

## 2024-04-22 RX ORDER — OLOPATADINE HYDROCHLORIDE 1 MG/ML
1 SOLUTION/ DROPS OPHTHALMIC 2 TIMES DAILY
COMMUNITY
Start: 2024-04-02

## 2024-04-22 NOTE — PATIENT INSTRUCTIONS
"Please fill out and bring back the "Five Wishes" packet to your next visit.  If you already have a copy of a Healthcare Power of  and/or a Living Will, bring those back as well.    "

## 2024-04-22 NOTE — PROGRESS NOTES
Primary Care Provider Appointment - [facility] & Cary Shields MD      Subjective:      Patient ID: Betzaida Neumann is a 84 y.o. female with   Past Medical History:   Diagnosis Date    Allergy     Arthritis     Cataract     CKD (chronic kidney disease) stage 3, GFR 30-59 ml/min     DVT (deep venous thrombosis) 2023    x2    Elevated C-reactive protein (CRP) 03/24/2017    Fibromyalgia     GERD (gastroesophageal reflux disease)     Hyperlipidemia     Hypertension     Polymyalgia rheumatica     RA (rheumatoid arthritis)            Chief Complaint: Follow-up    Prior to this visit, patient's last encounter with PCP was 3/18/2024.    Doing well; recently went to [place] to visit her son and his family; went to [employer] and did well getting around and w/ endurance as well.  Hip pain from prior visit resolved.  Tried the citalopram which seemed to make her feel depressed and tired.  Stopped it.  Still having night sweats.      3/18: No improvement w/ menopausal sx so far.  H/o DVT x2 last year.  Also having pain in L hip.  Having to use heating pad from time to time.  Pain seems to be worse if sitting for too long.  Being active seems to help prevent it.  Also c/o changes in fingernails; having ridges on nails and seeming more brittle.      3/5: Pt reports wanted to establish care.  Has HTN, HLD, CKD.  Wants to avoid HD.  BP tends to elevate at dr's office.  H/o 80% R carotid stenosis, improved w/ medical therapy only.  Former smoker.  Lives independently and mows lawn, gardens, keeps house.  Considering joining gym.  Had an illness last year and lost about 25 lbs.  Had no appetite, wasn't eating.  Was hospitalized.  Doing better now, but not back to 100% since.  Has some fatigue.  Tries to stay busy around house due to fear of crime so doesn't get out much.    Her gynecologist retired, who was Rx-ing HRT.  Has been out of estradiol about a month and having hot flashes, sweats, etc.  Has been on it  since 1979 when she had a hysterectomy.    On Orencia for RA w/ good results.  No joint pains now.    Checks BPs at home 2-3x/wk, usually in 120s systolic, never higher than 140.    4Ms for Medical Decision-Making in Older Adults    Last Completed EAWV: None    MOBILITY:  Get Up and Go:       No data to display              Activities of Daily Living:       No data to display              Whisper Test:       No data to display              Disability Status:      4/20/2017     9:00 AM   Disability Status   Are you deaf or do you have serious difficulty hearing? N   Are you blind or do you have serious difficulty seeing, even when wearing glasses? N   Because of a physical, mental, or emotional condition, do you have serious difficulty concentrating, remembering, or making decisions? N   Do you have serious difficulty walking or climbing stairs? N   Do you have difficulty dressing or bathing? N   Because of a physical, mental, or emotional condition, do you have difficulty doing errands alone such as visiting a doctor's office or shopping? N     Nutrition Screening:       No data to display             Screening Score: 0-7 Malnourished, 8-11 At Risk, 12-14 Normal    MENTATION:   Depression Patient Health Questionnaire:      3/5/2024     2:07 PM   Depression Patient Health Questionnaire   Over the last two weeks how often have you been bothered by little interest or pleasure in doing things Not at all   Over the last two weeks how often have you been bothered by feeling down, depressed or hopeless Not at all   PHQ-2 Total Score 0     Has Dementia Dx: No    Cognitive Function Screening:       No data to display              Cognitive Function Screening Total - Less than 4 = Abnormal,  Greater than or equal to 4 = Normal    MEDICATIONS:  High Risk Medications:  Total Active Medications: 1  citalopram - 10 MG    WHAT MATTERS MOST:  Advance Care Planning   ACP Status:   Patient has had an ACP conversation  Living Will:  "No  Power of : No  [place]: No    What is most important right now is to focus on avoiding the hospital and remaining as independent as possible    Accordingly, we have decided that the best plan to meet the patient's goals includes continuing with treatment      What matters most to patient today is: getting established with a PCP who is accessible and will personalize care consistent with her values.             Date: 2024  Patient did not wish or was not able to name a surrogate decision maker or provide an Advance Care Plan. "Five Wishes" provided to patient to fill out and bring to return visit.      Social History     Socioeconomic History    Marital status: Single   Tobacco Use    Smoking status: Former     Current packs/day: 0.00     Average packs/day: 1 pack/day for 29.0 years (29.0 ttl pk-yrs)     Types: Cigarettes     Start date: 1970     Quit date: 1999     Years since quittin.6    Smokeless tobacco: Never    Tobacco comments:     Retired ; ; 4 children healthy   Substance and Sexual Activity    Alcohol use: Yes     Alcohol/week: 1.0 standard drink of alcohol     Types: 1 Glasses of wine per week     Comment: social    Drug use: No    Sexual activity: Yes     Partners: Male       Review of Systems   Constitutional:  Positive for diaphoresis and fatigue. Negative for activity change, appetite change, chills and fever.   HENT:  Negative for sore throat.    Eyes:  Negative for photophobia and visual disturbance.   Respiratory:  Negative for cough and shortness of breath.    Cardiovascular:  Negative for chest pain and leg swelling.   Gastrointestinal:  Negative for abdominal pain, constipation, diarrhea, nausea and vomiting.   Genitourinary:  Positive for hot flashes. Negative for dysuria and hematuria.   Musculoskeletal:  Positive for leg pain. Negative for arthralgias and myalgias.   Integumentary:  Negative for rash and wound.   Neurological:  Negative " for dizziness and weakness.        Objective:   BP (!) 141/77 (BP Location: Left arm, Patient Position: Sitting, BP Method: Medium (Automatic))   Pulse 85   Temp 98 °F (36.7 °C) (Oral)   Wt 66.7 kg (147 lb 2.5 oz)   SpO2 98%   BMI 26.92 kg/m²     Physical Exam  Vitals reviewed.   Constitutional:       General: She is not in acute distress.     Appearance: Normal appearance.   HENT:      Head: Normocephalic and atraumatic.      Right Ear: Tympanic membrane, ear canal and external ear normal.      Left Ear: Tympanic membrane, ear canal and external ear normal.      Nose: Nose normal.      Mouth/Throat:      Mouth: Mucous membranes are moist.      Pharynx: Oropharynx is clear.   Eyes:      General: No scleral icterus.     Conjunctiva/sclera: Conjunctivae normal.   Cardiovascular:      Rate and Rhythm: Normal rate and regular rhythm.      Pulses: Normal pulses.      Heart sounds: Normal heart sounds.   Pulmonary:      Effort: Pulmonary effort is normal. No respiratory distress.      Breath sounds: Normal breath sounds.   Abdominal:      General: There is no distension.      Palpations: Abdomen is soft.      Tenderness: There is no abdominal tenderness. There is no guarding.   Musculoskeletal:         General: Normal range of motion.      Cervical back: Normal range of motion and neck supple.      Right lower leg: No edema.      Left lower leg: No edema.   Lymphadenopathy:      Cervical: No cervical adenopathy.   Skin:     General: Skin is warm and dry.      Findings: No rash.   Neurological:      General: No focal deficit present.      Mental Status: She is alert and oriented to person, place, and time.              Lab Results   Component Value Date    WBC 6.15 03/18/2024    HGB 11.8 (L) 03/18/2024    HCT 38.7 03/18/2024     03/18/2024    CHOL 254 (H) 03/18/2024    TRIG 58 03/18/2024    HDL 85 (H) 03/18/2024    ALT 10 03/18/2024    AST 17 03/18/2024     03/18/2024    K 3.9 03/18/2024      03/18/2024    CREATININE 1.3 03/18/2024    BUN 14 03/18/2024    CO2 22 (L) 03/18/2024    TSH 1.397 03/18/2024    INR 1.1 04/30/2023    HGBA1C 5.4 05/01/2023       Current Outpatient Medications on File Prior to Visit   Medication Sig Dispense Refill    abatacept (ORENCIA CLICKJECT) 125 mg/mL AtIn Inject 1 mL into the skin every 7 days. 4 mL 11    ACETAMINOPHEN ORAL Take by mouth as needed.      albuterol (PROVENTIL) 2.5 mg /3 mL (0.083 %) nebulizer solution Take 2.5 mg by nebulization every 6 (six) hours as needed for Wheezing. Rescue      allopurinoL (ZYLOPRIM) 100 MG tablet Take 2 tablets (200 mg total) by mouth daily as needed (gout flare). 180 tablet 1    azelastine 205.5 mcg (0.15 %) Spry 2 sprays by Each Nostril route 2 (two) times daily.      betamethasone dipropionate 0.05 % cream APPLY THIN LAYER TOPICALLY TO THE AFFECTED AREA TWICE DAILY      bismuth subsalicylate (PEPTO-BISMOL ORAL) Take by mouth.      citalopram (CELEXA) 10 MG tablet Take 1 tablet (10 mg total) by mouth once daily. After 7 days increase to 2 tablets daily. 30 tablet 0    diclofenac sodium (VOLTAREN) 1 % Gel Apply 4 g topically 3 (three) times daily as needed (hip/leg pain).      diphenhydrAMINE (BENADRYL) 25 mg capsule Take 25 mg by mouth every 6 (six) hours as needed for Itching.      ferrous sulfate 325 (65 FE) MG EC tablet Take 325 mg by mouth once daily.      fluticasone (FLONASE) 50 mcg/actuation nasal spray fluticasone 50 mcg/actuation nasal spray,suspension      gemfibroziL (LOPID) 600 MG tablet Take 600 mg by mouth 2 (two) times daily.      hydroCHLOROthiazide (HYDRODIURIL) 25 MG tablet       ipratropium (ATROVENT) 42 mcg (0.06 %) nasal spray INSTILL 2 SPRAYS IN EACH NOSTRIL TWICE DAILY WITH ASTELIN      ipratropium/albuterol sulfate (IPRATROPIUM-ALBUTEROL INHL) Inhale into the lungs as needed.      levocetirizine (XYZAL) 5 MG tablet Take 5 mg by mouth once daily.      loratadine (CLARITIN) 10 mg tablet Take 10 mg by mouth every  morning.      losartan (COZAAR) 100 MG tablet Take 1 tablet (100 mg total) by mouth once daily. 90 tablet 3    montelukast (SINGULAIR) 10 mg tablet Take 10 mg by mouth every evening.      olopatadine (PATANOL) 0.1 % ophthalmic solution Place 1 drop into both eyes 2 (two) times daily.      predniSONE (DELTASONE) 2.5 MG tablet Take 1 tablet (2.5 mg total) by mouth daily as needed (RA flare). 30 tablet 4    SYMBICORT 160-4.5 mcg/actuation HFAA Inhale 2 puffs into the lungs 2 (two) times daily.       No current facility-administered medications on file prior to visit.         Assessment:   84 y.o. female with multiple co-morbid illnesses here to follow-up for ongoing chronic disease management and preventive health maintenance.    Plan:     Problem List Items Addressed This Visit       Essential hypertension     Not at goal on losartan 100 mg daily.  HCTZ stopped previously secondary to dehydration.  We will trial amlodipine 2.5 mg daily.         Relevant Medications    amLODIPine (NORVASC) 2.5 MG tablet    Chronic night sweats - Primary    Relevant Medications    gabapentin (NEURONTIN) 300 MG capsule    Menopausal syndrome (hot flashes)     Did not tolerate citalopram.  We will try gabapentin 300 mg at bedtime to try to reduce vasomotor symptoms.  Patient counseled on likely drowsiness as side effect of the medication.                Health Maintenance         Date Due Completion Date    RSV Vaccine (Age 60+ and Pregnant patients) (1 - 1-dose 60+ series) Never done ---    COVID-19 Vaccine (4 - 2023-24 season) 09/01/2023 10/6/2022    DEXA Scan 08/18/2025 8/18/2022    Override on 5/3/2016: Declined    Lipid Panel 03/18/2029 3/18/2024    TETANUS VACCINE 08/26/2029 8/26/2019            Future Appointments   Date Time Provider Department Center   5/6/2024  2:00 PM Roger Shields MD [facility]   5/8/2024  3:00 PM Judith Gaines MD [facility] NEURO [facility]   5/15/2024  2:00 PM Imelda You MD  [facility] NEPHRO [facility]   6/3/2024  3:00 PM Melissa Galloway MD [facility] PHYSMED [facility]   6/4/2024  1:40 PM Porter Thomas MD [facility] CARDIO [facility]   6/10/2024  2:00 [facility]   6/10/2024  2:15 PM SPECIMAN LAB, [facility] SPECLAB [facility]   7/5/2024  1:00 PM Yarely Carter, CK [facility] OPTICLB [facility]   7/9/2024  2:30 PM Valentin Sawyer MD [facility] SLEEP [facility]         Follow up in about 2 weeks (around 5/6/2024). Total clinical care time was 40 min.    Roger Shields MD  [facility], [facility]

## 2024-04-22 NOTE — ASSESSMENT & PLAN NOTE
Did not tolerate citalopram.  We will try gabapentin 300 mg at bedtime to try to reduce vasomotor symptoms.  Patient counseled on likely drowsiness as side effect of the medication.

## 2024-04-22 NOTE — ASSESSMENT & PLAN NOTE
Not at goal on losartan 100 mg daily.  HCTZ stopped previously secondary to dehydration.  We will trial amlodipine 2.5 mg daily.

## 2024-04-30 DIAGNOSIS — M05.742 RHEUMATOID ARTHRITIS INVOLVING BOTH HANDS WITH POSITIVE RHEUMATOID FACTOR: ICD-10-CM

## 2024-04-30 DIAGNOSIS — M05.741 RHEUMATOID ARTHRITIS INVOLVING BOTH HANDS WITH POSITIVE RHEUMATOID FACTOR: ICD-10-CM

## 2024-04-30 RX ORDER — ABATACEPT 125 MG/ML
1 INJECTION, SOLUTION SUBCUTANEOUS
Qty: 4 ML | Refills: 0 | Status: ACTIVE | OUTPATIENT
Start: 2024-04-30

## 2024-05-06 ENCOUNTER — OFFICE VISIT (OUTPATIENT)
Facility: CLINIC | Age: 84
End: 2024-05-06
Payer: MEDICARE

## 2024-05-06 VITALS
SYSTOLIC BLOOD PRESSURE: 138 MMHG | BODY MASS INDEX: 26.96 KG/M2 | OXYGEN SATURATION: 100 % | WEIGHT: 147.38 LBS | HEART RATE: 85 BPM | TEMPERATURE: 99 F | DIASTOLIC BLOOD PRESSURE: 63 MMHG

## 2024-05-06 DIAGNOSIS — N18.32 STAGE 3B CHRONIC KIDNEY DISEASE: Primary | ICD-10-CM

## 2024-05-06 DIAGNOSIS — R61 CHRONIC NIGHT SWEATS: ICD-10-CM

## 2024-05-06 PROCEDURE — 99999 PR PBB SHADOW E&M-EST. PATIENT-LVL IV: CPT | Mod: PBBFAC,,, | Performed by: HOSPITALIST

## 2024-05-06 PROCEDURE — 99499 UNLISTED E&M SERVICE: CPT | Mod: S$GLB,,, | Performed by: HOSPITALIST

## 2024-05-06 RX ORDER — ALENDRONATE SODIUM 70 MG/1
70 TABLET ORAL
COMMUNITY
Start: 2024-05-03

## 2024-05-06 RX ORDER — GABAPENTIN 300 MG/1
300 CAPSULE ORAL NIGHTLY
Qty: 30 CAPSULE | Refills: 11 | Status: SHIPPED | OUTPATIENT
Start: 2024-05-06 | End: 2025-05-06

## 2024-05-06 NOTE — PATIENT INSTRUCTIONS
You can bring the Advance Care Planning packet back to us anytime at your convenience and we will scan it into your chart.

## 2024-05-06 NOTE — ASSESSMENT & PLAN NOTE
Kidney function has remained relatively stable over past year.  She is on an ARB and follows with a nephrologist.

## 2024-05-06 NOTE — ASSESSMENT & PLAN NOTE
Re-sent gabapentin prescription and confirmed receipt by pharmacy.  We will schedule phone follow-up in 1 week to reassess symptoms and titrate therapy as needed.

## 2024-05-06 NOTE — PROGRESS NOTES
"  Primary Care Provider Appointment - [facility]  Roger Shields MD      Subjective:      Patient ID: Betzaida Neumann is a 84 y.o. female with   Past Medical History:   Diagnosis Date    Allergy     Arthritis     Cataract     CKD (chronic kidney disease) stage 3, GFR 30-59 ml/min     DVT (deep venous thrombosis) 2023    x2    Elevated C-reactive protein (CRP) 03/24/2017    Fibromyalgia     GERD (gastroesophageal reflux disease)     Hyperlipidemia     Hypertension     Polymyalgia rheumatica     RA (rheumatoid arthritis)            Chief Complaint: Follow-up    Prior to this visit, patient's last encounter with PCP was 4/22/2024.    Reports hasn't gotten the gabapentin Rx'd last visit; [facility] saying they never got it.  Continues to have hot flashes.  C/o difficulty getting an appointment with a gynecologist, who had been ordering her mammograms and doing paps yearly in addition to Rx-ing HRT until he retired.  Last mammogram in January this year; diagnostic repeat due to fatty tissue obscuring an area.  Will be due for screening MMG in November.  She has not been sexually active since before [place] on account of "nobody to play with;" scarcity of older men who are available but also high-functioning, but she is also leery about the dangers of dating nowadays.  No abnormal paps for past couple decades.  She meant to bring back the ACP booklet she completed but forgot it.      4/22: Doing well; recently went to [place] to visit her son and his family; went to [employer] and did well getting around and w/ endurance as well.  Hip pain from prior visit resolved.  Tried the citalopram which seemed to make her feel depressed and tired.  Stopped it.  Still having night sweats.      3/18: No improvement w/ menopausal sx so far.  H/o DVT x2 last year.  Also having pain in L hip.  Having to use heating pad from time to time.  Pain seems to be worse if sitting for too long.  Being active seems to help " prevent it.  Also c/o changes in fingernails; having ridges on nails and seeming more brittle.      3/5: Pt reports wanted to establish care.  Has HTN, HLD, CKD.  Wants to avoid HD.  BP tends to elevate at dr's office.  H/o 80% R carotid stenosis, improved w/ medical therapy only.  Former smoker.  Lives independently and mows lawn, gardens, keeps house.  Considering joining gym.  Had an illness last year and lost about 25 lbs.  Had no appetite, wasn't eating.  Was hospitalized.  Doing better now, but not back to 100% since.  Has some fatigue.  Tries to stay busy around house due to fear of crime so doesn't get out much.    Her gynecologist retired, who was Rx-ing HRT.  Has been out of estradiol about a month and having hot flashes, sweats, etc.  Has been on it since 1979 when she had a hysterectomy.    On Orencia for RA w/ good results.  No joint pains now.    Checks BPs at home 2-3x/wk, usually in 120s systolic, never higher than 140.    4Ms for Medical Decision-Making in Older Adults    Last Completed EAWV: None    MOBILITY:  Get Up and Go:       No data to display              Activities of Daily Living:       No data to display              Whisper Test:       No data to display              Disability Status:      4/20/2017     9:00 AM   Disability Status   Are you deaf or do you have serious difficulty hearing? N   Are you blind or do you have serious difficulty seeing, even when wearing glasses? N   Because of a physical, mental, or emotional condition, do you have serious difficulty concentrating, remembering, or making decisions? N   Do you have serious difficulty walking or climbing stairs? N   Do you have difficulty dressing or bathing? N   Because of a physical, mental, or emotional condition, do you have difficulty doing errands alone such as visiting a doctor's office or shopping? N     Nutrition Screening:       No data to display             Screening Score: 0-7 Malnourished, 8-11 At Risk, 12-14  "Normal    MENTATION:   Depression Patient Health Questionnaire:      3/5/2024     2:07 PM   Depression Patient Health Questionnaire   Over the last two weeks how often have you been bothered by little interest or pleasure in doing things Not at all   Over the last two weeks how often have you been bothered by feeling down, depressed or hopeless Not at all   PHQ-2 Total Score 0     Has Dementia Dx: No    Cognitive Function Screening:       No data to display              Cognitive Function Screening Total - Less than 4 = Abnormal,  Greater than or equal to 4 = Normal    MEDICATIONS:  High Risk Medications:  Total Active Medications: 1  gabapentin - 300 MG    WHAT MATTERS MOST:  Advance Care Planning   ACP Status:   Patient has had an ACP conversation  Living Will: No  Power of : No  [place]: No    What is most important right now is to focus on avoiding the hospital and remaining as independent as possible    Accordingly, we have decided that the best plan to meet the patient's goals includes continuing with treatment      What matters most to patient today is: getting established with a PCP who is accessible and will personalize care consistent with her values.             Date: 2024  Patient did not wish or was not able to name a surrogate decision maker or provide an Advance Care Plan. "Five Wishes" provided to patient to fill out and bring to return visit.      Social History     Socioeconomic History    Marital status: Single   Tobacco Use    Smoking status: Former     Current packs/day: 0.00     Average packs/day: 1 pack/day for 29.0 years (29.0 ttl pk-yrs)     Types: Cigarettes     Start date: 1970     Quit date: 1999     Years since quittin.7    Smokeless tobacco: Never    Tobacco comments:     Retired ; ; 4 children healthy   Substance and Sexual Activity    Alcohol use: Yes     Alcohol/week: 1.0 standard drink of alcohol     Types: 1 Glasses of wine per " week     Comment: social    Drug use: No    Sexual activity: Yes     Partners: Male       Review of Systems   Constitutional:  Positive for diaphoresis and fatigue. Negative for activity change, appetite change, chills and fever.   HENT:  Negative for sore throat.    Eyes:  Negative for photophobia and visual disturbance.   Respiratory:  Negative for cough and shortness of breath.    Cardiovascular:  Negative for chest pain and leg swelling.   Gastrointestinal:  Negative for abdominal pain, constipation, diarrhea, nausea and vomiting.   Genitourinary:  Positive for hot flashes. Negative for dysuria and hematuria.   Musculoskeletal:  Positive for leg pain. Negative for arthralgias and myalgias.   Integumentary:  Negative for rash and wound.   Neurological:  Negative for dizziness and weakness.        Objective:   /63 (BP Location: Left arm, Patient Position: Sitting, BP Method: Medium (Automatic))   Pulse 85   Temp 98.6 °F (37 °C) (Oral)   Wt 66.8 kg (147 lb 6 oz)   SpO2 100%   BMI 26.96 kg/m²     Physical Exam  Vitals reviewed.   Constitutional:       General: She is not in acute distress.     Appearance: Normal appearance.   HENT:      Head: Normocephalic and atraumatic.      Right Ear: Tympanic membrane, ear canal and external ear normal.      Left Ear: Tympanic membrane, ear canal and external ear normal.      Nose: Nose normal.      Mouth/Throat:      Mouth: Mucous membranes are moist.      Pharynx: Oropharynx is clear.   Eyes:      General: No scleral icterus.     Conjunctiva/sclera: Conjunctivae normal.   Cardiovascular:      Rate and Rhythm: Normal rate and regular rhythm.      Pulses: Normal pulses.      Heart sounds: Normal heart sounds.   Pulmonary:      Effort: Pulmonary effort is normal. No respiratory distress.      Breath sounds: Normal breath sounds.   Abdominal:      General: There is no distension.      Palpations: Abdomen is soft.      Tenderness: There is no abdominal tenderness. There  is no guarding.   Musculoskeletal:         General: Normal range of motion.      Cervical back: Normal range of motion and neck supple.      Right lower leg: No edema.      Left lower leg: No edema.   Lymphadenopathy:      Cervical: No cervical adenopathy.   Skin:     General: Skin is warm and dry.      Findings: No rash.   Neurological:      General: No focal deficit present.      Mental Status: She is alert and oriented to person, place, and time.            Lab Results   Component Value Date    WBC 6.15 03/18/2024    HGB 11.8 (L) 03/18/2024    HCT 38.7 03/18/2024     03/18/2024    CHOL 254 (H) 03/18/2024    TRIG 58 03/18/2024    HDL 85 (H) 03/18/2024    ALT 10 03/18/2024    AST 17 03/18/2024     03/18/2024    K 3.9 03/18/2024     03/18/2024    CREATININE 1.3 03/18/2024    BUN 14 03/18/2024    CO2 22 (L) 03/18/2024    TSH 1.397 03/18/2024    INR 1.1 04/30/2023    HGBA1C 5.4 05/01/2023       Current Outpatient Medications on File Prior to Visit   Medication Sig Dispense Refill    abatacept (ORENCIA CLICKJECT) 125 mg/mL AtIn Inject 1 mL into the skin every 7 days. 4 mL 0    ACETAMINOPHEN ORAL Take by mouth as needed.      albuterol (PROVENTIL) 2.5 mg /3 mL (0.083 %) nebulizer solution Take 2.5 mg by nebulization every 6 (six) hours as needed for Wheezing. Rescue      allopurinoL (ZYLOPRIM) 100 MG tablet Take 2 tablets (200 mg total) by mouth daily as needed (gout flare). 180 tablet 1    amLODIPine (NORVASC) 2.5 MG tablet Take 1 tablet (2.5 mg total) by mouth once daily. 90 tablet 3    azelastine 205.5 mcg (0.15 %) Spry 2 sprays by Each Nostril route 2 (two) times daily.      betamethasone dipropionate 0.05 % cream APPLY THIN LAYER TOPICALLY TO THE AFFECTED AREA TWICE DAILY      bismuth subsalicylate (PEPTO-BISMOL ORAL) Take by mouth.      diclofenac sodium (VOLTAREN) 1 % Gel Apply 4 g topically 3 (three) times daily as needed (hip/leg pain).      diphenhydrAMINE (BENADRYL) 25 mg capsule Take 25 mg  by mouth every 6 (six) hours as needed for Itching.      ferrous sulfate 325 (65 FE) MG EC tablet Take 325 mg by mouth once daily.      fluticasone (FLONASE) 50 mcg/actuation nasal spray fluticasone 50 mcg/actuation nasal spray,suspension      gabapentin (NEURONTIN) 300 MG capsule Take 1 capsule (300 mg total) by mouth every evening. 30 capsule 11    gemfibroziL (LOPID) 600 MG tablet Take 600 mg by mouth 2 (two) times daily.      ipratropium (ATROVENT) 42 mcg (0.06 %) nasal spray INSTILL 2 SPRAYS IN EACH NOSTRIL TWICE DAILY WITH ASTELIN      ipratropium/albuterol sulfate (IPRATROPIUM-ALBUTEROL INHL) Inhale into the lungs as needed.      levocetirizine (XYZAL) 5 MG tablet Take 5 mg by mouth once daily.      loratadine (CLARITIN) 10 mg tablet Take 10 mg by mouth every morning.      losartan (COZAAR) 100 MG tablet Take 1 tablet (100 mg total) by mouth once daily. 90 tablet 3    montelukast (SINGULAIR) 10 mg tablet Take 10 mg by mouth every evening.      olopatadine (PATANOL) 0.1 % ophthalmic solution Place 1 drop into both eyes 2 (two) times daily.      predniSONE (DELTASONE) 2.5 MG tablet Take 1 tablet (2.5 mg total) by mouth daily as needed (RA flare). 30 tablet 4    SYMBICORT 160-4.5 mcg/actuation HFAA Inhale 2 puffs into the lungs 2 (two) times daily.       No current facility-administered medications on file prior to visit.         Assessment:   84 y.o. female with multiple co-morbid illnesses here to follow-up for ongoing chronic disease management and preventive health maintenance.    Plan:     Problem List Items Addressed This Visit       Chronic night sweats     Re-sent gabapentin prescription and confirmed receipt by pharmacy.  We will schedule phone follow-up in 1 week to reassess symptoms and titrate therapy as needed.         Relevant Medications    gabapentin (NEURONTIN) 300 MG capsule    Stage 3b chronic kidney disease - Primary     Kidney function has remained relatively stable over past year.  She is  on an ARB and follows with a nephrologist.                  Health Maintenance         Date Due Completion Date    RSV Vaccine (Age 60+ and Pregnant patients) (1 - 1-dose 60+ series) Never done ---    COVID-19 Vaccine (4 - 2023-24 season) 09/01/2023 10/6/2022    DEXA Scan 08/18/2025 8/18/2022    Override on 5/3/2016: Declined    Lipid Panel 03/18/2029 3/18/2024    TETANUS VACCINE 08/26/2029 8/26/2019            Future Appointments   Date Time Provider Department Center   5/8/2024  3:00 PM Judith Gaines MD [facility] NEURO [facility]   5/15/2024  2:00 PM Imelda You MD [facility] NEPHRO [facility]   5/16/2024 11:00 AM Roger Shields MD [facility]   6/3/2024  3:00 PM Melissa Galloway MD [facility] PHYSMED [facility]   6/4/2024  1:40 PM Porter Thomas MD [facility] CARDIO [facility]   6/10/2024  2:00 PM LAB, [place] [facility]   6/10/2024  2:15 PM SPECIMAN LAB, [facility] SPECLAB [facility]   7/5/2024  1:00 PM Yarely Carter OD [facility] OPTICLB [facility]   7/9/2024  2:30 PM Valentin Sawyer MD [facility] SLEEP [facility]         Follow up in about 1 week (around 5/13/2024) for phone call. Total clinical care time was 40 min.    Roger Shields MD  [facility]

## 2024-05-08 ENCOUNTER — OFFICE VISIT (OUTPATIENT)
Dept: NEUROLOGY | Facility: CLINIC | Age: 84
End: 2024-05-08
Payer: MEDICARE

## 2024-05-08 ENCOUNTER — LAB VISIT (OUTPATIENT)
Dept: LAB | Facility: OTHER | Age: 84
End: 2024-05-08
Attending: HOSPITALIST
Payer: MEDICARE

## 2024-05-08 VITALS — HEIGHT: 62 IN | BODY MASS INDEX: 27.1 KG/M2 | WEIGHT: 147.25 LBS

## 2024-05-08 DIAGNOSIS — G47.33 OSA (OBSTRUCTIVE SLEEP APNEA): ICD-10-CM

## 2024-05-08 DIAGNOSIS — R51.9 DAILY HEADACHE: Primary | ICD-10-CM

## 2024-05-08 DIAGNOSIS — R51.9 SEVERE FRONTAL HEADACHES: ICD-10-CM

## 2024-05-08 DIAGNOSIS — G95.9 CERVICAL MYELOPATHY: ICD-10-CM

## 2024-05-08 LAB
CREAT SERPL-MCNC: 1.5 MG/DL (ref 0.5–1.4)
CRP SERPL-MCNC: 8.1 MG/L (ref 0–8.2)
ERYTHROCYTE [SEDIMENTATION RATE] IN BLOOD BY PHOTOMETRIC METHOD: 12 MM/HR (ref 0–36)
EST. GFR  (NO RACE VARIABLE): 34 ML/MIN/1.73 M^2

## 2024-05-08 PROCEDURE — 36415 COLL VENOUS BLD VENIPUNCTURE: CPT | Performed by: STUDENT IN AN ORGANIZED HEALTH CARE EDUCATION/TRAINING PROGRAM

## 2024-05-08 PROCEDURE — 1159F MED LIST DOCD IN RCRD: CPT | Mod: CPTII,S$GLB,, | Performed by: STUDENT IN AN ORGANIZED HEALTH CARE EDUCATION/TRAINING PROGRAM

## 2024-05-08 PROCEDURE — 3288F FALL RISK ASSESSMENT DOCD: CPT | Mod: CPTII,S$GLB,, | Performed by: STUDENT IN AN ORGANIZED HEALTH CARE EDUCATION/TRAINING PROGRAM

## 2024-05-08 PROCEDURE — 99999 PR PBB SHADOW E&M-EST. PATIENT-LVL IV: CPT | Mod: PBBFAC,,, | Performed by: STUDENT IN AN ORGANIZED HEALTH CARE EDUCATION/TRAINING PROGRAM

## 2024-05-08 PROCEDURE — 85652 RBC SED RATE AUTOMATED: CPT | Performed by: STUDENT IN AN ORGANIZED HEALTH CARE EDUCATION/TRAINING PROGRAM

## 2024-05-08 PROCEDURE — 82565 ASSAY OF CREATININE: CPT | Performed by: STUDENT IN AN ORGANIZED HEALTH CARE EDUCATION/TRAINING PROGRAM

## 2024-05-08 PROCEDURE — 99204 OFFICE O/P NEW MOD 45 MIN: CPT | Mod: S$GLB,,, | Performed by: STUDENT IN AN ORGANIZED HEALTH CARE EDUCATION/TRAINING PROGRAM

## 2024-05-08 PROCEDURE — 1160F RVW MEDS BY RX/DR IN RCRD: CPT | Mod: CPTII,S$GLB,, | Performed by: STUDENT IN AN ORGANIZED HEALTH CARE EDUCATION/TRAINING PROGRAM

## 2024-05-08 PROCEDURE — 1101F PT FALLS ASSESS-DOCD LE1/YR: CPT | Mod: CPTII,S$GLB,, | Performed by: STUDENT IN AN ORGANIZED HEALTH CARE EDUCATION/TRAINING PROGRAM

## 2024-05-08 PROCEDURE — 86140 C-REACTIVE PROTEIN: CPT | Performed by: STUDENT IN AN ORGANIZED HEALTH CARE EDUCATION/TRAINING PROGRAM
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
## 2024-05-10 DIAGNOSIS — N18.32 STAGE 3B CHRONIC KIDNEY DISEASE: Primary | ICD-10-CM

## 2024-05-15 ENCOUNTER — OFFICE VISIT (OUTPATIENT)
Dept: NEPHROLOGY | Facility: CLINIC | Age: 84
End: 2024-05-15
Payer: MEDICARE

## 2024-05-15 VITALS
OXYGEN SATURATION: 96 % | HEART RATE: 88 BPM | WEIGHT: 141.56 LBS | DIASTOLIC BLOOD PRESSURE: 81 MMHG | BODY MASS INDEX: 26.05 KG/M2 | RESPIRATION RATE: 18 BRPM | SYSTOLIC BLOOD PRESSURE: 132 MMHG | HEIGHT: 62 IN

## 2024-05-15 DIAGNOSIS — I10 HYPERTENSION, ESSENTIAL: ICD-10-CM

## 2024-05-15 DIAGNOSIS — N18.32 STAGE 3B CHRONIC KIDNEY DISEASE: ICD-10-CM

## 2024-05-15 DIAGNOSIS — I10 HYPERTENSION, UNSPECIFIED TYPE: Primary | ICD-10-CM

## 2024-05-15 DIAGNOSIS — M10.9 GOUT, UNSPECIFIED CAUSE, UNSPECIFIED CHRONICITY, UNSPECIFIED SITE: ICD-10-CM

## 2024-05-15 DIAGNOSIS — D63.1 ANEMIA IN STAGE 3B CHRONIC KIDNEY DISEASE: ICD-10-CM

## 2024-05-15 DIAGNOSIS — N18.32 ANEMIA IN STAGE 3B CHRONIC KIDNEY DISEASE: ICD-10-CM

## 2024-05-15 PROCEDURE — 3288F FALL RISK ASSESSMENT DOCD: CPT | Mod: CPTII,S$GLB,, | Performed by: INTERNAL MEDICINE

## 2024-05-15 PROCEDURE — 1101F PT FALLS ASSESS-DOCD LE1/YR: CPT | Mod: CPTII,S$GLB,, | Performed by: INTERNAL MEDICINE

## 2024-05-15 PROCEDURE — 3075F SYST BP GE 130 - 139MM HG: CPT | Mod: CPTII,S$GLB,, | Performed by: INTERNAL MEDICINE

## 2024-05-15 PROCEDURE — 1125F AMNT PAIN NOTED PAIN PRSNT: CPT | Mod: CPTII,S$GLB,, | Performed by: INTERNAL MEDICINE

## 2024-05-15 PROCEDURE — 99204 OFFICE O/P NEW MOD 45 MIN: CPT | Mod: S$GLB,,, | Performed by: INTERNAL MEDICINE

## 2024-05-15 PROCEDURE — 3079F DIAST BP 80-89 MM HG: CPT | Mod: CPTII,S$GLB,, | Performed by: INTERNAL MEDICINE

## 2024-05-15 PROCEDURE — 99999 PR PBB SHADOW E&M-EST. PATIENT-LVL III: CPT | Mod: PBBFAC,,, | Performed by: INTERNAL MEDICINE

## 2024-05-15 RX ORDER — LOSARTAN POTASSIUM 100 MG/1
100 TABLET ORAL DAILY
Qty: 90 TABLET | Refills: 3 | Status: SHIPPED | OUTPATIENT
Start: 2024-05-15 | End: 2025-05-15

## 2024-05-15 NOTE — PROGRESS NOTES
REASON FOR CONSULT: CKD    REFERRING PHYSICIAN: Roger Shields MD      HISTORY OF PRESENT ILLNESS: 84 y.o. female who is new to me  has a past medical history of Allergy, Arthritis, Cataract, CKD (chronic kidney disease) stage 3, GFR 30-59 ml/min, DVT (deep venous thrombosis) (2023), Elevated C-reactive protein (CRP) (03/24/2017), Fibromyalgia, GERD (gastroesophageal reflux disease), Hyperlipidemia, Hypertension, Polymyalgia rheumatica, and RA (rheumatoid arthritis). patient was referred here for abnormal renal function   The patient denies taking NSAIDs or new antibiotics, recreational drugs, recent episode of dehydration, diarrhea, nausea or vomiting, acute illness, hospitalization or exposure to IV radiocontrast.     ROS:  General: negative for chills, or fatigue  ENT: No epistaxis or headaches  Hematological and Lymphatic: No bleeding problems or blood clots.  Endocrine: No skin changes or temperature intolerance  Respiratory: No cough, shortness of breath, or wheezing  Cardiovascular: No chest pain or dyspnea   Gastrointestinal: No abdominal pain, change in bowel habits  Genito-Urinary: No dysuria, trouble voiding, or hematuria  Musculoskeletal: ROS: negative for - joint pain, joint stiffness, joint swelling, muscle pain or muscular weakness  Neurological: No focal weakness, no numbness  Dermatological: No rash or ulcers.    PAST MEDICAL HISTORY:  Past Medical History:   Diagnosis Date    Allergy     Arthritis     Cataract     CKD (chronic kidney disease) stage 3, GFR 30-59 ml/min     DVT (deep venous thrombosis) 2023    x2    Elevated C-reactive protein (CRP) 03/24/2017    Fibromyalgia     GERD (gastroesophageal reflux disease)     Hyperlipidemia     Hypertension     Polymyalgia rheumatica     RA (rheumatoid arthritis)        PAST SURGICAL HISTORY:  Past Surgical History:   Procedure Laterality Date    APPENDECTOMY      CATARACT EXTRACTION W/  INTRAOCULAR LENS IMPLANT Left     Dr. Cedeño     CATARACT  EXTRACTION W/  INTRAOCULAR LENS IMPLANT Right 2017    Dr. Sena     SECTION      x2    CHOLECYSTECTOMY      COSMETIC SURGERY      Rhinoplasty    EPIDURAL STEROID INJECTION INTO CERVICAL SPINE Bilateral 12/10/2020    Procedure: CERVICAL  DORIS DIRECT REFERRAL;  Surgeon: Inga Blackburn MD;  Location: [facility];  Service: Pain Management;  Laterality: Bilateral;  NEEDS CONSENT    ESOPHAGEAL MANOMETRY WITH MEASUREMENT OF IMPEDANCE N/A 2019    Procedure: MANOMETRY, ESOPHAGUS, WITH IMPEDANCE MEASUREMENT;  Surgeon: Roger De Luna MD;  [facility]  Service: Endoscopy;  Laterality: N/A;  Prep instructions mailed to Pt - ERW    ESOPHAGOGASTRODUODENOSCOPY N/A 2019    Procedure: EGD (ESOPHAGOGASTRODUODENOSCOPY);  Surgeon: Carlos Rodgers MD;  Location: [facility] ([address] [place] [address]);  Service: Endoscopy;  Laterality: N/A;    EYE SURGERY      left eye Lens Placed    HYSTERECTOMY      RHINOPLASTY TIP      TONSILLECTOMY      TUBAL LIGATION         FAMILY HISTORY:   Family History   Problem Relation Name Age of Onset    Other Mother 106     Other Father          accident    HIV Son      Heart disease Son          mi    Inflammatory bowel disease Neg Hx      Kidney disease Neg Hx      Lupus Neg Hx      Psoriasis Neg Hx      Rheum arthritis Neg Hx      Blindness Neg Hx      Glaucoma Neg Hx      Macular degeneration Neg Hx      Retinal detachment Neg Hx      Asthma Neg Hx      Emphysema Neg Hx      Colon cancer Neg Hx      Esophageal cancer Neg Hx         SOCIAL HISTORY:  Social History     Socioeconomic History    Marital status: Single   Tobacco Use    Smoking status: Former     Current packs/day: 0.00     Average packs/day: 1 pack/day for 29.0 years (29.0 ttl pk-yrs)     Types: Cigarettes     Start date: 1970     Quit date: 1999     Years since quittin.7    Smokeless tobacco: Never    Tobacco comments:     Retired ; ; 4 children healthy   Substance and  Sexual Activity    Alcohol use: Yes     Alcohol/week: 1.0 standard drink of alcohol     Types: 1 Glasses of wine per week     Comment: social    Drug use: No    Sexual activity: Yes     Partners: Male       ALLERGIES:  Review of patient's allergies indicates:   Allergen Reactions    Shellfish containing products Anaphylaxis    Sulfa (sulfonamide antibiotics) Swelling     swelling    Cabbage (brassica oleracea) Other (See Comments)    Chocolate flavor Other (See Comments)    Duckweed Other (See Comments)    Kale Swelling    Mustard Other (See Comments)    Orange Other (See Comments)    Statins-hmg-coa reductase inhibitors     Sulfacetamide sodium Swelling    Tomato (solanum lycopersicum)     Tree nut Other (See Comments)    Weed pollen Other (See Comments)     Patient reported allergy to weed pollen, grass, trees, duckweed, cockroaches       MEDICATIONS:    Current Outpatient Medications:     abatacept (ORENCIA CLICKJECT) 125 mg/mL AtIn, Inject 1 mL into the skin every 7 days., Disp: 4 mL, Rfl: 0    ACETAMINOPHEN ORAL, Take by mouth as needed., Disp: , Rfl:     albuterol (PROVENTIL) 2.5 mg /3 mL (0.083 %) nebulizer solution, Take 2.5 mg by nebulization every 6 (six) hours as needed for Wheezing. Rescue, Disp: , Rfl:     alendronate (FOSAMAX) 70 MG tablet, Take 70 mg by mouth every 7 days., Disp: , Rfl:     allopurinoL (ZYLOPRIM) 100 MG tablet, Take 2 tablets (200 mg total) by mouth daily as needed (gout flare)., Disp: 180 tablet, Rfl: 1    azelastine 205.5 mcg (0.15 %) Spry, 2 sprays by Each Nostril route 2 (two) times daily., Disp: , Rfl:     betamethasone dipropionate 0.05 % cream, APPLY THIN LAYER TOPICALLY TO THE AFFECTED AREA TWICE DAILY, Disp: , Rfl:     bismuth subsalicylate (PEPTO-BISMOL ORAL), Take by mouth., Disp: , Rfl:     diclofenac sodium (VOLTAREN) 1 % Gel, Apply 4 g topically 3 (three) times daily as needed (hip/leg pain)., Disp: , Rfl:     diphenhydrAMINE (BENADRYL) 25 mg capsule, Take 25 mg by mouth  every 6 (six) hours as needed for Itching., Disp: , Rfl:     ferrous sulfate 325 (65 FE) MG EC tablet, Take 325 mg by mouth once daily., Disp: , Rfl:     fluticasone (FLONASE) 50 mcg/actuation nasal spray, fluticasone 50 mcg/actuation nasal spray,suspension, Disp: , Rfl:     gemfibroziL (LOPID) 600 MG tablet, Take 600 mg by mouth 2 (two) times daily., Disp: , Rfl:     ipratropium (ATROVENT) 42 mcg (0.06 %) nasal spray, INSTILL 2 SPRAYS IN EACH NOSTRIL TWICE DAILY WITH ASTELIN, Disp: , Rfl:     ipratropium/albuterol sulfate (IPRATROPIUM-ALBUTEROL INHL), Inhale into the lungs as needed., Disp: , Rfl:     levocetirizine (XYZAL) 5 MG tablet, Take 5 mg by mouth once daily., Disp: , Rfl:     losartan (COZAAR) 100 MG tablet, Take 1 tablet (100 mg total) by mouth once daily., Disp: 90 tablet, Rfl: 3    montelukast (SINGULAIR) 10 mg tablet, Take 10 mg by mouth every evening., Disp: , Rfl:     olopatadine (PATANOL) 0.1 % ophthalmic solution, Place 1 drop into both eyes 2 (two) times daily., Disp: , Rfl:     predniSONE (DELTASONE) 2.5 MG tablet, Take 1 tablet (2.5 mg total) by mouth daily as needed (RA flare)., Disp: 30 tablet, Rfl: 4    SYMBICORT 160-4.5 mcg/actuation HFAA, Inhale 2 puffs into the lungs 2 (two) times daily., Disp: , Rfl:     amLODIPine (NORVASC) 2.5 MG tablet, Take 1 tablet (2.5 mg total) by mouth once daily. (Patient not taking: Reported on 5/15/2024), Disp: 90 tablet, Rfl: 3    gabapentin (NEURONTIN) 300 MG capsule, Take 1 capsule (300 mg total) by mouth every evening. (Patient not taking: Reported on 5/15/2024), Disp: 30 capsule, Rfl: 11   Medication List with Changes/Refills   Current Medications    ABATACEPT (ORENCIA CLICKJECT) 125 MG/ML ATIN    Inject 1 mL into the skin every 7 days.    ACETAMINOPHEN ORAL    Take by mouth as needed.    ALBUTEROL (PROVENTIL) 2.5 MG /3 ML (0.083 %) NEBULIZER SOLUTION    Take 2.5 mg by nebulization every 6 (six) hours as needed for Wheezing. Rescue    ALENDRONATE (FOSAMAX)  "70 MG TABLET    Take 70 mg by mouth every 7 days.    ALLOPURINOL (ZYLOPRIM) 100 MG TABLET    Take 2 tablets (200 mg total) by mouth daily as needed (gout flare).    AMLODIPINE (NORVASC) 2.5 MG TABLET    Take 1 tablet (2.5 mg total) by mouth once daily.    AZELASTINE 205.5 MCG (0.15 %) SPRY    2 sprays by Each Nostril route 2 (two) times daily.    BETAMETHASONE DIPROPIONATE 0.05 % CREAM    APPLY THIN LAYER TOPICALLY TO THE AFFECTED AREA TWICE DAILY    BISMUTH SUBSALICYLATE (PEPTO-BISMOL ORAL)    Take by mouth.    DICLOFENAC SODIUM (VOLTAREN) 1 % GEL    Apply 4 g topically 3 (three) times daily as needed (hip/leg pain).    DIPHENHYDRAMINE (BENADRYL) 25 MG CAPSULE    Take 25 mg by mouth every 6 (six) hours as needed for Itching.    FERROUS SULFATE 325 (65 FE) MG EC TABLET    Take 325 mg by mouth once daily.    FLUTICASONE (FLONASE) 50 MCG/ACTUATION NASAL SPRAY    fluticasone 50 mcg/actuation nasal spray,suspension    GABAPENTIN (NEURONTIN) 300 MG CAPSULE    Take 1 capsule (300 mg total) by mouth every evening.    GEMFIBROZIL (LOPID) 600 MG TABLET    Take 600 mg by mouth 2 (two) times daily.    IPRATROPIUM (ATROVENT) 42 MCG (0.06 %) NASAL SPRAY    INSTILL 2 SPRAYS IN EACH NOSTRIL TWICE DAILY WITH ASTELIN    IPRATROPIUM/ALBUTEROL SULFATE (IPRATROPIUM-ALBUTEROL INHL)    Inhale into the lungs as needed.    LEVOCETIRIZINE (XYZAL) 5 MG TABLET    Take 5 mg by mouth once daily.    LOSARTAN (COZAAR) 100 MG TABLET    Take 1 tablet (100 mg total) by mouth once daily.    MONTELUKAST (SINGULAIR) 10 MG TABLET    Take 10 mg by mouth every evening.    OLOPATADINE (PATANOL) 0.1 % OPHTHALMIC SOLUTION    Place 1 drop into both eyes 2 (two) times daily.    PREDNISONE (DELTASONE) 2.5 MG TABLET    Take 1 tablet (2.5 mg total) by mouth daily as needed (RA flare).    SYMBICORT 160-4.5 MCG/ACTUATION HFAA    Inhale 2 puffs into the lungs 2 (two) times daily.        PHYSICAL EXAM:  /81   Pulse 88   Resp 18   Ht 5' 2" (1.575 m)   Wt " 64.2 kg (141 lb 8.6 oz)   SpO2 96%   BMI 25.89 kg/m²     General: No distress, No fever or chills  Head: Normocephalic,atraumatic  Eyes: conjunctivae/corneas clear. PERRL, EOM's intact.  Nose: Nares normal. Mucosa normal. No drainage or sinus tenderness.  Neck: No adenopathy,no carotid bruit,no JVD  Lungs:Clear to auscultation bilaterally, No Crackles  Heart: Regular rate and rhythm, no murmur, gallops or rubs  Abdomen: Soft, no tenderness, bowel sounds normal  Extremities: Atraumatic, no edema in LE  Skin: Turgor normal. No rashes or ulcers  Neurologic: No focal weakness, oriented.          LABS:  Lab Results   Component Value Date    CREATININE 1.5 (H) 05/13/2024       Prot/Creat Ratio, Urine   Date Value Ref Range Status   05/13/2024 Unable to calculate 0.00 - 0.20 Final   10/15/2018 0.08 0.00 - 0.20 Final   02/02/2018 Unable to calculate 0.00 - 0.20 Final       Lab Results   Component Value Date     05/13/2024    K 4.1 05/13/2024    CO2 22 (L) 05/13/2024       last PTH   Lab Results   Component Value Date    PTH 59.7 05/13/2024    CALCIUM 9.5 05/13/2024    PHOS 2.5 (L) 05/13/2024       Lab Results   Component Value Date    HGB 10.6 (L) 05/13/2024        Lab Results   Component Value Date    HGBA1C 5.4 05/01/2023       Lab Results   Component Value Date    LDLCALC 157.4 03/18/2024           ASSESSMENT:    1- CKD IIIb stable   - pt last seen by outside nephro 2022. Her scr at baseline 1.5 and GFR 34  - UA unremarkable   -Avoid NSAIDs intake    2-HTN : controlled   -Continue current BP meds regimen    3-Gout : on allopurinol     4-AOCD : at goal ~10          RTC in 1 yr       Thanks for allowing me to participate in the care of this patient.     1:14 PM    SAMSON CORDERO MD  NEPHROLOGY ATTENDING

## 2024-05-16 ENCOUNTER — OFFICE VISIT (OUTPATIENT)
Facility: CLINIC | Age: 84
End: 2024-05-16
Payer: MEDICARE

## 2024-05-16 DIAGNOSIS — N95.1 MENOPAUSAL SYNDROME (HOT FLASHES): Primary | ICD-10-CM

## 2024-05-16 PROCEDURE — 99499 UNLISTED E&M SERVICE: CPT | Mod: 95,,, | Performed by: HOSPITALIST

## 2024-05-16 RX ORDER — FEZOLINETANT 45 MG/1
45 TABLET, FILM COATED ORAL DAILY
Qty: 30 TABLET | Refills: 5 | Status: SHIPPED | OUTPATIENT
Start: 2024-05-16

## 2024-05-16 NOTE — Clinical Note
Needs 4 wk f/u; we can also try sending this note to the insurance company regarding indication for use.

## 2024-05-20 NOTE — PROGRESS NOTES
Established Patient - Audio Only Telehealth Visit     The patient location is: home  The chief complaint leading to consultation is: f/u menopausal sx  Visit type: Virtual visit with audio only (telephone)  Total time spent with patient: 10 min       The reason for the audio only service rather than synchronous audio and video virtual visit was related to technical difficulties or patient preference/necessity.     Each patient to whom I provide medical services by telemedicine is:  (1) informed of the relationship between the physician and patient and the respective role of any other health care provider with respect to management of the patient; and (2) notified that they may decline to receive medical services by telemedicine and may withdraw from such care at any time. Patient verbally consented to receive this service via voice-only telephone call.       HPI: 84F w/ persistent hot flashes since discontinuing HRT, which she has been on for 40+ years.  She reports no relief of hot flashes w/ the gabapentin which she has been taking since last visit. She continues to have nightly sleep disturbances due to vasomotor symptoms.  She is wondering if it would be possible to go back on HRT at this point due to her intractable symptoms.     Assessment and plan:  Reinforced with patient that HRT is associated with increased risk of thrombotic disease, and because of her DVT last year she is at higher risk for recurrent DVT.  She agrees that she does not want that additional risk since everything is going so well for her otherwise.  At this point she has failed SSRI/SNRI as well as gabapentin to try to manage her vasomotor estrogen withdrawal symptoms.  I discussed with her trying a new medication for this since nothing else has worked so far and she is willing to give it a try.  Prescription for Veozah sent.                        This service was not originating from a related E/M service provided within the previous 7  days nor will  to an E/M service or procedure within the next 24 hours or my soonest available appointment.  Prevailing standard of care was able to be met in this audio-only visit.

## 2024-06-03 ENCOUNTER — OFFICE VISIT (OUTPATIENT)
Dept: PHYSICAL MEDICINE AND REHAB | Facility: CLINIC | Age: 84
End: 2024-06-03
Payer: MEDICARE

## 2024-06-03 DIAGNOSIS — M79.642 PAIN IN BOTH HANDS: ICD-10-CM

## 2024-06-03 DIAGNOSIS — M70.62 TROCHANTERIC BURSITIS OF LEFT HIP: ICD-10-CM

## 2024-06-03 DIAGNOSIS — M79.7 FIBROMYALGIA SYNDROME: ICD-10-CM

## 2024-06-03 DIAGNOSIS — M48.02 CERVICAL SPINAL STENOSIS: ICD-10-CM

## 2024-06-03 DIAGNOSIS — M35.3 POLYMYALGIA RHEUMATICA: ICD-10-CM

## 2024-06-03 DIAGNOSIS — M54.2 CHRONIC NECK PAIN: Primary | ICD-10-CM

## 2024-06-03 DIAGNOSIS — G89.29 CHRONIC NECK PAIN: Primary | ICD-10-CM

## 2024-06-03 DIAGNOSIS — M48.02 NEURAL FORAMINAL STENOSIS OF CERVICAL SPINE: ICD-10-CM

## 2024-06-03 DIAGNOSIS — M47.22 OSTEOARTHRITIS OF SPINE WITH RADICULOPATHY, CERVICAL REGION: ICD-10-CM

## 2024-06-03 DIAGNOSIS — M79.641 PAIN IN BOTH HANDS: ICD-10-CM

## 2024-06-03 PROCEDURE — 1159F MED LIST DOCD IN RCRD: CPT | Mod: CPTII,S$GLB,, | Performed by: PHYSICAL MEDICINE & REHABILITATION

## 2024-06-03 PROCEDURE — 99204 OFFICE O/P NEW MOD 45 MIN: CPT | Mod: S$GLB,,, | Performed by: PHYSICAL MEDICINE & REHABILITATION

## 2024-06-03 PROCEDURE — 1125F AMNT PAIN NOTED PAIN PRSNT: CPT | Mod: CPTII,S$GLB,, | Performed by: PHYSICAL MEDICINE & REHABILITATION

## 2024-06-03 PROCEDURE — 1101F PT FALLS ASSESS-DOCD LE1/YR: CPT | Mod: CPTII,S$GLB,, | Performed by: PHYSICAL MEDICINE & REHABILITATION

## 2024-06-03 PROCEDURE — 3288F FALL RISK ASSESSMENT DOCD: CPT | Mod: CPTII,S$GLB,, | Performed by: PHYSICAL MEDICINE & REHABILITATION

## 2024-06-03 PROCEDURE — 99999 PR PBB SHADOW E&M-EST. PATIENT-LVL IV: CPT | Mod: PBBFAC,,, | Performed by: PHYSICAL MEDICINE & REHABILITATION

## 2024-06-03 RX ORDER — ACETAMINOPHEN 500 MG
500 TABLET ORAL EVERY 6 HOURS PRN
COMMUNITY
Start: 2024-06-03

## 2024-06-03 RX ORDER — DULOXETIN HYDROCHLORIDE 30 MG/1
30 CAPSULE, DELAYED RELEASE ORAL DAILY
Qty: 30 CAPSULE | Refills: 1 | Status: SHIPPED | OUTPATIENT
Start: 2024-06-03 | End: 2025-06-03

## 2024-06-03 RX ORDER — TRAMADOL HYDROCHLORIDE 50 MG/1
50 TABLET ORAL 3 TIMES DAILY PRN
Qty: 90 TABLET | Refills: 1 | Status: SHIPPED | OUTPATIENT
Start: 2024-06-03 | End: 2024-08-02

## 2024-06-03 NOTE — PATIENT INSTRUCTIONS
Neck/Back Pain [General]    Both neck and back pain are usually caused by injury to the muscles or ligaments of the spine. Sometimes the disks that separate each bone of the spine may cause pain by putting pressure on a nearby nerve. Back and neck pain may appear after a sudden twisting/bending force (such as in a car accident), or sometimes after a simple awkward movement. In either case, muscle spasm is often present and adds to the pain.  Acute neck and back pain usually gets better in one to two weeks. Pain related to disk disease, arthritis in the spinal joints or spinal stenosis (narrowing of the spinal canal) can become chronic and last for months or years.  Home Care:  FOR NECK PAIN: Use a comfortable pillow that supports the head and keeps the spine in a neutral position. The position of the head should not be tilted forward or backward.  FOR BACK PAIN: You may need to stay in bed the first few days. But, as soon as possible, begin sitting or walking to avoid problems with prolonged bed rest (muscle weakness, worsening back stiffness and pain, blood clots in the legs).  When in bed, try to find a position of comfort. A firm mattress is best. Try lying flat on your back with pillows under your knees. You can also try lying on your side with your knees bent up towards your chest and a pillow between your knees.  Avoid prolonged sitting. This puts more stress on the lower back than standing or walking.  During the first two days after injury, apply an ICE PACK to the painful area for 20 minutes every 2-4 hours. This will reduce swelling and pain. HEAT (hot shower, hot bath or heating pad) works well for muscle spasm. You can start with ice, then switch to heat after two days. Some patients feel best alternating ice and heat treatments. Use the one method that feels the best to you.  You may use acetaminophen (Tylenol) or ibuprofen (Motrin, Advil) to control pain, unless another pain medicine was prescribed.  [NOTE: If you have chronic liver or kidney disease or ever had a stomach ulcer or GI bleeding, talk with your doctor before using these medicines.]  Be aware of safe lifting methods and do not lift anything over 15 pounds until all the pain is gone.  Follow Up  with your physician or this facility if your symptoms do not start to improve after one week. Physical therapy or further tests may be needed.  [NOTE: If X-rays were taken, they will be reviewed by a radiologist. You will be notified of any new findings that may affect your care.]  Get Prompt Medical Attention  if any of the following occur:  Pain becomes worse or spreads into your arms or legs  Weakness, numbness or pain in one or both arms or legs  Loss of bowel or bladder control  Numbness in the groin area  Difficulty walking  Fever of 100.4ºF (38ºC) or higher, or as directed by your healthcare provider  © 8009-7762 [place] [address]. All rights reserved. This information is not intended as a substitute for professional medical care. Always follow your healthcare professional's instructions.    Neck Exercises: Passive Neck Rotation          To start, lie on your back, knees bent and feet flat on the floor. Keep your ears, shoulders, and hips aligned, but dont press your lower back to the floor. Rest your hands on your pelvis. Breathe deeply and relax.  With your neck relaxed, place the palm of one hand on your forehead. Use your hand to turn your head to one side until you feel a stretch in the neck muscles. Do not push through pain.  Hold for 5 seconds. Then turn to the other side.  Repeat 5 times on each side.   Note: Keep your shoulders on the floor. Dont lift your chin as you turn your head.  © 2000-2013 [place] [address]. All rights reserved. This information is not intended as a substitute for professional medical care. Always follow your healthcare professional's  instructions.    Exercises: Neck Isometrics  To start, sit in a chair with your feet flat on the floor. Your weight should be slightly forward so that youre balanced evenly on your buttocks. Relax your shoulders and keep your head level. Using a chair with arms may help you keep your balance.  Press your palm against your forehead. Resist with your neck muscles. Hold for 10 seconds. Relax. Repeat 5 times.  Do the exercise again, pressing on the side of your head. Repeat 5 times. Switch sides.  Do the exercise again, pressing on the back of your head. Repeat 5 times.          For your safety, check with your healthcare provider before starting an exercise program.    © 0331-9724 [facility], [address] [place] [address]. All rights reserved. This information is not intended as a substitute for professional medical care. Always follow your healthcare professional's instructions.    Shoulder and Upper Back Stretch  To start, stand tall with your ears, shoulders, and hips in line. Your feet should be slightly apart, positioned just under your hips. Focus your eyes directly in front of you.  this position for a few seconds before starting your exercise. This helps increase your awareness of proper posture.  Reach overhead and slightly back with both arms. Keep your shoulders and neck aligned and your elbows behind your shoulders.  With your palms facing the ceiling, turn your fingers inward.  Take a deep breath. Breathe out and lower your elbows toward your buttocks. Hold for 5 seconds, then return to starting position.  Repeat 3 times.          © 1666-5770 [facility], [address] [place] [address]. All rights reserved. This information is not intended as a substitute for professional medical care. Always follow your healthcare professional's instructions.

## 2024-06-03 NOTE — PROGRESS NOTES
Subjective:       Patient ID: Betzaida Neumann is a 84 y.o. female.    Chief Complaint: No chief complaint on file.      HPI      Mrs. Guerrero is a 84-year-old female with past medical history of hypertension, hyperlipidemia, CKD stage 3, polymyalgia rheumatica, fibromyalgia, rheumatoid arthritis, osteopenia, GERD, allergies.  She was referred to the Physical Medicine Clinic for left hip pain.    The patient has been followed up by Rheumatology for several years outside [facility] and then later at [facility].  He was diagnosed with a combination of rheumatoid arthritis, polymyalgia rheumatica iand fibromyalgia syndrome.  She reports that her fibromyalgia symptoms including generalized muscle and joint aching, stiffness, fatigue, hypersensitivity to pain and sleep impairment have been recently under good control.  She presented to her Primary Care Provider in 03/2024 with left hip pain and was referred to the Physical Medicine Clinic and rehabilitation for management.  However she reports that her hip pain nearly subsided.      She has history of chronic neck pain of many years' duration.  Review of the chart shows that she had an MRI of the cervical spine on 9/14/2022 that showed multilevel degenerative changes at C4-5 with moderate spinal canal stenosis and severe left foraminal stenosis.  The patient was followed up at the pain Clinic at [facility] [place].  On 12/10/2020 she underwent C7-T1 interlaminar Epidural Steroid Injection.  She reports some relief.  She was lost follow-up at the pain clinic.  Patient reports that neck pain has been recently worse.  It is a constant soreness in the lower cervical spine and across her upper back.  She has occasional shooting pain to both hands with dysesthetic sensations, mostly itching.  Her pain is aggravated by rotation.  It is better with sitting still.  Her maximum pain is 5-6/10 and minimum 1-2/10.  Today it is 1-2/10.  He denies any significant numbness.   She denies any weakness.  She denies any impaired hand coordination.  He has been also complaining of bilateral hand stiffness, mostly in the morning.    He is currently on:   Gabapentin 300 mg at bedtime.  However she has not been taking it because it does not help and it makes her sleepy.  She reports that in the past Lyrica did not help either.    She was previously on Tramadol p.r.n. but has not had any such prescriptions for long time.      Past Medical History:   Diagnosis Date    Allergy     Arthritis     Cataract     CKD (chronic kidney disease) stage 3, GFR 30-59 ml/min     DVT (deep venous thrombosis) 2023    x2    Elevated C-reactive protein (CRP) 03/24/2017    Fibromyalgia     GERD (gastroesophageal reflux disease)     Hyperlipidemia     Hypertension     Polymyalgia rheumatica     RA (rheumatoid arthritis)         Review of patient's allergies indicates:   Allergen Reactions    Shellfish containing products Anaphylaxis    Sulfa (sulfonamide antibiotics) Swelling     swelling    Cabbage (brassica oleracea) Other (See Comments)    Chocolate flavor Other (See Comments)    Duckweed Other (See Comments)    Kale Swelling    Mustard Other (See Comments)    Orange Other (See Comments)    Statins-hmg-coa reductase inhibitors     Sulfacetamide sodium Swelling    Tomato (solanum lycopersicum)     Tree nut Other (See Comments)    Weed pollen Other (See Comments)     Patient reported allergy to weed pollen, grass, trees, duckweed, cockroaches        Review of Systems   Constitutional:  Positive for fatigue. Negative for chills and fever.   Eyes:  Negative for visual disturbance.   Respiratory:  Negative for shortness of breath.    Cardiovascular:  Negative for chest pain.   Gastrointestinal:  Negative for blood in stool, constipation, nausea and vomiting.   Genitourinary:  Negative for difficulty urinating.   Musculoskeletal:  Positive for arthralgias and neck pain. Negative for back pain and gait problem.    Neurological:  Positive for headaches. Negative for dizziness and weakness.   Psychiatric/Behavioral:  Negative for behavioral problems and sleep disturbance.              Objective:      Physical Exam  Vitals reviewed.   Constitutional:       Appearance: She is well-developed.   HENT:      Head: Normocephalic and atraumatic.   Eyes:      Extraocular Movements: Extraocular movements intact.   Neck:      Comments: Mild decrease ROM.  Musculoskeletal:      Cervical back: Tenderness present.      Comments: BUE:  ROM:   RUE: full.   LUE: full.  Strength:    RUE: 5/5 at shoulder abduction, 5 elbow flexion, 5- wrist extension, 5 elbow extension, 5- finger flexion, 5- finger abduction.   LUE: 5/5 at shoulder abduction, 5 elbow flexion, 5 wrist extension, 5 elbow extension, 5 finger flexion, 5- finger abduction.  Sensation to pinprick:   RUE: intact.   LUE: intact.  DTR:    RUE: +3 biceps, +3 triceps.   LUE:  +3 biceps, +3 triceps.  Cartagena:   RUE: -ve.   LUE: -ve.    BLE:  ROM:   RLE: full.   LLE: full.  Knee crepitus:   RLE: -ve.   LLE: -ve.   Strength:    RLE: 5/5 at hip flexion, 5 knee extension, 5 ankle DF, 5 PF, 5 EHL.   LLE: 5/5 at hip flexion, 5 knee extension, 5 ankle DF, 5 PF, 5 EHL.  Sensation to pinprick:     RLE: intact.      LLE: intact.   DTR:     RLE: +2 knee, +1 ankle.    LLE: +2 knee, +1 ankle.  Clonus:    Rt ankle: -ve.    Lt ankle: -ve.  SLR (sitting):      RLE: -ve.      LLE: -ve.    -ve tenderness over lumbar spine.  +ve diffuse tender points over the upper & lower back, anterior chest wall, hips, elbows & knees (10/18 fibromyalgia reference points)    Gait: WNL       Skin:     General: Skin is warm.   Neurological:      General: No focal deficit present.      Mental Status: She is alert.   Psychiatric:         Mood and Affect: Mood normal.         Behavior: Behavior normal.           IMAGING STUDIES:      MRI CERVICAL SPINE WITHOUT CONTRAST (9/14/2020):     CLINICAL HISTORY:  severe neck pain;.   Cervicalgia     TECHNIQUE:  Multiplanar, multisequence MR images of the cervical spine were acquired without the administration of contrast.     COMPARISON:  None.     FINDINGS:  The alignment of the cervical spine is normal.  The vertebral body heights are well maintained.  Severe disc space narrowing at C3-C4 and mild disc space narrowing at C4-5 and C5-6.  No evidence of malignant bone marrow replacement process or infection.  The craniocervical junction, cervical cord and upper thoracic cord demonstrate normal signal.     C2-C3: No disc abnormality, no canal stenosis or foraminal narrowing.  There is mild left facet joint osseous hypertrophy.     C3-C4: Posterior disc osteophyte complex effacing the anterior CSF sleeve causing no canal stenosis.  There is bilateral uncovertebral spur creating moderate left and mild right foraminal narrowing.     C4-C5: Posterior disc osteophyte complex with left paracentral disc  protrusion and ligamentum flavum hypertrophy result in overall moderate central canal stenosis with severe left lateral canal stenosis.  There is mild left foraminal narrowing.     C5-C6: No disc abnormality, mild ligamentum flavum hypertrophy.  No canal stenosis or foraminal narrowing.     C6-C7: Unremarkable     C7-T1: Unremarkable     The paraspinal soft tissues appear normal.     Impression:     Significant spondylosis of the cervical spine most severe at C5-6 where there is severe left lateral canal stenosis and overall moderate central canal stenosis due to a posterior disc osteophyte complex with a left paracentral disc protrusion and prominent ligamentum flavum hypertrophy.     This report was flagged in Epic as abnormal.         Assessment:       1. Chronic neck pain    2. Osteoarthritis of spine with radiculopathy, cervical region    3. Neural foraminal stenosis of cervical spine    4. Cervical spinal stenosis    5. Pain in both hands    6. Fibromyalgia syndrome    7. Trochanteric bursitis of  left hip    8. Polymyalgia rheumatica        Plan:         - MRI Cervical Spine Without Contrast; Future  - Oral NSAIDs will be avoided due to CKD.  - Start DULoxetine (CYMBALTA) 30 MG capsule; Take 1 capsule (30 mg total) by mouth once daily. In 1-2 weeks, if no relief, may increase dose to 2 capsules once daily. Call for refills.  - Start acetaminophen (TYLENOL) 500 MG tablet; Take 1 tablet (500 mg total) by mouth every 6 (six) hours as needed for mild pain.  - Restart traMADoL (ULTRAM) 50 mg tablet; Take 1 tablet (50 mg total) by mouth 3 (three) times daily as needed for moderate to severe pain pain.  - The patient was encouraged to adopt a regular Home Exercise Program, and provided with printouts.  - Follow up in about 3 months (around 9/3/2024).    This was a 45 minute visit, 50% of which was spent educating the patient about the diagnosis and the treatment plan.    This note was partly generated with [employer] voice recognition software. I apologize for any possible typographical errors.

## 2024-06-04 ENCOUNTER — OFFICE VISIT (OUTPATIENT)
Dept: CARDIOLOGY | Facility: CLINIC | Age: 84
End: 2024-06-04
Payer: MEDICARE

## 2024-06-04 VITALS
BODY MASS INDEX: 27.1 KG/M2 | OXYGEN SATURATION: 99 % | DIASTOLIC BLOOD PRESSURE: 72 MMHG | SYSTOLIC BLOOD PRESSURE: 154 MMHG | WEIGHT: 148.13 LBS | HEART RATE: 85 BPM

## 2024-06-04 DIAGNOSIS — E78.2 MIXED HYPERLIPIDEMIA: ICD-10-CM

## 2024-06-04 DIAGNOSIS — I65.23 CAROTID STENOSIS, BILATERAL: ICD-10-CM

## 2024-06-04 DIAGNOSIS — G47.33 OSA (OBSTRUCTIVE SLEEP APNEA): ICD-10-CM

## 2024-06-04 DIAGNOSIS — I65.29 ASYMPTOMATIC CAROTID ARTERY STENOSIS, UNSPECIFIED LATERALITY: ICD-10-CM

## 2024-06-04 DIAGNOSIS — I10 ESSENTIAL HYPERTENSION: Primary | ICD-10-CM

## 2024-06-04 DIAGNOSIS — Z78.9 STATIN INTOLERANCE: ICD-10-CM

## 2024-06-04 PROCEDURE — 99214 OFFICE O/P EST MOD 30 MIN: CPT | Mod: S$GLB,,, | Performed by: INTERNAL MEDICINE

## 2024-06-04 PROCEDURE — 1101F PT FALLS ASSESS-DOCD LE1/YR: CPT | Mod: CPTII,S$GLB,, | Performed by: INTERNAL MEDICINE

## 2024-06-04 PROCEDURE — 99999 PR PBB SHADOW E&M-EST. PATIENT-LVL IV: CPT | Mod: PBBFAC,,, | Performed by: INTERNAL MEDICINE

## 2024-06-04 PROCEDURE — 3078F DIAST BP <80 MM HG: CPT | Mod: CPTII,S$GLB,, | Performed by: INTERNAL MEDICINE

## 2024-06-04 PROCEDURE — 3288F FALL RISK ASSESSMENT DOCD: CPT | Mod: CPTII,S$GLB,, | Performed by: INTERNAL MEDICINE

## 2024-06-04 PROCEDURE — 1125F AMNT PAIN NOTED PAIN PRSNT: CPT | Mod: CPTII,S$GLB,, | Performed by: INTERNAL MEDICINE

## 2024-06-04 PROCEDURE — 3077F SYST BP >= 140 MM HG: CPT | Mod: CPTII,S$GLB,, | Performed by: INTERNAL MEDICINE

## 2024-06-04 PROCEDURE — 1159F MED LIST DOCD IN RCRD: CPT | Mod: CPTII,S$GLB,, | Performed by: INTERNAL MEDICINE

## 2024-06-04 RX ORDER — AMLODIPINE BESYLATE 5 MG/1
5 TABLET ORAL DAILY
Qty: 90 TABLET | Refills: 3 | Status: SHIPPED | OUTPATIENT
Start: 2024-06-04 | End: 2025-06-04

## 2024-06-04 NOTE — PROGRESS NOTES
Cardiology    6/4/2024  1:43 PM    Problem list  Patient Active Problem List   Diagnosis    Essential hypertension    Chronic allergic rhinitis    GERD (gastroesophageal reflux disease)    PMR (polymyalgia rheumatica)    Rheumatoid arthritis    Fatigue    Current use of steroid medication    Fibromyalgia    Nuclear sclerosis    Moderate persistent asthma without complication    Bulla of lung    Calcified lymph nodes    EMILIO (obstructive sleep apnea)    Dysphagia    Allergic rhinitis due to pollen    Benign paroxysmal positional vertigo    Bilateral cataracts    Body mass index (BMI) of 25.0 to 29.9    Changing skin lesion    COPD bronchitis    Joint pain    Mixed hyperlipidemia    Red eye    Reflux esophagitis    Shoulder pain    Vertigo    Extrinsic asthma    Chronic night sweats    Multiple thyroid nodules    Cervical radiculopathy    Immunosuppression    Cervical myelopathy    Stage 3b chronic kidney disease    Menopausal syndrome (hot flashes)    Recurrent sinusitis    History of cataract extraction    Assessment of barriers to meet care plan goals performed    Statin intolerance    Trochanteric bursitis of left hip    Osteopenia of both hips    Carotid stenosis, asymptomatic       CC:  F/u    HPI:  She is here for follow-up.  She has no complaints.  She denies any chest pain, shortness breath, palpitation or syncope.  She denies any TIA or CVA.  She is tolerating her medication.  She is currently on prednisone.  Her pressure has been ranging 130-140 systolic.  She already took her medications today.  She saw her nephrologist last month.  Her last creatinine was 1.5.    Medications  Current Outpatient Medications   Medication Sig Dispense Refill    abatacept (ORENCIA CLICKJECT) 125 mg/mL AtIn Inject 1 mL into the skin every 7 days. 4 mL 0    acetaminophen (TYLENOL) 500 MG tablet Take 1 tablet (500 mg total) by mouth every 6 (six) hours as needed for Pain.      albuterol (PROVENTIL) 2.5 mg /3 mL (0.083 %)  nebulizer solution Take 2.5 mg by nebulization every 6 (six) hours as needed for Wheezing. Rescue      alendronate (FOSAMAX) 70 MG tablet Take 70 mg by mouth every 7 days.      amLODIPine (NORVASC) 2.5 MG tablet Take 1 tablet (2.5 mg total) by mouth once daily. 90 tablet 3    azelastine 205.5 mcg (0.15 %) Spry 2 sprays by Each Nostril route 2 (two) times daily.      betamethasone dipropionate 0.05 % cream APPLY THIN LAYER TOPICALLY TO THE AFFECTED AREA TWICE DAILY      bismuth subsalicylate (PEPTO-BISMOL ORAL) Take by mouth.      diclofenac sodium (VOLTAREN) 1 % Gel Apply 4 g topically 3 (three) times daily as needed (hip/leg pain).      diphenhydrAMINE (BENADRYL) 25 mg capsule Take 25 mg by mouth every 6 (six) hours as needed for Itching.      ferrous sulfate 325 (65 FE) MG EC tablet Take 325 mg by mouth once daily.      fluticasone (FLONASE) 50 mcg/actuation nasal spray fluticasone 50 mcg/actuation nasal spray,suspension      gabapentin (NEURONTIN) 300 MG capsule Take 1 capsule (300 mg total) by mouth every evening. 30 capsule 11    gemfibroziL (LOPID) 600 MG tablet Take 600 mg by mouth 2 (two) times daily.      ipratropium (ATROVENT) 42 mcg (0.06 %) nasal spray INSTILL 2 SPRAYS IN EACH NOSTRIL TWICE DAILY WITH ASTELIN      ipratropium/albuterol sulfate (IPRATROPIUM-ALBUTEROL INHL) Inhale into the lungs as needed.      levocetirizine (XYZAL) 5 MG tablet Take 5 mg by mouth once daily.      losartan (COZAAR) 100 MG tablet Take 1 tablet (100 mg total) by mouth once daily. 90 tablet 3    montelukast (SINGULAIR) 10 mg tablet Take 10 mg by mouth every evening.      olopatadine (PATANOL) 0.1 % ophthalmic solution Place 1 drop into both eyes 2 (two) times daily.      predniSONE (DELTASONE) 2.5 MG tablet Take 1 tablet (2.5 mg total) by mouth daily as needed (RA flare). 30 tablet 4    SYMBICORT 160-4.5 mcg/actuation HFAA Inhale 2 puffs into the lungs 2 (two) times daily.      traMADoL (ULTRAM) 50 mg tablet Take 1 tablet (50  mg total) by mouth 3 (three) times daily as needed for Pain. 90 tablet 1    DULoxetine (CYMBALTA) 30 MG capsule Take 1 capsule (30 mg total) by mouth once daily. In 1-2 weeks, if no relief, may increase dose to 2 capsules once daily. Call for refills. (Patient not taking: Reported on 6/4/2024) 30 capsule 1    fezolinetant (VEOZAH) 45 mg Tab Take 45 mg by mouth once daily. 30 tablet 5     No current facility-administered medications for this visit.      Prior to Admission medications    Medication Sig Start Date End Date Taking? Authorizing Provider   abatacept (ORENCIA CLICKJECT) 125 mg/mL AtIn Inject 1 mL into the skin every 7 days. 4/30/24  Yes Gagan Alvarez MD   acetaminophen (TYLENOL) 500 MG tablet Take 1 tablet (500 mg total) by mouth every 6 (six) hours as needed for Pain. 6/3/24  Yes Melissa Galloway MD   albuterol (PROVENTIL) 2.5 mg /3 mL (0.083 %) nebulizer solution Take 2.5 mg by nebulization every 6 (six) hours as needed for Wheezing. Rescue   Yes Provider, Historical   alendronate (FOSAMAX) 70 MG tablet Take 70 mg by mouth every 7 days. 5/3/24  Yes Provider, Historical   amLODIPine (NORVASC) 2.5 MG tablet Take 1 tablet (2.5 mg total) by mouth once daily. 4/22/24 4/22/25 Yes Roger Shields MD   azelastine 205.5 mcg (0.15 %) Spry 2 sprays by Each Nostril route 2 (two) times daily. 4/10/22  Yes Provider, Historical   betamethasone dipropionate 0.05 % cream APPLY THIN LAYER TOPICALLY TO THE AFFECTED AREA TWICE DAILY   Yes Provider, Historical   bismuth subsalicylate (PEPTO-BISMOL ORAL) Take by mouth.   Yes Provider, Historical   diclofenac sodium (VOLTAREN) 1 % Gel Apply 4 g topically 3 (three) times daily as needed (hip/leg pain).   Yes Provider, Historical   diphenhydrAMINE (BENADRYL) 25 mg capsule Take 25 mg by mouth every 6 (six) hours as needed for Itching.   Yes Provider, Historical   ferrous sulfate 325 (65 FE) MG EC tablet Take 325 mg by mouth once daily.   Yes Provider,  Historical   fluticasone (FLONASE) 50 mcg/actuation nasal spray fluticasone 50 mcg/actuation nasal spray,suspension   Yes Provider, Historical   gabapentin (NEURONTIN) 300 MG capsule Take 1 capsule (300 mg total) by mouth every evening. 5/6/24 5/6/25 Yes Roger Shields MD   gemfibroziL (LOPID) 600 MG tablet Take 600 mg by mouth 2 (two) times daily. 3/21/24  Yes Provider, Historical   ipratropium (ATROVENT) 42 mcg (0.06 %) nasal spray INSTILL 2 SPRAYS IN EACH NOSTRIL TWICE DAILY WITH ASTELIN   Yes Provider, Historical   ipratropium/albuterol sulfate (IPRATROPIUM-ALBUTEROL INHL) Inhale into the lungs as needed.   Yes Provider, Historical   levocetirizine (XYZAL) 5 MG tablet Take 5 mg by mouth once daily. 6/24/22  Yes Provider, Historical   losartan (COZAAR) 100 MG tablet Take 1 tablet (100 mg total) by mouth once daily. 5/15/24 5/15/25 Yes Imelda You MD   montelukast (SINGULAIR) 10 mg tablet Take 10 mg by mouth every evening.   Yes Provider, Historical   olopatadine (PATANOL) 0.1 % ophthalmic solution Place 1 drop into both eyes 2 (two) times daily. 4/2/24  Yes Provider, Historical   predniSONE (DELTASONE) 2.5 MG tablet Take 1 tablet (2.5 mg total) by mouth daily as needed (RA flare). 3/5/24  Yes Roger Shields MD   SYMBICORT 160-4.5 mcg/actuation HFAA Inhale 2 puffs into the lungs 2 (two) times daily. 4/2/24  Yes Provider, Historical   traMADoL (ULTRAM) 50 mg tablet Take 1 tablet (50 mg total) by mouth 3 (three) times daily as needed for Pain. 6/3/24 8/2/24 Yes Melissa Galloway MD   DULoxetine (CYMBALTA) 30 MG capsule Take 1 capsule (30 mg total) by mouth once daily. In 1-2 weeks, if no relief, may increase dose to 2 capsules once daily. Call for refills.  Patient not taking: Reported on 6/4/2024 6/3/24 6/3/25  Melissa Galloway MD   fezolinetant (VEOZAH) 45 mg Tab Take 45 mg by mouth once daily. 5/16/24   Roger Shields MD         History  Past Medical History:   Diagnosis Date     Allergy     Arthritis     Cataract     CKD (chronic kidney disease) stage 3, GFR 30-59 ml/min     DVT (deep venous thrombosis) 2023    x2    Elevated C-reactive protein (CRP) 2017    Fibromyalgia     GERD (gastroesophageal reflux disease)     Hyperlipidemia     Hypertension     Polymyalgia rheumatica     RA (rheumatoid arthritis)      Past Surgical History:   Procedure Laterality Date    APPENDECTOMY      CATARACT EXTRACTION W/  INTRAOCULAR LENS IMPLANT Left     Dr. Cedeño     CATARACT EXTRACTION W/  INTRAOCULAR LENS IMPLANT Right 2017    Dr. Sena     SECTION      x2    CHOLECYSTECTOMY      COSMETIC SURGERY      Rhinoplasty    EPIDURAL STEROID INJECTION INTO CERVICAL SPINE Bilateral 12/10/2020    Procedure: CERVICAL  DORIS DIRECT REFERRAL;  Surgeon: Inga Blackburn MD;  Location: [facility];  Service: Pain Management;  Laterality: Bilateral;  NEEDS CONSENT    ESOPHAGEAL MANOMETRY WITH MEASUREMENT OF IMPEDANCE N/A 2019    Procedure: MANOMETRY, ESOPHAGUS, WITH IMPEDANCE MEASUREMENT;  Surgeon: Roger De Luna MD;  Location: [facility] ([address] [place] [address]);  Service: Endoscopy;  Laterality: N/A;  Prep instructions mailed to Pt - ERW    ESOPHAGOGASTRODUODENOSCOPY N/A 2019    Procedure: EGD (ESOPHAGOGASTRODUODENOSCOPY);  Surgeon: Carlos Rodgers MD;  Location: [facility] ([address] [place] [address]);  Service: Endoscopy;  Laterality: N/A;    EYE SURGERY      left eye Lens Placed    HYSTERECTOMY      RHINOPLASTY TIP      TONSILLECTOMY      TUBAL LIGATION       Social History     Socioeconomic History    Marital status: Single   Tobacco Use    Smoking status: Former     Current packs/day: 0.00     Average packs/day: 1 pack/day for 29.0 years (29.0 ttl pk-yrs)     Types: Cigarettes     Start date: 1970     Quit date: 1999     Years since quittin.8    Smokeless tobacco: Never    Tobacco comments:     Retired ; ; 4 children healthy   Substance and Sexual Activity    Alcohol use:  Yes     Alcohol/week: 1.0 standard drink of alcohol     Types: 1 Glasses of wine per week     Comment: social    Drug use: No    Sexual activity: Yes     Partners: Male     Social Determinants of Health     Financial Resource Strain: High Risk (5/28/2024)    Overall Financial Resource Strain (CARDIA)     Difficulty of Paying Living Expenses: Very hard   Food Insecurity: Food Insecurity Present (5/28/2024)    Hunger Vital Sign     Worried About Running Out of Food in the Last Year: Sometimes true     Ran Out of Food in the Last Year: Sometimes true   Physical Activity: Unknown (5/28/2024)    Exercise Vital Sign     Days of Exercise per Week: 3 days   Stress: No Stress Concern Present (5/28/2024)    [employer] - Occupational Stress Questionnaire     Feeling of Stress : Not at all   Housing Stability: Unknown (5/28/2024)    Housing Stability Vital Sign     Unable to Pay for Housing in the Last Year: No         Allergies  Review of patient's allergies indicates:   Allergen Reactions    Shellfish containing products Anaphylaxis    Sulfa (sulfonamide antibiotics) Swelling     swelling    Cabbage (brassica oleracea) Other (See Comments)    Chocolate flavor Other (See Comments)    Duckweed Other (See Comments)    Kale Swelling    Mustard Other (See Comments)    Orange Other (See Comments)    Statins-hmg-coa reductase inhibitors     Sulfacetamide sodium Swelling    Tomato (solanum lycopersicum)     Tree nut Other (See Comments)    Weed pollen Other (See Comments)     Patient reported allergy to weed pollen, grass, trees, duckweed, cockroaches         Review of Systems   Review of Systems   Constitutional: Negative for decreased appetite, fever and weight loss.   HENT:  Negative for congestion and nosebleeds.    Eyes:  Negative for double vision, vision loss in left eye, vision loss in right eye and visual disturbance.   Cardiovascular:  Negative for chest pain, claudication, cyanosis, dyspnea on  exertion, irregular heartbeat, leg swelling, near-syncope, orthopnea, palpitations, paroxysmal nocturnal dyspnea and syncope.   Respiratory:  Negative for cough, hemoptysis, shortness of breath, sleep disturbances due to breathing, snoring, sputum production and wheezing.    Endocrine: Negative for cold intolerance and heat intolerance.   Skin:  Negative for nail changes and rash.   Musculoskeletal:  Negative for joint pain, muscle cramps, muscle weakness and myalgias.   Gastrointestinal:  Negative for change in bowel habit, heartburn, hematemesis, hematochezia, hemorrhoids and melena.   Neurological:  Negative for dizziness, focal weakness and headaches.         Physical Exam  Wt Readings from Last 1 Encounters:   06/04/24 67.2 kg (148 lb 2.4 oz)     BP Readings from Last 3 Encounters:   06/04/24 (!) 154/72   05/15/24 132/81   05/06/24 138/63     Pulse Readings from Last 1 Encounters:   06/04/24 85     Body mass index is 27.1 kg/m².    Physical Exam  Constitutional:       Appearance: She is well-developed.   HENT:      Head: Atraumatic.   Eyes:      General: No scleral icterus.  Neck:      Vascular: Normal carotid pulses. No carotid bruit, hepatojugular reflux or JVD.   Cardiovascular:      Rate and Rhythm: Normal rate and regular rhythm.      Chest Wall: PMI is not displaced.      Pulses: Intact distal pulses.           Carotid pulses are 2+ on the right side and 2+ on the left side.       Radial pulses are 2+ on the right side and 2+ on the left side.        Dorsalis pedis pulses are 2+ on the right side and 2+ on the left side.      Heart sounds: Normal heart sounds, S1 normal and S2 normal. No murmur heard.     No friction rub.   Pulmonary:      Effort: Pulmonary effort is normal. No respiratory distress.      Breath sounds: Normal breath sounds. No stridor. No wheezing or rales.   Chest:      Chest wall: No tenderness.   Abdominal:      General: Bowel sounds are normal.      Palpations: Abdomen is soft.    Musculoskeletal:      Cervical back: Neck supple. No edema.   Skin:     General: Skin is warm and dry.      Nails: There is no clubbing.   Neurological:      Mental Status: She is alert and oriented to person, place, and time.   Psychiatric:         Behavior: Behavior normal.         Thought Content: Thought content normal.             Assessment  1. Essential hypertension  Not at goal.  Will adjust medications and monitor    2. Mixed hyperlipidemia  On gemfibrozil.  Continue current medications and monitor    3. Statin intolerance  Unchanged    4. EMILIO (obstructive sleep apnea)  Unchanged    5. Asymptomatic carotid artery stenosis, unspecified laterality  Being evaluated        Plan and Discussion  Discussed her blood pressure is now well controlled which is likely from her current use of steroid.  Will increase her amlodipine to 5 mg once a day.  Given her history of carotid stenosis and her last carotid Doppler was in 2022, will repeat her carotid Doppler.  Will also get her echocardiogram to assess her hypertensive heart disease.    Follow Up  2-3 months      Porter Thomas MD, F.A.C.C, F.S.C.A.I.      Total professional time spent for the encounter: 30 minutes  Time was spent preparing to see the patient, reviewing results of prior testing, obtaining and/or reviewing separately obtained history, performing a medically appropriate examination and interview, counseling and educating the patient/family, ordering medications/tests/procedures, referring and communicating with other health care professionals, documenting clinical information in the electronic health record, and independently interpreting results.    Disclaimer: This document was created using voice recognition software ([employer]). Although it may be edited, this document may contain errors related to incorrect recognition of the spoken word. Please call the physician if clarification is needed.

## 2024-06-10 ENCOUNTER — OFFICE VISIT (OUTPATIENT)
Facility: CLINIC | Age: 84
End: 2024-06-10
Payer: MEDICARE

## 2024-06-10 ENCOUNTER — LAB VISIT (OUTPATIENT)
Dept: LAB | Facility: HOSPITAL | Age: 84
End: 2024-06-10
Attending: INTERNAL MEDICINE
Payer: MEDICARE

## 2024-06-10 VITALS
DIASTOLIC BLOOD PRESSURE: 67 MMHG | HEIGHT: 62 IN | TEMPERATURE: 98 F | SYSTOLIC BLOOD PRESSURE: 137 MMHG | HEART RATE: 85 BPM | OXYGEN SATURATION: 97 % | BODY MASS INDEX: 27.6 KG/M2 | WEIGHT: 150 LBS

## 2024-06-10 DIAGNOSIS — L85.3 DRY SKIN DERMATITIS: ICD-10-CM

## 2024-06-10 DIAGNOSIS — M06.9 RHEUMATOID ARTHRITIS INVOLVING RIGHT HAND, UNSPECIFIED WHETHER RHEUMATOID FACTOR PRESENT: ICD-10-CM

## 2024-06-10 DIAGNOSIS — D84.9 IMMUNOSUPPRESSION: ICD-10-CM

## 2024-06-10 DIAGNOSIS — M06.9 RHEUMATOID ARTHRITIS INVOLVING RIGHT HAND, UNSPECIFIED WHETHER RHEUMATOID FACTOR PRESENT: Primary | ICD-10-CM

## 2024-06-10 DIAGNOSIS — J43.9 BULLA OF LUNG: ICD-10-CM

## 2024-06-10 DIAGNOSIS — N18.32 STAGE 3B CHRONIC KIDNEY DISEASE: ICD-10-CM

## 2024-06-10 PROBLEM — H57.89 RED EYE: Status: RESOLVED | Noted: 2019-06-13 | Resolved: 2024-06-10

## 2024-06-10 PROBLEM — H26.9 BILATERAL CATARACTS: Status: RESOLVED | Noted: 2019-06-13 | Resolved: 2024-06-10

## 2024-06-10 PROBLEM — L98.9 CHANGING SKIN LESION: Status: RESOLVED | Noted: 2019-06-13 | Resolved: 2024-06-10

## 2024-06-10 PROBLEM — R42 VERTIGO: Status: RESOLVED | Noted: 2019-06-13 | Resolved: 2024-06-10

## 2024-06-10 LAB
BASOPHILS # BLD AUTO: 0.04 K/UL (ref 0–0.2)
BASOPHILS NFR BLD: 0.8 % (ref 0–1.9)
DIFFERENTIAL METHOD BLD: ABNORMAL
EOSINOPHIL # BLD AUTO: 0.2 K/UL (ref 0–0.5)
EOSINOPHIL NFR BLD: 4.7 % (ref 0–8)
ERYTHROCYTE [DISTWIDTH] IN BLOOD BY AUTOMATED COUNT: 13.1 % (ref 11.5–14.5)
HCT VFR BLD AUTO: 33.6 % (ref 37–48.5)
HGB BLD-MCNC: 10.6 G/DL (ref 12–16)
IMM GRANULOCYTES # BLD AUTO: 0.01 K/UL (ref 0–0.04)
IMM GRANULOCYTES NFR BLD AUTO: 0.2 % (ref 0–0.5)
LYMPHOCYTES # BLD AUTO: 2 K/UL (ref 1–4.8)
LYMPHOCYTES NFR BLD: 38.6 % (ref 18–48)
MCH RBC QN AUTO: 30.7 PG (ref 27–31)
MCHC RBC AUTO-ENTMCNC: 31.5 G/DL (ref 32–36)
MCV RBC AUTO: 97 FL (ref 82–98)
MONOCYTES # BLD AUTO: 0.4 K/UL (ref 0.3–1)
MONOCYTES NFR BLD: 7.2 % (ref 4–15)
NEUTROPHILS # BLD AUTO: 2.5 K/UL (ref 1.8–7.7)
NEUTROPHILS NFR BLD: 48.5 % (ref 38–73)
NRBC BLD-RTO: 0 /100 WBC
PLATELET # BLD AUTO: 292 K/UL (ref 150–450)
PMV BLD AUTO: 10.2 FL (ref 9.2–12.9)
RBC # BLD AUTO: 3.45 M/UL (ref 4–5.4)
WBC # BLD AUTO: 5.13 K/UL (ref 3.9–12.7)

## 2024-06-10 PROCEDURE — 36415 COLL VENOUS BLD VENIPUNCTURE: CPT | Mod: PN | Performed by: INTERNAL MEDICINE

## 2024-06-10 PROCEDURE — 1125F AMNT PAIN NOTED PAIN PRSNT: CPT | Mod: CPTII,S$GLB,, | Performed by: HOSPITALIST

## 2024-06-10 PROCEDURE — 1101F PT FALLS ASSESS-DOCD LE1/YR: CPT | Mod: CPTII,S$GLB,, | Performed by: HOSPITALIST

## 2024-06-10 PROCEDURE — 85025 COMPLETE CBC W/AUTO DIFF WBC: CPT | Performed by: INTERNAL MEDICINE

## 2024-06-10 PROCEDURE — 99999 PR PBB SHADOW E&M-EST. PATIENT-LVL V: CPT | Mod: PBBFAC,,, | Performed by: HOSPITALIST

## 2024-06-10 PROCEDURE — 1159F MED LIST DOCD IN RCRD: CPT | Mod: CPTII,S$GLB,, | Performed by: HOSPITALIST

## 2024-06-10 PROCEDURE — 99214 OFFICE O/P EST MOD 30 MIN: CPT | Mod: S$GLB,,, | Performed by: HOSPITALIST

## 2024-06-10 PROCEDURE — 3075F SYST BP GE 130 - 139MM HG: CPT | Mod: CPTII,S$GLB,, | Performed by: HOSPITALIST

## 2024-06-10 PROCEDURE — 3288F FALL RISK ASSESSMENT DOCD: CPT | Mod: CPTII,S$GLB,, | Performed by: HOSPITALIST

## 2024-06-10 PROCEDURE — 3078F DIAST BP <80 MM HG: CPT | Mod: CPTII,S$GLB,, | Performed by: HOSPITALIST

## 2024-06-10 PROCEDURE — 80069 RENAL FUNCTION PANEL: CPT | Performed by: INTERNAL MEDICINE

## 2024-06-10 PROCEDURE — 86140 C-REACTIVE PROTEIN: CPT | Performed by: HOSPITALIST

## 2024-06-10 RX ORDER — AMMONIUM LACTATE 12 G/100G
LOTION TOPICAL 2 TIMES DAILY PRN
Qty: 225 G | Refills: 3 | Status: SHIPPED | OUTPATIENT
Start: 2024-06-10

## 2024-06-10 NOTE — ASSESSMENT & PLAN NOTE
I suspect she may be having a flare-up.  She has been taking the p.r.n. prednisone and I will check a CRP, which if elevated would warrant possibly a short course of higher dose steroids.

## 2024-06-10 NOTE — PROGRESS NOTES
"  Primary Care Provider Appointment - [facility]  Roger Shields MD      Subjective:      Patient ID: Betzaida Neumann is a 84 y.o. female with   Past Medical History:   Diagnosis Date    Allergy     Arthritis     Cataract     CKD (chronic kidney disease) stage 3, GFR 30-59 ml/min     DVT (deep venous thrombosis) 2023    x2    Elevated C-reactive protein (CRP) 03/24/2017    Fibromyalgia     GERD (gastroesophageal reflux disease)     Hyperlipidemia     Hypertension     Polymyalgia rheumatica     RA (rheumatoid arthritis)            Chief Complaint: Follow-up (DM 4 wk./PT reports having sweats. ) and Dry Skin (Pt reports dry skin. Pt does use various types of moisturizers to see what works best for her skin. )    Prior to this visit, patient's last encounter with PCP was 5/16/2024.    C/o dry skin and pruritus past couple weeks.  Uses Oil of Olay moisturizer after bathing.    Having pain in R hand in MTP joints; taking the PRN prednisone which is helping but she's put on 2 lbs.  Using tramadol as well.  Has been acting up past 3 weeks.    Major complaint is persistent night sweats; but did just get letter from insurance that the Veozah has been approved.  Hasn't picked it up yet though.      5/7: Reports hasn't gotten the gabapentin Rx'd last visit; [facility] saying they never got it.  Continues to have hot flashes.  C/o difficulty getting an appointment with a gynecologist, who had been ordering her mammograms and doing paps yearly in addition to Rx-ing HRT until he retired.  Last mammogram in January this year; diagnostic repeat due to fatty tissue obscuring an area.  Will be due for screening MMG in November.  She has not been sexually active since before [place] on account of "nobody to play with;" scarcity of older men who are available but also high-functioning, but she is also leery about the dangers of dating nowadays.  No abnormal paps for past couple decades.  She meant to bring back the ACP " booklet she completed but forgot it.      4/22: Doing well; recently went to [place] to visit her son and his family; went to [employer] and did well getting around and w/ endurance as well.  Hip pain from prior visit resolved.  Tried the citalopram which seemed to make her feel depressed and tired.  Stopped it.  Still having night sweats.      3/18: No improvement w/ menopausal sx so far.  H/o DVT x2 last year.  Also having pain in L hip.  Having to use heating pad from time to time.  Pain seems to be worse if sitting for too long.  Being active seems to help prevent it.  Also c/o changes in fingernails; having ridges on nails and seeming more brittle.      3/5: Pt reports wanted to establish care.  Has HTN, HLD, CKD.  Wants to avoid HD.  BP tends to elevate at dr's office.  H/o 80% R carotid stenosis, improved w/ medical therapy only.  Former smoker.  Lives independently and mows lawn, gardens, keeps house.  Considering joining gym.  Had an illness last year and lost about 25 lbs.  Had no appetite, wasn't eating.  Was hospitalized.  Doing better now, but not back to 100% since.  Has some fatigue.  Tries to stay busy around house due to fear of crime so doesn't get out much.    Her gynecologist retired, who was Rx-ing HRT.  Has been out of estradiol about a month and having hot flashes, sweats, etc.  Has been on it since 1979 when she had a hysterectomy.    On Orencia for RA w/ good results.  No joint pains now.    Checks BPs at home 2-3x/wk, usually in 120s systolic, never higher than 140.    4Ms for Medical Decision-Making in Older Adults    Last Completed EAWV: None    MOBILITY:  Get Up and Go:       No data to display              Activities of Daily Living:       No data to display              Whisper Test:       No data to display              Disability Status:      4/20/2017     9:00 AM   Disability Status   Are you deaf or do you have serious difficulty hearing? N   Are you blind or do you have serious  "difficulty seeing, even when wearing glasses? N   Because of a physical, mental, or emotional condition, do you have serious difficulty concentrating, remembering, or making decisions? N   Do you have serious difficulty walking or climbing stairs? N   Do you have difficulty dressing or bathing? N   Because of a physical, mental, or emotional condition, do you have difficulty doing errands alone such as visiting a doctor's office or shopping? N     Nutrition Screening:       No data to display             Screening Score: 0-7 Malnourished, 8-11 At Risk, 12-14 Normal    MENTATION:   Depression Patient Health Questionnaire:      5/15/2024     1:07 PM   Depression Patient Health Questionnaire   Over the last two weeks how often have you been bothered by little interest or pleasure in doing things Not at all   Over the last two weeks how often have you been bothered by feeling down, depressed or hopeless Not at all   PHQ-2 Total Score 0     Has Dementia Dx: No    Cognitive Function Screening:       No data to display              Cognitive Function Screening Total - Less than 4 = Abnormal,  Greater than or equal to 4 = Normal    MEDICATIONS:  High Risk Medications:  Total Active Medications: 3  DULoxetine - 30 MG  gabapentin - 300 MG  traMADoL - 50 mg    WHAT MATTERS MOST:  Advance Care Planning   ACP Status:   Patient has had an ACP conversation  Living Will: No  Power of : No  La[place]: No    What is most important right now is to focus on avoiding the hospital and remaining as independent as possible    Accordingly, we have decided that the best plan to meet the patient's goals includes continuing with treatment      What matters most to patient today is: getting established with a PCP who is accessible and will personalize care consistent with her values.             Date: 03/05/2024  Patient did not wish or was not able to name a surrogate decision maker or provide an Advance Care Plan. "Five Wishes" provided " to patient to fill out and bring to return visit.      Social History     Socioeconomic History    Marital status: Single   Tobacco Use    Smoking status: Former     Current packs/day: 0.00     Average packs/day: 1 pack/day for 29.0 years (29.0 ttl pk-yrs)     Types: Cigarettes     Start date: 1970     Quit date: 1999     Years since quittin.8    Smokeless tobacco: Never    Tobacco comments:     Retired ; ; 4 children healthy   Substance and Sexual Activity    Alcohol use: Yes     Alcohol/week: 1.0 standard drink of alcohol     Types: 1 Glasses of wine per week     Comment: social    Drug use: No    Sexual activity: Yes     Partners: Male     Social Determinants of Health     Financial Resource Strain: High Risk (2024)    Overall Financial Resource Strain (CARDIA)     Difficulty of Paying Living Expenses: Very hard   Food Insecurity: Food Insecurity Present (2024)    Hunger Vital Sign     Worried About Running Out of Food in the Last Year: Sometimes true     Ran Out of Food in the Last Year: Sometimes true   Physical Activity: Unknown (2024)    Exercise Vital Sign     Days of Exercise per Week: 3 days   Stress: No Stress Concern Present (2024)    [place] [place] [employer] - Occupational Stress Questionnaire     Feeling of Stress : Not at all   Housing Stability: Unknown (2024)    Housing Stability Vital Sign     Unable to Pay for Housing in the Last Year: No       Review of Systems   Constitutional:  Positive for diaphoresis and fatigue. Negative for activity change, appetite change, chills and fever.   HENT:  Negative for sore throat.    Eyes:  Negative for photophobia and visual disturbance.   Respiratory:  Negative for cough and shortness of breath.    Cardiovascular:  Negative for chest pain and leg swelling.   Gastrointestinal:  Negative for abdominal pain, constipation, diarrhea, nausea and vomiting.   Genitourinary:  Positive for  "hot flashes. Negative for dysuria and hematuria.   Musculoskeletal:  Positive for leg pain. Negative for arthralgias and myalgias.   Integumentary:  Negative for rash and wound.   Neurological:  Negative for dizziness and weakness.        Objective:   /67 (BP Location: Left arm, Patient Position: Sitting, BP Method: Medium (Automatic))   Pulse 85   Temp 98.2 °F (36.8 °C) (Oral)   Ht 5' 2" (1.575 m)   Wt 68.1 kg (150 lb 0.4 oz)   SpO2 97%   BMI 27.44 kg/m²     Physical Exam  Vitals reviewed.   Constitutional:       General: She is not in acute distress.     Appearance: Normal appearance.   HENT:      Head: Normocephalic and atraumatic.      Right Ear: Tympanic membrane, ear canal and external ear normal.      Left Ear: Tympanic membrane, ear canal and external ear normal.      Nose: Nose normal.      Mouth/Throat:      Mouth: Mucous membranes are moist.      Pharynx: Oropharynx is clear.   Eyes:      General: No scleral icterus.     Conjunctiva/sclera: Conjunctivae normal.   Cardiovascular:      Rate and Rhythm: Normal rate and regular rhythm.      Pulses: Normal pulses.      Heart sounds: Normal heart sounds.   Pulmonary:      Effort: Pulmonary effort is normal. No respiratory distress.      Breath sounds: Normal breath sounds.   Abdominal:      General: There is no distension.      Palpations: Abdomen is soft.      Tenderness: There is no abdominal tenderness. There is no guarding.   Musculoskeletal:         General: Normal range of motion.      Cervical back: Normal range of motion and neck supple.      Right lower leg: No edema.      Left lower leg: No edema.   Lymphadenopathy:      Cervical: No cervical adenopathy.   Skin:     General: Skin is warm and dry.      Findings: No rash.   Neurological:      General: No focal deficit present.      Mental Status: She is alert and oriented to person, place, and time.              Lab Results   Component Value Date    WBC 6.50 05/13/2024    HGB 10.6 (L) " 05/13/2024    HCT 34.0 (L) 05/13/2024     05/13/2024    CHOL 254 (H) 03/18/2024    TRIG 58 03/18/2024    HDL 85 (H) 03/18/2024    ALT 10 03/18/2024    AST 17 03/18/2024     05/13/2024    K 4.1 05/13/2024     05/13/2024    CREATININE 1.5 (H) 05/13/2024    BUN 18 05/13/2024    CO2 22 (L) 05/13/2024    TSH 1.397 03/18/2024    INR 1.1 04/30/2023    HGBA1C 5.4 05/01/2023       Current Outpatient Medications on File Prior to Visit   Medication Sig Dispense Refill    abatacept (ORENCIA CLICKJECT) 125 mg/mL AtIn Inject 1 mL into the skin every 7 days. 4 mL 0    acetaminophen (TYLENOL) 500 MG tablet Take 1 tablet (500 mg total) by mouth every 6 (six) hours as needed for Pain.      albuterol (PROVENTIL) 2.5 mg /3 mL (0.083 %) nebulizer solution Take 2.5 mg by nebulization every 6 (six) hours as needed for Wheezing. Rescue      alendronate (FOSAMAX) 70 MG tablet Take 70 mg by mouth every 7 days.      amLODIPine (NORVASC) 5 MG tablet Take 1 tablet (5 mg total) by mouth once daily. 90 tablet 3    azelastine 205.5 mcg (0.15 %) Spry 2 sprays by Each Nostril route 2 (two) times daily.      betamethasone dipropionate 0.05 % cream APPLY THIN LAYER TOPICALLY TO THE AFFECTED AREA TWICE DAILY      diclofenac sodium (VOLTAREN) 1 % Gel Apply 4 g topically 3 (three) times daily as needed (hip/leg pain).      fezolinetant (VEOZAH) 45 mg Tab Take 45 mg by mouth once daily. 30 tablet 5    fluticasone (FLONASE) 50 mcg/actuation nasal spray fluticasone 50 mcg/actuation nasal spray,suspension      gabapentin (NEURONTIN) 300 MG capsule Take 1 capsule (300 mg total) by mouth every evening. 30 capsule 11    gemfibroziL (LOPID) 600 MG tablet Take 600 mg by mouth 2 (two) times daily.      ipratropium (ATROVENT) 42 mcg (0.06 %) nasal spray INSTILL 2 SPRAYS IN EACH NOSTRIL TWICE DAILY WITH ASTELIN      ipratropium/albuterol sulfate (IPRATROPIUM-ALBUTEROL INHL) Inhale into the lungs as needed.      levocetirizine (XYZAL) 5 MG tablet  Take 5 mg by mouth once daily.      losartan (COZAAR) 100 MG tablet Take 1 tablet (100 mg total) by mouth once daily. 90 tablet 3    montelukast (SINGULAIR) 10 mg tablet Take 10 mg by mouth every evening.      olopatadine (PATANOL) 0.1 % ophthalmic solution Place 1 drop into both eyes 2 (two) times daily.      predniSONE (DELTASONE) 2.5 MG tablet Take 1 tablet (2.5 mg total) by mouth daily as needed (RA flare). 30 tablet 4    SYMBICORT 160-4.5 mcg/actuation HFAA Inhale 2 puffs into the lungs 2 (two) times daily.      traMADoL (ULTRAM) 50 mg tablet Take 1 tablet (50 mg total) by mouth 3 (three) times daily as needed for Pain. 90 tablet 1    bismuth subsalicylate (PEPTO-BISMOL ORAL) Take by mouth. (Patient not taking: Reported on 6/10/2024)      diphenhydrAMINE (BENADRYL) 25 mg capsule Take 25 mg by mouth every 6 (six) hours as needed for Itching. (Patient not taking: Reported on 6/10/2024)      DULoxetine (CYMBALTA) 30 MG capsule Take 1 capsule (30 mg total) by mouth once daily. In 1-2 weeks, if no relief, may increase dose to 2 capsules once daily. Call for refills. (Patient not taking: Reported on 6/4/2024) 30 capsule 1    ferrous sulfate 325 (65 FE) MG EC tablet Take 325 mg by mouth once daily.       No current facility-administered medications on file prior to visit.         Assessment:   84 y.o. female with multiple co-morbid illnesses here to follow-up for ongoing chronic disease management and preventive health maintenance.    Plan:     Problem List Items Addressed This Visit       Rheumatoid arthritis - Primary     I suspect she may be having a flare-up.  She has been taking the p.r.n. prednisone and I will check a CRP, which if elevated would warrant possibly a short course of higher dose steroids.         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         Immunosuppression     Chronically on  Orencia injections to control rheumatoid arthritis, with intermittent use of prednisone as needed for flare-ups.         Dry skin dermatitis     Ammonium lactate lotion prescribed for flaky dry skin.  She was advised to try a different moisturizer such as CeraVe.         Relevant Medications    ammonium lactate (LAC-HYDRIN) 12 % lotion               Health Maintenance         Date Due Completion Date    RSV Vaccine (Age 60+ and Pregnant patients) (1 - 1-dose 60+ series) Never done ---    COVID-19 Vaccine (4 - 2023-24 season) 09/01/2023 10/6/2022    Mammogram 11/20/2024 1/3/2024    DEXA Scan 08/18/2025 8/18/2022    Override on 5/3/2016: Declined    Lipid Panel 03/18/2029 3/18/2024    TETANUS VACCINE 08/26/2029 8/26/2019            Future Appointments   Date Time Provider Department Center   6/19/2024  1:00 PM [facility] MRI1 350 LB LIMIT [facility]   6/24/2024  2:00 PM Roger Shields MD [facility]   7/5/2024  1:00 PM Yarely Carter OD [facility] [address]   7/9/2024  2:30 PM Valentin Sawyer MD [facility] SLEEP [facility]   7/29/2024  1:00 PM Royal Riggins PA-C [facility] [place]   9/4/2024  1:00 PM CARDIOLOGY, TESTS [facility]   9/4/2024  2:00 PM CARDIOLOGY, TESTS [facility]   9/10/2024  1:00 PM Porter Thomas MD [facility] CARDIO [facility]   10/7/2024  1:40 PM [facility]         Follow up in about 2 weeks (around 6/24/2024). Total clinical care time was 40 min.    Roger Shields MD  [facility]

## 2024-06-10 NOTE — ASSESSMENT & PLAN NOTE
Ammonium lactate lotion prescribed for flaky dry skin.  She was advised to try a different moisturizer such as CeraVe.

## 2024-06-10 NOTE — ASSESSMENT & PLAN NOTE
Chronically on Orencia injections to control rheumatoid arthritis, with intermittent use of prednisone as needed for flare-ups.

## 2024-06-11 LAB
ALBUMIN SERPL BCP-MCNC: 3.7 G/DL (ref 3.5–5.2)
ANION GAP SERPL CALC-SCNC: 12 MMOL/L (ref 8–16)
BUN SERPL-MCNC: 8 MG/DL (ref 8–23)
CALCIUM SERPL-MCNC: 9.8 MG/DL (ref 8.7–10.5)
CHLORIDE SERPL-SCNC: 104 MMOL/L (ref 95–110)
CO2 SERPL-SCNC: 23 MMOL/L (ref 23–29)
CREAT SERPL-MCNC: 1.2 MG/DL (ref 0.5–1.4)
CRP SERPL-MCNC: 30.1 MG/L (ref 0–8.2)
EST. GFR  (NO RACE VARIABLE): 44.9 ML/MIN/1.73 M^2
GLUCOSE SERPL-MCNC: 101 MG/DL (ref 70–110)
PHOSPHATE SERPL-MCNC: 2.8 MG/DL (ref 2.7–4.5)
POTASSIUM SERPL-SCNC: 4 MMOL/L (ref 3.5–5.1)
SODIUM SERPL-SCNC: 139 MMOL/L (ref 136–145)

## 2024-06-19 ENCOUNTER — HOSPITAL ENCOUNTER (OUTPATIENT)
Dept: RADIOLOGY | Facility: OTHER | Age: 84
Discharge: HOME | End: 2024-06-19
Attending: PHYSICAL MEDICINE & REHABILITATION
Payer: MEDICARE

## 2024-06-19 DIAGNOSIS — M48.02 CERVICAL SPINAL STENOSIS: ICD-10-CM

## 2024-06-19 DIAGNOSIS — M48.02 NEURAL FORAMINAL STENOSIS OF CERVICAL SPINE: ICD-10-CM

## 2024-06-19 DIAGNOSIS — G89.29 CHRONIC NECK PAIN: ICD-10-CM

## 2024-06-19 DIAGNOSIS — M47.22 OSTEOARTHRITIS OF SPINE WITH RADICULOPATHY, CERVICAL REGION: ICD-10-CM

## 2024-06-19 DIAGNOSIS — M54.2 CHRONIC NECK PAIN: ICD-10-CM

## 2024-06-19 PROCEDURE — 72141 MRI NECK SPINE W/O DYE: CPT | Mod: TC

## 2024-06-19 PROCEDURE — 72141 MRI NECK SPINE W/O DYE: CPT | Mod: 26,,, | Performed by: RADIOLOGY

## 2024-06-21 ENCOUNTER — TELEPHONE (OUTPATIENT)
Dept: RHEUMATOLOGY | Facility: CLINIC | Age: 84
End: 2024-06-21
Payer: MEDICARE

## 2024-06-21 NOTE — TELEPHONE ENCOUNTER
----- Message from Lillian Rosario sent at 6/21/2024  1:57 PM CDT -----  Contact: [phone] patient  Requesting an RX refill or new RX.    Is this a refill or new RX:     RX name and strength abatacept (ORENCIA CLICKJECT) 125 mg/mL AtIn  Is this a 30 day or 90 day RX:     Pharmacy name and phone #       [facility] [address] [place] [address]  Phone: [phone] Fax: [phone]        The doctors have asked that we provide their patients with the following 2 reminders -- prescription refills can take up to 72 hours, and a friendly reminder that in the future you can use your [facility] account to request refills:yes

## 2024-06-24 ENCOUNTER — OFFICE VISIT (OUTPATIENT)
Facility: CLINIC | Age: 84
End: 2024-06-24
Payer: MEDICARE

## 2024-06-24 VITALS
TEMPERATURE: 98 F | HEART RATE: 87 BPM | BODY MASS INDEX: 26.96 KG/M2 | OXYGEN SATURATION: 99 % | DIASTOLIC BLOOD PRESSURE: 70 MMHG | SYSTOLIC BLOOD PRESSURE: 130 MMHG | WEIGHT: 147.38 LBS

## 2024-06-24 DIAGNOSIS — M06.9 RHEUMATOID ARTHRITIS INVOLVING RIGHT HAND, UNSPECIFIED WHETHER RHEUMATOID FACTOR PRESENT: Primary | ICD-10-CM

## 2024-06-24 DIAGNOSIS — N95.1 MENOPAUSAL SYNDROME (HOT FLASHES): ICD-10-CM

## 2024-06-24 PROCEDURE — 96372 THER/PROPH/DIAG INJ SC/IM: CPT | Mod: S$GLB,,, | Performed by: HOSPITALIST

## 2024-06-24 PROCEDURE — 3078F DIAST BP <80 MM HG: CPT | Mod: CPTII,S$GLB,, | Performed by: HOSPITALIST

## 2024-06-24 PROCEDURE — 99214 OFFICE O/P EST MOD 30 MIN: CPT | Mod: 25,S$GLB,, | Performed by: HOSPITALIST

## 2024-06-24 PROCEDURE — 3075F SYST BP GE 130 - 139MM HG: CPT | Mod: CPTII,S$GLB,, | Performed by: HOSPITALIST

## 2024-06-24 PROCEDURE — 99999 PR PBB SHADOW E&M-EST. PATIENT-LVL V: CPT | Mod: PBBFAC,,, | Performed by: HOSPITALIST

## 2024-06-24 PROCEDURE — 1125F AMNT PAIN NOTED PAIN PRSNT: CPT | Mod: CPTII,S$GLB,, | Performed by: HOSPITALIST

## 2024-06-24 RX ORDER — TRIAMCINOLONE ACETONIDE 40 MG/ML
40 INJECTION, SUSPENSION INTRA-ARTICULAR; INTRAMUSCULAR
Status: COMPLETED | OUTPATIENT
Start: 2024-06-24 | End: 2024-06-24

## 2024-06-24 RX ADMIN — TRIAMCINOLONE ACETONIDE 40 MG: 40 INJECTION, SUSPENSION INTRA-ARTICULAR; INTRAMUSCULAR at 03:06

## 2024-06-24 NOTE — PROGRESS NOTES
"  Primary Care Provider Appointment - [facility]  Roger Shields MD      Subjective:      Patient ID: Betzaida Neumann is a 84 y.o. female with   Past Medical History:   Diagnosis Date    Allergy     Arthritis     Cataract     CKD (chronic kidney disease) stage 3, GFR 30-59 ml/min     DVT (deep venous thrombosis) 2023    x2    Elevated C-reactive protein (CRP) 03/24/2017    Fibromyalgia     GERD (gastroesophageal reflux disease)     Hyperlipidemia     Hypertension     Polymyalgia rheumatica     RA (rheumatoid arthritis)            Chief Complaint: Follow-up    Prior to this visit, patient's last encounter with PCP was 6/10/2024.    Has been taking the Veozah for 4 days now, and has complete relief of vasomotor sx.  Skin doing better as well.  Still c/o hand pain, worse in morning w/ stiffness.  Ran out of allopurinol prior to flareup.  Sx not as bad during the day.  Has appt w/ rheumatologist.     6/10: C/o dry skin and pruritus past couple weeks.  Uses Oil of Olay moisturizer after bathing.    Having pain in R hand in MTP joints; taking the PRN prednisone which is helping but she's put on 2 lbs.  Using tramadol as well.  Has been acting up past 3 weeks.    Major complaint is persistent night sweats; but did just get letter from insurance that the Veozah has been approved.  Hasn't picked it up yet though.      5/7: Reports hasn't gotten the gabapentin Rx'd last visit; [facility] saying they never got it.  Continues to have hot flashes.  C/o difficulty getting an appointment with a gynecologist, who had been ordering her mammograms and doing paps yearly in addition to Rx-ing HRT until he retired.  Last mammogram in January this year; diagnostic repeat due to fatty tissue obscuring an area.  Will be due for screening MMG in November.  She has not been sexually active since before [place] on account of "nobody to play with;" scarcity of older men who are available but also high-functioning, but she is " also leery about the dangers of dating nowadays.  No abnormal paps for past couple decades.  She meant to bring back the ACP booklet she completed but forgot it.      4/22: Doing well; recently went to [place] to visit her son and his family; went to [employer] and did well getting around and w/ endurance as well.  Hip pain from prior visit resolved.  Tried the citalopram which seemed to make her feel depressed and tired.  Stopped it.  Still having night sweats.      3/18: No improvement w/ menopausal sx so far.  H/o DVT x2 last year.  Also having pain in L hip.  Having to use heating pad from time to time.  Pain seems to be worse if sitting for too long.  Being active seems to help prevent it.  Also c/o changes in fingernails; having ridges on nails and seeming more brittle.      3/5: Pt reports wanted to establish care.  Has HTN, HLD, CKD.  Wants to avoid HD.  BP tends to elevate at dr's office.  H/o 80% R carotid stenosis, improved w/ medical therapy only.  Former smoker.  Lives independently and mows lawn, gardens, keeps house.  Considering joining gym.  Had an illness last year and lost about 25 lbs.  Had no appetite, wasn't eating.  Was hospitalized.  Doing better now, but not back to 100% since.  Has some fatigue.  Tries to stay busy around house due to fear of crime so doesn't get out much.    Her gynecologist retired, who was Rx-ing HRT.  Has been out of estradiol about a month and having hot flashes, sweats, etc.  Has been on it since 1979 when she had a hysterectomy.    On Orencia for RA w/ good results.  No joint pains now.    Checks BPs at home 2-3x/wk, usually in 120s systolic, never higher than 140.    4Ms for Medical Decision-Making in Older Adults    Last Completed EAWV: None    MOBILITY:  Get Up and Go:       No data to display              Activities of Daily Living:       No data to display              Whisper Test:       No data to display              Disability Status:      4/20/2017      9:00 AM   Disability Status   Are you deaf or do you have serious difficulty hearing? N   Are you blind or do you have serious difficulty seeing, even when wearing glasses? N   Because of a physical, mental, or emotional condition, do you have serious difficulty concentrating, remembering, or making decisions? N   Do you have serious difficulty walking or climbing stairs? N   Do you have difficulty dressing or bathing? N   Because of a physical, mental, or emotional condition, do you have difficulty doing errands alone such as visiting a doctor's office or shopping? N     Nutrition Screening:       No data to display             Screening Score: 0-7 Malnourished, 8-11 At Risk, 12-14 Normal    MENTATION:   Depression Patient Health Questionnaire:      5/15/2024     1:07 PM   Depression Patient Health Questionnaire   Over the last two weeks how often have you been bothered by little interest or pleasure in doing things Not at all   Over the last two weeks how often have you been bothered by feeling down, depressed or hopeless Not at all   PHQ-2 Total Score 0     Has Dementia Dx: No    Cognitive Function Screening:       No data to display              Cognitive Function Screening Total - Less than 4 = Abnormal,  Greater than or equal to 4 = Normal    MEDICATIONS:  High Risk Medications:  Total Active Medications: 3  DULoxetine - 30 MG  gabapentin - 300 MG  traMADoL - 50 mg    WHAT MATTERS MOST:  Advance Care Planning   ACP Status:   Patient has had an ACP conversation  Living Will: No  Power of : No  [place]: No    What is most important right now is to focus on avoiding the hospital and remaining as independent as possible    Accordingly, we have decided that the best plan to meet the patient's goals includes continuing with treatment      What matters most to patient today is: getting established with a PCP who is accessible and will personalize care consistent with her values.             Date:  "2024  Patient did not wish or was not able to name a surrogate decision maker or provide an Advance Care Plan. "Five Wishes" provided to patient to fill out and bring to return visit.      Social History     Socioeconomic History    Marital status: Single   Tobacco Use    Smoking status: Former     Current packs/day: 0.00     Average packs/day: 1 pack/day for 29.0 years (29.0 ttl pk-yrs)     Types: Cigarettes     Start date: 1970     Quit date: 1999     Years since quittin.8    Smokeless tobacco: Never    Tobacco comments:     Retired ; ; 4 children healthy   Substance and Sexual Activity    Alcohol use: Yes     Alcohol/week: 1.0 standard drink of alcohol     Types: 1 Glasses of wine per week     Comment: social    Drug use: No    Sexual activity: Yes     Partners: Male     Social Determinants of Health     Financial Resource Strain: High Risk (2024)    Overall Financial Resource Strain (CARDIA)     Difficulty of Paying Living Expenses: Very hard   Food Insecurity: Food Insecurity Present (2024)    Hunger Vital Sign     Worried About Running Out of Food in the Last Year: Sometimes true     Ran Out of Food in the Last Year: Sometimes true   Physical Activity: Unknown (2024)    Exercise Vital Sign     Days of Exercise per Week: 3 days   Stress: No Stress Concern Present (2024)    [employer] - Occupational Stress Questionnaire     Feeling of Stress : Not at all   Housing Stability: Unknown (2024)    Housing Stability Vital Sign     Unable to Pay for Housing in the Last Year: No       Review of Systems   Constitutional:  Positive for diaphoresis and fatigue. Negative for activity change, appetite change, chills and fever.   HENT:  Negative for sore throat.    Eyes:  Negative for photophobia and visual disturbance.   Respiratory:  Negative for cough and shortness of breath.    Cardiovascular:  Negative for chest pain and " leg swelling.   Gastrointestinal:  Negative for abdominal pain, constipation, diarrhea, nausea and vomiting.   Genitourinary:  Positive for hot flashes. Negative for dysuria and hematuria.   Musculoskeletal:  Positive for leg pain. Negative for arthralgias and myalgias.   Integumentary:  Negative for rash and wound.   Neurological:  Negative for dizziness and weakness.        Objective:   /70   Pulse 87   Temp 98.3 °F (36.8 °C) (Oral)   Wt 66.8 kg (147 lb 6 oz)   SpO2 99%   BMI 26.96 kg/m²     Physical Exam  Vitals reviewed.   Constitutional:       General: She is not in acute distress.     Appearance: Normal appearance.   HENT:      Head: Normocephalic and atraumatic.      Right Ear: Tympanic membrane, ear canal and external ear normal.      Left Ear: Tympanic membrane, ear canal and external ear normal.      Nose: Nose normal.      Mouth/Throat:      Mouth: Mucous membranes are moist.      Pharynx: Oropharynx is clear.   Eyes:      General: No scleral icterus.     Conjunctiva/sclera: Conjunctivae normal.   Cardiovascular:      Rate and Rhythm: Normal rate and regular rhythm.      Pulses: Normal pulses.      Heart sounds: Normal heart sounds.   Pulmonary:      Effort: Pulmonary effort is normal. No respiratory distress.      Breath sounds: Normal breath sounds.   Abdominal:      General: There is no distension.      Palpations: Abdomen is soft.      Tenderness: There is no abdominal tenderness. There is no guarding.   Musculoskeletal:         General: Swelling and tenderness present. Normal range of motion.      Cervical back: Normal range of motion and neck supple.      Right lower leg: No edema.      Left lower leg: No edema.      Comments: Mild swelling of fingers of right hand centered around 2nd, 3rd, and 4th MCP joints.  Some synovitis is appreciable at these joints.   Lymphadenopathy:      Cervical: No cervical adenopathy.   Skin:     General: Skin is warm and dry.      Findings: No rash.    Neurological:      General: No focal deficit present.      Mental Status: She is alert and oriented to person, place, and time.              Lab Results   Component Value Date    WBC 5.13 06/10/2024    HGB 10.6 (L) 06/10/2024    HCT 33.6 (L) 06/10/2024     06/10/2024    CHOL 254 (H) 03/18/2024    TRIG 58 03/18/2024    HDL 85 (H) 03/18/2024    ALT 10 03/18/2024    AST 17 03/18/2024     06/10/2024    K 4.0 06/10/2024     06/10/2024    CREATININE 1.2 06/10/2024    BUN 8 06/10/2024    CO2 23 06/10/2024    TSH 1.397 03/18/2024    INR 1.1 04/30/2023    HGBA1C 5.4 05/01/2023       Current Outpatient Medications on File Prior to Visit   Medication Sig Dispense Refill    abatacept (ORENCIA CLICKJECT) 125 mg/mL AtIn Inject 1 mL into the skin every 7 days. 4 mL 0    acetaminophen (TYLENOL) 500 MG tablet Take 1 tablet (500 mg total) by mouth every 6 (six) hours as needed for Pain.      albuterol (PROVENTIL) 2.5 mg /3 mL (0.083 %) nebulizer solution Take 2.5 mg by nebulization every 6 (six) hours as needed for Wheezing. Rescue      alendronate (FOSAMAX) 70 MG tablet Take 70 mg by mouth every 7 days.      amLODIPine (NORVASC) 5 MG tablet Take 1 tablet (5 mg total) by mouth once daily. 90 tablet 3    ammonium lactate (LAC-HYDRIN) 12 % lotion Apply topically 2 (two) times daily as needed for Dry Skin. 225 g 3    azelastine 205.5 mcg (0.15 %) Spry 2 sprays by Each Nostril route 2 (two) times daily.      betamethasone dipropionate 0.05 % cream APPLY THIN LAYER TOPICALLY TO THE AFFECTED AREA TWICE DAILY      bismuth subsalicylate (PEPTO-BISMOL ORAL) Take by mouth.      diclofenac sodium (VOLTAREN) 1 % Gel Apply 4 g topically 3 (three) times daily as needed (hip/leg pain).      diphenhydrAMINE (BENADRYL) 25 mg capsule Take 25 mg by mouth every 6 (six) hours as needed for Itching.      DULoxetine (CYMBALTA) 30 MG capsule Take 1 capsule (30 mg total) by mouth once daily. In 1-2 weeks, if no relief, may increase dose  to 2 capsules once daily. Call for refills. 30 capsule 1    ferrous sulfate 325 (65 FE) MG EC tablet Take 325 mg by mouth once daily.      fezolinetant (VEOZAH) 45 mg Tab Take 45 mg by mouth once daily. 30 tablet 5    fluticasone (FLONASE) 50 mcg/actuation nasal spray fluticasone 50 mcg/actuation nasal spray,suspension      gabapentin (NEURONTIN) 300 MG capsule Take 1 capsule (300 mg total) by mouth every evening. 30 capsule 11    gemfibroziL (LOPID) 600 MG tablet Take 600 mg by mouth 2 (two) times daily.      ipratropium (ATROVENT) 42 mcg (0.06 %) nasal spray INSTILL 2 SPRAYS IN EACH NOSTRIL TWICE DAILY WITH ASTELIN      ipratropium/albuterol sulfate (IPRATROPIUM-ALBUTEROL INHL) Inhale into the lungs as needed.      levocetirizine (XYZAL) 5 MG tablet Take 5 mg by mouth once daily.      losartan (COZAAR) 100 MG tablet Take 1 tablet (100 mg total) by mouth once daily. 90 tablet 3    montelukast (SINGULAIR) 10 mg tablet Take 10 mg by mouth every evening.      olopatadine (PATANOL) 0.1 % ophthalmic solution Place 1 drop into both eyes 2 (two) times daily.      predniSONE (DELTASONE) 2.5 MG tablet Take 1 tablet (2.5 mg total) by mouth daily as needed (RA flare). 30 tablet 4    SYMBICORT 160-4.5 mcg/actuation HFAA Inhale 2 puffs into the lungs 2 (two) times daily.      traMADoL (ULTRAM) 50 mg tablet Take 1 tablet (50 mg total) by mouth 3 (three) times daily as needed for Pain. 90 tablet 1     No current facility-administered medications on file prior to visit.         Assessment:   84 y.o. female with multiple co-morbid illnesses here to follow-up for ongoing chronic disease management and preventive health maintenance.    Plan:     Problem List Items Addressed This Visit       Rheumatoid arthritis - Primary     Hand symptoms in context of significantly elevated CRP drawn after last visit consistent with RA flare.  We will give a Kenalog 40 mg IM injection here in clinic to help resolve it more quickly.  She has upcoming  visit with rheumatologist.         Menopausal syndrome (hot flashes)     So far appears to be getting relief with Veozah; continue for now.                      Health Maintenance         Date Due Completion Date    RSV Vaccine (Age 60+ and Pregnant patients) (1 - 1-dose 60+ series) Never done ---    COVID-19 Vaccine (4 - 2023-24 season) 09/01/2023 10/6/2022    Mammogram 11/20/2024 1/3/2024    DEXA Scan 08/18/2025 8/18/2022    Override on 5/3/2016: Declined    Lipid Panel 03/18/2029 3/18/2024    TETANUS VACCINE 08/26/2029 8/26/2019            Future Appointments   Date Time Provider Department Center   7/8/2024 11:00 AM Gagan Alvarez MD [facility] RHEUM [address]   7/29/2024  1:00 PM Royal Riggins PA-C [facility] [place]   9/4/2024  1:00 PM CARDIOLOGY, [facility]S [facility]   9/4/2024  2:00 PM CARDIOLOGY, [facility]   9/10/2024  1:00 PM Porter Thomas MD [facility] CARDIO [facility]   9/11/2024  2:20 PM Bing Urias, NP [facility] SLEEP [facility]   10/7/2024  1:40 PM Melissa Galloway MD [facility] PHYSMED [address]   10/23/2024  1:40 PM Dee Goff OD [facility] OPTIC[facility] [address]         Follow up in about 4 weeks (around 7/22/2024). Total clinical care time was 40 min.    Roger Shields MD  [facility]

## 2024-07-22 ENCOUNTER — OFFICE VISIT (OUTPATIENT)
Facility: CLINIC | Age: 84
End: 2024-07-22
Payer: MEDICARE

## 2024-07-22 VITALS
OXYGEN SATURATION: 98 % | SYSTOLIC BLOOD PRESSURE: 146 MMHG | DIASTOLIC BLOOD PRESSURE: 80 MMHG | WEIGHT: 145.63 LBS | TEMPERATURE: 98 F | HEART RATE: 86 BPM | BODY MASS INDEX: 26.63 KG/M2

## 2024-07-22 DIAGNOSIS — I10 ESSENTIAL HYPERTENSION: ICD-10-CM

## 2024-07-22 DIAGNOSIS — N95.1 MENOPAUSAL SYNDROME (HOT FLASHES): ICD-10-CM

## 2024-07-22 DIAGNOSIS — M06.9 RHEUMATOID ARTHRITIS INVOLVING RIGHT HAND, UNSPECIFIED WHETHER RHEUMATOID FACTOR PRESENT: ICD-10-CM

## 2024-07-22 DIAGNOSIS — Z23 NEED FOR COVID-19 VACCINE: Primary | ICD-10-CM

## 2024-07-22 PROCEDURE — 1126F AMNT PAIN NOTED NONE PRSNT: CPT | Mod: CPTII,S$GLB,, | Performed by: HOSPITALIST

## 2024-07-22 PROCEDURE — 99214 OFFICE O/P EST MOD 30 MIN: CPT | Mod: S$GLB,,, | Performed by: HOSPITALIST

## 2024-07-22 PROCEDURE — 99999 PR PBB SHADOW E&M-EST. PATIENT-LVL IV: CPT | Mod: PBBFAC,,, | Performed by: HOSPITALIST

## 2024-07-22 PROCEDURE — 3077F SYST BP >= 140 MM HG: CPT | Mod: CPTII,S$GLB,, | Performed by: HOSPITALIST

## 2024-07-22 PROCEDURE — 3079F DIAST BP 80-89 MM HG: CPT | Mod: CPTII,S$GLB,, | Performed by: HOSPITALIST

## 2024-07-22 PROCEDURE — 1159F MED LIST DOCD IN RCRD: CPT | Mod: CPTII,S$GLB,, | Performed by: HOSPITALIST

## 2024-07-22 RX ORDER — LORATADINE 10 MG/1
10 TABLET ORAL EVERY MORNING
COMMUNITY
Start: 2024-07-12

## 2024-07-22 NOTE — PROGRESS NOTES
Primary Care Provider Appointment - 66 [facility] & Cary Shields MD      Subjective:      Patient ID: Betziada Neumann is a 84 y.o. female with   Past Medical History:   Diagnosis Date    Allergy     Arthritis     Cataract     CKD (chronic kidney disease) stage 3, GFR 30-59 ml/min     DVT (deep venous thrombosis) 2023    x2    Elevated C-reactive protein (CRP) 03/24/2017    Fibromyalgia     GERD (gastroesophageal reflux disease)     Hyperlipidemia     Hypertension     Polymyalgia rheumatica     RA (rheumatoid arthritis)            Chief Complaint: Follow-up    Prior to this visit, patient's last encounter with PCP was 6/24/2024.    Had to discontinue the Veozah due to mood swings, nightmares, anxiety, depression.  Only took it for about a week.  Side effects stopped after stopping the medication.  Has resigned herself to living with the hot flashes.  Worried about her BP lately, has been reading up to 180s at home.    6/24: Has been taking the Veozah for 4 days now, and has complete relief of vasomotor sx.  Skin doing better as well.  Still c/o hand pain, worse in morning w/ stiffness.  Ran out of allopurinol prior to flareup.  Sx not as bad during the day.  Has appt w/ rheumatologist.     6/10: C/o dry skin and pruritus past couple weeks.  Uses Oil of Olay moisturizer after bathing.    Having pain in R hand in MTP joints; taking the PRN prednisone which is helping but she's put on 2 lbs.  Using tramadol as well.  Has been acting up past 3 weeks.    Major complaint is persistent night sweats; but did just get letter from insurance that the Veozah has been approved.  Hasn't picked it up yet though.      5/7: Reports hasn't gotten the gabapentin Rx'd last visit; [facility] saying they never got it.  Continues to have hot flashes.  C/o difficulty getting an appointment with a gynecologist, who had been ordering her mammograms and doing paps yearly in addition to Rx-ing HRT until he retired.  Last  "mammogram in January this year; diagnostic repeat due to fatty tissue obscuring an area.  Will be due for screening MMG in November.  She has not been sexually active since before [place] on account of "nobody to play with;" scarcity of older men who are available but also high-functioning, but she is also leery about the dangers of dating nowadays.  No abnormal paps for past couple decades.  She meant to bring back the ACP booklet she completed but forgot it.      4/22: Doing well; recently went to [place] to visit her son and his family; went to [employer] and did well getting around and w/ endurance as well.  Hip pain from prior visit resolved.  Tried the citalopram which seemed to make her feel depressed and tired.  Stopped it.  Still having night sweats.      3/18: No improvement w/ menopausal sx so far.  H/o DVT x2 last year.  Also having pain in L hip.  Having to use heating pad from time to time.  Pain seems to be worse if sitting for too long.  Being active seems to help prevent it.  Also c/o changes in fingernails; having ridges on nails and seeming more brittle.      3/5: Pt reports wanted to establish care.  Has HTN, HLD, CKD.  Wants to avoid HD.  BP tends to elevate at dr's office.  H/o 80% R carotid stenosis, improved w/ medical therapy only.  Former smoker.  Lives independently and mows lawn, gardens, keeps house.  Considering joining gym.  Had an illness last year and lost about 25 lbs.  Had no appetite, wasn't eating.  Was hospitalized.  Doing better now, but not back to 100% since.  Has some fatigue.  Tries to stay busy around house due to fear of crime so doesn't get out much.    Her gynecologist retired, who was Rx-ing HRT.  Has been out of estradiol about a month and having hot flashes, sweats, etc.  Has been on it since 1979 when she had a hysterectomy.    On Orencia for RA w/ good results.  No joint pains now.    Checks BPs at home 2-3x/wk, usually in 120s systolic, never higher than " 140.    4Ms for Medical Decision-Making in Older Adults    Last Completed EAWV: None    MOBILITY:  Get Up and Go:       No data to display              Activities of Daily Living:       No data to display              Whisper Test:       No data to display              Disability Status:      4/20/2017     9:00 AM   Disability Status   Are you deaf or do you have serious difficulty hearing? N   Are you blind or do you have serious difficulty seeing, even when wearing glasses? N   Because of a physical, mental, or emotional condition, do you have serious difficulty concentrating, remembering, or making decisions? N   Do you have serious difficulty walking or climbing stairs? N   Do you have difficulty dressing or bathing? N   Because of a physical, mental, or emotional condition, do you have difficulty doing errands alone such as visiting a doctor's office or shopping? N     Nutrition Screening:       No data to display             Screening Score: 0-7 Malnourished, 8-11 At Risk, 12-14 Normal    MENTATION:   Depression Patient Health Questionnaire:      5/15/2024     1:07 PM   Depression Patient Health Questionnaire   Over the last two weeks how often have you been bothered by little interest or pleasure in doing things Not at all   Over the last two weeks how often have you been bothered by feeling down, depressed or hopeless Not at all   PHQ-2 Total Score 0     Has Dementia Dx: No    Cognitive Function Screening:       No data to display              Cognitive Function Screening Total - Less than 4 = Abnormal,  Greater than or equal to 4 = Normal    MEDICATIONS:  High Risk Medications:  Total Active Medications: 3  DULoxetine - 30 MG  gabapentin - 300 MG  traMADoL - 50 mg    WHAT MATTERS MOST:  Advance Care Planning   ACP Status:   Patient has had an ACP conversation  Living Will: No  Power of : No  La[place]: No    What is most important right now is to focus on avoiding the hospital and remaining as  "independent as possible    Accordingly, we have decided that the best plan to meet the patient's goals includes continuing with treatment      What matters most to patient today is: getting established with a PCP who is accessible and will personalize care consistent with her values.             Date: 2024  Patient did not wish or was not able to name a surrogate decision maker or provide an Advance Care Plan. "Five Wishes" provided to patient to fill out and bring to return visit.      Social History     Socioeconomic History    Marital status: Single   Tobacco Use    Smoking status: Former     Current packs/day: 0.00     Average packs/day: 1 pack/day for 29.0 years (29.0 ttl pk-yrs)     Types: Cigarettes     Start date: 1970     Quit date: 1999     Years since quittin.9    Smokeless tobacco: Never    Tobacco comments:     Retired ; ; 4 children healthy   Substance and Sexual Activity    Alcohol use: Yes     Alcohol/week: 1.0 standard drink of alcohol     Types: 1 Glasses of wine per week     Comment: social    Drug use: No    Sexual activity: Yes     Partners: Male     Social Determinants of Health     Financial Resource Strain: High Risk (2024)    Overall Financial Resource Strain (CARDIA)     Difficulty of Paying Living Expenses: Very hard   Food Insecurity: Food Insecurity Present (2024)    Hunger Vital Sign     Worried About Running Out of Food in the Last Year: Sometimes true     Ran Out of Food in the Last Year: Sometimes true   Physical Activity: Unknown (2024)    Exercise Vital Sign     Days of Exercise per Week: 3 days   Stress: No Stress Concern Present (2024)    [employer] - Occupational Stress Questionnaire     Feeling of Stress : Not at all   Housing Stability: Unknown (2024)    Housing Stability Vital Sign     Unable to Pay for Housing in the Last Year: No       Review of Systems   Constitutional:  " Positive for diaphoresis and fatigue. Negative for activity change, appetite change, chills and fever.   HENT:  Negative for sore throat.    Eyes:  Negative for photophobia and visual disturbance.   Respiratory:  Negative for cough and shortness of breath.    Cardiovascular:  Negative for chest pain and leg swelling.   Gastrointestinal:  Negative for abdominal pain, constipation, diarrhea, nausea and vomiting.   Genitourinary:  Positive for hot flashes. Negative for dysuria and hematuria.   Musculoskeletal:  Positive for leg pain. Negative for arthralgias and myalgias.   Integumentary:  Negative for rash and wound.   Neurological:  Negative for dizziness and weakness.        Objective:   BP (!) 146/80   Pulse 86   Temp 97.7 °F (36.5 °C) (Oral)   Wt 66 kg (145 lb 9.8 oz)   SpO2 98%   BMI 26.63 kg/m²     Physical Exam  Vitals reviewed.   Constitutional:       General: She is not in acute distress.     Appearance: Normal appearance.   HENT:      Head: Normocephalic and atraumatic.      Right Ear: Tympanic membrane, ear canal and external ear normal.      Left Ear: Tympanic membrane, ear canal and external ear normal.      Nose: Nose normal.      Mouth/Throat:      Mouth: Mucous membranes are moist.      Pharynx: Oropharynx is clear.   Eyes:      General: No scleral icterus.     Conjunctiva/sclera: Conjunctivae normal.   Cardiovascular:      Rate and Rhythm: Normal rate and regular rhythm.      Pulses: Normal pulses.      Heart sounds: Normal heart sounds.   Pulmonary:      Effort: Pulmonary effort is normal. No respiratory distress.      Breath sounds: Normal breath sounds.   Abdominal:      General: There is no distension.      Palpations: Abdomen is soft.      Tenderness: There is no abdominal tenderness. There is no guarding.   Musculoskeletal:         General: Normal range of motion.      Cervical back: Normal range of motion and neck supple.      Right lower leg: No edema.      Left lower leg: No edema.    Lymphadenopathy:      Cervical: No cervical adenopathy.   Skin:     General: Skin is warm and dry.      Findings: No rash.   Neurological:      General: No focal deficit present.      Mental Status: She is alert and oriented to person, place, and time.              Lab Results   Component Value Date    WBC 5.13 06/10/2024    HGB 10.6 (L) 06/10/2024    HCT 33.6 (L) 06/10/2024     06/10/2024    CHOL 254 (H) 03/18/2024    TRIG 58 03/18/2024    HDL 85 (H) 03/18/2024    ALT 10 03/18/2024    AST 17 03/18/2024     06/10/2024    K 4.0 06/10/2024     06/10/2024    CREATININE 1.2 06/10/2024    BUN 8 06/10/2024    CO2 23 06/10/2024    TSH 1.397 03/18/2024    INR 1.1 04/30/2023    HGBA1C 5.4 05/01/2023       Current Outpatient Medications on File Prior to Visit   Medication Sig Dispense Refill    abatacept (ORENCIA CLICKJECT) 125 mg/mL AtIn Inject 1 mL into the skin every 7 days. 4 mL 0    acetaminophen (TYLENOL) 500 MG tablet Take 1 tablet (500 mg total) by mouth every 6 (six) hours as needed for Pain.      albuterol (PROVENTIL) 2.5 mg /3 mL (0.083 %) nebulizer solution Take 2.5 mg by nebulization every 6 (six) hours as needed for Wheezing. Rescue      alendronate (FOSAMAX) 70 MG tablet Take 70 mg by mouth every 7 days.      amLODIPine (NORVASC) 5 MG tablet Take 1 tablet (5 mg total) by mouth once daily. 90 tablet 3    ammonium lactate (LAC-HYDRIN) 12 % lotion Apply topically 2 (two) times daily as needed for Dry Skin. 225 g 3    azelastine 205.5 mcg (0.15 %) Spry 2 sprays by Each Nostril route 2 (two) times daily.      betamethasone dipropionate 0.05 % cream APPLY THIN LAYER TOPICALLY TO THE AFFECTED AREA TWICE DAILY      bismuth subsalicylate (PEPTO-BISMOL ORAL) Take by mouth.      diclofenac sodium (VOLTAREN) 1 % Gel Apply 4 g topically 3 (three) times daily as needed (hip/leg pain).      diphenhydrAMINE (BENADRYL) 25 mg capsule Take 25 mg by mouth every 6 (six) hours as needed for Itching.       DULoxetine (CYMBALTA) 30 MG capsule Take 1 capsule (30 mg total) by mouth once daily. In 1-2 weeks, if no relief, may increase dose to 2 capsules once daily. Call for refills. 30 capsule 1    ferrous sulfate 325 (65 FE) MG EC tablet Take 325 mg by mouth once daily.      fezolinetant (VEOZAH) 45 mg Tab Take 45 mg by mouth once daily. 30 tablet 5    fluticasone (FLONASE) 50 mcg/actuation nasal spray fluticasone 50 mcg/actuation nasal spray,suspension      gabapentin (NEURONTIN) 300 MG capsule Take 1 capsule (300 mg total) by mouth every evening. 30 capsule 11    gemfibroziL (LOPID) 600 MG tablet Take 600 mg by mouth 2 (two) times daily.      ipratropium (ATROVENT) 42 mcg (0.06 %) nasal spray INSTILL 2 SPRAYS IN EACH NOSTRIL TWICE DAILY WITH ASTELIN      ipratropium/albuterol sulfate (IPRATROPIUM-ALBUTEROL INHL) Inhale into the lungs as needed.      levocetirizine (XYZAL) 5 MG tablet Take 5 mg by mouth once daily.      loratadine (CLARITIN) 10 mg tablet Take 10 mg by mouth every morning.      losartan (COZAAR) 100 MG tablet Take 1 tablet (100 mg total) by mouth once daily. 90 tablet 3    montelukast (SINGULAIR) 10 mg tablet Take 10 mg by mouth every evening.      olopatadine (PATANOL) 0.1 % ophthalmic solution Place 1 drop into both eyes 2 (two) times daily.      predniSONE (DELTASONE) 2.5 MG tablet Take 1 tablet (2.5 mg total) by mouth daily as needed (RA flare). 30 tablet 4    SYMBICORT 160-4.5 mcg/actuation HFAA Inhale 2 puffs into the lungs 2 (two) times daily.      traMADoL (ULTRAM) 50 mg tablet Take 1 tablet (50 mg total) by mouth 3 (three) times daily as needed for Pain. 90 tablet 1     No current facility-administered medications on file prior to visit.         Assessment:   84 y.o. female with multiple co-morbid illnesses here to follow-up for ongoing chronic disease management and preventive health maintenance.    Plan:     Problem List Items Addressed This Visit       Essential hypertension     Patient  instructed to bring her home blood pressure cuff to return visit so that it can be tested against ours.         Rheumatoid arthritis     Flare resolved after Kenalog injection last visit.  She had to reschedule her appointment with the rheumatologist so has not been seen by them yet.  She remains on Orencia.         Need for COVID-19 vaccine - Primary     She has started wearing a mask in public again due to recent increase in COVID cases.  Her last COVID vaccination was in October of 2022; a booster was offered to her which she expressed interest in.  However, on going to pull the vaccine to administer it we found that we were out of doses.  She was instructed to go to her pharmacy and request it.         Relevant Medications    sars-cov-2 (covid-19) (Spikevax (Moderna) (12yrs and up 2023)) 50 mcg/0.5 mL injection 0.5 mL    Menopausal syndrome (hot flashes)     Discontinued Veozah due to side effects.  She has so far failed multiple medication classes for vasomotor symptoms.  We will need to look into other alternative symptomatic therapies.                      Health Maintenance         Date Due Completion Date    RSV Vaccine (Age 60+ and Pregnant patients) (1 - 1-dose 60+ series) Never done ---    COVID-19 Vaccine (4 - 2023-24 season) 09/01/2023 10/6/2022    Influenza Vaccine (1) 09/01/2024 9/12/2023    Mammogram 11/20/2024 1/3/2024    DEXA Scan 08/18/2025 8/18/2022    Override on 5/3/2016: Declined    Lipid Panel 03/18/2029 3/18/2024    TETANUS VACCINE 08/26/2029 8/26/2019            Future Appointments   Date Time Provider Department Center   7/29/2024  1:00 PM Royal Riggins PA-C [facility] [place]   9/4/2024  1:00 PM CARDIOLOGY, TESTS [facility]   9/4/2024  2:00 PM CARDIOLOGY, [facility]S [facility]   9/10/2024  1:00 PM Porter Thomas MD [facility] CARDIO [facility]   9/11/2024  2:20 PM Bing Urias, NP [facility] SLEEP [facility]   10/7/2024  1:40 PM  Melissa Galloway MD [facility] PHYSMED [facility]   10/23/2024  1:40 PM Dee Goff, CK [facility]         Follow up in about 4 weeks (around 8/19/2024). Total clinical care time was 40 min.    Roger Shields MD  [address] [place] [address], [facility] and [facility]

## 2024-07-22 NOTE — PATIENT INSTRUCTIONS
Bring your blood pressure cuff with you to your next appointment so we can check its accuracy against ours.

## 2024-07-23 NOTE — ASSESSMENT & PLAN NOTE
Patient instructed to bring her home blood pressure cuff to return visit so that it can be tested against ours.

## 2024-07-23 NOTE — ASSESSMENT & PLAN NOTE
She has started wearing a mask in public again due to recent increase in COVID cases.  Her last COVID vaccination was in October of 2022; a booster was offered to her which she expressed interest in.  However, on going to pull the vaccine to administer it we found that we were out of doses.  She was instructed to go to her pharmacy and request it.

## 2024-07-23 NOTE — ASSESSMENT & PLAN NOTE
Flare resolved after Kenalog injection last visit.  She had to reschedule her appointment with the rheumatologist so has not been seen by them yet.  She remains on Orencia.

## 2024-07-23 NOTE — ASSESSMENT & PLAN NOTE
Discontinued Veozah due to side effects.  She has so far failed multiple medication classes for vasomotor symptoms.  We will need to look into other alternative symptomatic therapies.

## 2024-07-23 NOTE — ASSESSMENT & PLAN NOTE
Hand symptoms in context of significantly elevated CRP drawn after last visit consistent with RA flare.  We will give a Kenalog 40 mg IM injection here in clinic to help resolve it more quickly.  She has upcoming visit with rheumatologist.

## 2024-07-29 ENCOUNTER — OFFICE VISIT (OUTPATIENT)
Dept: OTOLARYNGOLOGY | Facility: CLINIC | Age: 84
End: 2024-07-29
Payer: MEDICARE

## 2024-07-29 VITALS
DIASTOLIC BLOOD PRESSURE: 75 MMHG | BODY MASS INDEX: 26.9 KG/M2 | HEART RATE: 81 BPM | SYSTOLIC BLOOD PRESSURE: 155 MMHG | WEIGHT: 147.06 LBS

## 2024-07-29 DIAGNOSIS — J34.3 HYPERTROPHY OF INFERIOR NASAL TURBINATE: ICD-10-CM

## 2024-07-29 DIAGNOSIS — J34.2 DEVIATED NASAL SEPTUM: ICD-10-CM

## 2024-07-29 DIAGNOSIS — J32.9 CHRONIC RHINOSINUSITIS: Primary | ICD-10-CM

## 2024-07-29 DIAGNOSIS — J30.2 PERENNIAL ALLERGIC RHINITIS WITH SEASONAL VARIATION: ICD-10-CM

## 2024-07-29 DIAGNOSIS — J30.89 PERENNIAL ALLERGIC RHINITIS WITH SEASONAL VARIATION: ICD-10-CM

## 2024-07-29 DIAGNOSIS — K11.7 XEROSTOMIA: ICD-10-CM

## 2024-07-29 PROCEDURE — 3078F DIAST BP <80 MM HG: CPT | Mod: CPTII,S$GLB,, | Performed by: PHYSICIAN ASSISTANT

## 2024-07-29 PROCEDURE — 1101F PT FALLS ASSESS-DOCD LE1/YR: CPT | Mod: CPTII,S$GLB,, | Performed by: PHYSICIAN ASSISTANT

## 2024-07-29 PROCEDURE — 1126F AMNT PAIN NOTED NONE PRSNT: CPT | Mod: CPTII,S$GLB,, | Performed by: PHYSICIAN ASSISTANT

## 2024-07-29 PROCEDURE — 99999 PR PBB SHADOW E&M-EST. PATIENT-LVL V: CPT | Mod: PBBFAC,,, | Performed by: PHYSICIAN ASSISTANT

## 2024-07-29 PROCEDURE — 1159F MED LIST DOCD IN RCRD: CPT | Mod: CPTII,S$GLB,, | Performed by: PHYSICIAN ASSISTANT

## 2024-07-29 PROCEDURE — 99214 OFFICE O/P EST MOD 30 MIN: CPT | Mod: S$GLB,,, | Performed by: PHYSICIAN ASSISTANT

## 2024-07-29 PROCEDURE — 3077F SYST BP >= 140 MM HG: CPT | Mod: CPTII,S$GLB,, | Performed by: PHYSICIAN ASSISTANT

## 2024-07-29 PROCEDURE — 3288F FALL RISK ASSESSMENT DOCD: CPT | Mod: CPTII,S$GLB,, | Performed by: PHYSICIAN ASSISTANT

## 2024-07-29 RX ORDER — IPRATROPIUM BROMIDE 21 UG/1
2 SPRAY, METERED NASAL 3 TIMES DAILY PRN
Qty: 30 ML | Refills: 2 | Status: SHIPPED | OUTPATIENT
Start: 2024-07-29

## 2024-07-29 NOTE — PROGRESS NOTES
Subjective:      Betzaida is a 84 y.o. female who comes for follow-up of  headaches .  Her last visit with me was on 3/20/2024.       Patient was evaluated by Neurology and underwent an MRI of her brain which showed scant ethmoid mucosal thickening.  Patient reports her frontal headaches have persisted.  Patient also with postnasal drip which is usually worse in the morning.    Patient also reports chronic neck pain and dry mouth that is present in the morning when she wakes up.    Per last office visit 3/20/2024 :  Patient reports frequent postnasal drip and frontal/ temporal pressure.  Patient states symptoms are usually worse in the morning when she wakes up.  Patient denies any associated throat clearing, coughing, dysphagia, rhinorrhea, frequent nose blowing.   Patient reports frequent sinus infections but denies any discolored mucus or nasal obstruction with these episodes.    This has been recurring for years and has undergone workup previously with ENT in 2016.    Patient states she is undergone allergy shots and does not feel significant improvement.     Current sinonasal medications include flonase, azelastine and ipratropium-->2 SEN BID, .    She recalls previously having allergy testingwhich resulted in a course of immunotherapy.  She denies a history of asthma as a child.  She relates a history of reflux symptoms which is currently managed with unknown medication.    She relates a diagnosis of obstructive sleep apnea without regular CPAP use. H/o moderate severe EMILIO in 2018, but mild EMILIO in 2021 with Dr. Sawyer  She does not recall a prior history of nasal trauma other than the sinonasal surgery.  She has previously had sinonasal surgery consisting of septoplasty in 1999.    She has had a tonsillectomy.  She is not a tobacco smoker.     1. Severe frontal headaches  -     Do not suspect sinusitis as etiology of headaches  -Other etiologies include under-treated EMILIO, tension headaches, cervicogenic  "headache  -     Ambulatory referral/consult to Neurology; Future; Expected date: 03/27/2024  -     Laryngoscopy with LPR changes, PND, or rhinitis  2. Perennial allergic rhinitis with seasonal variation  3. Chronic rhinosinusitis  -    Continue nasal sprays   -Do not suspect LPR as etiology of PND; no coughing, throat clearing or other throat symptoms  4. EMILIO (obstructive sleep apnea)  -     Ambulatory referral/consult to Sleep Disorders; Future; Expected date: 03/27/2024  5. Deviated nasal septum  6. Hypertrophy of inferior nasal turbinate              - incidental findings    The patient's medications, allergies, past medical, surgical, social and family histories were reviewed and updated as appropriate.    A detailed review of systems was obtained with pertinent positives as per the above HPI, and otherwise negative.        Objective:     BP (!) 155/75 (BP Location: Left arm, Patient Position: Sitting, BP Method: Small (Automatic))   Pulse 81   Wt 66.7 kg (147 lb 0.8 oz)   BMI 26.90 kg/m²      Physical Exam  Vitals reviewed.   Constitutional:       Appearance: Normal appearance.   HENT:      Head: Normocephalic and atraumatic.      Right Ear: External ear normal.      Left Ear: External ear normal.      Nose:      Right Turbinates: Not enlarged.      Left Turbinates: Not enlarged.      Right Sinus: No maxillary sinus tenderness or frontal sinus tenderness.      Left Sinus: No maxillary sinus tenderness or frontal sinus tenderness.   Pulmonary:      Effort: Pulmonary effort is normal.   Neurological:      Mental Status: She is alert.          Procedure    None    Data Reviewed    WBC (K/uL)   Date Value   06/10/2024 5.13     Eosinophil % (%)   Date Value   06/10/2024 4.7     Eos # (K/uL)   Date Value   06/10/2024 0.2     Platelets (K/uL)   Date Value   06/10/2024 292     Glucose (mg/dL)   Date Value   06/10/2024 101     No results found for: "IGE"    Reported aeroallergen sensitivity by skin and skin prick to " oak, grass, mold, dust, cat, dog     I independently reviewed the images of the MRI brain dated 5/13/24. Pertinent findings include L deviated septum, mild ethmoid mucosal thickening, scant left inferior max mucosal thickening,     Assessment:     1. Chronic rhinosinusitis    2. Xerostomia    3. Perennial allergic rhinitis with seasonal variation    4. Deviated nasal septum    5. Hypertrophy of inferior nasal turbinate         Plan:     Recent MRI without evidence of frontal sinusitis contributing to symptoms    Continue azleasitne  Rx ipratropium  Rx sialogogues as xerostomia may be contributing to thick mucus    Sleep eval in September; patient to contact me with sleep study results    She will Follow up if symptoms worsen or fail to improve.  I had a discussion with the patient regarding her condition and the further workup and management options.    All questions were answered, and the patient is in agreement with the above.     Disclaimer:  This note may have been prepared utilizing voice recognition software which may result in occasional typographical errors in the text such as sound alike words.   If further clarification is needed, please contact the ENT department of [facility].

## 2024-07-29 NOTE — PATIENT INSTRUCTIONS
Ipratropium nasal spray  This particular spray is used to relieve a runny nose caused by year-round allergic and nonallergic rhinitis (runny nose and stuffiness). Ipratropium nasal spray does not relieve nasal congestion or sneezing caused by these conditions. Ipratropium nasal spray is in a class of medications called anticholinergics. It works by reducing the amount of mucus produced in the nose.  Use this spray up to three times daily as needed for runny nose/post-nasal drip.      Xylimelts at night.  Biotene rinse during the day

## 2024-08-30 ENCOUNTER — TELEPHONE (OUTPATIENT)
Facility: CLINIC | Age: 84
End: 2024-08-30
Payer: MEDICARE

## 2024-08-30 NOTE — TELEPHONE ENCOUNTER
----- Message from Mary Lou Ackerman sent at 8/30/2024 11:55 AM CDT -----  Contact: [phone] (  Pt missed appt on yesterday and is calling to reschedule. Schedule is closed off for me    Please call pt and advise

## 2024-09-03 NOTE — PROGRESS NOTES
HPI:     Patient with joint pains and high inflammatory markers for a follow up    The chief complaint leading to consultation is: seronegative arthritis      History of presenting illness    82 year old black female has    Joint pain since 2008     She has seen several rheumatologists. First was Dr. Lerma who thought she had bursitis.Then she saw Dr. Riddle who diagnosed rheumatoid arthritis and gave her Humira x 3 years which did not help. Humira caused her to feel like a zombie. She then went to [facility] : saw Dr. Woo and Dr. Reeves     He diagnosed a combination of rheumatoid arthritis, fibromyalgia and PMR.  She recalls taking methotrexate but it didn't help her. At one point she only took pain pills.She took mtx for 4 years    She has been on prednisone since May 2016 due to ALLERGIES as prescribed by her ALLERGY doctor.     She also took tramadol and percocet but did not help.     Held Arava due to stomach upset.  Night sweats since 2008.    Then we were treating her as PMR.     She was on prednisone 2.5 mg BID    She took lyrica in the past and that didn't help      Then her complaints were :     Fatigue  Pain all over  No swelling    Once she had right hand pain and swelling,then she had left ankle pain and swelling  Muscle spasms     I checked her uric acid and it was 11.4  She had CRP of 50.3  Her ESR was normal    I offered her gabapentin and she didn't like it  She didn't like the lyrica    I then gave her 20 mg prednisone    I asked her to taper prednisone  Actemra offered last time     She didn't like it     She says it caused rash,made her sleepy     She has stayed on 10 mg prednisone   But she has continued to take actemra     Uric acid down to 5.6 AND THEN UPTO 6.9    SHE HAD ONGOING JOINT SYMPTOMS   She started to develop fibromyalgia symptoms as well    I added 0.5 ml weekly mtx and she took it for 2 weeks and she stopped it     She was taking actemra,prednisone 10 mg tapered to 2.5 mg    She is now on cymbalta 30 mg   She takes allopurinol 200 mg and colchicine 0.6 mg     I last saw her in November and she was asked to start xeljanz 5 mg bid  She was taken off actemra in february  She didn't get xeljanz unfortunately yet    3/2021    She has had more flares recently  One in the left hand yesterday    3/1/2021    Tb neg  H/h 11.7/38.6  nml white and plt count  GFR 41  Hep b and c neg    11/2020    Uric acid 5.8  ESR 26  CRP 33.4    6/2021    Neck is hurting and it causes shooting pain down the left arm    ESR 53  CRP 33.8  GFR 49  CBC nml    Sinuses are bothering her a lot      MRI C spine    The alignment of the cervical spine is normal.  The vertebral body heights are well maintained.  Severe disc space narrowing at C3-C4 and mild disc space narrowing at C4-5 and C5-6.  No evidence of malignant bone marrow replacement process or infection.  The craniocervical junction, cervical cord and upper thoracic cord demonstrate normal signal.     C2-C3: No disc abnormality, no canal stenosis or foraminal narrowing.  There is mild left facet joint osseous hypertrophy.     C3-C4: Posterior disc osteophyte complex effacing the anterior CSF sleeve causing no canal stenosis.  There is bilateral uncovertebral spur creating moderate left and mild right foraminal narrowing.     C4-C5: Posterior disc osteophyte complex with left paracentral disc  protrusion and ligamentum flavum hypertrophy result in overall moderate central canal stenosis with severe left lateral canal stenosis.  There is mild left foraminal narrowing.     C5-C6: No disc abnormality, mild ligamentum flavum hypertrophy.  No canal stenosis or foraminal narrowing.     C6-C7: Unremarkable     C7-T1: Unremarkable     The paraspinal soft tissues appear normal.     Impression:     Significant spondylosis of the cervical spine most severe at C5-6 where there is severe left lateral canal stenosis and overall moderate central canal stenosis due to a  posterior disc osteophyte complex with a left paracentral disc protrusion and prominent ligamentum flavum hypertrophy.     She sees spine ortho and surgery is recommended     7/2022    She has fibromyalgia flares  Joints are stable    She likes orencia  She stopped daily allopurinol-uric acid had gone upto 8.1  CRP 18.8  CMP- GFR 46  H/h 11.2/37.1      She stopped cymbalta      1/2023    On orencia  She likes it    Main complaint-  Neck pain  Low back pain going down the left leg    She has not had gout attacks  Her uric acid has gone up  She doesn't like to take allopurinol  She will follow dietary restrictions    Anemia persists and is mild      Past history : HTN,allergic rhinitis,CKD stage 3,GERD,PMR,RA,FMS,elevated inflammatory markers,asthma     Family history : no autoimmune illness    Social history : former smoker 1999      Review of Systems   Constitutional: Negative for activity change, appetite change, chills, diaphoresis, fatigue, fever and unexpected weight change.   HENT: Negative for congestion, dental problem, drooling, ear discharge, ear pain, facial swelling, hearing loss, mouth sores, nosebleeds, postnasal drip, rhinorrhea, sinus pressure, sinus pain, sneezing, sore throat, tinnitus, trouble swallowing and voice change.    Eyes: Negative for photophobia, pain, discharge, redness, itching and visual disturbance.   Respiratory: Negative for apnea, cough, choking, chest tightness, shortness of breath, wheezing and stridor.    Cardiovascular: Negative for chest pain, palpitations and leg swelling.   Gastrointestinal: Negative for abdominal distention, abdominal pain, anal bleeding, blood in stool, constipation, diarrhea, nausea, rectal pain and vomiting.   Endocrine: Negative for cold intolerance, heat intolerance, polydipsia, polyphagia and polyuria.   Genitourinary: Negative for decreased urine volume, difficulty urinating, dysuria, enuresis, flank pain, frequency, genital sores, hematuria and  urgency.   Musculoskeletal: Positive for arthralgias. Negative for back pain, gait problem, joint swelling, myalgias, neck pain and neck stiffness.   Skin: Negative for color change, pallor, rash and wound.   Allergic/Immunologic: Negative for environmental allergies, food allergies and immunocompromised state.   Neurological: Negative for dizziness, tremors, seizures, syncope, facial asymmetry, speech difficulty, weakness, light-headedness, numbness and headaches.   Hematological: Negative for adenopathy. Does not bruise/bleed easily.   Psychiatric/Behavioral: Negative for agitation, behavioral problems, confusion, decreased concentration, dysphoric mood, hallucinations, self-injury, sleep disturbance and suicidal ideas. The patient is not nervous/anxious and is not hyperactive.      Physical exam     MSK exam nml        Assessment     82 year old black female with     HTN,allergic rhinitis,CKD stage 3,GERD,PMR,RA,FMS,elevated inflammatory markers,asthma     She wants prednisone to survive with the joint pain    We have witnessed synovitis in the past,this made us think she has RA  We thought she rapidly responded to prednisone,hence comes the PMR  She always complains of overall body pain,hence she gets the fibromyalgia diagnosis    We have positive RF in the past  We have high uric acid    She has responded really well to prednisone 20 mg  Her CRP dropped a great deal    We gave her allopurinol 100 mg and colchicine 0.6 mg  and her uric acid dropped to less than 6    I GAVE HER MTX AND SHE TOOK IT FOR 2 WEEKS ONLY     Finally on actemra we had achieved compliance    We stopped prednisone and she had more flares    We offered xeljanz 5 mg bid  She had more flares    She now take orencia weekly  She wants 2.5 mg daily prednisone    She probably has seronegative RA/PMR/Gout and fibromyalgia    Dexa : Osteopenia of the femoral neck.  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 Seronegative arthritis        Reviewed labs/xrays  Reviewed medications        Plan:      She does not want allopurinol 200 mg daily and colchicine 0.6 mg daily  She wants dietary management only   Suggested compliance  She takes allopurinol as needed  She is aware that uric acid has gone up    Continue orencia weekly    She stopped cymbalta  30/ 60 mg daily  Lyrica gave her a rash    Will offer tramadol    [facility] xray    Prednisone 2.5 mg taper off,she says she cant     rtc in 3 months     CBC,CMP,ESR,CRP,uric acid-today    Vaccines : covid,flu,pneumonia,shingles    dexa osteopenia 2019  dexa osteopenia 2021  On tonya and vit d    Refuses spine-injections                 [Well Developed] : well developed [Well Nourished] : well nourished [No Acute Distress] : no acute distress [Obese] : obese [Normal Conjunctiva] : normal conjunctiva [Normal Venous Pressure] : normal venous pressure [No Carotid Bruit] : no carotid bruit [Normal S1, S2] : normal S1, S2 [No Murmur] : no murmur [No Rub] : no rub [No Gallop] : no gallop [Clear Lung Fields] : clear lung fields [Good Air Entry] : good air entry [No Respiratory Distress] : no respiratory distress  [Soft] : abdomen soft [Non Tender] : non-tender [No Masses/organomegaly] : no masses/organomegaly [Normal Bowel Sounds] : normal bowel sounds [Normal Gait] : normal gait [No Edema] : no edema [No Cyanosis] : no cyanosis [No Clubbing] : no clubbing [No Varicosities] : no varicosities [No Rash] : no rash [No Skin Lesions] : no skin lesions [Moves all extremities] : moves all extremities [No Focal Deficits] : no focal deficits [Normal Speech] : normal speech [Alert and Oriented] : alert and oriented [Normal memory] : normal memory

## 2024-09-04 ENCOUNTER — HOSPITAL ENCOUNTER (OUTPATIENT)
Dept: CARDIOLOGY | Facility: OTHER | Age: 84
Discharge: HOME | End: 2024-09-04
Attending: INTERNAL MEDICINE
Payer: MEDICARE

## 2024-09-04 VITALS
HEIGHT: 62 IN | DIASTOLIC BLOOD PRESSURE: 75 MMHG | HEART RATE: 81 BPM | WEIGHT: 147 LBS | SYSTOLIC BLOOD PRESSURE: 155 MMHG | BODY MASS INDEX: 27.05 KG/M2

## 2024-09-04 DIAGNOSIS — I65.29 ASYMPTOMATIC CAROTID ARTERY STENOSIS, UNSPECIFIED LATERALITY: ICD-10-CM

## 2024-09-04 DIAGNOSIS — I10 ESSENTIAL HYPERTENSION: ICD-10-CM

## 2024-09-04 DIAGNOSIS — I65.23 CAROTID STENOSIS, BILATERAL: ICD-10-CM

## 2024-09-04 LAB
ASCENDING AORTA: 2.73 CM
AV INDEX (PROSTH): 0.81
AV MEAN GRADIENT: 4 MMHG
AV PEAK GRADIENT: 9 MMHG
AV REGURGITATION PRESSURE HALF TIME: 553 MS
AV VALVE AREA BY VELOCITY RATIO: 1.97 CM²
AV VALVE AREA: 2.07 CM²
AV VELOCITY RATIO: 0.77
BSA FOR ECHO PROCEDURE: 1.71 M2
CV ECHO LV RWT: 0.41 CM
DOP CALC AO PEAK VEL: 1.5 M/S
DOP CALC AO VTI: 26.4 CM
DOP CALC LVOT AREA: 2.6 CM2
DOP CALC LVOT DIAMETER: 1.81 CM
DOP CALC LVOT PEAK VEL: 1.15 M/S
DOP CALC LVOT STROKE VOLUME: 54.78 CM3
DOP CALC RVOT PEAK VEL: 1.03 M/S
DOP CALC RVOT VTI: 19.7 CM
DOP CALCLVOT PEAK VEL VTI: 21.3 CM
E WAVE DECELERATION TIME: 155.59 MSEC
E/A RATIO: 0.62
E/E' RATIO: 8 M/S
ECHO LV POSTERIOR WALL: 0.92 CM (ref 0.6–1.1)
FRACTIONAL SHORTENING: 39 % (ref 28–44)
INTERVENTRICULAR SEPTUM: 0.88 CM (ref 0.6–1.1)
IVC DIAMETER: 0.82 CM
IVRT: 106.57 MSEC
LA MAJOR: 4.92 CM
LA MINOR: 3.91 CM
LA WIDTH: 4.1 CM
LEFT ARM DIASTOLIC BLOOD PRESSURE: 77 MMHG
LEFT ARM SYSTOLIC BLOOD PRESSURE: 167 MMHG
LEFT ATRIUM AREA SYSTOLIC (APICAL 2 CHAMBER): 12.27 CM2
LEFT ATRIUM AREA SYSTOLIC (APICAL 4 CHAMBER): 19.17 CM2
LEFT ATRIUM SIZE: 3.3 CM
LEFT ATRIUM VOLUME INDEX MOD: 28.2 ML/M2
LEFT ATRIUM VOLUME INDEX: 29.8 ML/M2
LEFT ATRIUM VOLUME MOD: 47.32 CM3
LEFT ATRIUM VOLUME: 50.11 CM3
LEFT CBA DIAS: 15 CM/S
LEFT CBA SYS: 89 CM/S
LEFT CCA DIST DIAS: 20 CM/S
LEFT CCA DIST SYS: 118 CM/S
LEFT CCA MID DIAS: 12 CM/S
LEFT CCA MID SYS: 77 CM/S
LEFT CCA PROX DIAS: 13 CM/S
LEFT CCA PROX SYS: 76 CM/S
LEFT ECA DIAS: 16 CM/S
LEFT ECA SYS: 110 CM/S
LEFT ICA DIST DIAS: 22 CM/S
LEFT ICA DIST SYS: 82 CM/S
LEFT ICA MID DIAS: 21 CM/S
LEFT ICA MID SYS: 84 CM/S
LEFT ICA PROX DIAS: 15 CM/S
LEFT ICA PROX SYS: 95 CM/S
LEFT INTERNAL DIMENSION IN SYSTOLE: 2.76 CM (ref 2.1–4)
LEFT VENTRICLE DIASTOLIC VOLUME INDEX: 55.29 ML/M2
LEFT VENTRICLE DIASTOLIC VOLUME: 92.89 ML
LEFT VENTRICLE END SYSTOLIC VOLUME APICAL 2 CHAMBER: 30.76 ML
LEFT VENTRICLE END SYSTOLIC VOLUME APICAL 4 CHAMBER: 57.97 ML
LEFT VENTRICLE MASS INDEX: 79 G/M2
LEFT VENTRICLE SYSTOLIC VOLUME INDEX: 17 ML/M2
LEFT VENTRICLE SYSTOLIC VOLUME: 28.57 ML
LEFT VENTRICULAR INTERNAL DIMENSION IN DIASTOLE: 4.51 CM (ref 3.5–6)
LEFT VENTRICULAR MASS: 133.31 G
LEFT VERTEBRAL DIAS: 15 CM/S
LEFT VERTEBRAL SYS: 54 CM/S
LV LATERAL E/E' RATIO: 8 M/S
LV SEPTAL E/E' RATIO: 8 M/S
LVED V (TEICH): 92.89 ML
LVES V (TEICH): 28.57 ML
LVOT MG: 2.46 MMHG
LVOT MV: 0.73 CM/S
MV PEAK A VEL: 0.9 M/S
MV PEAK E VEL: 0.56 M/S
MV STENOSIS PRESSURE HALF TIME: 45.12 MS
MV VALVE AREA P 1/2 METHOD: 4.88 CM2
OHS CV CAROTID RIGHT ICA EDV HIGHEST: 20
OHS CV CAROTID ULTRASOUND LEFT ICA/CCA RATIO: 0.81
OHS CV CAROTID ULTRASOUND RIGHT ICA/CCA RATIO: 1.25
OHS CV PV CAROTID LEFT HIGHEST CCA: 118
OHS CV PV CAROTID LEFT HIGHEST ICA: 95
OHS CV PV CAROTID RIGHT HIGHEST CCA: 84
OHS CV PV CAROTID RIGHT HIGHEST ICA: 105
OHS CV US CAROTID LEFT HIGHEST EDV: 22
PISA AR MAX VEL: 2.47 M/S
PISA MRMAX VEL: 3.73 M/S
PISA TR MAX VEL: 2.32 M/S
PULM VEIN S/D RATIO: 1.73
PV MEAN GRADIENT: 2 MMHG
PV PEAK D VEL: 0.3 M/S
PV PEAK GRADIENT: 4 MMHG
PV PEAK S VEL: 0.52 M/S
PV PEAK VELOCITY: 0.95 M/S
RA MAJOR: 4.6 CM
RA PRESSURE ESTIMATED: 3 MMHG
RA WIDTH: 3.8 CM
RIGHT ARM DIASTOLIC BLOOD PRESSURE: 75 MMHG
RIGHT ARM SYSTOLIC BLOOD PRESSURE: 160 MMHG
RIGHT CBA DIAS: 13 CM/S
RIGHT CBA SYS: 84 CM/S
RIGHT CCA DIST DIAS: 14 CM/S
RIGHT CCA DIST SYS: 84 CM/S
RIGHT CCA MID DIAS: 14 CM/S
RIGHT CCA MID SYS: 84 CM/S
RIGHT CCA PROX DIAS: 11 CM/S
RIGHT CCA PROX SYS: 67 CM/S
RIGHT ECA DIAS: 9 CM/S
RIGHT ECA SYS: 108 CM/S
RIGHT ICA DIST DIAS: 20 CM/S
RIGHT ICA DIST SYS: 105 CM/S
RIGHT ICA MID DIAS: 15 CM/S
RIGHT ICA MID SYS: 105 CM/S
RIGHT ICA PROX DIAS: 17 CM/S
RIGHT ICA PROX SYS: 101 CM/S
RIGHT VENTRICLE DIASTOLIC BASEL DIMENSION: 3.6 CM
RIGHT VERTEBRAL DIAS: 11 CM/S
RIGHT VERTEBRAL SYS: 43 CM/S
RV TB RVSP: 5 MMHG
RV TISSUE DOPPLER FREE WALL SYSTOLIC VELOCITY 1 (APICAL 4 CHAMBER VIEW): 15.35 CM/S
SINUS: 2.7 CM
STJ: 2.49 CM
TDI LATERAL: 0.07 M/S
TDI SEPTAL: 0.07 M/S
TDI: 0.07 M/S
TR MAX PG: 22 MMHG
TRICUSPID ANNULAR PLANE SYSTOLIC EXCURSION: 1.8 CM
TV REST PULMONARY ARTERY PRESSURE: 25 MMHG
Z-SCORE OF LEFT VENTRICULAR DIMENSION IN END DIASTOLE: -0.39
Z-SCORE OF LEFT VENTRICULAR DIMENSION IN END SYSTOLE: -0.4

## 2024-09-04 PROCEDURE — 93880 EXTRACRANIAL BILAT STUDY: CPT | Mod: 26,,, | Performed by: INTERNAL MEDICINE

## 2024-09-04 PROCEDURE — 93880 EXTRACRANIAL BILAT STUDY: CPT

## 2024-09-04 PROCEDURE — 93306 TTE W/DOPPLER COMPLETE: CPT | Mod: 26,,, | Performed by: INTERNAL MEDICINE

## 2024-09-04 PROCEDURE — 93306 TTE W/DOPPLER COMPLETE: CPT

## 2024-09-10 ENCOUNTER — OFFICE VISIT (OUTPATIENT)
Dept: CARDIOLOGY | Facility: CLINIC | Age: 84
End: 2024-09-10
Payer: MEDICARE

## 2024-09-10 VITALS
SYSTOLIC BLOOD PRESSURE: 170 MMHG | DIASTOLIC BLOOD PRESSURE: 74 MMHG | OXYGEN SATURATION: 99 % | HEART RATE: 88 BPM | BODY MASS INDEX: 27.5 KG/M2 | WEIGHT: 150.38 LBS

## 2024-09-10 DIAGNOSIS — E78.2 MIXED HYPERLIPIDEMIA: ICD-10-CM

## 2024-09-10 DIAGNOSIS — I65.29 ASYMPTOMATIC CAROTID ARTERY STENOSIS, UNSPECIFIED LATERALITY: ICD-10-CM

## 2024-09-10 DIAGNOSIS — I10 ESSENTIAL HYPERTENSION: Primary | ICD-10-CM

## 2024-09-10 PROCEDURE — 99214 OFFICE O/P EST MOD 30 MIN: CPT | Mod: S$GLB,,, | Performed by: INTERNAL MEDICINE

## 2024-09-10 PROCEDURE — 3077F SYST BP >= 140 MM HG: CPT | Mod: CPTII,S$GLB,, | Performed by: INTERNAL MEDICINE

## 2024-09-10 PROCEDURE — 3288F FALL RISK ASSESSMENT DOCD: CPT | Mod: CPTII,S$GLB,, | Performed by: INTERNAL MEDICINE

## 2024-09-10 PROCEDURE — 1101F PT FALLS ASSESS-DOCD LE1/YR: CPT | Mod: CPTII,S$GLB,, | Performed by: INTERNAL MEDICINE

## 2024-09-10 PROCEDURE — 99999 PR PBB SHADOW E&M-EST. PATIENT-LVL IV: CPT | Mod: PBBFAC,,, | Performed by: INTERNAL MEDICINE

## 2024-09-10 PROCEDURE — 3078F DIAST BP <80 MM HG: CPT | Mod: CPTII,S$GLB,, | Performed by: INTERNAL MEDICINE

## 2024-09-10 PROCEDURE — 1126F AMNT PAIN NOTED NONE PRSNT: CPT | Mod: CPTII,S$GLB,, | Performed by: INTERNAL MEDICINE

## 2024-09-10 PROCEDURE — 1159F MED LIST DOCD IN RCRD: CPT | Mod: CPTII,S$GLB,, | Performed by: INTERNAL MEDICINE

## 2024-09-10 RX ORDER — NIFEDIPINE 30 MG/1
30 TABLET, EXTENDED RELEASE ORAL DAILY
Qty: 90 TABLET | Refills: 3 | Status: SHIPPED | OUTPATIENT
Start: 2024-09-10 | End: 2025-09-10
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
## 2024-09-24 NOTE — PROGRESS NOTES
NEW PATIENT VISIT (last seen June 2021)    Betzaida Neumann  is a pleasant 84 y.o. female who presents in the summer of 2024 for sleep evaluation    See assessment below for further history.    Past Medical History:   Diagnosis Date    Allergy     Arthritis     Cataract     CKD (chronic kidney disease) stage 3, GFR 30-59 ml/min     DVT (deep venous thrombosis) 2023    x2    Elevated C-reactive protein (CRP) 03/24/2017    Fibromyalgia     GERD (gastroesophageal reflux disease)     Hyperlipidemia     Hypertension     Polymyalgia rheumatica     RA (rheumatoid arthritis)      Patient Active Problem List   Diagnosis    Essential hypertension    Chronic allergic rhinitis    GERD (gastroesophageal reflux disease)    PMR (polymyalgia rheumatica)    Rheumatoid arthritis    Fatigue    Current use of steroid medication    Fibromyalgia    Nuclear sclerosis    Moderate persistent asthma without complication    Bulla of lung    Calcified lymph nodes    EMILIO (obstructive sleep apnea)    Dysphagia    Benign paroxysmal positional vertigo    Body mass index (BMI) of 25.0 to 29.9    Joint pain    Mixed hyperlipidemia    Need for COVID-19 vaccine    Reflux esophagitis    Shoulder pain    Extrinsic asthma    Chronic night sweats    Multiple thyroid nodules    Cervical radiculopathy    Immunosuppression    Cervical myelopathy    Stage 3b chronic kidney disease    Menopausal syndrome (hot flashes)    Recurrent sinusitis    History of cataract extraction    Assessment of barriers to meet care plan goals performed    Statin intolerance    Trochanteric bursitis of left hip    Osteopenia of both hips    Carotid stenosis, asymptomatic    Dry skin dermatitis     Current Outpatient Medications:     abatacept (ORENCIA CLICKJECT) 125 mg/mL AtIn, Inject 1 mL into the skin every 7 days. (Patient not taking: Reported on 9/10/2024), Disp: 4 mL, Rfl: 0    acetaminophen (TYLENOL) 500 MG tablet, Take 1 tablet (500 mg total) by mouth every 6 (six) hours as  needed for Pain., Disp: , Rfl:     albuterol (PROVENTIL) 2.5 mg /3 mL (0.083 %) nebulizer solution, Take 2.5 mg by nebulization every 6 (six) hours as needed for Wheezing. Rescue, Disp: , Rfl:     alendronate (FOSAMAX) 70 MG tablet, Take 70 mg by mouth every 7 days., Disp: , Rfl:     ammonium lactate (LAC-HYDRIN) 12 % lotion, Apply topically 2 (two) times daily as needed for Dry Skin., Disp: 225 g, Rfl: 3    azelastine 205.5 mcg (0.15 %) Spry, 2 sprays by Each Nostril route 2 (two) times daily., Disp: , Rfl:     betamethasone dipropionate 0.05 % cream, APPLY THIN LAYER TOPICALLY TO THE AFFECTED AREA TWICE DAILY, Disp: , Rfl:     bismuth subsalicylate (PEPTO-BISMOL ORAL), Take by mouth., Disp: , Rfl:     diclofenac sodium (VOLTAREN) 1 % Gel, Apply 4 g topically 3 (three) times daily as needed (hip/leg pain)., Disp: , Rfl:     diphenhydrAMINE (BENADRYL) 25 mg capsule, Take 25 mg by mouth every 6 (six) hours as needed for Itching., Disp: , Rfl:     DULoxetine (CYMBALTA) 30 MG capsule, Take 1 capsule (30 mg total) by mouth once daily. In 1-2 weeks, if no relief, may increase dose to 2 capsules once daily. Call for refills., Disp: 30 capsule, Rfl: 1    ferrous sulfate 325 (65 FE) MG EC tablet, Take 325 mg by mouth once daily., Disp: , Rfl:     fluticasone (FLONASE) 50 mcg/actuation nasal spray, fluticasone 50 mcg/actuation nasal spray,suspension, Disp: , Rfl:     gabapentin (NEURONTIN) 300 MG capsule, Take 1 capsule (300 mg total) by mouth every evening. (Patient not taking: Reported on 9/10/2024), Disp: 30 capsule, Rfl: 11    gemfibroziL (LOPID) 600 MG tablet, Take 600 mg by mouth 2 (two) times daily., Disp: , Rfl:     ipratropium (ATROVENT) 21 mcg (0.03 %) nasal spray, 2 sprays by Each Nostril route 3 (three) times daily as needed (runny nose)., Disp: 30 mL, Rfl: 2    ipratropium/albuterol sulfate (IPRATROPIUM-ALBUTEROL INHL), Inhale into the lungs as needed., Disp: , Rfl:     levocetirizine (XYZAL) 5 MG tablet, Take 5  "mg by mouth once daily., Disp: , Rfl:     loratadine (CLARITIN) 10 mg tablet, Take 10 mg by mouth every morning., Disp: , Rfl:     losartan (COZAAR) 100 MG tablet, Take 1 tablet (100 mg total) by mouth once daily., Disp: 90 tablet, Rfl: 3    montelukast (SINGULAIR) 10 mg tablet, Take 10 mg by mouth every evening., Disp: , Rfl:     NIFEdipine (PROCARDIA-XL) 30 MG (OSM) 24 hr tablet, Take 1 tablet (30 mg total) by mouth once daily., Disp: 90 tablet, Rfl: 3    olopatadine (PATANOL) 0.1 % ophthalmic solution, Place 1 drop into both eyes 2 (two) times daily., Disp: , Rfl:     predniSONE (DELTASONE) 2.5 MG tablet, Take 1 tablet (2.5 mg total) by mouth daily as needed (RA flare). (Patient not taking: Reported on 9/10/2024), Disp: 30 tablet, Rfl: 4    SYMBICORT 160-4.5 mcg/actuation HFAA, Inhale 2 puffs into the lungs 2 (two) times daily., Disp: , Rfl:       Vitals:    09/25/24 1325   BP: (!) 174/91   BP Location: Left arm   Patient Position: Sitting   BP Method: Small (Automatic)   Pulse: 84   Weight: 68 kg (149 lb 14.6 oz)   Height: 5' 2" (1.575 m)     Physical Exam:    GEN:   Well-appearing  Psych:  Appropriate affect, demonstrates insight  SKIN:  No rash on the face or bridge of the nose      LABS:   Lab Results   Component Value Date    CO2 23 06/10/2024         Echo    Result Date: 9/4/2024    Left Ventricle: The left ventricle is normal in size. Normal wall   thickness. There is normal systolic function with a visually estimated   ejection fraction of 55 - 60%. Grade I diastolic dysfunction.    Right Ventricle: Normal right ventricular cavity size. Wall thickness   is normal. Systolic function is normal.    Aortic Valve: There is mild aortic valve sclerosis.    Tricuspid Valve: There is mild regurgitation.    IVC/SVC: Normal venous pressure at 3 mmHg.          No results found for: "FERRITIN"        RECORDS REVIEWED:    PSG 10/25/2018: AHI 22.6, june 79%    Sig PMH: HTN, CKD, EMILIO (moderately severe dx 2018) "   PROBLEM DESCRIPTION/ Sx on Presentation  STATUS PLAN     Moderately severe  EMILIO   Presentation:   Initially presented 2021 with frequent night sweats  Had inconclusive HST  Here again 2024, referred for re-evaluation    + reported snoring, attributes to sinus congestion  no witnessed  apneas      uncontrolled      -patient requires an updated study for insurance requirements       Discussed trial of CPAP    Nightsweats Still having frequent night sweats            Daytime Sx     no sleepiness when inactive, always busy  ESS n/a/24 on intake    SLEEP SCHEDULE   Duration    Wind- down    Envmnt    CBTi    Meds prior    Meds now    Bed Time 10P   Lights out    Latency    Arousals once   Back to sleep    Stim. ctrl    Wake time 7AM   Caffeine    Naps    Nocturia 2   Work         Denies problems           N/A        Nocturia     x 2 per sleep period, varies with fluid intake    N/A      Sinus congestion   Antihistamine: claritin  Nasal sprays: using flonase and atrovent nasal sprays regularly  Surgeries:         N/A -mgmt per ENT, on a good regimen   Other issues:     RTC:  will arrange RTC depending on results of sleep testing

## 2024-09-25 ENCOUNTER — OFFICE VISIT (OUTPATIENT)
Dept: SLEEP MEDICINE | Facility: CLINIC | Age: 84
End: 2024-09-25
Payer: MEDICARE

## 2024-09-25 VITALS
SYSTOLIC BLOOD PRESSURE: 174 MMHG | HEIGHT: 62 IN | BODY MASS INDEX: 27.59 KG/M2 | DIASTOLIC BLOOD PRESSURE: 91 MMHG | WEIGHT: 149.94 LBS | HEART RATE: 84 BPM

## 2024-09-25 DIAGNOSIS — R35.1 NOCTURIA: ICD-10-CM

## 2024-09-25 DIAGNOSIS — I10 HYPERTENSION, UNSPECIFIED TYPE: Primary | ICD-10-CM

## 2024-09-25 DIAGNOSIS — G47.33 OSA (OBSTRUCTIVE SLEEP APNEA): ICD-10-CM

## 2024-09-25 PROCEDURE — 3077F SYST BP >= 140 MM HG: CPT | Mod: CPTII,S$GLB,, | Performed by: INTERNAL MEDICINE

## 2024-09-25 PROCEDURE — 3080F DIAST BP >= 90 MM HG: CPT | Mod: CPTII,S$GLB,, | Performed by: INTERNAL MEDICINE

## 2024-09-25 PROCEDURE — 99204 OFFICE O/P NEW MOD 45 MIN: CPT | Mod: S$GLB,,, | Performed by: INTERNAL MEDICINE

## 2024-09-25 PROCEDURE — 3288F FALL RISK ASSESSMENT DOCD: CPT | Mod: CPTII,S$GLB,, | Performed by: INTERNAL MEDICINE

## 2024-09-25 PROCEDURE — 1159F MED LIST DOCD IN RCRD: CPT | Mod: CPTII,S$GLB,, | Performed by: INTERNAL MEDICINE

## 2024-09-25 PROCEDURE — 99999 PR PBB SHADOW E&M-EST. PATIENT-LVL IV: CPT | Mod: PBBFAC,,, | Performed by: INTERNAL MEDICINE

## 2024-09-25 PROCEDURE — 1101F PT FALLS ASSESS-DOCD LE1/YR: CPT | Mod: CPTII,S$GLB,, | Performed by: INTERNAL MEDICINE

## 2024-09-25 PROCEDURE — 1126F AMNT PAIN NOTED NONE PRSNT: CPT | Mod: CPTII,S$GLB,, | Performed by: INTERNAL MEDICINE

## 2024-09-30 ENCOUNTER — TELEPHONE (OUTPATIENT)
Dept: SLEEP MEDICINE | Facility: OTHER | Age: 84
End: 2024-09-30
Payer: MEDICARE

## 2024-10-01 ENCOUNTER — TELEPHONE (OUTPATIENT)
Dept: SLEEP MEDICINE | Facility: OTHER | Age: 84
End: 2024-10-01
Payer: MEDICARE

## 2024-10-01 ENCOUNTER — HOSPITAL ENCOUNTER (OUTPATIENT)
Dept: SLEEP MEDICINE | Facility: OTHER | Age: 84
Discharge: HOME | End: 2024-10-01
Attending: INTERNAL MEDICINE
Payer: MEDICARE

## 2024-10-01 DIAGNOSIS — G47.33 OSA (OBSTRUCTIVE SLEEP APNEA): ICD-10-CM

## 2024-10-01 DIAGNOSIS — I10 HYPERTENSION, UNSPECIFIED TYPE: ICD-10-CM

## 2024-10-01 DIAGNOSIS — R35.1 NOCTURIA: ICD-10-CM

## 2024-10-01 PROCEDURE — 95810 POLYSOM 6/> YRS 4/> PARAM: CPT

## 2024-10-02 NOTE — PROGRESS NOTES
MOISES WORTHY to [facility] on 10/1/24 for an overnight baseline study.     Pt did not meet criteria for split night study.       Post study information given to pt in AM

## 2024-10-04 NOTE — PROCEDURES
"[facility]    Polysomnography Interpretation Report    Patient Name:  MOISES WORTHY  MRN#:  [id number]  :  1940  Study Date:  10/1/2024  Referring Provider:  DENI RICH MD    Indications for Polysomnography:  The patient is a 84 year old Female who is 5' 2" and weighs 149.0 lbs.  Her BMI equals 27.4.  [place] was - and Neck Circumference was -.  A full night polysomnogram was performed to evaluate for -.    Polysomnogram Data  A full night polysomnogram recorded the standard physiologic parameters including EEG, EOG, EMG, EKG, nasal and oral airflow.  Respiratory parameters of chest and abdominal movements were recorded with (RIP) Respiratory Inductance Plethsmography.  Oxygen saturation was recorded by pulse oximetry.    Sleep Architecture  The total recording time of the polysomnogram was 402.4 minutes.  The total sleep time was 256.0 minutes.  The patient spent 44.1% of total sleep time in Stage N1, 41.4% in Stage N2, 0.0% in Stages N3, and 14.5% in REM.  Sleep latency was 43.6 minutes.  REM latency was 93.0 minutes.  Sleep Efficiency was 63.6%.  Wake after sleep onset was 102.5 minutes.    Respiratory Events  The polysomnogram revealed a presence of 3 obstructive, 6 central, and - mixed apneas resulting in a Total Apnea index of 2.1 events per hour.  There were 62 hypopneas resulting in a Total Hypopnea index of 14.5 events per hour.  The combined Apnea/Hypopnea index was 16.6 events per hour.  There were a total of 17 RERA events resulting in a Respiratory Disturbance Index (RDI) of 20.6 events per hour.     Mean oxygen saturation was 93.9%.  The lowest oxygen saturation during sleep was 87.0%.  Time spent <=88% oxygen saturation was 1.5 minutes (0.4%).    Limb Activity  There were 19 limb movements recorded.  Of this total, 19 were classified as PLMs.  Of the PLMs, 5 were associated with arousals.  The Limb Movement index was 4.5 per hour while the PLM index was 4.5 per hour and PLM " "with arousals index was 1.2 per hour.      Cardiac:  single lead EKG revealed normal sinus rhythm     Oxygenation:  Hypoxemia was only observed in relation to obstructive events.    Impression:  -moderately severe obstructive sleep apnea which is significantly worse in stage REM sleep       Recommendations:  -treatment for the EMILIO seen in this study is recommended  -the patient has follow up with Sleep Medicine          Valentin Rich MD    (This Sleep Study was interpreted by a Board Certified Sleep Specialist who conducted an epoch-by-epoch review of the entire raw data recording.)  (The indication for this sleep study was reviewed and deemed appropriate by [facility] Practice Parameters or other reasons by a Board Certified Sleep Specialist.)          [facility]    Diagnostic PSG Report    Patient Name: MOISES WORTHY Study Date: 10/1/2024   YOB: 1940 MRN #: [id number]   Age: 84 year MICHELLE #: [id number]   Sex: Female Referring Provider: DENI RICH MD   Height: 5' 2" Recording Tech: Homero Watersvira RPSGT   Weight: 149.0 lbs Scoring Tech: Leonid Bertrand RRT RPSGT   BMI: 27.4 Interpreting Physician: -   ESS: - Neck Circumference: -     Study Overview    Lights Off: 10:25:05 PM  Count Index   Lights On: 05:07:31 AM Awakenings: 31 7.3   Time in Bed: 402.4 min. Arousals: 147 34.5   Total Sleep Time: 256.0 min. Apneas & Hypopneas: 71 16.6    Sleep Efficiency: 63.6% Limb Movements: 19 4.5   Sleep Latency: 43.6 min. Snores: - -   Wake After Sleep Onset: 102.5 min. Desaturations: 65 15.2    REM Latency from Sleep Onset: 93.0 min. Minimum SpO2 TST: 87.0%        Sleep Architecture   % of Time in Bed  Stages Time (mins) % Sleep Time   Wake 147.0    Stage N1 113.0 44.1%   Stage N2 106.0 41.4%   Stage N3 0.0 0.0%   REM 37.0 14.5%         Arousal Summary     NREM REM Sleep Index   Respiratory Arousals 24 3 27 6.3   PLM Arousals 5 - 5 1.2   Isolated Limb Movement Arousals - - - -   Spontaneous Arousals 106 " 9 115 27.0   Total 135 12 147 34.5       Limb Movement Summary     Count Index   Isolated Limb Movements - -   Periodic Limb Movements (PLMs) 19 4.5   Total Limb Movements 19 4.5         Respiratory Summary     By Sleep Stage By Body Position Total    NREM REM Supine Non-Supine    Time (min) 219.0 37.0 256.0 - 256.0           Obstructive Apnea 2 1 3 - 3   Mixed Apnea - - - - -   Central Apnea 5 1 6 - 6   Central Apnea Index 1.4 1.6 1.4 - 1.4   Total Apneas 7 2 9 - 9   Total Apnea Index 1.9 3.2 2.1 - 2.1           Hypopnea 27 35 62 - 62   Hypopnea Index 7.4 56.8 14.5 - 14.5           Apnea & Hypopnea 34 37 71 - 71   Apnea & Hypopnea Index 9.3 60.0 16.6 - 16.6           RERAs 17 - 17 - 17   RERA Index 4.7 - 4.0 - 4.0           RDI 14.0 60.0 20.6 - 20.6     Scoring Criteria: Hypopneas scored at 4% desaturation criteria.    Respiratory Event Durations     Apnea Hypopnea    NREM REM NREM REM   Average (seconds) 12.2 10.5 15.0 17.7   Maximum (seconds) 13.7 12.0 21.1 26.9       Oxygen Saturation Summary     Wake NREM REM TST TIB   Average SpO2 94.2% 93.8% 93.2% 93.7% 93.9%   Minimum SpO2 89.0% 87.0% 87.0% 87.0% 87.0%   Maximum SpO2 98.0% 97.0% 97.0% 97.0% 98.0%       Oxygen Saturation Distribution    Range (%) Time in range (min) Time in range (%)   90.0 - 100.0 391.6 98.3%   80.0 - 90.0 6.8 1.7%   70.0 - 80.0 - -   60.0 - 70.0 - -   50.0 - 60.0 - -   0.0 - 50.0 - -   Time Spent <=88% SpO2    Range (%) Time in range (min) Time in range (%)   0.0 - 88.0 1.5 0.4%          Count Index   Desaturations 65 15.2      Cardiac Summary     Wake NREM REM Sleep Total   Average Pulse Rate (BPM) 89.5 86.5 84.0 86.1 87.3   Minimum Pulse Rate (BPM) 84.0 83.0 78.0 78.0 78.0   Maximum Pulse Rate (BPM) 107.0 91.0 90.0 91.0 107.0     Pulse Rate Distribution    Range (bpm) Time in range (min) Time in range (%)   0.0 - 40.0 - -   40.0 - 60.0 - -   60.0 - 80.0 0.6 0.2%   80.0 - 100.0 396.7 99.4%   100.0 - 120.0 1.6 0.4%   120.0 - 140.0 - -    140.0 - 200.0 - -     EtCO2 Summary    Stage Min (mmHg) Average (mmHg) Max (mmHg)   Wake - - -   NREM(1+2+3) - - -   REM - - -     Range (mmHg) Time in range (min) Time in range (%)   - - -   - - -   - - -   - - -   - - -     TcCO2 Summary    Stage Min (mmHg) Average (mmHg) Max (mmHg)   Wake - - -   NREM(1+2+3) - - -   REM - - -     Range (mmHg) Time in range (min) Time in range (%)   20.0 - 40.0 - -   40.0 - 50.0 - -   50.0 - 55.0 - -   55.0 - 100.0 - -   Excluded data <20.0 & >65.0 403.0 100.0%     Comments    -

## 2024-10-05 ENCOUNTER — PATIENT MESSAGE (OUTPATIENT)
Dept: SLEEP MEDICINE | Facility: CLINIC | Age: 84
End: 2024-10-05

## 2024-10-05 DIAGNOSIS — G47.33 OSA (OBSTRUCTIVE SLEEP APNEA): Primary | ICD-10-CM

## 2024-10-07 ENCOUNTER — OFFICE VISIT (OUTPATIENT)
Dept: PHYSICAL MEDICINE AND REHAB | Facility: CLINIC | Age: 84
End: 2024-10-07
Payer: MEDICARE

## 2024-10-07 VITALS — BODY MASS INDEX: 27.6 KG/M2 | OXYGEN SATURATION: 98 % | WEIGHT: 150 LBS | HEIGHT: 62 IN

## 2024-10-07 DIAGNOSIS — M47.22 OSTEOARTHRITIS OF SPINE WITH RADICULOPATHY, CERVICAL REGION: ICD-10-CM

## 2024-10-07 DIAGNOSIS — M54.2 CHRONIC NECK PAIN: Primary | ICD-10-CM

## 2024-10-07 DIAGNOSIS — R61 CHRONIC NIGHT SWEATS: ICD-10-CM

## 2024-10-07 DIAGNOSIS — G89.29 CHRONIC NECK PAIN: Primary | ICD-10-CM

## 2024-10-07 DIAGNOSIS — M79.7 FIBROMYALGIA SYNDROME: ICD-10-CM

## 2024-10-07 DIAGNOSIS — M48.02 NEURAL FORAMINAL STENOSIS OF CERVICAL SPINE: ICD-10-CM

## 2024-10-07 DIAGNOSIS — M35.3 POLYMYALGIA RHEUMATICA: ICD-10-CM

## 2024-10-07 DIAGNOSIS — M70.62 TROCHANTERIC BURSITIS OF LEFT HIP: ICD-10-CM

## 2024-10-07 DIAGNOSIS — M48.02 CERVICAL SPINAL STENOSIS: ICD-10-CM

## 2024-10-07 PROCEDURE — 99999 PR PBB SHADOW E&M-EST. PATIENT-LVL IV: CPT | Mod: PBBFAC,,, | Performed by: PHYSICAL MEDICINE & REHABILITATION

## 2024-10-07 PROCEDURE — 1125F AMNT PAIN NOTED PAIN PRSNT: CPT | Mod: CPTII,S$GLB,, | Performed by: PHYSICAL MEDICINE & REHABILITATION

## 2024-10-07 PROCEDURE — 99214 OFFICE O/P EST MOD 30 MIN: CPT | Mod: S$GLB,,, | Performed by: PHYSICAL MEDICINE & REHABILITATION

## 2024-10-07 PROCEDURE — 1101F PT FALLS ASSESS-DOCD LE1/YR: CPT | Mod: CPTII,S$GLB,, | Performed by: PHYSICAL MEDICINE & REHABILITATION

## 2024-10-07 PROCEDURE — 3288F FALL RISK ASSESSMENT DOCD: CPT | Mod: CPTII,S$GLB,, | Performed by: PHYSICAL MEDICINE & REHABILITATION

## 2024-10-07 PROCEDURE — 1159F MED LIST DOCD IN RCRD: CPT | Mod: CPTII,S$GLB,, | Performed by: PHYSICAL MEDICINE & REHABILITATION

## 2024-10-07 RX ORDER — GABAPENTIN 300 MG/1
300 CAPSULE ORAL NIGHTLY
Qty: 30 CAPSULE | Refills: 3 | Status: SHIPPED | OUTPATIENT
Start: 2024-10-07 | End: 2025-10-07

## 2024-10-07 RX ORDER — DULOXETIN HYDROCHLORIDE 60 MG/1
60 CAPSULE, DELAYED RELEASE ORAL DAILY
Qty: 30 CAPSULE | Refills: 3 | Status: SHIPPED | OUTPATIENT
Start: 2024-10-07 | End: 2025-02-04

## 2024-10-07 NOTE — PROGRESS NOTES
Subjective:       Patient ID: Betzaida Neumann is a 84 y.o. female.    Chief Complaint: No chief complaint on file.      HPI      Mrs. Guerrero is a 84-year-old female with past medical history of hypertension, hyperlipidemia, CKD stage 3, polymyalgia rheumatica, fibromyalgia, rheumatoid arthritis, osteopenia, GERD, allergies.  She was referred to the Physical Medicine Clinic for left hip pain.    The patient is coming to the clinic for follow-up.  Her fibromyalgia symptoms including generalized muscle and joint aching, stiffness, fatigue, hypersensitivity to pain and sleep impairment have been recently worse.    Her neck pain has been stable.  It is a constant soreness in the lower cervical spine and across her upper back.  She has occasional shooting pain to both hands with dysesthetic sensations, mostly itching.  Her pain is aggravated by rotation.  It is better with sitting still.  Her maximum pain is 9-10/10 and minimum 3-4/10.  Today it is 5-6/10.  He has right hand numbness.  She denies any weakness.  She denies any impaired hand coordination.  She denies impiaed gait.  He has been also complaining of bilateral hand stiffness, mostly in the morning.    She is currently on:   Gabapentin 300 mg capsules at bedtime.  However, she has been out for few weeks   Duloxetine 30 mg daily.  She stopped it few weeks ago due to lack of relief   Tylenol 500 mg p.r.n., usually 1 tablet twice per day.  Tramadol 50 mg p.r.n. However, she said it does not help.  She said a Orencia used to help.  However she has not followed up with Rheumatology for few months.      Past Medical History:   Diagnosis Date    Allergy     Arthritis     Cataract     CKD (chronic kidney disease) stage 3, GFR 30-59 ml/min     DVT (deep venous thrombosis) 2023    x2    Elevated C-reactive protein (CRP) 03/24/2017    Fibromyalgia     GERD (gastroesophageal reflux disease)     Hyperlipidemia     Hypertension     Polymyalgia rheumatica     RA  (rheumatoid arthritis)         Review of patient's allergies indicates:   Allergen Reactions    Shellfish containing products Anaphylaxis    Sulfa (sulfonamide antibiotics) Swelling     swelling    Cabbage (brassica oleracea) Other (See Comments)    Chocolate flavor Other (See Comments)    Duckweed Other (See Comments)    Kale Swelling    Mustard Other (See Comments)    Orange Other (See Comments)    Statins-hmg-coa reductase inhibitors     Sulfacetamide sodium Swelling    Tomato (solanum lycopersicum)     Tree nut Other (See Comments)    Weed pollen Other (See Comments)     Patient reported allergy to weed pollen, grass, trees, duckweed, cockroaches        Review of Systems   Constitutional:  Positive for fatigue. Negative for chills and fever.   Eyes:  Negative for visual disturbance.   Respiratory:  Negative for shortness of breath.    Cardiovascular:  Negative for chest pain.   Gastrointestinal:  Negative for blood in stool, constipation, nausea and vomiting.   Genitourinary:  Negative for difficulty urinating.   Musculoskeletal:  Positive for arthralgias and neck pain. Negative for back pain and gait problem.   Neurological:  Negative for dizziness, weakness and headaches.   Psychiatric/Behavioral:  Negative for behavioral problems and sleep disturbance.              Objective:      Physical Exam  Vitals reviewed.   Constitutional:       Appearance: She is well-developed.   HENT:      Head: Normocephalic and atraumatic.   Eyes:      Extraocular Movements: Extraocular movements intact.   Neck:      Comments: Mild decrease ROM.  Musculoskeletal:      Cervical back: Tenderness present.      Comments: BUE:  ROM:   RUE: full.   LUE: full.  Strength:    RUE: 5/5 at shoulder abduction, 5 elbow flexion, 5- wrist extension, 5 elbow extension, 5 finger flexion, 4 finger abduction.   LUE: 5/5 at shoulder abduction, 5 elbow flexion, 5 wrist extension, 5 elbow extension, 5 finger flexion, 4 finger abduction.  Sensation to  pinprick:   RUE: intact.   LUE: intact.  DTR:    RUE: +3 biceps, +3 triceps.   LUE:  +3 biceps, +3 triceps.  [place]:   RUE: -ve.   LUE: -ve.    BLE:  ROM:   RLE: full.   LLE: full.  Knee crepitus:   RLE: -ve.   LLE: -ve.   Strength:    RLE: 5/5 at hip flexion, 5 knee extension, 5 ankle DF, 5 PF, 5 EHL.   LLE: 5/5 at hip flexion, 5 knee extension, 5 ankle DF, 5 PF, 5 EHL.  Sensation to pinprick:     RLE: intact.      LLE: intact.   DTR:     RLE: +2 knee, +1 ankle.    LLE: +2 knee, +1 ankle.  Clonus:    Rt ankle: -ve.    Lt ankle: -ve.  SLR (sitting):      RLE: -ve.      LLE: -ve.    -ve tenderness over lumbar spine.  +ve diffuse tender points over the upper & lower back, anterior chest wall, hips, elbows & knees (10/18 fibromyalgia reference points)    Gait: WNL       Skin:     General: Skin is warm.   Neurological:      General: No focal deficit present.      Mental Status: She is alert.   Psychiatric:         Mood and Affect: Mood normal.         Behavior: Behavior normal.         IMAGING STUDIES:      MRI CERVICAL SPINE WITHOUT CONTRAST (6/19/2024):     CLINICAL HISTORY:  Neck pain, chronic;Neck pain, chronic, degenerative changes on xray;.  Cervicalgia     TECHNIQUE:  Multiplanar, multisequence MR images of the cervical spine were acquired without the administration of contrast.     COMPARISON:  09/14/2020     FINDINGS:  The marrow demonstrates homogeneous signal.  Vertebral body heights are maintained. Disc space narrowing and endplate changes most pronounced C3-4, C4-5, C5-6. AP alignment is anatomic.     Multilevel degenerative changes detailed below:     C2-3: Left facet arthropathy without significant canal or neural foraminal narrowing.     C3-4: Posterior disc osteophyte complex with the face mint of the ventral thecal sac and mild canal narrowing.  Uncovertebral spurring and facet arthropathy resultant moderate bilateral neural foraminal narrowing.     C4-5: Posterior disc osteophyte complex results in  effacement of the thecal sac and moderate canal narrowing.  Uncovertebral spurring and facet arthropathy contribute to mild right and moderate left neural foraminal narrowing.     C5-6: Posterior disc osteophyte complex results in effacement of the ventral thecal sac and mild canal narrowing.  Uncovertebral spurring results in mild right neural foraminal narrowing.     C6-7: Posterior disc osteophyte complex without significant canal or neural foraminal narrowing.     C7-T1: No significant canal or neural foraminal narrowing.     No significant prevertebral soft tissue edema.     Cervical spinal cord is normal in caliber and signal.     Impression:     Multilevel degenerative change contributing to mild to moderate canal stenosis C3-4 through C5-6.  Multilevel mild and moderate neural foraminal narrowing at these levels.         Assessment:       1. Chronic neck pain    2. Osteoarthritis of spine with radiculopathy, cervical region    3. Neural foraminal stenosis of cervical spine    4. Cervical spinal stenosis    5. Fibromyalgia syndrome    6. Polymyalgia rheumatica    7. Trochanteric bursitis of left hip        Plan:           - MRI findings were discussed with the patient.  - [facility] for evaluation for epidural steroid injections.  - Oral NSAIDs will be avoided due to CKD.  - Increase DULoxetine (CYMBALTA) 60 MG capsule; Take 1 capsule (60 mg total) by mouth once daily.  - Restarting gabapentin 300 mg capsules; take 1 capsule by mouth once daily.  - Increase acetaminophen (TYLENOL) 500 MG tablet; Take 1 tablet (500 mg total) by mouth every 6 (six) hours as needed for mild pain.  - Referral to Rheumatology for continuation of treatment of polymyalgia rheumatica.  - Follow up in about 4 months (around 2/7/2025).        This note was partly generated with [employer] voice recognition software. I apologize for any possible typographical errors.

## 2024-10-08 ENCOUNTER — PATIENT MESSAGE (OUTPATIENT)
Dept: SLEEP MEDICINE | Facility: OTHER | Age: 84
End: 2024-10-08
Payer: MEDICARE

## 2024-10-11 ENCOUNTER — HOSPITAL ENCOUNTER (OUTPATIENT)
Dept: SLEEP MEDICINE | Facility: OTHER | Age: 84
Discharge: HOME | End: 2024-10-11
Attending: INTERNAL MEDICINE
Payer: MEDICARE

## 2024-10-11 DIAGNOSIS — G47.33 OSA (OBSTRUCTIVE SLEEP APNEA): ICD-10-CM

## 2024-10-11 PROCEDURE — 95811 POLYSOM 6/>YRS CPAP 4/> PARM: CPT

## 2024-10-23 ENCOUNTER — OFFICE VISIT (OUTPATIENT)
Dept: OPTOMETRY | Facility: CLINIC | Age: 84
End: 2024-10-23
Payer: MEDICARE

## 2024-10-23 DIAGNOSIS — H52.4 HYPEROPIA OF BOTH EYES WITH ASTIGMATISM AND PRESBYOPIA: ICD-10-CM

## 2024-10-23 DIAGNOSIS — H52.203 HYPEROPIA OF BOTH EYES WITH ASTIGMATISM AND PRESBYOPIA: ICD-10-CM

## 2024-10-23 DIAGNOSIS — H52.03 HYPEROPIA OF BOTH EYES WITH ASTIGMATISM AND PRESBYOPIA: ICD-10-CM

## 2024-10-23 DIAGNOSIS — Z96.1 PRESENCE OF INTRAOCULAR LENS: Primary | ICD-10-CM

## 2024-10-23 DIAGNOSIS — Z98.49 HISTORY OF CATARACT EXTRACTION, UNSPECIFIED LATERALITY: ICD-10-CM

## 2024-10-23 PROCEDURE — 3288F FALL RISK ASSESSMENT DOCD: CPT | Mod: CPTII,S$GLB,, | Performed by: OPTOMETRIST

## 2024-10-23 PROCEDURE — 99999 PR PBB SHADOW E&M-EST. PATIENT-LVL IV: CPT | Mod: PBBFAC,,, | Performed by: OPTOMETRIST

## 2024-10-23 PROCEDURE — 1126F AMNT PAIN NOTED NONE PRSNT: CPT | Mod: CPTII,S$GLB,, | Performed by: OPTOMETRIST

## 2024-10-23 PROCEDURE — 1159F MED LIST DOCD IN RCRD: CPT | Mod: CPTII,S$GLB,, | Performed by: OPTOMETRIST

## 2024-10-23 PROCEDURE — 1101F PT FALLS ASSESS-DOCD LE1/YR: CPT | Mod: CPTII,S$GLB,, | Performed by: OPTOMETRIST

## 2024-10-23 PROCEDURE — 99204 OFFICE O/P NEW MOD 45 MIN: CPT | Mod: S$GLB,,, | Performed by: OPTOMETRIST

## 2024-10-23 RX ORDER — PREDNISOLONE ACETATE 10 MG/ML
1 SUSPENSION/ DROPS OPHTHALMIC 3 TIMES DAILY
Qty: 5 ML | Refills: 2 | Status: SHIPPED | OUTPATIENT
Start: 2024-10-23 | End: 2025-10-23

## 2024-10-23 NOTE — PROGRESS NOTES
HPI    DLS: 06/09/2017 Dr. Huizar    Eye Meds:     84 y.o. female is here for ocular health check. Denies eye pain and   flashes/floaters. Blurred vision up close with correction. Wear +2.00 otc   readers. No itching, burning or tearing. Notice a film over both eyes.   Always have trouble with glare. Mucus discharge for a couple of month both   eyes.   Last edited by Contreras Orosco OA on 10/23/2024  2:24 PM.            Assessment /Plan     For exam results, see Encounter Report.    Presence of intraocular lens    Hyperopia of both eyes with astigmatism and presbyopia    History of cataract extraction, unspecified laterality  -     Ambulatory referral/consult to Optometry    Other orders  -     prednisoLONE acetate (PRED FORTE) 1 % DrpS; Place 1 drop into both eyes 3 (three) times daily.  Dispense: 5 mL; Refill: 2      MONITOR. ED PT ON ALL EXAM FINDINGS  RX FINAL SPECS   OCULAR HEALTH STABLE OD, OS   S/P PCIOL OU; UV PROTECTION; MONITOR.   DISCUSSED DRY EYE THERAPIES; CONTINUE WITH AT'S PRN; RX PF 1% TID OU X 1-2 WEEKS W/ TAPER; CONSIDER DRY EYE CONSULT PRN   RTC 1 YR//PRN FOR REE/DFE

## 2024-10-25 ENCOUNTER — OFFICE VISIT (OUTPATIENT)
Facility: CLINIC | Age: 84
End: 2024-10-25
Payer: MEDICARE

## 2024-10-25 VITALS
OXYGEN SATURATION: 98 % | HEART RATE: 88 BPM | TEMPERATURE: 99 F | DIASTOLIC BLOOD PRESSURE: 74 MMHG | WEIGHT: 151.69 LBS | RESPIRATION RATE: 16 BRPM | HEIGHT: 62 IN | SYSTOLIC BLOOD PRESSURE: 161 MMHG | BODY MASS INDEX: 27.92 KG/M2

## 2024-10-25 DIAGNOSIS — M05.742 RHEUMATOID ARTHRITIS INVOLVING BOTH HANDS WITH POSITIVE RHEUMATOID FACTOR: ICD-10-CM

## 2024-10-25 DIAGNOSIS — M05.741 RHEUMATOID ARTHRITIS INVOLVING BOTH HANDS WITH POSITIVE RHEUMATOID FACTOR: ICD-10-CM

## 2024-10-25 DIAGNOSIS — I10 ESSENTIAL HYPERTENSION: Primary | ICD-10-CM

## 2024-10-25 PROCEDURE — 99999 PR PBB SHADOW E&M-EST. PATIENT-LVL V: CPT | Mod: PBBFAC,,, | Performed by: HOSPITALIST

## 2024-10-25 RX ORDER — NIFEDIPINE 60 MG/1
60 TABLET, EXTENDED RELEASE ORAL DAILY
Qty: 90 TABLET | Refills: 3 | Status: SHIPPED | OUTPATIENT
Start: 2024-10-25 | End: 2025-10-25

## 2024-10-25 NOTE — ASSESSMENT & PLAN NOTE
Blood pressure is not at goal.  Her cardiologist recently switched her from amlodipine to nifedipine about 6 weeks ago; we will increase to 60 mg since she will not be seeing him again until December.

## 2024-10-25 NOTE — PROGRESS NOTES
Primary Care Provider Appointment - [facility] & Cary Shields MD      Subjective:      Patient ID: Betzaida Neumann is a 84 y.o. female with   Past Medical History:   Diagnosis Date    Allergy     Arthritis     Cataract     CKD (chronic kidney disease) stage 3, GFR 30-59 ml/min     DVT (deep venous thrombosis) 2023    x2    Elevated C-reactive protein (CRP) 03/24/2017    Fibromyalgia     GERD (gastroesophageal reflux disease)     Hyperlipidemia     Hypertension     Polymyalgia rheumatica     RA (rheumatoid arthritis)            Chief Complaint: Follow-up and Medication Refill    Prior to this visit, patient's last encounter with PCP was 7/22/2024.    Ran out of Orencia about 3 m/a b/c her rheumatologist left the system.  Having diffuse joint pains since, especially in her hands which is making it hard to work.  Endorses multiple current stressors including her home insurance increasing, which has required her to go back to work.  Saw cardiologist early last mo and BP meds changed, but isn't seeing improvement.  Has had a couple of sleep studies to try to figure out how best to manage sleep apnea; not using CPAP yet.    Sweats not as bad or as frequent as they used to be.      7/22: Had to discontinue the Veozah due to mood swings, nightmares, anxiety, depression.  Only took it for about a week.  Side effects stopped after stopping the medication.  Has resigned herself to living with the hot flashes.  Worried about her BP lately, has been reading up to 180s at home.    6/24: Has been taking the Veozah for 4 days now, and has complete relief of vasomotor sx.  Skin doing better as well.  Still c/o hand pain, worse in morning w/ stiffness.  Ran out of allopurinol prior to flareup.  Sx not as bad during the day.  Has appt w/ rheumatologist.     6/10: C/o dry skin and pruritus past couple weeks.  Uses Oil of Olay moisturizer after bathing.    Having pain in R hand in MTP joints; taking the PRN  "prednisone which is helping but she's put on 2 lbs.  Using tramadol as well.  Has been acting up past 3 weeks.    Major complaint is persistent night sweats; but did just get letter from insurance that the Veozah has been approved.  Hasn't picked it up yet though.      5/7: Reports hasn't gotten the gabapentin Rx'd last visit; [facility] saying they never got it.  Continues to have hot flashes.  C/o difficulty getting an appointment with a gynecologist, who had been ordering her mammograms and doing paps yearly in addition to Rx-ing HRT until he retired.  Last mammogram in January this year; diagnostic repeat due to fatty tissue obscuring an area.  Will be due for screening MMG in November.  She has not been sexually active since before [place] on account of "nobody to play with;" scarcity of older men who are available but also high-functioning, but she is also leery about the dangers of dating nowadays.  No abnormal paps for past couple decades.  She meant to bring back the ACP booklet she completed but forgot it.      4/22: Doing well; recently went to [place] to visit her son and his family; went to [employer] and did well getting around and w/ endurance as well.  Hip pain from prior visit resolved.  Tried the citalopram which seemed to make her feel depressed and tired.  Stopped it.  Still having night sweats.      3/18: No improvement w/ menopausal sx so far.  H/o DVT x2 last year.  Also having pain in L hip.  Having to use heating pad from time to time.  Pain seems to be worse if sitting for too long.  Being active seems to help prevent it.  Also c/o changes in fingernails; having ridges on nails and seeming more brittle.      3/5: Pt reports wanted to establish care.  Has HTN, HLD, CKD.  Wants to avoid HD.  BP tends to elevate at dr's office.  H/o 80% R carotid stenosis, improved w/ medical therapy only.  Former smoker.  Lives independently and mows lawn, gardens, keeps house.  Considering joining gym.  Had " an illness last year and lost about 25 lbs.  Had no appetite, wasn't eating.  Was hospitalized.  Doing better now, but not back to 100% since.  Has some fatigue.  Tries to stay busy around house due to fear of crime so doesn't get out much.    Her gynecologist retired, who was Rx-ing HRT.  Has been out of estradiol about a month and having hot flashes, sweats, etc.  Has been on it since 1979 when she had a hysterectomy.    On Orencia for RA w/ good results.  No joint pains now.    Checks BPs at home 2-3x/wk, usually in 120s systolic, never higher than 140.    4Ms for Medical Decision-Making in Older Adults    Last Completed EAWV: None    MOBILITY:  Get Up and Go:       No data to display              Activities of Daily Living:       No data to display              Whisper Test:       No data to display              Disability Status:      4/20/2017     9:00 AM   Disability Status   Are you deaf or do you have serious difficulty hearing? N   Are you blind or do you have serious difficulty seeing, even when wearing glasses? N   Because of a physical, mental, or emotional condition, do you have serious difficulty concentrating, remembering, or making decisions? N   Do you have serious difficulty walking or climbing stairs? N   Do you have difficulty dressing or bathing? N   Because of a physical, mental, or emotional condition, do you have difficulty doing errands alone such as visiting a doctor's office or shopping? N     Nutrition Screening:       No data to display             Screening Score: 0-7 Malnourished, 8-11 At Risk, 12-14 Normal    MENTATION:   Depression Patient Health Questionnaire:      5/15/2024     1:07 PM   Depression Patient Health Questionnaire   Over the last two weeks how often have you been bothered by little interest or pleasure in doing things Not at all   Over the last two weeks how often have you been bothered by feeling down, depressed or hopeless Not at all   PHQ-2 Total Score 0     Has  "Dementia Dx: No    Cognitive Function Screening:       No data to display              Cognitive Function Screening Total - Less than 4 = Abnormal,  Greater than or equal to 4 = Normal    MEDICATIONS:  High Risk Medications:  Total Active Medications: 2  DULoxetine - 60 MG  gabapentin - 300 MG    WHAT MATTERS MOST:  Advance Care Planning   ACP Status:   Patient has had an ACP conversation  Living Will: No  Power of : No  [place]: No    What is most important right now is to focus on avoiding the hospital and remaining as independent as possible    Accordingly, we have decided that the best plan to meet the patient's goals includes continuing with treatment      What matters most to patient today is: getting established with a PCP who is accessible and will personalize care consistent with her values.             Date: 2024  Patient did not wish or was not able to name a surrogate decision maker or provide an Advance Care Plan. "Five Wishes" provided to patient to fill out and bring to return visit.      Social History     Socioeconomic History    Marital status: Single   Tobacco Use    Smoking status: Former     Current packs/day: 0.00     Average packs/day: 1 pack/day for 29.0 years (29.0 ttl pk-yrs)     Types: Cigarettes     Start date: 1970     Quit date: 1999     Years since quittin.1    Smokeless tobacco: Never    Tobacco comments:     Retired ; ; 4 children healthy   Substance and Sexual Activity    Alcohol use: Yes     Alcohol/week: 1.0 standard drink of alcohol     Types: 1 Glasses of wine per week     Comment: social    Drug use: No    Sexual activity: Yes     Partners: Male     Social Drivers of Health     Financial Resource Strain: High Risk (2024)    Overall Financial Resource Strain (CARDIA)     Difficulty of Paying Living Expenses: Very hard   Food Insecurity: Food Insecurity Present (2024)    Hunger Vital Sign     Worried About Running Out " "of Food in the Last Year: Sometimes true     Ran Out of Food in the Last Year: Sometimes true   Physical Activity: Unknown (5/28/2024)    Exercise Vital Sign     Days of Exercise per Week: 3 days   Stress: No Stress Concern Present (5/28/2024)    [employer] [place] [employer] - Occupational Stress Questionnaire     Feeling of Stress : Not at all   Housing Stability: Unknown (5/28/2024)    Housing Stability Vital Sign     Unable to Pay for Housing in the Last Year: No       Review of Systems   Constitutional:  Positive for diaphoresis and fatigue. Negative for activity change, appetite change, chills and fever.   HENT:  Negative for sore throat.    Eyes:  Negative for photophobia and visual disturbance.   Respiratory:  Negative for cough and shortness of breath.    Cardiovascular:  Negative for chest pain and leg swelling.   Gastrointestinal:  Negative for abdominal pain, constipation, diarrhea, nausea and vomiting.   Genitourinary:  Positive for hot flashes. Negative for dysuria and hematuria.   Musculoskeletal:  Positive for leg pain. Negative for arthralgias and myalgias.   Integumentary:  Negative for rash and wound.   Neurological:  Negative for dizziness and weakness.        Objective:   BP (!) 161/74   Pulse 88   Temp 98.6 °F (37 °C)   Resp 16   Ht 5' 2" (1.575 m)   Wt 68.8 kg (151 lb 10.8 oz)   SpO2 98%   BMI 27.74 kg/m²     Physical Exam  Vitals reviewed.   Constitutional:       General: She is not in acute distress.     Appearance: Normal appearance.   HENT:      Head: Normocephalic and atraumatic.      Right Ear: Tympanic membrane, ear canal and external ear normal.      Left Ear: Tympanic membrane, ear canal and external ear normal.      Nose: Nose normal.      Mouth/Throat:      Mouth: Mucous membranes are moist.      Pharynx: Oropharynx is clear.   Eyes:      General: No scleral icterus.     Conjunctiva/sclera: Conjunctivae normal.   Cardiovascular:      Rate and Rhythm: Normal rate " and regular rhythm.      Pulses: Normal pulses.      Heart sounds: Normal heart sounds.   Pulmonary:      Effort: Pulmonary effort is normal. No respiratory distress.      Breath sounds: Normal breath sounds.   Abdominal:      General: There is no distension.      Palpations: Abdomen is soft.      Tenderness: There is no abdominal tenderness. There is no guarding.   Musculoskeletal:         General: Normal range of motion.      Cervical back: Normal range of motion and neck supple.      Right lower leg: No edema.      Left lower leg: No edema.   Lymphadenopathy:      Cervical: No cervical adenopathy.   Skin:     General: Skin is warm and dry.      Findings: No rash.   Neurological:      General: No focal deficit present.      Mental Status: She is alert and oriented to person, place, and time.              Lab Results   Component Value Date    WBC 5.13 06/10/2024    HGB 10.6 (L) 06/10/2024    HCT 33.6 (L) 06/10/2024     06/10/2024    CHOL 254 (H) 03/18/2024    TRIG 58 03/18/2024    HDL 85 (H) 03/18/2024    ALT 10 03/18/2024    AST 17 03/18/2024     06/10/2024    K 4.0 06/10/2024     06/10/2024    CREATININE 1.2 06/10/2024    BUN 8 06/10/2024    CO2 23 06/10/2024    TSH 1.397 03/18/2024    INR 1.1 04/30/2023    HGBA1C 5.4 05/01/2023       Current Outpatient Medications on File Prior to Visit   Medication Sig Dispense Refill    abatacept (ORENCIA CLICKJECT) 125 mg/mL AtIn Inject 1 mL into the skin every 7 days. 4 mL 0    acetaminophen (TYLENOL) 500 MG tablet Take 1 tablet (500 mg total) by mouth every 6 (six) hours as needed for Pain.      albuterol (PROVENTIL) 2.5 mg /3 mL (0.083 %) nebulizer solution Take 2.5 mg by nebulization every 6 (six) hours as needed for Wheezing. Rescue      alendronate (FOSAMAX) 70 MG tablet Take 70 mg by mouth every 7 days.      ammonium lactate (LAC-HYDRIN) 12 % lotion Apply topically 2 (two) times daily as needed for Dry Skin. 225 g 3    azelastine 205.5 mcg (0.15 %)  Spry 2 sprays by Each Nostril route 2 (two) times daily.      betamethasone dipropionate 0.05 % cream APPLY THIN LAYER TOPICALLY TO THE AFFECTED AREA TWICE DAILY      bismuth subsalicylate (PEPTO-BISMOL ORAL) Take by mouth.      diclofenac sodium (VOLTAREN) 1 % Gel Apply 4 g topically 3 (three) times daily as needed (hip/leg pain).      diphenhydrAMINE (BENADRYL) 25 mg capsule Take 25 mg by mouth every 6 (six) hours as needed for Itching.      DULoxetine (CYMBALTA) 60 MG capsule Take 1 capsule (60 mg total) by mouth once daily. In 1-2 weeks, if no relief, may increase dose to 2 capsules once daily. Call for refills. 30 capsule 3    ferrous sulfate 325 (65 FE) MG EC tablet Take 325 mg by mouth once daily.      fluticasone (FLONASE) 50 mcg/actuation nasal spray fluticasone 50 mcg/actuation nasal spray,suspension      gabapentin (NEURONTIN) 300 MG capsule Take 1 capsule (300 mg total) by mouth every evening. 30 capsule 3    gemfibroziL (LOPID) 600 MG tablet Take 600 mg by mouth 2 (two) times daily.      ipratropium (ATROVENT) 21 mcg (0.03 %) nasal spray 2 sprays by Each Nostril route 3 (three) times daily as needed (runny nose). 30 mL 2    ipratropium/albuterol sulfate (IPRATROPIUM-ALBUTEROL INHL) Inhale into the lungs as needed.      levocetirizine (XYZAL) 5 MG tablet Take 5 mg by mouth once daily.      loratadine (CLARITIN) 10 mg tablet Take 10 mg by mouth every morning.      losartan (COZAAR) 100 MG tablet Take 1 tablet (100 mg total) by mouth once daily. 90 tablet 3    montelukast (SINGULAIR) 10 mg tablet Take 10 mg by mouth every evening.      NIFEdipine (PROCARDIA-XL) 30 MG (OSM) 24 hr tablet Take 1 tablet (30 mg total) by mouth once daily. 90 tablet 3    olopatadine (PATANOL) 0.1 % ophthalmic solution Place 1 drop into both eyes 2 (two) times daily.      prednisoLONE acetate (PRED FORTE) 1 % DrpS Place 1 drop into both eyes 3 (three) times daily. 5 mL 2    predniSONE (DELTASONE) 2.5 MG tablet Take 1 tablet (2.5  mg total) by mouth daily as needed (RA flare). 30 tablet 4    SYMBICORT 160-4.5 mcg/actuation HFAA Inhale 2 puffs into the lungs 2 (two) times daily.       No current facility-administered medications on file prior to visit.         Assessment:   84 y.o. female with multiple co-morbid illnesses here to follow-up for ongoing chronic disease management and preventive health maintenance.    Plan:     Problem List Items Addressed This Visit       Essential hypertension - Primary     Blood pressure is not at goal.  Her cardiologist recently switched her from amlodipine to nifedipine about 6 weeks ago; we will increase to 60 mg since she will not be seeing him again until December.         Relevant Medications    NIFEdipine (PROCARDIA-XL) 60 MG (OSM) 24 hr tablet    Rheumatoid arthritis     She has been out of Orencia for about 3 months with resurgence of polyarthritic symptoms.  I have sent a message to the rheumatologist she is scheduled to see in 2 months to reestablish care to ask if he would not mind reordering the Orencia in the meantime.                        Health Maintenance         Date Due Completion Date    RSV Vaccine (Age 60+ and Pregnant patients) (1 - 1-dose 75+ series) Never done ---    COVID-19 Vaccine (5 - 2024-25 season) 10/31/2024 9/5/2024    Mammogram 11/20/2024 1/3/2024    DEXA Scan 08/18/2025 8/18/2022    Override on 5/3/2016: Declined    Lipid Panel 03/18/2029 3/18/2024    TETANUS VACCINE 08/26/2029 8/26/2019            Future Appointments   Date Time Provider Department Center   11/4/2024  1:00 PM [facility] MAMMO1 [facility] MAMMO [facility]   11/13/2024  2:00 PM Inga Blackburn MD [facility] PAINMGT [facility]   12/3/2024  2:00 PM Porter Thomas MD [facility] CARDIO [facility]   12/11/2024  2:40 PM Roger Shields MD [facility]   12/17/2024  3:40 PM Robert Hanley MD [facility] RHEUM [facility]   2/3/2025  1:40 PM Melissa Galloway MD [facility]         Follow up in about 4  weeks (around 11/22/2024). Total clinical care time was 40 min.    Roger Shields MD  [facility], [facility] and

## 2024-10-25 NOTE — ASSESSMENT & PLAN NOTE
She has been out of Orencia for about 3 months with resurgence of polyarthritic symptoms.  I have sent a message to the rheumatologist she is scheduled to see in 2 months to reestablish care to ask if he would not mind reordering the Orencia in the meantime.

## 2024-10-27 ENCOUNTER — PATIENT MESSAGE (OUTPATIENT)
Dept: SLEEP MEDICINE | Facility: CLINIC | Age: 84
End: 2024-10-27

## 2024-10-27 DIAGNOSIS — G47.33 OSA (OBSTRUCTIVE SLEEP APNEA): Primary | ICD-10-CM

## 2024-10-27 PROCEDURE — 95811 POLYSOM 6/>YRS CPAP 4/> PARM: CPT | Mod: 26,,, | Performed by: INTERNAL MEDICINE

## 2024-10-28 ENCOUNTER — TELEPHONE (OUTPATIENT)
Facility: CLINIC | Age: 84
End: 2024-10-28
Payer: MEDICARE

## 2024-10-28 DIAGNOSIS — M06.9 RHEUMATOID ARTHRITIS INVOLVING BOTH HANDS, UNSPECIFIED WHETHER RHEUMATOID FACTOR PRESENT: Primary | ICD-10-CM

## 2024-10-28 RX ORDER — PREDNISONE 2.5 MG/1
2.5 TABLET ORAL DAILY PRN
Qty: 30 TABLET | Refills: 1 | Status: SHIPPED | OUTPATIENT
Start: 2024-10-28

## 2024-11-12 ENCOUNTER — OFFICE VISIT (OUTPATIENT)
Dept: PRIMARY CARE CLINIC | Facility: CLINIC | Age: 84
End: 2024-11-12
Payer: MEDICARE

## 2024-11-12 VITALS
BODY MASS INDEX: 28.02 KG/M2 | DIASTOLIC BLOOD PRESSURE: 82 MMHG | OXYGEN SATURATION: 98 % | SYSTOLIC BLOOD PRESSURE: 150 MMHG | WEIGHT: 152.25 LBS | HEIGHT: 62 IN | HEART RATE: 87 BPM

## 2024-11-12 DIAGNOSIS — L29.9 PRURITUS: Primary | ICD-10-CM

## 2024-11-12 DIAGNOSIS — L82.1 SEBORRHEIC KERATOSES: ICD-10-CM

## 2024-11-12 PROCEDURE — 1126F AMNT PAIN NOTED NONE PRSNT: CPT | Mod: CPTII,S$GLB,, | Performed by: FAMILY MEDICINE

## 2024-11-12 PROCEDURE — 1101F PT FALLS ASSESS-DOCD LE1/YR: CPT | Mod: CPTII,S$GLB,, | Performed by: FAMILY MEDICINE

## 2024-11-12 PROCEDURE — 99999 PR PBB SHADOW E&M-EST. PATIENT-LVL V: CPT | Mod: PBBFAC,,, | Performed by: FAMILY MEDICINE

## 2024-11-12 PROCEDURE — 99203 OFFICE O/P NEW LOW 30 MIN: CPT | Mod: S$GLB,,, | Performed by: FAMILY MEDICINE

## 2024-11-12 PROCEDURE — 3288F FALL RISK ASSESSMENT DOCD: CPT | Mod: CPTII,S$GLB,, | Performed by: FAMILY MEDICINE

## 2024-11-12 PROCEDURE — 3079F DIAST BP 80-89 MM HG: CPT | Mod: CPTII,S$GLB,, | Performed by: FAMILY MEDICINE

## 2024-11-12 PROCEDURE — 3077F SYST BP >= 140 MM HG: CPT | Mod: CPTII,S$GLB,, | Performed by: FAMILY MEDICINE

## 2024-11-12 RX ORDER — HYDROXYZINE HYDROCHLORIDE 10 MG/1
10 TABLET, FILM COATED ORAL 3 TIMES DAILY PRN
Qty: 30 TABLET | Refills: 2 | Status: SHIPPED | OUTPATIENT
Start: 2024-11-12

## 2024-11-12 RX ORDER — TRIAMCINOLONE ACETONIDE 1 MG/ML
LOTION TOPICAL 2 TIMES DAILY
Qty: 60 ML | Refills: 2 | Status: SHIPPED | OUTPATIENT
Start: 2024-11-12

## 2024-11-13 ENCOUNTER — OFFICE VISIT (OUTPATIENT)
Dept: PAIN MEDICINE | Facility: CLINIC | Age: 84
End: 2024-11-13
Attending: ANESTHESIOLOGY
Payer: MEDICARE

## 2024-11-13 VITALS
SYSTOLIC BLOOD PRESSURE: 169 MMHG | TEMPERATURE: 98 F | HEART RATE: 89 BPM | DIASTOLIC BLOOD PRESSURE: 93 MMHG | WEIGHT: 152.13 LBS | BODY MASS INDEX: 27.82 KG/M2

## 2024-11-13 DIAGNOSIS — M06.9 RHEUMATOID ARTHRITIS INVOLVING RIGHT HAND, UNSPECIFIED WHETHER RHEUMATOID FACTOR PRESENT: Primary | ICD-10-CM

## 2024-11-13 DIAGNOSIS — M54.2 CHRONIC NECK PAIN: ICD-10-CM

## 2024-11-13 DIAGNOSIS — G89.29 CHRONIC NECK PAIN: ICD-10-CM

## 2024-11-13 DIAGNOSIS — M48.02 CERVICAL SPINAL STENOSIS: ICD-10-CM

## 2024-11-13 DIAGNOSIS — M48.02 NEURAL FORAMINAL STENOSIS OF CERVICAL SPINE: ICD-10-CM

## 2024-11-13 PROCEDURE — 3080F DIAST BP >= 90 MM HG: CPT | Mod: CPTII,S$GLB,, | Performed by: ANESTHESIOLOGY

## 2024-11-13 PROCEDURE — 1101F PT FALLS ASSESS-DOCD LE1/YR: CPT | Mod: CPTII,S$GLB,, | Performed by: ANESTHESIOLOGY

## 2024-11-13 PROCEDURE — 3288F FALL RISK ASSESSMENT DOCD: CPT | Mod: CPTII,S$GLB,, | Performed by: ANESTHESIOLOGY

## 2024-11-13 PROCEDURE — 1159F MED LIST DOCD IN RCRD: CPT | Mod: CPTII,S$GLB,, | Performed by: ANESTHESIOLOGY

## 2024-11-13 PROCEDURE — 3077F SYST BP >= 140 MM HG: CPT | Mod: CPTII,S$GLB,, | Performed by: ANESTHESIOLOGY

## 2024-11-13 PROCEDURE — 99999 PR PBB SHADOW E&M-EST. PATIENT-LVL V: CPT | Mod: PBBFAC,,, | Performed by: ANESTHESIOLOGY

## 2024-11-13 PROCEDURE — 1125F AMNT PAIN NOTED PAIN PRSNT: CPT | Mod: CPTII,S$GLB,, | Performed by: ANESTHESIOLOGY

## 2024-11-13 PROCEDURE — 1160F RVW MEDS BY RX/DR IN RCRD: CPT | Mod: CPTII,S$GLB,, | Performed by: ANESTHESIOLOGY

## 2024-11-13 PROCEDURE — 99204 OFFICE O/P NEW MOD 45 MIN: CPT | Mod: GC,S$GLB,, | Performed by: ANESTHESIOLOGY

## 2024-11-13 RX ORDER — PREDNISONE 10 MG/1
10 TABLET ORAL DAILY
Qty: 45 TABLET | Refills: 0 | Status: SHIPPED | OUTPATIENT
Start: 2024-11-13

## 2024-11-13 NOTE — PROGRESS NOTES
Clinic Note  11/13/2024      Subjective:       Patient ID:  Betzaida is a 84 y.o. female being seen for:      History of Present Illness    CHIEF COMPLAINT:  Betzaida presents with a widespread itchy rash and skin bumps that have been present for a few months.    HPI:  Betzaida reports a generalized pruritic rash affecting most of her body for approximately 2 months. Scratching results in scabs and bumps, with increased pruritus exacerbating the scratching cycle. The pruritus is constant, including nocturnal symptoms that disrupt sleep. Betzaida reports intense scratching on her back due to severe itching. The rash and pruritus primarily affect her body, sparing the hands.    Betzaida has been applying Aveeno lotion post-shower for symptom management and attempted using peroxide on her skin. She expresses concern about the increasing number of skin lesions.     MEDICATIONS:  Betzaida is using Aveeno lotion, which she applies after showering to manage dry skin.    MEDICAL HISTORY:  Betzaida has a history of seborrheic keratosis, with multiple lesions present on her body.    FAMILY HISTORY:  Family history is significant for father with a history of seborrheic keratosis.    SOCIAL HISTORY:  Betzaida is retired.      ROS:  General: -fever, -chills, -fatigue, -weight gain, -weight loss  Eyes: -vision changes, -redness, -discharge  ENT: -ear pain, -nasal congestion, -sore throat  Cardiovascular: -chest pain, -palpitations, -lower extremity edema  Respiratory: -cough, -shortness of breath  Gastrointestinal: -abdominal pain, -nausea, -vomiting, -diarrhea, -constipation, -blood in stool  Genitourinary: -dysuria, -hematuria, -frequency  Musculoskeletal: -joint pain, -muscle pain  Skin: +rash, -lesion, +itching  Neurological: -headache, -dizziness, -numbness, -tingling  Psychiatric: -anxiety, -depression, -sleep difficulty           Medication List with Changes/Refills   New Medications    HYDROXYZINE HCL (ATARAX) 10 MG TAB    Take 1 tablet (10 mg  total) by mouth 3 (three) times daily as needed (itchiness (can make you sleepy)).    TRIAMCINOLONE ACETONIDE 0.1% (KENALOG) 0.1 % LOTN    Apply topically 2 (two) times daily.   Current Medications    ABATACEPT (ORENCIA CLICKJECT) 125 MG/ML ATIN    Inject 1 mL into the skin every 7 days.    ACETAMINOPHEN (TYLENOL) 500 MG TABLET    Take 1 tablet (500 mg total) by mouth every 6 (six) hours as needed for Pain.    ALBUTEROL (PROVENTIL) 2.5 MG /3 ML (0.083 %) NEBULIZER SOLUTION    Take 2.5 mg by nebulization every 6 (six) hours as needed for Wheezing. Rescue    ALENDRONATE (FOSAMAX) 70 MG TABLET    Take 70 mg by mouth every 7 days.    AMMONIUM LACTATE (LAC-HYDRIN) 12 % LOTION    Apply topically 2 (two) times daily as needed for Dry Skin.    AZELASTINE 205.5 MCG (0.15 %) SPRY    2 sprays by Each Nostril route 2 (two) times daily.    BETAMETHASONE DIPROPIONATE 0.05 % CREAM    APPLY THIN LAYER TOPICALLY TO THE AFFECTED AREA TWICE DAILY    BISMUTH SUBSALICYLATE (PEPTO-BISMOL ORAL)    Take by mouth.    DICLOFENAC SODIUM (VOLTAREN) 1 % GEL    Apply 4 g topically 3 (three) times daily as needed (hip/leg pain).    DULOXETINE (CYMBALTA) 60 MG CAPSULE    Take 1 capsule (60 mg total) by mouth once daily. In 1-2 weeks, if no relief, may increase dose to 2 capsules once daily. Call for refills.    FERROUS SULFATE 325 (65 FE) MG EC TABLET    Take 325 mg by mouth once daily.    FLUTICASONE (FLONASE) 50 MCG/ACTUATION NASAL SPRAY    fluticasone 50 mcg/actuation nasal spray,suspension    GABAPENTIN (NEURONTIN) 300 MG CAPSULE    Take 1 capsule (300 mg total) by mouth every evening.    GEMFIBROZIL (LOPID) 600 MG TABLET    Take 600 mg by mouth 2 (two) times daily.    IPRATROPIUM (ATROVENT) 21 MCG (0.03 %) NASAL SPRAY    2 sprays by Each Nostril route 3 (three) times daily as needed (runny nose).    IPRATROPIUM/ALBUTEROL SULFATE (IPRATROPIUM-ALBUTEROL INHL)    Inhale into the lungs as needed.    LEVOCETIRIZINE (XYZAL) 5 MG TABLET    Take 5 mg  by mouth once daily.    LORATADINE (CLARITIN) 10 MG TABLET    Take 10 mg by mouth every morning.    LOSARTAN (COZAAR) 100 MG TABLET    Take 1 tablet (100 mg total) by mouth once daily.    MONTELUKAST (SINGULAIR) 10 MG TABLET    Take 10 mg by mouth every evening.    NIFEDIPINE (PROCARDIA-XL) 60 MG (OSM) 24 HR TABLET    Take 1 tablet (60 mg total) by mouth once daily.    OLOPATADINE (PATANOL) 0.1 % OPHTHALMIC SOLUTION    Place 1 drop into both eyes 2 (two) times daily.    PREDNISOLONE ACETATE (PRED FORTE) 1 % DRPS    Place 1 drop into both eyes 3 (three) times daily.    PREDNISONE (DELTASONE) 2.5 MG TABLET    Take 1 tablet (2.5 mg total) by mouth daily as needed (RA flare).    SYMBICORT 160-4.5 MCG/ACTUATION HFAA    Inhale 2 puffs into the lungs 2 (two) times daily.   Discontinued Medications    DIPHENHYDRAMINE (BENADRYL) 25 MG CAPSULE    Take 25 mg by mouth every 6 (six) hours as needed for Itching.       Patient Active Problem List   Diagnosis    Essential hypertension    Chronic allergic rhinitis    GERD (gastroesophageal reflux disease)    PMR (polymyalgia rheumatica)    Rheumatoid arthritis    Fatigue    Fibromyalgia    Nuclear sclerosis    Moderate persistent asthma without complication    Bulla of lung    Calcified lymph nodes    EMILIO (obstructive sleep apnea)    Dysphagia    Benign paroxysmal positional vertigo    Body mass index (BMI) of 25.0 to 29.9    Joint pain    Mixed hyperlipidemia    Need for COVID-19 vaccine    Reflux esophagitis    Shoulder pain    Extrinsic asthma    Chronic night sweats    Multiple thyroid nodules    Cervical radiculopathy    Immunosuppression    Cervical myelopathy    Stage 3b chronic kidney disease    Menopausal syndrome (hot flashes)    Recurrent sinusitis    History of cataract extraction    Assessment of barriers to meet care plan goals performed    Statin intolerance    Trochanteric bursitis of left hip    Osteopenia of both hips    Carotid stenosis, asymptomatic    Dry skin  "dermatitis           Objective:      BP (!) 150/82   Pulse 87   Ht 5' 2" (1.575 m)   Wt 69 kg (152 lb 3.6 oz)   SpO2 98%   BMI 27.84 kg/m²       Physical Exam    General: No acute distress. Well-developed. Well-nourished.  Eyes: EOMI. Sclerae anicteric.  HENT: Normocephalic. Atraumatic. Nares patent. Moist oral mucosa.  Cardiovascular: Regular rate. Regular rhythm. No murmurs. No rubs. No gallops. Normal S1, S2.  Respiratory: Normal respiratory effort. Clear to auscultation bilaterally. No rales. No rhonchi. No wheezing.  Musculoskeletal: No  obvious deformity.  Extremities: No lower extremity edema.  Neurological: Alert & oriented x3. No slurred speech. Normal gait.  Psychiatric: Normal mood. Normal affect. Good insight. Good judgment.  Skin: Warm. Dry skin. No rash. Multiple moles present. Seborrheic keratosis present of the chest and back. Several areas of excoriations from scratching          Assessment and Plan:       - Assessed generalized pruritus and skin changes, attributing symptoms primarily to age-related dryness and seborrheic keratoses  - Determined need for topical steroid to break itch-scratch cycle  - Considered low-dose oral antihistamine for nighttime pruritus relief, accounting for patient's advanced age  - Evaluated skin lesions, identifying multiple benign moles and seborrheic keratoses  - Ruled out concerning lesions based on visual inspection, but recommended dermatology referral for comprehensive skin check    AGE-RELATED SKIN CHANGES:  Explained age-related skin changes, including increased dryness and propensity for itching.  Described seborrheic keratoses as benign, genetic skin growths common with aging.  Clarified that observed skin changes are benign and part of normal aging process.  Betzaida to apply Aveeno lotion multiple times daily, especially after showering.  Continued Aveeno lotion, apply several times daily.    SKIN DRYNESS AND ITCHING:  Kaiser to avoid using hydrogen " peroxide on skin.  Started topical triamcinolone lotion, apply 2 times daily until itching subsides; thereafter use no more than 2 times weekly as needed.  Started hydroxyzine 10mg orally at bedtime as needed for itching.    DERMATOLOGY REFERRAL:  Referred to dermatology for comprehensive skin check.   to attempt scheduling appointment; if unable, dermatology office will contact patient directly.         Follow Up:   No follow-ups on file.    Other Orders Placed This Visit:  Orders Placed This Encounter    Ambulatory referral/consult to Dermatology    triamcinolone acetonide 0.1% (KENALOG) 0.1 % Lotn    hydrOXYzine HCL (ATARAX) 10 MG Tab         Huang Thomas MD        This note is dictated on [employer] word recognition program and This note was generated with the assistance of ambient listening technology. Verbal consent was obtained by the patient and accompanying visitor(s) for the recording of patient appointment to facilitate this note. I attest to having reviewed and edited the generated note for accuracy, though some syntax or spelling errors may persist. Please contact the author of this note for any clarification.  .  There are word recognition mistakes that are occasionally missed on review.

## 2024-11-13 NOTE — PROGRESS NOTES
Contacted by pt's Pain Management physician Dr. Blackburn regarding pt's worsening RA sx.  She is scheduled to see Dr. Hanley in Rheumatology to re-establish care 12/17 after her previous rheumatologist left.  She has been out of [facility] for a few months now.  Dr. Hanley brought into conversation who suggested increasing her prednisone to 10mg daily and he will try to move up her appointment.  Rx for prednisone 10mg sent to her pharmacy.

## 2024-11-13 NOTE — PROGRESS NOTES
Subjective:      Patient ID: Betzaida Neumann is a 84 y.o. female.    Chief Complaint: Hand Pain (Both hands) and Low-back Pain    Referred by: Melissa Galloway MD     Initial HPI (11/13/24): Neck pain started about 10 years ago. Pain started shooting into her hands about a year ago bilaterally. Neck pain most commonly goes into her shoulder and it is a constant dull ache. Had been on Orencia (abatacept) for about 2-3 years, currently in between rheumatologists and couldn't get an appointment with new provider in December. She has not had her medication since the beginning of this year.    Pain Interventions:  C6/7 ILESI 2020     Relevant Surgeries:  N/a    Conservative Treatment  - Physical Therpy: 2021 per patient    - Home Exercise Program: yes  - Chiropractic: Not currently    Medications  - Opioids: No  - NSAIDS/Tylenol: Tylenol helps small amount  - Muscle Relaxers:  In past hasn't helped  - Neuropathic agents: No  - Topicals: OTC equate pain relieving gel  Orencia (abatacept) is the only medication that helps  - Blood thinners: No    Relevant Imaging  MRI CERVICAL SPINE WITHOUT CONTRAST 6/19/24     CLINICAL HISTORY:  Neck pain, chronic;Neck pain, chronic, degenerative changes on xray;.  Cervicalgia     TECHNIQUE:  Multiplanar, multisequence MR images of the cervical spine were acquired without the administration of contrast.     COMPARISON:  09/14/2020     FINDINGS:  The marrow demonstrates homogeneous signal.  Vertebral body heights are maintained. Disc space narrowing and endplate changes most pronounced C3-4, C4-5, C5-6. AP alignment is anatomic.     Multilevel degenerative changes detailed below:     C2-3: Left facet arthropathy without significant canal or neural foraminal narrowing.     C3-4: Posterior disc osteophyte complex with the face mint of the ventral thecal sac and mild canal narrowing.  Uncovertebral spurring and facet arthropathy resultant moderate bilateral neural foraminal narrowing.      C4-5: Posterior disc osteophyte complex results in effacement of the thecal sac and moderate canal narrowing.  Uncovertebral spurring and facet arthropathy contribute to mild right and moderate left neural foraminal narrowing.     C5-6: Posterior disc osteophyte complex results in effacement of the ventral thecal sac and mild canal narrowing.  Uncovertebral spurring results in mild right neural foraminal narrowing.     C6-7: Posterior disc osteophyte complex without significant canal or neural foraminal narrowing.     C7-T1: No significant canal or neural foraminal narrowing.     No significant prevertebral soft tissue edema.     Cervical spinal cord is normal in caliber and signal.     Impression:     Multilevel degenerative change contributing to mild to moderate canal stenosis C3-4 through C5-6.  Multilevel mild and moderate neural foraminal narrowing at these levels.      Past Medical History:   Diagnosis Date    Allergy     Arthritis     Cataract     CKD (chronic kidney disease) stage 3, GFR 30-59 ml/min     DVT (deep venous thrombosis) 2023    x2    Elevated C-reactive protein (CRP) 2017    Fibromyalgia     GERD (gastroesophageal reflux disease)     Hyperlipidemia     Hypertension     Polymyalgia rheumatica     RA (rheumatoid arthritis)        Past Surgical History:   Procedure Laterality Date    APPENDECTOMY      CATARACT EXTRACTION W/  INTRAOCULAR LENS IMPLANT Left     Dr. Cedeño     CATARACT EXTRACTION W/  INTRAOCULAR LENS IMPLANT Right 2017    Dr. Sena     SECTION      x2    CHOLECYSTECTOMY      COSMETIC SURGERY      Rhinoplasty    EPIDURAL STEROID INJECTION INTO CERVICAL SPINE Bilateral 12/10/2020    Procedure: CERVICAL  DORIS DIRECT REFERRAL;  Surgeon: Inga Blackburn MD;  Location: [facility];  Service: Pain Management;  Laterality: Bilateral;  NEEDS CONSENT    ESOPHAGEAL MANOMETRY WITH MEASUREMENT OF IMPEDANCE N/A 2019    Procedure: MANOMETRY, ESOPHAGUS, WITH IMPEDANCE  MEASUREMENT;  Surgeon: Roger De Luna MD;  [facility]  Service: Endoscopy;  Laterality: N/A;  Prep instructions mailed to Pt - ERW    ESOPHAGOGASTRODUODENOSCOPY N/A 02/21/2019    Procedure: EGD (ESOPHAGOGASTRODUODENOSCOPY);  Surgeon: Carlos Rodgers MD;  [facility]  Service: Endoscopy;  Laterality: N/A;    EYE SURGERY      left eye Lens Placed    HYSTERECTOMY      RHINOPLASTY TIP  1980's    TONSILLECTOMY      TUBAL LIGATION         Review of patient's allergies indicates:   Allergen Reactions    Shellfish containing products Anaphylaxis    Sulfa (sulfonamide antibiotics) Swelling     swelling    Cabbage (brassica oleracea) Other (See Comments)    Chocolate flavor Other (See Comments)    Duckweed Other (See Comments)    Kale Swelling    Mustard Other (See Comments)    Orange Other (See Comments)    Statins-hmg-coa reductase inhibitors     Sulfacetamide sodium Swelling    Tomato (solanum lycopersicum)     Tree nut Other (See Comments)    Weed pollen Other (See Comments)     Patient reported allergy to weed pollen, grass, trees, duckweed, cockroaches       Current Outpatient Medications   Medication Sig Dispense Refill    abatacept (ORENCIA CLICKJECT) 125 mg/mL AtIn Inject 1 mL into the skin every 7 days. 4 mL 0    acetaminophen (TYLENOL) 500 MG tablet Take 1 tablet (500 mg total) by mouth every 6 (six) hours as needed for Pain.      albuterol (PROVENTIL) 2.5 mg /3 mL (0.083 %) nebulizer solution Take 2.5 mg by nebulization every 6 (six) hours as needed for Wheezing. Rescue      alendronate (FOSAMAX) 70 MG tablet Take 70 mg by mouth every 7 days.      ammonium lactate (LAC-HYDRIN) 12 % lotion Apply topically 2 (two) times daily as needed for Dry Skin. 225 g 3    azelastine 205.5 mcg (0.15 %) Spry 2 sprays by Each Nostril route 2 (two) times daily.      betamethasone dipropionate 0.05 % cream APPLY THIN LAYER TOPICALLY TO THE AFFECTED AREA TWICE DAILY      bismuth  subsalicylate (PEPTO-BISMOL ORAL) Take by mouth.      diclofenac sodium (VOLTAREN) 1 % Gel Apply 4 g topically 3 (three) times daily as needed (hip/leg pain).      DULoxetine (CYMBALTA) 60 MG capsule Take 1 capsule (60 mg total) by mouth once daily. In 1-2 weeks, if no relief, may increase dose to 2 capsules once daily. Call for refills. 30 capsule 3    ferrous sulfate 325 (65 FE) MG EC tablet Take 325 mg by mouth once daily.      fluticasone (FLONASE) 50 mcg/actuation nasal spray fluticasone 50 mcg/actuation nasal spray,suspension      gabapentin (NEURONTIN) 300 MG capsule Take 1 capsule (300 mg total) by mouth every evening. 30 capsule 3    gemfibroziL (LOPID) 600 MG tablet Take 600 mg by mouth 2 (two) times daily.      hydrOXYzine HCL (ATARAX) 10 MG Tab Take 1 tablet (10 mg total) by mouth 3 (three) times daily as needed (itchiness (can make you sleepy)). 30 tablet 2    ipratropium (ATROVENT) 21 mcg (0.03 %) nasal spray 2 sprays by Each Nostril route 3 (three) times daily as needed (runny nose). 30 mL 2    ipratropium/albuterol sulfate (IPRATROPIUM-ALBUTEROL INHL) Inhale into the lungs as needed.      levocetirizine (XYZAL) 5 MG tablet Take 5 mg by mouth once daily.      loratadine (CLARITIN) 10 mg tablet Take 10 mg by mouth every morning.      losartan (COZAAR) 100 MG tablet Take 1 tablet (100 mg total) by mouth once daily. 90 tablet 3    montelukast (SINGULAIR) 10 mg tablet Take 10 mg by mouth every evening.      NIFEdipine (PROCARDIA-XL) 60 MG (OSM) 24 hr tablet Take 1 tablet (60 mg total) by mouth once daily. 90 tablet 3    olopatadine (PATANOL) 0.1 % ophthalmic solution Place 1 drop into both eyes 2 (two) times daily.      prednisoLONE acetate (PRED FORTE) 1 % DrpS Place 1 drop into both eyes 3 (three) times daily. 5 mL 2    predniSONE (DELTASONE) 2.5 MG tablet Take 1 tablet (2.5 mg total) by mouth daily as needed (RA flare). 30 tablet 1    SYMBICORT 160-4.5 mcg/actuation HFAA Inhale 2 puffs into the lungs 2  (two) times daily.      triamcinolone acetonide 0.1% (KENALOG) 0.1 % Lotn Apply topically 2 (two) times daily. 60 mL 2     No current facility-administered medications for this visit.       Family History   Problem Relation Name Age of Onset    HIV Son      Heart disease Son          mi    Inflammatory bowel disease Neg Hx      Kidney disease Neg Hx      Lupus Neg Hx      Psoriasis Neg Hx      Rheum arthritis Neg Hx      Blindness Neg Hx      Glaucoma Neg Hx      Macular degeneration Neg Hx      Retinal detachment Neg Hx      Asthma Neg Hx      Emphysema Neg Hx      Colon cancer Neg Hx      Esophageal cancer Neg Hx      Amblyopia Neg Hx      Cataracts Neg Hx      Strabismus Neg Hx         Social History     Socioeconomic History    Marital status: Single   Tobacco Use    Smoking status: Former     Current packs/day: 0.00     Average packs/day: 1 pack/day for 29.0 years (29.0 ttl pk-yrs)     Types: Cigarettes     Start date: 1970     Quit date: 1999     Years since quittin.2    Smokeless tobacco: Never    Tobacco comments:     Retired ; ; 4 children healthy   Substance and Sexual Activity    Alcohol use: Yes     Alcohol/week: 1.0 standard drink of alcohol     Types: 1 Glasses of wine per week     Comment: social    Drug use: No    Sexual activity: Yes     Partners: Male     Social Drivers of Health     Financial Resource Strain: High Risk (2024)    Overall Financial Resource Strain (CARDIA)     Difficulty of Paying Living Expenses: Very hard   Food Insecurity: Food Insecurity Present (2024)    Hunger Vital Sign     Worried About Running Out of Food in the Last Year: Sometimes true     Ran Out of Food in the Last Year: Sometimes true   Physical Activity: Unknown (2024)    Exercise Vital Sign     Days of Exercise per Week: 3 days   Stress: No Stress Concern Present (2024)    [employer] [place] [employer] - Occupational Stress Questionnaire      Feeling of Stress : Not at all   Housing Stability: Unknown (5/28/2024)    Housing Stability Vital Sign     Unable to Pay for Housing in the Last Year: No           Review of Systems   Constitutional: Negative for chills, fever and night sweats.   HENT:  Negative for hearing loss and sore throat.    Eyes:  Positive for pain. Negative for double vision.   Cardiovascular:  Negative for chest pain and syncope.   Respiratory:  Negative for shortness of breath and wheezing.    Musculoskeletal:  Positive for back pain, joint pain, muscle weakness, neck pain and stiffness.   Gastrointestinal:  Negative for abdominal pain and dysphagia.   Genitourinary:  Negative for dysuria and flank pain.   Neurological:  Negative for dizziness, numbness and seizures.   Psychiatric/Behavioral:  Negative for depression.          Objective:   BP (!) 169/93 (Patient Position: Sitting)   Pulse 89   Temp 97.5 °F (36.4 °C)   Wt 69 kg (152 lb 1.9 oz)   BMI 27.82 kg/m²   Pain Disability Index Review:      11/13/2024     1:57 PM   Last 3 PDI Scores   Pain Disability Index (PDI) 42            PHYSICAL EXAM   Vitals:    11/13/24 1357   BP: (!) 169/93   Pulse: 89   Temp: 97.5 °F (36.4 °C)     GENERAL: Alert and Oriented x3  H&N: NC/AT  CV/PULM: Regular rate. Normal WOB. Symmetric chest rise. No peripheral edema.  SKIN: Intact. No visible rashes or lesions.     MUSCULOSKELETAL/NEUROLGIC:  INSPECTION: Limbs are grossly symmetric. Normal muscle bulk for age. No obvious deformities.  SENSORY: Intact and symmetric to light touch distally in BUE and BLE  MOTOR:  Upper Extremity Manual Muscle Testing (*= weakness due to pain)   Right Left   Elbow Flexion 5/5 5/5   Elbow Extension 5/5 5/5   Wrist Extension 5/5 5/5   Finger Flexion () 4/5 5/5   Finger Abduction 5/5 5/5   Shoulder Abduction 5/5 4/5   Shoulder Flexion 5/5 5/5     Upper Track Signs:    Right Left   Cartagena's  - -   Babinski Response - -   Ankle Clonus - -       CERVICAL SPINE:    INSPECTION: No obvious abnormalities.  PALPATION:   - Tender to palpation over: Cervical spine, musculature, trapezius   ROM: Limited rotation bilaterally and pain with extension  Spurling's: + on right  Facet Loading: +    Shoulder   INSPECTION: atraumatic  PALPATION: crepitus   ROM: Normal    Ortho/SPM Exam      Assessment:       Encounter Diagnoses   Name Primary?    Chronic neck pain     Neural foraminal stenosis of cervical spine     Cervical spinal stenosis          Plan:   We discussed with the patient the assessment and recommendations. The following is the plan we agreed on:        Betzaida was seen today for hand pain and low-back pain.    Diagnoses and all orders for this visit:    Chronic neck pain  -     Ambulatory consult to Pain Clinic    Neural foraminal stenosis of cervical spine  -     Ambulatory consult to Pain Clinic    Cervical spinal stenosis  -     Ambulatory consult to Pain Clinic         Patient will follow up with rheumatology regarding refills of Orencia   After she is back on medication she knows to call office and we will evaluate need for cervical DORIS     Inocencio Adamson,   Anesthesiology, PGY3    I have personally taken the history and examined this patient and agree with the resident's note as stated above.

## 2024-11-15 ENCOUNTER — TELEPHONE (OUTPATIENT)
Dept: RHEUMATOLOGY | Facility: CLINIC | Age: 84
End: 2024-11-15
Payer: MEDICARE

## 2024-11-20 ENCOUNTER — HOSPITAL ENCOUNTER (OUTPATIENT)
Dept: RADIOLOGY | Facility: OTHER | Age: 84
Discharge: HOME | End: 2024-11-20
Payer: MEDICARE

## 2024-11-20 DIAGNOSIS — Z12.31 ENCOUNTER FOR SCREENING MAMMOGRAM FOR BREAST CANCER: ICD-10-CM

## 2024-11-20 PROCEDURE — 77063 BREAST TOMOSYNTHESIS BI: CPT | Mod: TC

## 2024-12-03 ENCOUNTER — OFFICE VISIT (OUTPATIENT)
Dept: CARDIOLOGY | Facility: CLINIC | Age: 84
End: 2024-12-03
Payer: MEDICARE

## 2024-12-03 VITALS
HEART RATE: 94 BPM | DIASTOLIC BLOOD PRESSURE: 82 MMHG | WEIGHT: 149.69 LBS | OXYGEN SATURATION: 98 % | BODY MASS INDEX: 27.38 KG/M2 | SYSTOLIC BLOOD PRESSURE: 156 MMHG

## 2024-12-03 DIAGNOSIS — I65.29 ASYMPTOMATIC CAROTID ARTERY STENOSIS, UNSPECIFIED LATERALITY: ICD-10-CM

## 2024-12-03 DIAGNOSIS — E78.2 MIXED HYPERLIPIDEMIA: ICD-10-CM

## 2024-12-03 DIAGNOSIS — I10 ESSENTIAL HYPERTENSION: Primary | ICD-10-CM

## 2024-12-03 PROCEDURE — 1159F MED LIST DOCD IN RCRD: CPT | Mod: CPTII,S$GLB,, | Performed by: INTERNAL MEDICINE

## 2024-12-03 PROCEDURE — 1101F PT FALLS ASSESS-DOCD LE1/YR: CPT | Mod: CPTII,S$GLB,, | Performed by: INTERNAL MEDICINE

## 2024-12-03 PROCEDURE — 99999 PR PBB SHADOW E&M-EST. PATIENT-LVL IV: CPT | Mod: PBBFAC,,, | Performed by: INTERNAL MEDICINE

## 2024-12-03 PROCEDURE — 99214 OFFICE O/P EST MOD 30 MIN: CPT | Mod: S$GLB,,, | Performed by: INTERNAL MEDICINE

## 2024-12-03 PROCEDURE — 3288F FALL RISK ASSESSMENT DOCD: CPT | Mod: CPTII,S$GLB,, | Performed by: INTERNAL MEDICINE

## 2024-12-03 PROCEDURE — 3077F SYST BP >= 140 MM HG: CPT | Mod: CPTII,S$GLB,, | Performed by: INTERNAL MEDICINE

## 2024-12-03 PROCEDURE — 3079F DIAST BP 80-89 MM HG: CPT | Mod: CPTII,S$GLB,, | Performed by: INTERNAL MEDICINE

## 2024-12-03 PROCEDURE — 1126F AMNT PAIN NOTED NONE PRSNT: CPT | Mod: CPTII,S$GLB,, | Performed by: INTERNAL MEDICINE

## 2024-12-03 RX ORDER — NIFEDIPINE 90 MG/1
90 TABLET, EXTENDED RELEASE ORAL DAILY
Qty: 90 TABLET | Refills: 3 | Status: SHIPPED | OUTPATIENT
Start: 2024-12-03 | End: 2025-12-03

## 2024-12-03 NOTE — PROGRESS NOTES
Cardiology    12/3/2024  2:13 PM    Problem list  Patient Active Problem List   Diagnosis    Essential hypertension    Chronic allergic rhinitis    GERD (gastroesophageal reflux disease)    PMR (polymyalgia rheumatica)    Rheumatoid arthritis    Fatigue    Fibromyalgia    Nuclear sclerosis    Moderate persistent asthma without complication    Bulla of lung    Calcified lymph nodes    EMILIO (obstructive sleep apnea)    Dysphagia    Benign paroxysmal positional vertigo    Body mass index (BMI) of 25.0 to 29.9    Joint pain    Mixed hyperlipidemia    Need for COVID-19 vaccine    Reflux esophagitis    Shoulder pain    Extrinsic asthma    Chronic night sweats    Multiple thyroid nodules    Cervical radiculopathy    Immunosuppression    Cervical myelopathy    Stage 3b chronic kidney disease    Menopausal syndrome (hot flashes)    Recurrent sinusitis    History of cataract extraction    Assessment of barriers to meet care plan goals performed    Statin intolerance    Trochanteric bursitis of left hip    Osteopenia of both hips    Carotid stenosis, asymptomatic    Dry skin dermatitis       CC:  Follow-up    HPI:  She is here for follow-up.  At the last visit, amlodipine was changed nifedipine 30 mg.  She saw her PCP in October and her pressure was not well controlled so her nifedipine was increased to 60 mg.  Her blood pressure is still not at goal.  Her systolic blood pressures ranging 140-150 at home.  She denies any cardiac complaints.    Medications  Current Outpatient Medications   Medication Sig Dispense Refill    abatacept (ORENCIA CLICKJECT) 125 mg/mL AtIn Inject 1 mL into the skin every 7 days. 4 mL 0    acetaminophen (TYLENOL) 500 MG tablet Take 1 tablet (500 mg total) by mouth every 6 (six) hours as needed for Pain.      albuterol (PROVENTIL) 2.5 mg /3 mL (0.083 %) nebulizer solution Take 2.5 mg by nebulization every 6 (six) hours as needed for Wheezing. Rescue      alendronate (FOSAMAX) 70 MG tablet Take  70 mg by mouth every 7 days.      ammonium lactate (LAC-HYDRIN) 12 % lotion Apply topically 2 (two) times daily as needed for Dry Skin. 225 g 3    azelastine 205.5 mcg (0.15 %) Spry 2 sprays by Each Nostril route 2 (two) times daily.      betamethasone dipropionate 0.05 % cream APPLY THIN LAYER TOPICALLY TO THE AFFECTED AREA TWICE DAILY      bismuth subsalicylate (PEPTO-BISMOL ORAL) Take by mouth.      diclofenac sodium (VOLTAREN) 1 % Gel Apply 4 g topically 3 (three) times daily as needed (hip/leg pain).      ferrous sulfate 325 (65 FE) MG EC tablet Take 325 mg by mouth once daily.      fluticasone (FLONASE) 50 mcg/actuation nasal spray fluticasone 50 mcg/actuation nasal spray,suspension      gemfibroziL (LOPID) 600 MG tablet Take 600 mg by mouth 2 (two) times daily.      ipratropium (ATROVENT) 21 mcg (0.03 %) nasal spray 2 sprays by Each Nostril route 3 (three) times daily as needed (runny nose). 30 mL 2    ipratropium/albuterol sulfate (IPRATROPIUM-ALBUTEROL INHL) Inhale into the lungs as needed.      levocetirizine (XYZAL) 5 MG tablet Take 5 mg by mouth once daily.      loratadine (CLARITIN) 10 mg tablet Take 10 mg by mouth every morning.      losartan (COZAAR) 100 MG tablet Take 1 tablet (100 mg total) by mouth once daily. 90 tablet 3    montelukast (SINGULAIR) 10 mg tablet Take 10 mg by mouth every evening.      olopatadine (PATANOL) 0.1 % ophthalmic solution Place 1 drop into both eyes 2 (two) times daily.      prednisoLONE acetate (PRED FORTE) 1 % DrpS Place 1 drop into both eyes 3 (three) times daily. 5 mL 2    predniSONE (DELTASONE) 2.5 MG tablet Take 1 tablet (2.5 mg total) by mouth daily as needed (RA flare). 30 tablet 1    SYMBICORT 160-4.5 mcg/actuation HFAA Inhale 2 puffs into the lungs 2 (two) times daily.      triamcinolone acetonide 0.1% (KENALOG) 0.1 % Lotn Apply topically 2 (two) times daily. 60 mL 2    DULoxetine (CYMBALTA) 60 MG capsule Take 1 capsule (60 mg total) by mouth once daily. In 1-2  weeks, if no relief, may increase dose to 2 capsules once daily. Call for refills. (Patient not taking: Reported on 12/3/2024) 30 capsule 3    gabapentin (NEURONTIN) 300 MG capsule Take 1 capsule (300 mg total) by mouth every evening. (Patient not taking: Reported on 12/3/2024) 30 capsule 3    hydrOXYzine HCL (ATARAX) 10 MG Tab Take 1 tablet (10 mg total) by mouth 3 (three) times daily as needed (itchiness (can make you sleepy)). (Patient not taking: Reported on 12/3/2024) 30 tablet 2    NIFEdipine (PROCARDIA-XL) 90 MG (OSM) 24 hr tablet Take 1 tablet (90 mg total) by mouth once daily. 90 tablet 3    predniSONE (DELTASONE) 10 MG tablet Take 1 tablet (10 mg total) by mouth once daily. (Patient not taking: Reported on 12/3/2024) 45 tablet 0     No current facility-administered medications for this visit.      Prior to Admission medications    Medication Sig Start Date End Date Taking? Authorizing Provider   abatacept (ORENCIA CLICKJECT) 125 mg/mL AtIn Inject 1 mL into the skin every 7 days. 4/30/24  Yes Gagan Alvarez MD   acetaminophen (TYLENOL) 500 MG tablet Take 1 tablet (500 mg total) by mouth every 6 (six) hours as needed for Pain. 6/3/24  Yes Melissa Galloway MD   albuterol (PROVENTIL) 2.5 mg /3 mL (0.083 %) nebulizer solution Take 2.5 mg by nebulization every 6 (six) hours as needed for Wheezing. Rescue   Yes Provider, Historical   alendronate (FOSAMAX) 70 MG tablet Take 70 mg by mouth every 7 days. 5/3/24  Yes Provider, Historical   ammonium lactate (LAC-HYDRIN) 12 % lotion Apply topically 2 (two) times daily as needed for Dry Skin. 6/10/24  Yes Roger Shields MD   azelastine 205.5 mcg (0.15 %) Spry 2 sprays by Each Nostril route 2 (two) times daily. 4/10/22  Yes Provider, Historical   betamethasone dipropionate 0.05 % cream APPLY THIN LAYER TOPICALLY TO THE AFFECTED AREA TWICE DAILY   Yes Provider, Historical   bismuth subsalicylate (PEPTO-BISMOL ORAL) Take by mouth.   Yes Provider,  Historical   diclofenac sodium (VOLTAREN) 1 % Gel Apply 4 g topically 3 (three) times daily as needed (hip/leg pain).   Yes Provider, Historical   ferrous sulfate 325 (65 FE) MG EC tablet Take 325 mg by mouth once daily.   Yes Provider, Historical   fluticasone (FLONASE) 50 mcg/actuation nasal spray fluticasone 50 mcg/actuation nasal spray,suspension   Yes Provider, Historical   gemfibroziL (LOPID) 600 MG tablet Take 600 mg by mouth 2 (two) times daily. 3/21/24  Yes Provider, Historical   ipratropium (ATROVENT) 21 mcg (0.03 %) nasal spray 2 sprays by Each Nostril route 3 (three) times daily as needed (runny nose). 7/29/24  Yes Royal Riggins PA-C   ipratropium/albuterol sulfate (IPRATROPIUM-ALBUTEROL INHL) Inhale into the lungs as needed.   Yes Provider, Historical   levocetirizine (XYZAL) 5 MG tablet Take 5 mg by mouth once daily. 6/24/22  Yes Provider, Historical   loratadine (CLARITIN) 10 mg tablet Take 10 mg by mouth every morning. 7/12/24  Yes Provider, Historical   losartan (COZAAR) 100 MG tablet Take 1 tablet (100 mg total) by mouth once daily. 5/15/24 5/15/25 Yes Imelda You MD   montelukast (SINGULAIR) 10 mg tablet Take 10 mg by mouth every evening.   Yes Provider, Historical   olopatadine (PATANOL) 0.1 % ophthalmic solution Place 1 drop into both eyes 2 (two) times daily. 4/2/24  Yes Provider, Historical   prednisoLONE acetate (PRED FORTE) 1 % DrpS Place 1 drop into both eyes 3 (three) times daily. 10/23/24 10/23/25 Yes Dee Goff, CK   predniSONE (DELTASONE) 2.5 MG tablet Take 1 tablet (2.5 mg total) by mouth daily as needed (RA flare). 10/28/24  Yes Roger Shields MD   SYMBICORT 160-4.5 mcg/actuation HFAA Inhale 2 puffs into the lungs 2 (two) times daily. 4/2/24  Yes Provider, Historical   triamcinolone acetonide 0.1% (KENALOG) 0.1 % Lotn Apply topically 2 (two) times daily. 11/12/24  Yes Huang Thomas MD   NIFEdipine (PROCARDIA-XL) 60 MG (OSM) 24 hr tablet Take 1 tablet (60  mg total) by mouth once daily. 10/25/24 12/3/24 Yes Roger Shields MD   DULoxetine (CYMBALTA) 60 MG capsule Take 1 capsule (60 mg total) by mouth once daily. In 1-2 weeks, if no relief, may increase dose to 2 capsules once daily. Call for refills.  Patient not taking: Reported on 12/3/2024 10/7/24 2/4/25  Melissa Galloway MD   gabapentin (NEURONTIN) 300 MG capsule Take 1 capsule (300 mg total) by mouth every evening.  Patient not taking: Reported on 12/3/2024 10/7/24 10/7/25  Melissa Galloway MD   hydrOXYzine HCL (ATARAX) 10 MG Tab Take 1 tablet (10 mg total) by mouth 3 (three) times daily as needed (itchiness (can make you sleepy)).  Patient not taking: Reported on 12/3/2024 11/12/24   Huang Thomas MD   NIFEdipine (PROCARDIA-XL) 90 MG (OSM) 24 hr tablet Take 1 tablet (90 mg total) by mouth once daily. 12/3/24 12/3/25  Porter Thomas MD   predniSONE (DELTASONE) 10 MG tablet Take 1 tablet (10 mg total) by mouth once daily.  Patient not taking: Reported on 12/3/2024 11/13/24   Roger Shields MD         History  Past Medical History:   Diagnosis Date    Allergy     Arthritis     Cataract     CKD (chronic kidney disease) stage 3, GFR 30-59 ml/min     DVT (deep venous thrombosis) 2023    x2    Elevated C-reactive protein (CRP) 2017    Fibromyalgia     GERD (gastroesophageal reflux disease)     Hyperlipidemia     Hypertension     Polymyalgia rheumatica     RA (rheumatoid arthritis)      Past Surgical History:   Procedure Laterality Date    APPENDECTOMY      CATARACT EXTRACTION W/  INTRAOCULAR LENS IMPLANT Left     Dr. Cedeño     CATARACT EXTRACTION W/  INTRAOCULAR LENS IMPLANT Right 2017    Dr. Sena     SECTION      x2    CHOLECYSTECTOMY      COSMETIC SURGERY      Rhinoplasty    EPIDURAL STEROID INJECTION INTO CERVICAL SPINE Bilateral 12/10/2020    Procedure: CERVICAL  DORIS DIRECT REFERRAL;  Surgeon: Inga Blackburn MD;  Location: [facility];  Service: Pain Management;   Laterality: Bilateral;  NEEDS CONSENT    ESOPHAGEAL MANOMETRY WITH MEASUREMENT OF IMPEDANCE N/A 2019    Procedure: MANOMETRY, ESOPHAGUS, WITH IMPEDANCE MEASUREMENT;  Surgeon: Roger De Luna MD;  Location: [facility] ([address] [place] [address]);  Service: Endoscopy;  Laterality: N/A;  Prep instructions mailed to Pt - ERW    ESOPHAGOGASTRODUODENOSCOPY N/A 2019    Procedure: EGD (ESOPHAGOGASTRODUODENOSCOPY);  Surgeon: Carlos Rodgers MD;  Location: [facility] ([address] [place] [address]);  Service: Endoscopy;  Laterality: N/A;    EYE SURGERY      left eye Lens Placed    HYSTERECTOMY      RHINOPLASTY TIP      TONSILLECTOMY      TUBAL LIGATION       Social History     Socioeconomic History    Marital status: Single   Tobacco Use    Smoking status: Former     Current packs/day: 0.00     Average packs/day: 1 pack/day for 29.0 years (29.0 ttl pk-yrs)     Types: Cigarettes     Start date: 1970     Quit date: 1999     Years since quittin.3    Smokeless tobacco: Never    Tobacco comments:     Retired ; ; 4 children healthy   Substance and Sexual Activity    Alcohol use: Yes     Alcohol/week: 1.0 standard drink of alcohol     Types: 1 Glasses of wine per week     Comment: social    Drug use: No    Sexual activity: Yes     Partners: Male     Social Drivers of Health     Financial Resource Strain: High Risk (2024)    Overall Financial Resource Strain (CARDIA)     Difficulty of Paying Living Expenses: Very hard   Food Insecurity: Food Insecurity Present (2024)    Hunger Vital Sign     Worried About Running Out of Food in the Last Year: Sometimes true     Ran Out of Food in the Last Year: Sometimes true   Physical Activity: Unknown (2024)    Exercise Vital Sign     Days of Exercise per Week: 3 days   Stress: No Stress Concern Present (2024)    [employer] [place] [employer] - Occupational Stress Questionnaire     Feeling of Stress : Not at all   Housing Stability: Unknown  (5/28/2024)    Housing Stability Vital Sign     Unable to Pay for Housing in the Last Year: No         Allergies  Review of patient's allergies indicates:   Allergen Reactions    Shellfish containing products Anaphylaxis    Sulfa (sulfonamide antibiotics) Swelling     swelling    Cabbage (brassica oleracea) Other (See Comments)    Chocolate flavor Other (See Comments)    Duckweed Other (See Comments)    Kale Swelling    Mustard Other (See Comments)    Orange Other (See Comments)    Statins-hmg-coa reductase inhibitors     Sulfacetamide sodium Swelling    Tomato (solanum lycopersicum)     Tree nut Other (See Comments)    Weed pollen Other (See Comments)     Patient reported allergy to weed pollen, grass, trees, duckweed, cockroaches         Review of Systems   Review of Systems   Constitutional: Negative for decreased appetite, fever and weight loss.   HENT:  Negative for congestion and nosebleeds.    Eyes:  Negative for double vision, vision loss in left eye, vision loss in right eye and visual disturbance.   Cardiovascular:  Negative for chest pain, claudication, cyanosis, dyspnea on exertion, irregular heartbeat, leg swelling, near-syncope, orthopnea, palpitations, paroxysmal nocturnal dyspnea and syncope.   Respiratory:  Negative for cough, hemoptysis, shortness of breath, sleep disturbances due to breathing, snoring, sputum production and wheezing.    Endocrine: Negative for cold intolerance and heat intolerance.   Skin:  Negative for nail changes and rash.   Musculoskeletal:  Negative for joint pain, muscle cramps, muscle weakness and myalgias.   Gastrointestinal:  Negative for change in bowel habit, heartburn, hematemesis, hematochezia, hemorrhoids and melena.   Neurological:  Negative for dizziness, focal weakness and headaches.         Physical Exam  Wt Readings from Last 1 Encounters:   12/03/24 67.9 kg (149 lb 11.1 oz)     BP Readings from Last 3 Encounters:   12/03/24 (!) 156/82   11/13/24 (!) 169/93    11/12/24 (!) 150/82     Pulse Readings from Last 1 Encounters:   12/03/24 94     Body mass index is 27.38 kg/m².    Physical Exam  Constitutional:       Appearance: She is well-developed.   HENT:      Head: Atraumatic.   Eyes:      General: No scleral icterus.  Neck:      Vascular: Normal carotid pulses. No carotid bruit, hepatojugular reflux or JVD.   Cardiovascular:      Rate and Rhythm: Normal rate and regular rhythm.      Chest Wall: PMI is not displaced.      Pulses: Intact distal pulses.           Carotid pulses are 2+ on the right side and 2+ on the left side.       Radial pulses are 2+ on the right side and 2+ on the left side.        Dorsalis pedis pulses are 2+ on the right side and 2+ on the left side.      Heart sounds: Normal heart sounds, S1 normal and S2 normal. No murmur heard.     No friction rub.   Pulmonary:      Effort: Pulmonary effort is normal. No respiratory distress.      Breath sounds: Normal breath sounds. No stridor. No wheezing or rales.   Chest:      Chest wall: No tenderness.   Abdominal:      General: Bowel sounds are normal.      Palpations: Abdomen is soft.   Musculoskeletal:      Cervical back: Neck supple. No edema.   Skin:     General: Skin is warm and dry.      Nails: There is no clubbing.   Neurological:      Mental Status: She is alert and oriented to person, place, and time.   Psychiatric:         Behavior: Behavior normal.         Thought Content: Thought content normal.             Assessment  1. Essential hypertension (Primary)  Not at goal.  Will adjust medications and monitor    2. Asymptomatic carotid artery stenosis, unspecified laterality  Stable.  Continue to monitor    3. Mixed hyperlipidemia  Stable.  Continue medications and monitor        Plan and Discussion  Discussed her blood pressure is not at goal.  Will increase her nifedipine to 90 mg once a day.  Patient has a follow-up appointment with her PCP in 2 weeks.  Will see her back in 2 months for blood  pressure check    Follow Up  Two months      Porter Thomas MD, F.A.C.C, F.S.C.A.I.      Total professional time spent for the encounter: 25 minutes  Time was spent preparing to see the patient, reviewing results of prior testing, obtaining and/or reviewing separately obtained history, performing a medically appropriate examination and interview, counseling and educating the patient/family, ordering medications/tests/procedures, referring and communicating with other health care professionals, documenting clinical information in the electronic health record, and independently interpreting results.    Disclaimer: This document was created using voice recognition software ([employer] Direct). Although it may be edited, this document may contain errors related to incorrect recognition of the spoken word. Please call the physician if clarification is needed.

## 2024-12-11 ENCOUNTER — OFFICE VISIT (OUTPATIENT)
Facility: CLINIC | Age: 84
End: 2024-12-11
Payer: MEDICARE

## 2024-12-11 VITALS
TEMPERATURE: 98 F | HEART RATE: 91 BPM | BODY MASS INDEX: 28.07 KG/M2 | OXYGEN SATURATION: 99 % | WEIGHT: 152.56 LBS | SYSTOLIC BLOOD PRESSURE: 135 MMHG | DIASTOLIC BLOOD PRESSURE: 75 MMHG | HEIGHT: 62 IN

## 2024-12-11 DIAGNOSIS — M79.7 FIBROMYALGIA SYNDROME: ICD-10-CM

## 2024-12-11 DIAGNOSIS — M05.741 RHEUMATOID ARTHRITIS INVOLVING BOTH HANDS WITH POSITIVE RHEUMATOID FACTOR: Primary | ICD-10-CM

## 2024-12-11 DIAGNOSIS — M05.742 RHEUMATOID ARTHRITIS INVOLVING BOTH HANDS WITH POSITIVE RHEUMATOID FACTOR: Primary | ICD-10-CM

## 2024-12-11 DIAGNOSIS — G89.29 CHRONIC NECK PAIN: ICD-10-CM

## 2024-12-11 DIAGNOSIS — M54.2 CHRONIC NECK PAIN: ICD-10-CM

## 2024-12-11 DIAGNOSIS — L28.2 PRURITIC RASH: ICD-10-CM

## 2024-12-11 PROCEDURE — 99215 OFFICE O/P EST HI 40 MIN: CPT | Mod: S$GLB,,, | Performed by: HOSPITALIST

## 2024-12-11 PROCEDURE — 1125F AMNT PAIN NOTED PAIN PRSNT: CPT | Mod: CPTII,S$GLB,, | Performed by: HOSPITALIST

## 2024-12-11 PROCEDURE — 3075F SYST BP GE 130 - 139MM HG: CPT | Mod: CPTII,S$GLB,, | Performed by: HOSPITALIST

## 2024-12-11 PROCEDURE — 1159F MED LIST DOCD IN RCRD: CPT | Mod: CPTII,S$GLB,, | Performed by: HOSPITALIST

## 2024-12-11 PROCEDURE — 99999 PR PBB SHADOW E&M-EST. PATIENT-LVL V: CPT | Mod: PBBFAC,,, | Performed by: HOSPITALIST

## 2024-12-11 PROCEDURE — 3288F FALL RISK ASSESSMENT DOCD: CPT | Mod: CPTII,S$GLB,, | Performed by: HOSPITALIST

## 2024-12-11 PROCEDURE — 3078F DIAST BP <80 MM HG: CPT | Mod: CPTII,S$GLB,, | Performed by: HOSPITALIST

## 2024-12-11 PROCEDURE — 1101F PT FALLS ASSESS-DOCD LE1/YR: CPT | Mod: CPTII,S$GLB,, | Performed by: HOSPITALIST

## 2024-12-11 RX ORDER — DULOXETIN HYDROCHLORIDE 60 MG/1
60 CAPSULE, DELAYED RELEASE ORAL DAILY
Qty: 30 CAPSULE | Refills: 3 | Status: SHIPPED | OUTPATIENT
Start: 2024-12-11

## 2024-12-11 RX ORDER — ALBUTEROL SULFATE 90 UG/1
2 INHALANT RESPIRATORY (INHALATION) EVERY 4 HOURS PRN
COMMUNITY
Start: 2024-11-12

## 2024-12-11 RX ORDER — AZELASTINE 1 MG/ML
1 SPRAY, METERED NASAL 2 TIMES DAILY
COMMUNITY
Start: 2024-11-21

## 2024-12-11 RX ORDER — AMLODIPINE BESYLATE 5 MG/1
90 TABLET ORAL DAILY
COMMUNITY
Start: 2024-12-04

## 2024-12-11 NOTE — PROGRESS NOTES
Primary Care Provider Appointment - [facility] & Cary Shields MD      Subjective:      Patient ID: Betzaida Neumann is a 84 y.o. female with   Past Medical History:   Diagnosis Date    Allergy     Arthritis     Cataract     CKD (chronic kidney disease) stage 3, GFR 30-59 ml/min     DVT (deep venous thrombosis) 2023    x2    Elevated C-reactive protein (CRP) 03/24/2017    Fibromyalgia     GERD (gastroesophageal reflux disease)     Hyperlipidemia     Hypertension     Polymyalgia rheumatica     RA (rheumatoid arthritis)            Chief Complaint: Follow-up and Arthritis    Prior to this visit, patient's last encounter with PCP was 10/25/2024.    Pt reporting pain and swelling in hands and neck; fingers locking up when flexing.  Was supposed to  the 10mg prednisone I Rx'd after discussing her sx with the rheumatologist and the Pain management Dr on 11/13; Dr. Galloway reached out to me concerned about her pain and we discussed with Dr. Hanley who will be her new rheumatologist; who recommended the 10mg prednisone daily and they were going to try to get her in sooner.  She might have picked up the prednisone but thought it was her regular PRN Rx so hasn't taken it.  She missed call from Rheumatology to schedule a sooner appt so was unaware of them trying to reach out to her.  Pain has been keeping her up at night.    Saw Dr. TANJA Thomas 11/12 about pruritic rash on her back for several months, especially since running out of the Orencia.  Was Rx'd steroid cream and hydroxyzine but didn't get them.  She notes that her regular pharmacy is closing down at the end of this month.      10/25: Ran out of [employer] about 3 m/a b/c her rheumatologist left the system.  Having diffuse joint pains since, especially in her hands which is making it hard to work.  Endorses multiple current stressors including her home insurance increasing, which has required her to go back to work.  Saw cardiologist early last mo  "and BP meds changed, but isn't seeing improvement.  Has had a couple of sleep studies to try to figure out how best to manage sleep apnea; not using CPAP yet.    Sweats not as bad or as frequent as they used to be.      7/22: Had to discontinue the Veozah due to mood swings, nightmares, anxiety, depression.  Only took it for about a week.  Side effects stopped after stopping the medication.  Has resigned herself to living with the hot flashes.  Worried about her BP lately, has been reading up to 180s at home.    6/24: Has been taking the Veozah for 4 days now, and has complete relief of vasomotor sx.  Skin doing better as well.  Still c/o hand pain, worse in morning w/ stiffness.  Ran out of allopurinol prior to flareup.  Sx not as bad during the day.  Has appt w/ rheumatologist.     6/10: C/o dry skin and pruritus past couple weeks.  Uses Oil of Olay moisturizer after bathing.    Having pain in R hand in MTP joints; taking the PRN prednisone which is helping but she's put on 2 lbs.  Using tramadol as well.  Has been acting up past 3 weeks.    Major complaint is persistent night sweats; but did just get letter from insurance that the Veozah has been approved.  Hasn't picked it up yet though.      5/7: Reports hasn't gotten the gabapentin Rx'd last visit; [facility] saying they never got it.  Continues to have hot flashes.  C/o difficulty getting an appointment with a gynecologist, who had been ordering her mammograms and doing paps yearly in addition to Rx-ing HRT until he retired.  Last mammogram in January this year; diagnostic repeat due to fatty tissue obscuring an area.  Will be due for screening MMG in November.  She has not been sexually active since before [place] on account of "nobody to play with;" scarcity of older men who are available but also high-functioning, but she is also leery about the dangers of dating nowadays.  No abnormal paps for past couple decades.  She meant to bring back the ACP booklet " she completed but forgot it.      4/22: Doing well; recently went to [place] to visit her son and his family; went to [employer] and did well getting around and w/ endurance as well.  Hip pain from prior visit resolved.  Tried the citalopram which seemed to make her feel depressed and tired.  Stopped it.  Still having night sweats.      3/18: No improvement w/ menopausal sx so far.  H/o DVT x2 last year.  Also having pain in L hip.  Having to use heating pad from time to time.  Pain seems to be worse if sitting for too long.  Being active seems to help prevent it.  Also c/o changes in fingernails; having ridges on nails and seeming more brittle.      3/5: Pt reports wanted to establish care.  Has HTN, HLD, CKD.  Wants to avoid HD.  BP tends to elevate at dr's office.  H/o 80% R carotid stenosis, improved w/ medical therapy only.  Former smoker.  Lives independently and mows lawn, gardens, keeps house.  Considering joining gym.  Had an illness last year and lost about 25 lbs.  Had no appetite, wasn't eating.  Was hospitalized.  Doing better now, but not back to 100% since.  Has some fatigue.  Tries to stay busy around house due to fear of crime so doesn't get out much.    Her gynecologist retired, who was Rx-ing HRT.  Has been out of estradiol about a month and having hot flashes, sweats, etc.  Has been on it since 1979 when she had a hysterectomy.    On Orencia for RA w/ good results.  No joint pains now.    Checks BPs at home 2-3x/wk, usually in 120s systolic, never higher than 140.    4Ms for Medical Decision-Making in Older Adults    Last Completed EAWV: None    MOBILITY:  Get Up and Go:       No data to display              Activities of Daily Living:       No data to display              Whisper Test:       No data to display              Disability Status:      4/20/2017     9:00 AM   Disability Status   Are you deaf or do you have serious difficulty hearing? N   Are you blind or do you have serious  "difficulty seeing, even when wearing glasses? N   Because of a physical, mental, or emotional condition, do you have serious difficulty concentrating, remembering, or making decisions? N   Do you have serious difficulty walking or climbing stairs? N   Do you have difficulty dressing or bathing? N   Because of a physical, mental, or emotional condition, do you have difficulty doing errands alone such as visiting a doctor's office or shopping? N     Nutrition Screening:       No data to display             Screening Score: 0-7 Malnourished, 8-11 At Risk, 12-14 Normal    MENTATION:   Depression Patient Health Questionnaire:      11/13/2024     1:58 PM   Depression Patient Health Questionnaire   Over the last two weeks how often have you been bothered by little interest or pleasure in doing things Not at all   Over the last two weeks how often have you been bothered by feeling down, depressed or hopeless Not at all   PHQ-2 Total Score 0     Has Dementia Dx: No    Cognitive Function Screening:       No data to display              Cognitive Function Screening Total - Less than 4 = Abnormal,  Greater than or equal to 4 = Normal    MEDICATIONS:  High Risk Medications:  Total Active Medications: 2  DULoxetine - 60 MG  gabapentin - 300 MG    WHAT MATTERS MOST:  Advance Care Planning   ACP Status:   Patient has had an ACP conversation  Living Will: No  Power of : No  LaPOST: No    What is most important right now is to focus on avoiding the hospital and remaining as independent as possible    Accordingly, we have decided that the best plan to meet the patient's goals includes continuing with treatment      What matters most to patient today is: getting established with a PCP who is accessible and will personalize care consistent with her values.             Date: 03/05/2024  Patient did not wish or was not able to name a surrogate decision maker or provide an Advance Care Plan. "Five Wishes" provided to patient to " fill out and bring to return visit.      Social History     Socioeconomic History    Marital status: Single   Tobacco Use    Smoking status: Former     Current packs/day: 0.00     Average packs/day: 1 pack/day for 29.0 years (29.0 ttl pk-yrs)     Types: Cigarettes     Start date: 1970     Quit date: 1999     Years since quittin.3    Smokeless tobacco: Never    Tobacco comments:     Retired ; ; 4 children healthy   Substance and Sexual Activity    Alcohol use: Yes     Alcohol/week: 1.0 standard drink of alcohol     Types: 1 Glasses of wine per week     Comment: social    Drug use: No    Sexual activity: Yes     Partners: Male     Social Drivers of Health     Financial Resource Strain: High Risk (2024)    Overall Financial Resource Strain (CARDIA)     Difficulty of Paying Living Expenses: Very hard   Food Insecurity: Food Insecurity Present (2024)    Hunger Vital Sign     Worried About Running Out of Food in the Last Year: Sometimes true     Ran Out of Food in the Last Year: Sometimes true   Physical Activity: Unknown (2024)    Exercise Vital Sign     Days of Exercise per Week: 3 days   Stress: No Stress Concern Present (2024)    [employer] [place] [employer] - Occupational Stress Questionnaire     Feeling of Stress : Not at all   Housing Stability: Unknown (2024)    Housing Stability Vital Sign     Unable to Pay for Housing in the Last Year: No       Review of Systems   Constitutional:  Positive for diaphoresis and fatigue. Negative for activity change, appetite change, chills and fever.   HENT:  Positive for postnasal drip. Negative for sore throat.    Eyes:  Negative for photophobia and visual disturbance.   Respiratory:  Positive for cough. Negative for shortness of breath.    Cardiovascular:  Negative for chest pain and leg swelling.   Gastrointestinal:  Negative for abdominal pain, constipation, diarrhea, nausea and vomiting.  "  Genitourinary:  Positive for hot flashes. Negative for dysuria and hematuria.   Musculoskeletal:  Positive for joint swelling and leg pain. Negative for arthralgias and myalgias.   Integumentary:  Positive for rash. Negative for wound.   Neurological:  Negative for dizziness and weakness.        Objective:   /75 (BP Location: Right arm, Patient Position: Sitting)   Pulse 91   Temp 98 °F (36.7 °C)   Ht 5' 2" (1.575 m)   Wt 69.2 kg (152 lb 8.9 oz)   SpO2 99%   BMI 27.90 kg/m²     Physical Exam  Vitals reviewed.   Constitutional:       General: She is not in acute distress.     Appearance: Normal appearance.   HENT:      Head: Normocephalic and atraumatic.      Right Ear: Tympanic membrane, ear canal and external ear normal.      Left Ear: Tympanic membrane, ear canal and external ear normal.      Nose: Nose normal.      Mouth/Throat:      Mouth: Mucous membranes are moist.      Pharynx: Oropharynx is clear.   Eyes:      General: No scleral icterus.     Conjunctiva/sclera: Conjunctivae normal.   Cardiovascular:      Rate and Rhythm: Normal rate and regular rhythm.      Pulses: Normal pulses.      Heart sounds: Normal heart sounds.   Pulmonary:      Effort: Pulmonary effort is normal. No respiratory distress.      Breath sounds: Normal breath sounds.   Abdominal:      General: There is no distension.      Palpations: Abdomen is soft.      Tenderness: There is no abdominal tenderness. There is no guarding.   Musculoskeletal:         General: Swelling (hands) and tenderness (hands) present.      Cervical back: Normal range of motion and neck supple.      Right lower leg: No edema.      Left lower leg: No edema.      Comments: Reproducible trigger finger of most digits   Lymphadenopathy:      Cervical: No cervical adenopathy.   Skin:     General: Skin is warm and dry.      Findings: Rash (extensive dermatosis papulosa nigra across back in "do tree" distribution; scattered hyperpigmented ring-like " macules across lower back.) present.   Neurological:      General: No focal deficit present.      Mental Status: She is alert and oriented to person, place, and time.              Lab Results   Component Value Date    WBC 5.13 06/10/2024    HGB 10.6 (L) 06/10/2024    HCT 33.6 (L) 06/10/2024     06/10/2024    CHOL 254 (H) 03/18/2024    TRIG 58 03/18/2024    HDL 85 (H) 03/18/2024    ALT 10 03/18/2024    AST 17 03/18/2024     06/10/2024    K 4.0 06/10/2024     06/10/2024    CREATININE 1.2 06/10/2024    BUN 8 06/10/2024    CO2 23 06/10/2024    TSH 1.397 03/18/2024    INR 1.1 04/30/2023    HGBA1C 5.4 05/01/2023       Current Outpatient Medications on File Prior to Visit   Medication Sig Dispense Refill    abatacept (ORENCIA CLICKJECT) 125 mg/mL AtIn Inject 1 mL into the skin every 7 days. 4 mL 0    acetaminophen (TYLENOL) 500 MG tablet Take 1 tablet (500 mg total) by mouth every 6 (six) hours as needed for Pain.      albuterol (PROVENTIL) 2.5 mg /3 mL (0.083 %) nebulizer solution Take 2.5 mg by nebulization every 6 (six) hours as needed for Wheezing. Rescue      albuterol (PROVENTIL/VENTOLIN HFA) 90 mcg/actuation inhaler Inhale 2 puffs into the lungs every 4 (four) hours as needed.      alendronate (FOSAMAX) 70 MG tablet Take 70 mg by mouth every 7 days.      amLODIPine (NORVASC) 5 MG tablet Take 90 mg by mouth once daily.      ammonium lactate (LAC-HYDRIN) 12 % lotion Apply topically 2 (two) times daily as needed for Dry Skin. 225 g 3    azelastine (ASTELIN) 137 mcg (0.1 %) nasal spray 1 spray by Nasal route 2 (two) times daily.      azelastine 205.5 mcg (0.15 %) Spry 2 sprays by Each Nostril route 2 (two) times daily.      betamethasone dipropionate 0.05 % cream APPLY THIN LAYER TOPICALLY TO THE AFFECTED AREA TWICE DAILY      bismuth subsalicylate (PEPTO-BISMOL ORAL) Take by mouth.      diclofenac sodium (VOLTAREN) 1 % Gel Apply 4 g topically 3 (three) times daily as needed (hip/leg pain).       DULoxetine (CYMBALTA) 60 MG capsule Take 1 capsule (60 mg total) by mouth once daily. In 1-2 weeks, if no relief, may increase dose to 2 capsules once daily. Call for refills. 30 capsule 3    ferrous sulfate 325 (65 FE) MG EC tablet Take 325 mg by mouth once daily.      fluticasone (FLONASE) 50 mcg/actuation nasal spray fluticasone 50 mcg/actuation nasal spray,suspension      gabapentin (NEURONTIN) 300 MG capsule Take 1 capsule (300 mg total) by mouth every evening. 30 capsule 3    gemfibroziL (LOPID) 600 MG tablet Take 600 mg by mouth 2 (two) times daily.      hydrOXYzine HCL (ATARAX) 10 MG Tab Take 1 tablet (10 mg total) by mouth 3 (three) times daily as needed (itchiness (can make you sleepy)). 30 tablet 2    ipratropium (ATROVENT) 21 mcg (0.03 %) nasal spray 2 sprays by Each Nostril route 3 (three) times daily as needed (runny nose). 30 mL 2    ipratropium/albuterol sulfate (IPRATROPIUM-ALBUTEROL INHL) Inhale into the lungs as needed.      levocetirizine (XYZAL) 5 MG tablet Take 5 mg by mouth once daily.      loratadine (CLARITIN) 10 mg tablet Take 10 mg by mouth every morning.      losartan (COZAAR) 100 MG tablet Take 1 tablet (100 mg total) by mouth once daily. 90 tablet 3    montelukast (SINGULAIR) 10 mg tablet Take 10 mg by mouth every evening.      NIFEdipine (PROCARDIA-XL) 90 MG (OSM) 24 hr tablet Take 1 tablet (90 mg total) by mouth once daily. 90 tablet 3    olopatadine (PATANOL) 0.1 % ophthalmic solution Place 1 drop into both eyes 2 (two) times daily.      prednisoLONE acetate (PRED FORTE) 1 % DrpS Place 1 drop into both eyes 3 (three) times daily. 5 mL 2    predniSONE (DELTASONE) 10 MG tablet Take 1 tablet (10 mg total) by mouth once daily. 45 tablet 0    predniSONE (DELTASONE) 2.5 MG tablet Take 1 tablet (2.5 mg total) by mouth daily as needed (RA flare). 30 tablet 1    SYMBICORT 160-4.5 mcg/actuation HFAA Inhale 2 puffs into the lungs 2 (two) times daily.      triamcinolone acetonide 0.1% (KENALOG)  0.1 % Lotn Apply topically 2 (two) times daily. 60 mL 2     No current facility-administered medications on file prior to visit.         Assessment:   84 y.o. female with multiple co-morbid illnesses here to follow-up for ongoing chronic disease management and preventive health maintenance.    Plan:     Problem List Items Addressed This Visit       Rheumatoid arthritis involving both hands with positive rheumatoid factor - Primary     She didn't start taking the 10mg prednisone as instructed likely accounting for the persistence of her symptoms; she wasn't answering her phone at the time due to the high volume of Medicare Open Enrollment solicitation calls so she was unable to be reached to inform her about the Rx or to be able to schedule a sooner appt with the rheumatologist.  She will be seeing Dr. Hanley next week to review and discuss maintenance therapy.         Pruritic rash     Onset coinciding with running out of Orencia with worsening in context of RA flare raises suspicion of common underlying etiology.  She was instructed to check with her pharmacy if they received the Rxs sent in last month and start those; will see if it improves with the prednisone she was supposed to be taking while awaiting Rheumatology appt.  She was referred to Dermatology last month; no appt made as of yet.  Will see if we can help her secure an appt today before she leaves.                          Health Maintenance         Date Due Completion Date    RSV Vaccine (Age 60+ and Pregnant patients) (1 - 1-dose 75+ series) Never done ---    COVID-19 Vaccine (5 - 2024-25 season) 09/25/2025 (Originally 10/31/2024) 9/5/2024    DEXA Scan 08/18/2025 8/18/2022    Override on 5/3/2016: Declined    Mammogram 11/20/2025 11/20/2024    Lipid Panel 03/18/2029 3/18/2024    TETANUS VACCINE 08/26/2029 8/26/2019            Future Appointments   Date Time Provider Department Center   12/17/2024  3:40 PM Robert Hanley MD [facility] RHEUM [facility]    1/15/2025  1:00 PM Roger Shields MD [facility]   2/3/2025  1:40 PM Melissa Galloway MD [facility]   3/18/2025  2:00 PM Porter Thomas MD [facility] CARDIO [facility]         Follow up in about 4 weeks (around 1/8/2025). Total clinical care time was 40 min.    Roger Shields MD  [facility], [facility]

## 2024-12-11 NOTE — ASSESSMENT & PLAN NOTE
Onset coinciding with running out of Orencia with worsening in context of RA flare raises suspicion of common underlying etiology.  She was instructed to check with her pharmacy if they received the Rxs sent in last month and start those; will see if it improves with the prednisone she was supposed to be taking while awaiting Rheumatology appt.  She was referred to Dermatology last month; no appt made as of yet.  Will see if we can help her secure an appt today before she leaves.  
She didn't start taking the 10mg prednisone as instructed likely accounting for the persistence of her symptoms; she wasn't answering her phone at the time due to the high volume of Medicare Open Enrollment solicitation calls so she was unable to be reached to inform her about the Rx or to be able to schedule a sooner appt with the rheumatologist.  She will be seeing Dr. Hanley next week to review and discuss maintenance therapy.  
6

## 2024-12-11 NOTE — Clinical Note
Saw her today for f/u; unfortunately she was unaware of the prednisone Rx or that the rheumatologist was going to get her in sooner as she wasn't answering her phone due to the high volume of Medicare solicitation calls.  After I explained to her how you reached out to me last month concerned about getting her back on appropriate RA treatment ASAP she was a little embarrassed about being so cross with you at that appointment and she wanted me to pass along an apology to you.

## 2024-12-16 ENCOUNTER — TELEPHONE (OUTPATIENT)
Dept: RHEUMATOLOGY | Facility: CLINIC | Age: 84
End: 2024-12-16
Payer: MEDICARE

## 2024-12-16 NOTE — TELEPHONE ENCOUNTER
Called pt regarding changing her appointment due to provider being out . No answer , Voicemail left

## 2024-12-17 ENCOUNTER — HOSPITAL ENCOUNTER (OUTPATIENT)
Dept: RADIOLOGY | Facility: HOSPITAL | Age: 84
Discharge: HOME | End: 2024-12-17
Payer: MEDICARE

## 2024-12-17 ENCOUNTER — OFFICE VISIT (OUTPATIENT)
Dept: RHEUMATOLOGY | Facility: CLINIC | Age: 84
End: 2024-12-17
Payer: MEDICARE

## 2024-12-17 VITALS
DIASTOLIC BLOOD PRESSURE: 80 MMHG | BODY MASS INDEX: 27.55 KG/M2 | HEIGHT: 62 IN | WEIGHT: 149.69 LBS | HEART RATE: 87 BPM | SYSTOLIC BLOOD PRESSURE: 166 MMHG

## 2024-12-17 DIAGNOSIS — M1A.09X0 IDIOPATHIC CHRONIC GOUT OF MULTIPLE SITES WITHOUT TOPHUS: ICD-10-CM

## 2024-12-17 DIAGNOSIS — D84.821 IMMUNOSUPPRESSION DUE TO DRUG THERAPY: ICD-10-CM

## 2024-12-17 DIAGNOSIS — M05.742 RHEUMATOID ARTHRITIS INVOLVING BOTH HANDS WITH POSITIVE RHEUMATOID FACTOR: ICD-10-CM

## 2024-12-17 DIAGNOSIS — M79.7 FIBROMYALGIA: ICD-10-CM

## 2024-12-17 DIAGNOSIS — M85.851 OSTEOPENIA OF BOTH HIPS: Primary | ICD-10-CM

## 2024-12-17 DIAGNOSIS — M35.3 POLYMYALGIA RHEUMATICA: ICD-10-CM

## 2024-12-17 DIAGNOSIS — M05.741 RHEUMATOID ARTHRITIS INVOLVING BOTH HANDS WITH POSITIVE RHEUMATOID FACTOR: ICD-10-CM

## 2024-12-17 DIAGNOSIS — M35.3 PMR (POLYMYALGIA RHEUMATICA): ICD-10-CM

## 2024-12-17 DIAGNOSIS — Z79.899 IMMUNOSUPPRESSION DUE TO DRUG THERAPY: ICD-10-CM

## 2024-12-17 DIAGNOSIS — M85.852 OSTEOPENIA OF BOTH HIPS: Primary | ICD-10-CM

## 2024-12-17 PROCEDURE — 99214 OFFICE O/P EST MOD 30 MIN: CPT | Mod: S$GLB,,,

## 2024-12-17 PROCEDURE — 1160F RVW MEDS BY RX/DR IN RCRD: CPT | Mod: CPTII,S$GLB,,

## 2024-12-17 PROCEDURE — 99999 PR PBB SHADOW E&M-EST. PATIENT-LVL V: CPT | Mod: PBBFAC,,,

## 2024-12-17 PROCEDURE — 3077F SYST BP >= 140 MM HG: CPT | Mod: CPTII,S$GLB,,

## 2024-12-17 PROCEDURE — 3079F DIAST BP 80-89 MM HG: CPT | Mod: CPTII,S$GLB,,

## 2024-12-17 PROCEDURE — 3288F FALL RISK ASSESSMENT DOCD: CPT | Mod: CPTII,S$GLB,,

## 2024-12-17 PROCEDURE — 1125F AMNT PAIN NOTED PAIN PRSNT: CPT | Mod: CPTII,S$GLB,,

## 2024-12-17 PROCEDURE — 77077 JOINT SURVEY SINGLE VIEW: CPT | Mod: TC

## 2024-12-17 PROCEDURE — 1159F MED LIST DOCD IN RCRD: CPT | Mod: CPTII,S$GLB,,

## 2024-12-17 PROCEDURE — 77077 JOINT SURVEY SINGLE VIEW: CPT | Mod: 26,,, | Performed by: RADIOLOGY

## 2024-12-17 PROCEDURE — 1101F PT FALLS ASSESS-DOCD LE1/YR: CPT | Mod: CPTII,S$GLB,,

## 2024-12-17 PROCEDURE — G2211 COMPLEX E/M VISIT ADD ON: HCPCS | Mod: S$GLB,,,

## 2024-12-17 RX ORDER — ABATACEPT 125 MG/ML
125 INJECTION, SOLUTION SUBCUTANEOUS
Qty: 4 ML | Refills: 2 | Status: SHIPPED | OUTPATIENT
Start: 2024-12-17

## 2024-12-17 NOTE — ASSESSMENT & PLAN NOTE
Pains today are consistent with inflammatory arthritis, with morning joint stiffness/swelling/pain.  Pt reports doing very well on orencia in the past, however she was unable to have this refilled since Dr. SYED left.  Restart orencia weekly injections now.  Continue prednisone 10mg for now. Once Orencia is started then we will begin taper.

## 2024-12-17 NOTE — ASSESSMENT & PLAN NOTE
Pt has taken gabapentin, Lyrica, cymbalta in the past without any noticeable improvements on them.

## 2024-12-17 NOTE — ASSESSMENT & PLAN NOTE
DEXA in August 2022 with osteopenia. Will reorder now. Advised to continue daily calcium and vitamin D as well as weight bearing exercises.

## 2024-12-17 NOTE — PROGRESS NOTES
"Subjective:      Patient ID: Betzaida Neumann is a 84 y.o. female.    Chief Complaint: Follow up on PMR, RA, fibromyalgia    HPI  Betzaida Neumann is an 84yofemale with PMH of seropositive RA ( +.4 and +RF 82 in 2016 )  HTN, CKD, fibromylagia and PMR who presented today for a second opinion from Dr. Moore who has been following the patient for complete body pain, PMR and h/o Ra.  Pt stated that she started to have the body pain from head to toe even making her hair hurt at times in 2008 when she moved back to [place] from [place] (pt had moved away from [place] during [place]).  Pt saw numerous rheumatologists in the past. At that time she saw Dr. Lerma who dx her with bursitis and then was seen Dr. Riddle who diagnosed her with rheumatoid arthritis and she was treated with humira and methotrexate for about 3 years but didn't notice any improvement in her pain.  She stated the Humira made her feel like a zombie.  Pt then was seen by Dr. Villafana and Dr. Reeves and was diagnosed with rheumatoid arthritis, fibromylagia and PMR.      Lost to follow up since Dr. SYED left.  She recalls taking methotrexate for about 4 years but it didn't help her.   Arava in the past caused GI upset.  Actemra in the past also did not improve her symptoms.    Now with worsening joint pains. Arthritis "moves around" was in left hip and now in right hand.Waking with stiffness and pain in neck and hands with pain/stiffness/swelling as well, where she is unable to wear rings.  She states she was able to wean off of prednisone in the past, however d/t her increasing pains her PCP prescribed prednisone 10mg daily, which she has been taking for the past 2 weeks.    Also takes tylenol arthritis twice daily, which only somewhat improves her symptoms.  Unable to take NSAIDs d/t h/o CKD.    The only medication she feels improved her joint pains in the past was Orencia, however she has been off of this for about a year, and was unable to " "have it prescribed again since Dr. KUNAL sprague.    Elevated uric acid in the past, however this is now controlled with her diet (eliminating tomatoes, red meats, seldomly drinks alcohol).  She can differentiate the gout pain vs inflammatory arthritis, and she states she hasn't had gout pains for a couple of years.  No longer taking allopurinol or colchicine.    Pt also with a h/o fibromyalgia: already failed gabapentin, lyrica, duloxetine.      Review of Systems   Constitutional:  Negative for fatigue, fever and unexpected weight change.   HENT:  Negative for mouth sores and trouble swallowing.    Eyes:  Negative for redness.   Respiratory:  Negative for cough and shortness of breath.    Cardiovascular:  Negative for chest pain.   Gastrointestinal:  Negative for constipation and diarrhea.   Genitourinary:  Negative for dysuria and genital sores.   Musculoskeletal:  Positive for arthralgias, myalgias, neck pain and neck stiffness. Negative for back pain.   Skin:  Positive for rash.        Rash over back, will be seeing dermatology   Neurological:  Negative for headaches.   Hematological:  Does not bruise/bleed easily.        Objective:   BP (!) 166/80 (Patient Position: Sitting)   Pulse 87   Ht 5' 2" (1.575 m)   Wt 67.9 kg (149 lb 11.1 oz)   BMI 27.38 kg/m²   Physical Exam   Constitutional: She is oriented to person, place, and time. normal appearance.   Cardiovascular: Normal rate and regular rhythm.   Pulmonary/Chest: Effort normal and breath sounds normal.   Abdominal: Soft.   Musculoskeletal:         General: Normal range of motion.      Comments: TTP over right hand 3rd digit MCP, without synovitis     Neurological: She is alert and oriented to person, place, and time.   Skin: Skin is warm and dry.   Psychiatric: Her behavior is normal. Mood normal.           12/16/2024     1:15 PM   Rapid3 Question Responses and Scores   MDHAQ Score 0.2   Psychologic Score 3.3   Pain Score 9.5   When you awakened in the morning " OVER THE LAST WEEK, did you feel stiff? Yes   If Yes, please indicate the number of hours until you are as limber as you will be for the day 2   Fatigue Score 3   Global Health Score 8.5   RAPID3 Score 6.22           Assessment:     1. Osteopenia of both hips    2. Polymyalgia rheumatica    3. Rheumatoid arthritis involving both hands with positive rheumatoid factor    4. Idiopathic chronic gout of multiple sites without tophus    5. Immunosuppression due to drug therapy    6. PMR (polymyalgia rheumatica)    7. Fibromyalgia          Plan:     Problem List Items Addressed This Visit          Immunology/Multi System    PMR (polymyalgia rheumatica)    Rheumatoid arthritis involving both hands with positive rheumatoid factor     Pains today are consistent with inflammatory arthritis, with morning joint stiffness/swelling/pain.  Pt reports doing very well on orencia in the past, however she was unable to have this refilled since Dr. SYED left.  Restart orencia weekly injections now.  Continue prednisone 10mg for now. Once Orencia is started then we will begin taper.         Relevant Medications    abatacept (ORENCIA CLICKJECT) 125 mg/mL AtIn    Other Relevant Orders    XR Arthritis Survey    C-Reactive Protein    Sedimentation rate    CBC Auto Differential    Comprehensive Metabolic Panel    URIC ACID    C-Reactive Protein    Sedimentation rate    CBC Auto Differential    Comprehensive Metabolic Panel    HIV 1/2 Ag/Ab (4th Gen)    Hepatitis B surface antigen    HBcAB    Hepatitis B surface antibody    Hepatitis C antibody    Hepatitis A antibody, IgG    Strongyloides IgG Antibodies    Quantiferon Gold TB    Treponema Pallidium Antibodies IgG, IgM    Varicella zoster antibody, IgG       Orthopedic    Fibromyalgia     Pt has taken gabapentin, Lyrica, cymbalta in the past without any noticeable improvements on them.         Osteopenia of both hips - Primary     DEXA in August 2022 with osteopenia. Will reorder now. Advised to  continue daily calcium and vitamin D as well as weight bearing exercises.         Relevant Orders    DXA Bone Density Axial Skeleton 1 or more sites     Other Visit Diagnoses       Polymyalgia rheumatica        Idiopathic chronic gout of multiple sites without tophus        Relevant Orders    URIC ACID    Immunosuppression due to drug therapy        Relevant Orders    HIV 1/2 Ag/Ab (4th Gen)    Hepatitis B surface antigen    HBcAB    Hepatitis B surface antibody    Hepatitis C antibody    Hepatitis A antibody, IgG    Strongyloides IgG Antibodies    Quantiferon Gold TB    Treponema Pallidium Antibodies IgG, IgM    Varicella zoster antibody, IgG          Today pt is requesting for a steroid injection in her right hand d/t pain in 3rd digit MCP, however since her BP is elevated I advised against it. She reports Bps at home are also in 150s at times, and she is following with her PCP to control this. We will work on starting Orencia injections to control her pain, so we can begin taper off prednisone.    RTO 3 months

## 2024-12-17 NOTE — PROGRESS NOTES
12/16/2024     1:15 PM   Rapid3 Question Responses and Scores   MDHAQ Score 0.2   Psychologic Score 3.3   Pain Score 9.5   When you awakened in the morning OVER THE LAST WEEK, did you feel stiff? Yes   If Yes, please indicate the number of hours until you are as limber as you will be for the day 2   Fatigue Score 3   Global Health Score 8.5   RAPID3 Score 6.22    Answers submitted by the patient for this visit:  Rheumatology Questionnaire (Submitted on 12/16/2024)  fever: No  eye redness: No  mouth sores: No  headaches: No  shortness of breath: No  chest pain: No  trouble swallowing: No  diarrhea: No  constipation: No  unexpected weight change: No  genital sore: No  dysuria: No  During the last 3 days, have you had a skin rash?: Yes  Bruises or bleeds easily: No  cough: No

## 2024-12-26 ENCOUNTER — HOSPITAL ENCOUNTER (OUTPATIENT)
Dept: RADIOLOGY | Facility: OTHER | Age: 84
Discharge: HOME | End: 2024-12-26
Payer: MEDICARE

## 2024-12-26 DIAGNOSIS — M85.852 OSTEOPENIA OF BOTH HIPS: ICD-10-CM

## 2024-12-26 DIAGNOSIS — M85.851 OSTEOPENIA OF BOTH HIPS: ICD-10-CM

## 2024-12-26 PROCEDURE — 77080 DXA BONE DENSITY AXIAL: CPT | Mod: TC

## 2024-12-26 PROCEDURE — 77080 DXA BONE DENSITY AXIAL: CPT | Mod: 26,,, | Performed by: RADIOLOGY

## 2025-01-27 RX ORDER — ALENDRONATE SODIUM 70 MG/1
70 TABLET ORAL
Qty: 90 TABLET | Refills: 2 | Status: SHIPPED | OUTPATIENT
Start: 2025-01-27

## 2025-01-27 NOTE — TELEPHONE ENCOUNTER
----- Message from Phyllis sent at 1/27/2025 12:20 PM CST -----  Contact: [phone]  Requesting an RX refill or new RX.    Is this a refill or new RX: Refill    RX name and strength (copy/paste from chart):  alendronate (FOSAMAX) 70 MG tablet     Is this a 30 day or 90 day RX: 90    Pharmacy name and phone # (copy/paste from chart):    [id number] - [place] [id number] [facility] [id number]  Phone: [phone] Fax: [phone]

## 2025-02-03 ENCOUNTER — OFFICE VISIT (OUTPATIENT)
Dept: PHYSICAL MEDICINE AND REHAB | Facility: CLINIC | Age: 85
End: 2025-02-03
Payer: MEDICARE

## 2025-02-03 DIAGNOSIS — M48.02 CERVICAL SPINAL STENOSIS: ICD-10-CM

## 2025-02-03 DIAGNOSIS — M35.3 POLYMYALGIA RHEUMATICA: ICD-10-CM

## 2025-02-03 DIAGNOSIS — M54.2 CHRONIC NECK PAIN: Primary | ICD-10-CM

## 2025-02-03 DIAGNOSIS — M47.22 OSTEOARTHRITIS OF SPINE WITH RADICULOPATHY, CERVICAL REGION: ICD-10-CM

## 2025-02-03 DIAGNOSIS — M48.02 NEURAL FORAMINAL STENOSIS OF CERVICAL SPINE: ICD-10-CM

## 2025-02-03 DIAGNOSIS — M70.62 TROCHANTERIC BURSITIS OF LEFT HIP: ICD-10-CM

## 2025-02-03 DIAGNOSIS — M79.7 FIBROMYALGIA SYNDROME: ICD-10-CM

## 2025-02-03 DIAGNOSIS — G89.29 CHRONIC NECK PAIN: Primary | ICD-10-CM

## 2025-02-03 PROCEDURE — 1159F MED LIST DOCD IN RCRD: CPT | Mod: CPTII,S$GLB,, | Performed by: PHYSICAL MEDICINE & REHABILITATION

## 2025-02-03 PROCEDURE — 99999 PR PBB SHADOW E&M-EST. PATIENT-LVL III: CPT | Mod: PBBFAC,,, | Performed by: PHYSICAL MEDICINE & REHABILITATION

## 2025-02-03 PROCEDURE — 3288F FALL RISK ASSESSMENT DOCD: CPT | Mod: CPTII,S$GLB,, | Performed by: PHYSICAL MEDICINE & REHABILITATION

## 2025-02-03 PROCEDURE — 1101F PT FALLS ASSESS-DOCD LE1/YR: CPT | Mod: CPTII,S$GLB,, | Performed by: PHYSICAL MEDICINE & REHABILITATION

## 2025-02-03 PROCEDURE — 99213 OFFICE O/P EST LOW 20 MIN: CPT | Mod: S$GLB,,, | Performed by: PHYSICAL MEDICINE & REHABILITATION

## 2025-02-03 RX ORDER — GABAPENTIN 300 MG/1
300 CAPSULE ORAL NIGHTLY
Qty: 30 CAPSULE | Refills: 3 | Status: SHIPPED | OUTPATIENT
Start: 2025-02-03 | End: 2026-02-03

## 2025-02-03 NOTE — PROGRESS NOTES
Subjective:       Patient ID: Betzaida Neumann is a 84 y.o. female.    Chief Complaint: Follow-up      HPI      Mrs. Guerrero is a 84-year-old female with past medical history of hypertension, hyperlipidemia, CKD stage 3, polymyalgia rheumatica, fibromyalgia, rheumatoid arthritis, osteopenia, GERD, allergies.  She was referred and presented to the [facility] Medicine Clinic on 10/7/2024 for fibromyalgia syndrome and chronic neck pain.  She was started on gabapentin and maintained on duloxetine and p.r.n. Tylenol.  She was referred to Rheumatology for treatment of polymyalgia rheumatica.  She was referred to [facility] for evaluation for cervical spine injections due to multilevel degenerative changes with mild-to-moderate stenosis at C3-4 through C5-6..    The patient was evaluated by Dr. Blackburn at the pain clinic on 11/13/2024.  Consideration for spine injections was postponed until she sees rheumatology.  She was evaluated by Rheumatology on 12/17/2024.  The plan is to start Orencia injections.    The patient is coming to the clinic for follow-up.  Her fibromyalgia symptoms including generalized muscle and joint aching, stiffness, fatigue, hypersensitivity to pain and sleep impairment have been better.    Her neck pain has been better.  It is now an intermittent soreness in the lower cervical spine and across her upper back.  She has occasional shooting pain to both hands with dysesthetic sensations, mostly itching.  Her pain is aggravated by rotation.  It is better with sitting still.  Her maximum pain is 9-10/10 and minimum 3-4/10.  Today it is 3-4/10.  She denies any numbness or  weakness.  She denies any impaired hand coordination.  She denies impiaed gait.    He has been also complaining of almost constant right hand stiffness.    She is currently on:   Gabapentin 300 mg capsules at bedtime, but couple times per week.   Duloxetine 60 mg daily.    Tylenol 500 mg p.r.n., usually 1 tablet once per  day.        Past Medical History:   Diagnosis Date    Allergy     Arthritis     Cataract     CKD (chronic kidney disease) stage 3, GFR 30-59 ml/min     DVT (deep venous thrombosis) 2023    x2    Elevated C-reactive protein (CRP) 03/24/2017    Fibromyalgia     GERD (gastroesophageal reflux disease)     Hyperlipidemia     Hypertension     Polymyalgia rheumatica     RA (rheumatoid arthritis)         Review of patient's allergies indicates:   Allergen Reactions    Shellfish containing products Anaphylaxis    Sulfa (sulfonamide antibiotics) Swelling     swelling    Cabbage (brassica oleracea) Other (See Comments)    Chocolate flavor Other (See Comments)    Duckweed Other (See Comments)    Kale Swelling    Mustard Other (See Comments)    Orange Other (See Comments)    Statins-hmg-coa reductase inhibitors     Sulfacetamide sodium Swelling    Tomato (solanum lycopersicum)     Tree nut Other (See Comments)    Weed pollen Other (See Comments)     Patient reported allergy to weed pollen, grass, trees, duckweed, cockroaches        Review of Systems   Constitutional:  Positive for fatigue. Negative for chills and fever.   Eyes:  Negative for visual disturbance.   Respiratory:  Negative for shortness of breath.    Cardiovascular:  Negative for chest pain.   Gastrointestinal:  Negative for blood in stool, constipation, nausea and vomiting.   Genitourinary:  Negative for difficulty urinating.   Musculoskeletal:  Positive for arthralgias, back pain and neck pain. Negative for gait problem.   Neurological:  Positive for weakness. Negative for dizziness and headaches.   Psychiatric/Behavioral:  Negative for behavioral problems and sleep disturbance.              Objective:      Physical Exam  Vitals reviewed.   Constitutional:       Appearance: She is well-developed.   HENT:      Head: Normocephalic and atraumatic.   Eyes:      Extraocular Movements: Extraocular movements intact.   Neck:      Comments: Mild decrease  ROM.  Musculoskeletal:      Cervical back: Tenderness present.      Comments: BUE:  ROM:   RUE: full.   LUE: full.  Strength:    RUE: 5/5 at shoulder abduction, 5 elbow flexion, 5 elbow extension, 4 hand .   LUE: 5/5 at shoulder abduction, 5 elbow flexion, 5 elbow extension, 4 hand .  Sensation to pinprick:   RUE: intact.   LUE: intact.  DTR:    RUE: +3 biceps, +3 triceps.   LUE:  +3 biceps, +3 triceps.  Cart[place]a:   RUE: -ve.   LUE: -ve.    BLE:  ROM:   RLE: full.   LLE: full.  Knee crepitus:   RLE: -ve.   LLE: -ve.   Strength:    RLE: 5/5 at hip flexion, 5 knee extension, 5 ankle DF, 5 PF, 5 EHL.   LLE: 5/5 at hip flexion, 5 knee extension, 5 ankle DF, 5 PF, 5 EHL.  Sensation to pinprick:     RLE: intact.      LLE: intact.   DTR:     RLE: +1 knee, +1 ankle.    LLE: +1 knee, +1 ankle.  Clonus:    Rt ankle: -ve.    Lt ankle: -ve.  SLR (sitting):      RLE: -ve.      LLE: -ve.    -ve tenderness over lumbar spine.  +ve diffuse tender points over the upper & lower back, anterior chest wall.    Gait: WNL       Skin:     General: Skin is warm.   Neurological:      General: No focal deficit present.      Mental Status: She is alert.   Psychiatric:         Mood and Affect: Mood normal.         Behavior: Behavior normal.         Assessment:       1. Chronic neck pain    2. Osteoarthritis of spine with radiculopathy, cervical region    3. Neural foraminal stenosis of cervical spine    4. Cervical spinal stenosis    5. Fibromyalgia syndrome    6. Polymyalgia rheumatica    7. Trochanteric bursitis of left hip        Plan:         - Oral NSAIDs will be avoided due to CKD.  - Continue DULoxetine (CYMBALTA) 60 MG capsule; Take 1 capsule (60 mg total) by mouth once daily.  - Take consistently: Restarting gabapentin 300 mg capsules; take 1 capsule by mouth once daily.  - Increase acetaminophen (TYLENOL) 500 MG tablet; Take 1 tablet (500 mg total) by mouth every 6 (six) hours as needed for mild pain.  - Follow up with   Rheumatology for treatment of polymyalgia rheumatica.  - Follow-up as needed with the Pain Clinic for evaluation for spine injections.  - Follow up in about 4 months (around 6/3/2025).        This note was partly generated with [employer] voice recognition software. I apologize for any possible typographical errors.

## 2025-02-20 ENCOUNTER — OFFICE VISIT (OUTPATIENT)
Facility: CLINIC | Age: 85
End: 2025-02-20
Payer: MEDICARE

## 2025-02-20 VITALS
BODY MASS INDEX: 28.72 KG/M2 | OXYGEN SATURATION: 97 % | HEIGHT: 62 IN | TEMPERATURE: 98 F | SYSTOLIC BLOOD PRESSURE: 138 MMHG | DIASTOLIC BLOOD PRESSURE: 78 MMHG | HEART RATE: 90 BPM | WEIGHT: 156.06 LBS

## 2025-02-20 DIAGNOSIS — J45.40 MODERATE PERSISTENT ASTHMA WITHOUT COMPLICATION: Primary | ICD-10-CM

## 2025-02-20 DIAGNOSIS — J43.9 BULLA OF LUNG: ICD-10-CM

## 2025-02-20 DIAGNOSIS — M05.741 RHEUMATOID ARTHRITIS INVOLVING BOTH HANDS WITH POSITIVE RHEUMATOID FACTOR: ICD-10-CM

## 2025-02-20 DIAGNOSIS — M05.742 RHEUMATOID ARTHRITIS INVOLVING BOTH HANDS WITH POSITIVE RHEUMATOID FACTOR: ICD-10-CM

## 2025-02-20 DIAGNOSIS — N18.32 STAGE 3B CHRONIC KIDNEY DISEASE: ICD-10-CM

## 2025-02-20 DIAGNOSIS — G95.9 CERVICAL MYELOPATHY: ICD-10-CM

## 2025-02-20 PROCEDURE — 1159F MED LIST DOCD IN RCRD: CPT | Mod: CPTII,S$GLB,, | Performed by: HOSPITALIST

## 2025-02-20 PROCEDURE — 1125F AMNT PAIN NOTED PAIN PRSNT: CPT | Mod: CPTII,S$GLB,, | Performed by: HOSPITALIST

## 2025-02-20 PROCEDURE — 1101F PT FALLS ASSESS-DOCD LE1/YR: CPT | Mod: CPTII,S$GLB,, | Performed by: HOSPITALIST

## 2025-02-20 PROCEDURE — 3288F FALL RISK ASSESSMENT DOCD: CPT | Mod: CPTII,S$GLB,, | Performed by: HOSPITALIST

## 2025-02-20 PROCEDURE — 99999 PR PBB SHADOW E&M-EST. PATIENT-LVL III: CPT | Mod: PBBFAC,,, | Performed by: HOSPITALIST

## 2025-02-20 PROCEDURE — 99214 OFFICE O/P EST MOD 30 MIN: CPT | Mod: S$GLB,,, | Performed by: HOSPITALIST

## 2025-02-20 PROCEDURE — 3078F DIAST BP <80 MM HG: CPT | Mod: CPTII,S$GLB,, | Performed by: HOSPITALIST

## 2025-02-20 PROCEDURE — 3075F SYST BP GE 130 - 139MM HG: CPT | Mod: CPTII,S$GLB,, | Performed by: HOSPITALIST

## 2025-02-20 NOTE — PROGRESS NOTES
Primary Care Provider Appointment - [facility] & Cary Shields MD      Subjective:      Patient ID: Betzaida Neumann is a 84 y.o. female with   Past Medical History:   Diagnosis Date    Allergy     Arthritis     Cataract     CKD (chronic kidney disease) stage 3, GFR 30-59 ml/min     DVT (deep venous thrombosis) 2023    x2    Elevated C-reactive protein (CRP) 03/24/2017    Fibromyalgia     GERD (gastroesophageal reflux disease)     Hyperlipidemia     Hypertension     Polymyalgia rheumatica     RA (rheumatoid arthritis)            Chief Complaint: Follow-up and Arthritis    Prior to this visit, patient's last encounter with PCP was 12/11/2024.    Still having pain in hands.  Saw Rheumatology in Dec and was restarted on Orencia, which is helping but still having sx.  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  Experiencing trigger finger in 3rd and 4th digits of R hand.   Morning stiffness lasting 3-4 hours.  Cold temperatures are exacerbating sx.  Sometimes hands are so swollen she has difficulty with buttons.  Also saw dermatologist last month and prescribed emollients.  Still having itching but it helps.        12/11: Pt reporting pain and swelling in hands and neck; fingers locking up when flexing.  Was supposed to  the 10mg prednisone I Rx'd after discussing her sx with the rheumatologist and the Pain management Dr on 11/13; Dr. Galloway reached out to me concerned about her pain and we discussed with Dr. Hanley who will be her new rheumatologist; who recommended the 10mg prednisone daily and they were going to try to get her in sooner.  She might have picked up the prednisone but thought it was her regular PRN Rx so hasn't taken it.  She missed call from Rheumatology to schedule a sooner appt so was unaware of them trying to reach out to her.  Pain has been keeping her up at night.    Saw Dr. TANJA Thomas 11/12 about pruritic rash on her back for  several months, especially since running out of the Orencia.  Was Rx'd steroid cream and hydroxyzine but didn't get them.  She notes that her regular pharmacy is closing down at the end of this month.      4Ms for Medical Decision-Making in Older Adults    Last Completed EAWV: None    MOBILITY:  Get Up and Go:       No data to display              Activities of Daily Living:       No data to display              Whisper Test:       No data to display              Disability Status:      4/20/2017     9:00 AM   Disability Status   Are you deaf or do you have serious difficulty hearing? N   Are you blind or do you have serious difficulty seeing, even when wearing glasses? N   Because of a physical, mental, or emotional condition, do you have serious difficulty concentrating, remembering, or making decisions? N   Do you have serious difficulty walking or climbing stairs? N   Do you have difficulty dressing or bathing? N   Because of a physical, mental, or emotional condition, do you have difficulty doing errands alone such as visiting a doctor's office or shopping? N     Nutrition Screening:       No data to display             Screening Score: 0-7 Malnourished, 8-11 At Risk, 12-14 Normal    MENTATION:   Depression Patient Health Questionnaire:      2/20/2025     1:39 PM   Depression Patient Health Questionnaire   Over the last two weeks how often have you been bothered by little interest or pleasure in doing things Not at all   Over the last two weeks how often have you been bothered by feeling down, depressed or hopeless Not at all   PHQ-2 Total Score 0     Has Dementia Dx: No    Cognitive Function Screening:       No data to display              Cognitive Function Screening Total - Less than 4 = Abnormal,  Greater than or equal to 4 = Normal    MEDICATIONS:  High Risk Medications:  Total Active Medications: 2  DULoxetine - 60 MG  gabapentin - 300 MG    WHAT MATTERS MOST:  Advance Care Planning   ACP Status:  "  Patient has had an ACP conversation  Living Will: No  Power of : No  [place]: No    What is most important right now is to focus on avoiding the hospital and remaining as independent as possible    Accordingly, we have decided that the best plan to meet the patient's goals includes continuing with treatment      What matters most to patient today is: getting established with a PCP who is accessible and will personalize care consistent with her values.             Date: 2024  Patient did not wish or was not able to name a surrogate decision maker or provide an Advance Care Plan. "Five Wishes" provided to patient to fill out and bring to return visit.      Social History     Socioeconomic History    Marital status: Single   Tobacco Use    Smoking status: Former     Current packs/day: 0.00     Average packs/day: 1 pack/day for 29.0 years (29.0 ttl pk-yrs)     Types: Cigarettes     Start date: 1970     Quit date: 1999     Years since quittin.5    Smokeless tobacco: Never    Tobacco comments:     Retired ; ; 4 children healthy   Substance and Sexual Activity    Alcohol use: Yes     Alcohol/week: 1.0 standard drink of alcohol     Types: 1 Glasses of wine per week     Comment: social    Drug use: No    Sexual activity: Yes     Partners: Male     Social Drivers of Health     Financial Resource Strain: High Risk (2024)    Overall Financial Resource Strain (CARDIA)     Difficulty of Paying Living Expenses: Very hard   Food Insecurity: Food Insecurity Present (2024)    Hunger Vital Sign     Worried About Running Out of Food in the Last Year: Sometimes true     Ran Out of Food in the Last Year: Sometimes true   Physical Activity: Unknown (2024)    Exercise Vital Sign     Days of Exercise per Week: 3 days   Stress: No Stress Concern Present (2024)    [place] [place] [employer] - Occupational Stress Questionnaire     Feeling of Stress : Not " "at all   Housing Stability: Unknown (5/28/2024)    Housing Stability Vital Sign     Unable to Pay for Housing in the Last Year: No       Review of Systems   Constitutional:  Positive for diaphoresis and fatigue. Negative for activity change, appetite change, chills and fever.   HENT:  Positive for postnasal drip. Negative for sore throat.    Eyes:  Negative for photophobia and visual disturbance.   Respiratory:  Positive for cough. Negative for shortness of breath.    Cardiovascular:  Negative for chest pain and leg swelling.   Gastrointestinal:  Negative for abdominal pain, constipation, diarrhea, nausea and vomiting.   Genitourinary:  Positive for hot flashes. Negative for dysuria and hematuria.   Musculoskeletal:  Positive for joint swelling and leg pain. Negative for arthralgias and myalgias.   Integumentary:  Positive for rash. Negative for wound.   Neurological:  Negative for dizziness and weakness.        Objective:   /78 (BP Location: Left arm, Patient Position: Sitting)   Pulse 90   Temp 98.1 °F (36.7 °C) (Oral)   Ht 5' 2" (1.575 m)   Wt 70.8 kg (156 lb 1.4 oz)   SpO2 97%   BMI 28.55 kg/m²     Physical Exam  Vitals reviewed.   Constitutional:       General: She is not in acute distress.     Appearance: Normal appearance.   HENT:      Head: Normocephalic and atraumatic.      Right Ear: Tympanic membrane, ear canal and external ear normal.      Left Ear: Tympanic membrane, ear canal and external ear normal.      Nose: Nose normal.      Mouth/Throat:      Mouth: Mucous membranes are moist.      Pharynx: Oropharynx is clear.   Eyes:      General: No scleral icterus.     Conjunctiva/sclera: Conjunctivae normal.   Cardiovascular:      Rate and Rhythm: Normal rate and regular rhythm.      Pulses: Normal pulses.      Heart sounds: Normal heart sounds.   Pulmonary:      Effort: Pulmonary effort is normal. No respiratory distress.      Breath sounds: Normal breath sounds.   Abdominal:      General: There " "is no distension.      Palpations: Abdomen is soft.      Tenderness: There is no abdominal tenderness. There is no guarding.   Musculoskeletal:         General: Swelling (hands) and tenderness (hands) present.      Cervical back: Normal range of motion and neck supple.      Right lower leg: No edema.      Left lower leg: No edema.      Comments: Reproducible trigger finger of most digits   Lymphadenopathy:      Cervical: No cervical adenopathy.   Skin:     General: Skin is warm and dry.      Findings: Rash (extensive dermatosis papulosa nigra across back in "do tree" distribution; scattered hyperpigmented ring-like macules across lower back.) present.   Neurological:      General: No focal deficit present.      Mental Status: She is alert and oriented to person, place, and time.              Lab Results   Component Value Date    WBC 6.65 12/17/2024    HGB 11.4 (L) 12/17/2024    HCT 37.0 12/17/2024     12/17/2024    CHOL 254 (H) 03/18/2024    TRIG 58 03/18/2024    HDL 85 (H) 03/18/2024    ALT 12 12/17/2024    AST 21 12/17/2024     12/17/2024    K 3.9 12/17/2024     12/17/2024    CREATININE 1.3 12/17/2024    BUN 11 12/17/2024    CO2 26 12/17/2024    TSH 1.397 03/18/2024    INR 1.1 04/30/2023    HGBA1C 5.4 05/01/2023       Current Outpatient Medications on File Prior to Visit   Medication Sig Dispense Refill    abatacept (ORENCIA CLICKJECT) 125 mg/mL AtIn Inject 125 mg into the skin every 7 days. 4 mL 2    acetaminophen (TYLENOL) 500 MG tablet Take 1 tablet (500 mg total) by mouth every 6 (six) hours as needed for Pain.      albuterol (PROVENTIL) 2.5 mg /3 mL (0.083 %) nebulizer solution Take 2.5 mg by nebulization every 6 (six) hours as needed for Wheezing. Rescue      albuterol (PROVENTIL/VENTOLIN HFA) 90 mcg/actuation inhaler Inhale 2 puffs into the lungs every 4 (four) hours as needed.      alendronate (FOSAMAX) 70 MG tablet Take 1 tablet (70 mg total) by mouth every 7 days. 90 tablet 2    " amLODIPine (NORVASC) 5 MG tablet Take 90 mg by mouth once daily.      ammonium lactate (LAC-HYDRIN) 12 % lotion Apply topically 2 (two) times daily as needed for Dry Skin. 225 g 3    azelastine (ASTELIN) 137 mcg (0.1 %) nasal spray 1 spray by Nasal route 2 (two) times daily.      azelastine 205.5 mcg (0.15 %) Spry 2 sprays by Each Nostril route 2 (two) times daily.      betamethasone dipropionate 0.05 % cream APPLY THIN LAYER TOPICALLY TO THE AFFECTED AREA TWICE DAILY      bismuth subsalicylate (PEPTO-BISMOL ORAL) Take by mouth.      diclofenac sodium (VOLTAREN) 1 % Gel Apply 4 g topically 3 (three) times daily as needed (hip/leg pain).      DULoxetine (CYMBALTA) 60 MG capsule Take 1 capsule (60 mg total) by mouth once daily. 30 capsule 3    ferrous sulfate 325 (65 FE) MG EC tablet Take 325 mg by mouth once daily.      fluticasone (FLONASE) 50 mcg/actuation nasal spray fluticasone 50 mcg/actuation nasal spray,suspension      gabapentin (NEURONTIN) 300 MG capsule Take 1 capsule (300 mg total) by mouth every evening. 30 capsule 3    gemfibroziL (LOPID) 600 MG tablet Take 600 mg by mouth 2 (two) times daily.      hydrOXYzine HCL (ATARAX) 10 MG Tab Take 1 tablet (10 mg total) by mouth 3 (three) times daily as needed (itchiness (can make you sleepy)). 30 tablet 2    ipratropium (ATROVENT) 21 mcg (0.03 %) nasal spray 2 sprays by Each Nostril route 3 (three) times daily as needed (runny nose). 30 mL 2    ipratropium/albuterol sulfate (IPRATROPIUM-ALBUTEROL INHL) Inhale into the lungs as needed.      levocetirizine (XYZAL) 5 MG tablet Take 5 mg by mouth once daily.      loratadine (CLARITIN) 10 mg tablet Take 10 mg by mouth every morning.      losartan (COZAAR) 100 MG tablet Take 1 tablet (100 mg total) by mouth once daily. 90 tablet 3    montelukast (SINGULAIR) 10 mg tablet Take 10 mg by mouth every evening.      NIFEdipine (PROCARDIA-XL) 90 MG (OSM) 24 hr tablet Take 1 tablet (90 mg total) by mouth once daily. 90 tablet 3     olopatadine (PATANOL) 0.1 % ophthalmic solution Place 1 drop into both eyes 2 (two) times daily.      prednisoLONE acetate (PRED FORTE) 1 % DrpS Place 1 drop into both eyes 3 (three) times daily. 5 mL 2    predniSONE (DELTASONE) 10 MG tablet Take 1 tablet (10 mg total) by mouth once daily. 45 tablet 0    predniSONE (DELTASONE) 2.5 MG tablet Take 1 tablet (2.5 mg total) by mouth daily as needed (RA flare). 30 tablet 1    SYMBICORT 160-4.5 mcg/actuation HFAA Inhale 2 puffs into the lungs 2 (two) times daily.      triamcinolone acetonide 0.1% (KENALOG) 0.1 % Lotn Apply topically 2 (two) times daily. 60 mL 2     No current facility-administered medications on file prior to visit.         Assessment:   84 y.o. female with multiple co-morbid illnesses here to follow-up for ongoing chronic disease management and preventive health maintenance.    Plan:     Problem List Items Addressed This Visit       Rheumatoid arthritis involving both hands with positive rheumatoid factor    Recently restarted Orencia and not yet back in remission; taking 2.5mg prednisone daily PRN.  Has established with new rheumatologist.  Still with some trigger finger of 2 digits of the R hand but not as severe.         Moderate persistent asthma without complication - Primary    On Singulair and Symbicort for controller with p.r.n. nebulizers.  Seems to be well controlled at this time.         Bulla of lung    Noted on prior CT of chest; mild emphysematous changes with bullous emphysema in the left apex.         Cervical myelopathy    RA may have contributed to spinal pathology; has known cervical spinal stenosis.  Myelopathy sx presently mild; no red flag sx.         Stage 3b chronic kidney disease    Kidney function has remained relatively stable over past year.  She is on an ARB and follows with a nephrologist.                            Health Maintenance         Date Due Completion Date    RSV Vaccine (Age 60+ and Pregnant patients) (1 -  1-dose 75+ series) Never done ---    COVID-19 Vaccine (5 - 2024-25 season) 09/25/2025 (Originally 10/31/2024) 9/5/2024    Mammogram 11/20/2025 11/20/2024    DEXA Scan 12/26/2027 12/26/2024    Override on 5/3/2016: Declined    Lipid Panel 03/18/2029 3/18/2024    TETANUS VACCINE 08/26/2029 8/26/2019            Future Appointments   Date Time Provider Department Center   3/17/2025  1:30 PM Nely Garcia FNP-BC [facility]   3/18/2025  2:00 PM Porter Thomas MD [facility]   5/20/2025  2:00 PM Roger Shields MD [facility]         Follow up in about 3 months (around 5/20/2025). Total clinical care time was 30 min.    Roger Shields MD  [facility]

## 2025-02-24 PROBLEM — M70.62 TROCHANTERIC BURSITIS OF LEFT HIP: Status: RESOLVED | Noted: 2024-03-19 | Resolved: 2025-02-24

## 2025-02-24 PROBLEM — Z23 NEED FOR COVID-19 VACCINE: Status: RESOLVED | Noted: 2019-06-13 | Resolved: 2025-02-24

## 2025-02-24 NOTE — ASSESSMENT & PLAN NOTE
On Singulair and Symbicort for controller with p.r.n. nebulizers.  Seems to be well controlled at this time.

## 2025-02-24 NOTE — ASSESSMENT & PLAN NOTE
RA may have contributed to spinal pathology; has known cervical spinal stenosis.  Myelopathy sx presently mild; no red flag sx.

## 2025-03-10 ENCOUNTER — TELEPHONE (OUTPATIENT)
Dept: RHEUMATOLOGY | Facility: CLINIC | Age: 85
End: 2025-03-10
Payer: MEDICARE

## 2025-03-10 DIAGNOSIS — M05.741 RHEUMATOID ARTHRITIS INVOLVING BOTH HANDS WITH POSITIVE RHEUMATOID FACTOR: ICD-10-CM

## 2025-03-10 DIAGNOSIS — M05.742 RHEUMATOID ARTHRITIS INVOLVING BOTH HANDS WITH POSITIVE RHEUMATOID FACTOR: ICD-10-CM

## 2025-03-10 RX ORDER — ABATACEPT 125 MG/ML
125 INJECTION, SOLUTION SUBCUTANEOUS
Qty: 4 ML | Refills: 2 | Status: SHIPPED | OUTPATIENT
Start: 2025-03-10 | End: 2025-03-13 | Stop reason: SDUPTHER

## 2025-03-10 NOTE — TELEPHONE ENCOUNTER
----- Message from Ursula Murray sent at 3/10/2025  1:56 PM CDT -----  Regarding: Refill  Contact: [phone]  Is this a Refill or New Rx (Script/Out of Refills):  Refill Name of Caller: Patient Rx Name: abatacept (ORENCIA CLICKJECT) 125 mg/mL AtInPharmacy [id number] - [place], [place] - [address] [facility] [facility] [id number]: [phone] Fax: [phone]

## 2025-03-13 DIAGNOSIS — M05.742 RHEUMATOID ARTHRITIS INVOLVING BOTH HANDS WITH POSITIVE RHEUMATOID FACTOR: ICD-10-CM

## 2025-03-13 DIAGNOSIS — M05.741 RHEUMATOID ARTHRITIS INVOLVING BOTH HANDS WITH POSITIVE RHEUMATOID FACTOR: ICD-10-CM

## 2025-03-13 RX ORDER — ABATACEPT 125 MG/ML
125 INJECTION, SOLUTION SUBCUTANEOUS
Qty: 4 ML | Refills: 2 | Status: SHIPPED | OUTPATIENT
Start: 2025-03-13

## 2025-03-17 ENCOUNTER — HOSPITAL ENCOUNTER (OUTPATIENT)
Dept: RADIOLOGY | Facility: HOSPITAL | Age: 85
Discharge: HOME | End: 2025-03-17
Payer: MEDICARE

## 2025-03-17 ENCOUNTER — OFFICE VISIT (OUTPATIENT)
Dept: RHEUMATOLOGY | Facility: CLINIC | Age: 85
End: 2025-03-17
Payer: MEDICARE

## 2025-03-17 VITALS
SYSTOLIC BLOOD PRESSURE: 141 MMHG | BODY MASS INDEX: 28.39 KG/M2 | HEART RATE: 99 BPM | DIASTOLIC BLOOD PRESSURE: 83 MMHG | HEIGHT: 62 IN | WEIGHT: 154.31 LBS

## 2025-03-17 DIAGNOSIS — M1A.09X0 IDIOPATHIC CHRONIC GOUT OF MULTIPLE SITES WITHOUT TOPHUS: ICD-10-CM

## 2025-03-17 DIAGNOSIS — M05.741 RHEUMATOID ARTHRITIS INVOLVING BOTH HANDS WITH POSITIVE RHEUMATOID FACTOR: Primary | ICD-10-CM

## 2025-03-17 DIAGNOSIS — M54.50 LUMBAR BACK PAIN: ICD-10-CM

## 2025-03-17 DIAGNOSIS — M05.742 RHEUMATOID ARTHRITIS INVOLVING BOTH HANDS WITH POSITIVE RHEUMATOID FACTOR: Primary | ICD-10-CM

## 2025-03-17 PROCEDURE — 72100 X-RAY EXAM L-S SPINE 2/3 VWS: CPT | Mod: 26,,, | Performed by: RADIOLOGY

## 2025-03-17 PROCEDURE — 72100 X-RAY EXAM L-S SPINE 2/3 VWS: CPT | Mod: TC

## 2025-03-17 PROCEDURE — 99999 PR PBB SHADOW E&M-EST. PATIENT-LVL V: CPT | Mod: PBBFAC,,,

## 2025-03-17 RX ORDER — CETIRIZINE HYDROCHLORIDE 10 MG/1
10 TABLET ORAL
COMMUNITY
Start: 2025-01-02 | End: 2025-03-18

## 2025-03-17 RX ORDER — METHYLPREDNISOLONE 4 MG/1
TABLET ORAL
Qty: 21 TABLET | Refills: 0 | Status: SHIPPED | OUTPATIENT
Start: 2025-03-17

## 2025-03-17 RX ORDER — ABATACEPT 125 MG/ML
125 INJECTION, SOLUTION SUBCUTANEOUS
Qty: 4 ML | Refills: 2 | Status: SHIPPED | OUTPATIENT
Start: 2025-03-17

## 2025-03-17 RX ORDER — TRIAMCINOLONE ACETONIDE 40 MG/ML
80 INJECTION, SUSPENSION INTRA-ARTICULAR; INTRAMUSCULAR
Status: COMPLETED | OUTPATIENT
Start: 2025-03-17 | End: 2025-03-17

## 2025-03-17 RX ADMIN — TRIAMCINOLONE ACETONIDE 80 MG: 40 INJECTION, SUSPENSION INTRA-ARTICULAR; INTRAMUSCULAR at 02:03

## 2025-03-17 NOTE — PROGRESS NOTES
"Per order, patient to receive 80 mg of Kenalog (triamcinolone acetonide). On hand 40mg/mL. The area of injection was palpated using the medial fold and the iliac crest as anatomical landmarks. Patient was advised to relax the muscle. The area was cleaned with alcohol and allowed to dry. Using a 25g 1.5" needle, 2 mL of Kenalog (triamcinolone acetonide) was placed intramuscularly into the left upper outer gluteal quadrant. Patient instructed to wait 15 minutes after the injection.  Patient experienced no complications and was discharged in stable condition. Lot: [id number] Exp: 09/30/2026     "

## 2025-03-17 NOTE — PROGRESS NOTES
"Subjective:      Patient ID: Betzaida Neumann is a 85 y.o. female.    Chief Complaint: Follow up on PRM, RA, fibromyalgia    HPI  Betzaida Neumann is an 84yofemale with PMH of seropositive RA ( +.4 and +RF 82 in 2016 )  HTN, CKD, fibromylagia and PMR who presented today for a second opinion from Dr. Moore who has been following the patient for complete body pain, PMR and h/o Ra.  Pt stated that she started to have the body pain from head to toe even making her hair hurt at times in 2008 when she moved back to [place] from [place] (pt had moved away from [place] during [place]).  Pt saw numerous rheumatologists in the past. At that time she saw Dr. Lerma who dx her with bursitis and then was seen Dr. Riddle who diagnosed her with rheumatoid arthritis and she was treated with humira and methotrexate for about 3 years but didn't notice any improvement in her pain.  She stated the Humira made her feel like a zombie.  Pt then was seen by Dr. Villafana and Dr. Reeves and was diagnosed with rheumatoid arthritis, fibromylagia and PMR.      Lost to follow up since Dr. SYED left.  She recalls taking methotrexate for about 4 years but it didn't help her.   Arava in the past caused GI upset.  Actemra in the past also did not improve her symptoms.     Now with worsening joint pains. Arthritis "moves around" was in left hip and now in right hand.Waking with stiffness and pain in neck and hands with pain/stiffness/swelling as well, where she is unable to wear rings.  She states she was able to wean off of prednisone in the past, however d/t her increasing pains her PCP prescribed prednisone 10mg daily, which she has been taking for the past 2 weeks.     Also takes tylenol arthritis twice daily, which only somewhat improves her symptoms.  Unable to take NSAIDs d/t h/o CKD.     The only medication she feels improved her joint pains in the past was Orencia.  Restarted Orencia at her last visit, however she has missed her " "last dose (last Monday), so now with increased pain all over; more than 10/10.  When she takes Orencia on time, her pain is improved.  Recently also with low back pain.      Elevated uric acid in the past, however this is now controlled with her diet (eliminating tomatoes, red meats, seldomly drinks alcohol).  She can differentiate the gout pain vs inflammatory arthritis, and she states she hasn't had gout pains for a couple of years.  No longer taking allopurinol or colchicine.     Pt also with a h/o fibromyalgia: already failed gabapentin, lyrica, duloxetine.    Recent DEXA Dec 2024 with osteopenia. She already takes daily calcium and vitamin D supplement.    Review of Systems   Constitutional:  Negative for fatigue and fever.   HENT:  Negative for trouble swallowing.    Respiratory:  Negative for shortness of breath.    Cardiovascular:  Negative for chest pain.   Musculoskeletal:  Positive for arthralgias, back pain and joint swelling.   Skin:  Negative for rash.   Neurological:  Negative for numbness.   Psychiatric/Behavioral:  Negative for behavioral problems and confusion.         Objective:   BP (!) 141/83 (BP Location: Left arm, Patient Position: Sitting)   Pulse 99   Ht 5' 2" (1.575 m)   Wt 70 kg (154 lb 5.2 oz)   BMI 28.23 kg/m²   Physical Exam   Constitutional: She is oriented to person, place, and time. normal appearance.   Cardiovascular: Normal rate and regular rhythm.   Pulmonary/Chest: Effort normal and breath sounds normal.   Abdominal: Soft.   Musculoskeletal:         General: Tenderness present.      Comments: TTP over bilateral shoulders and MCP joints of R hand   Neurological: She is alert and oriented to person, place, and time.   Skin: Skin is warm and dry.   Psychiatric: Her behavior is normal. Mood normal.        1/18/2023 3/17/2025   Tender (ARENAS-28) 0 / 28  2 / 28    Swollen (ARENAS-28) 0 / 28  2 / 28    Provider Global -- 5 / 100   Patient Global -- 100 / 100   ESR -- 36 mm/hr   CRP -- " 15.2 mg/L   ARENAS-28 (ESR) -- 5.1 (Moderate disease activity)   ARENAS-28 (CRP) -- 4.55 (Moderate disease activity)   CDAI Score -- 14.5         Assessment:     1. Rheumatoid arthritis involving both hands with positive rheumatoid factor    2. Lumbar back pain    3. Idiopathic chronic gout of multiple sites without tophus          Plan:     Problem List Items Addressed This Visit       Rheumatoid arthritis involving both hands with positive rheumatoid factor - Primary    Relevant Medications    abatacept (ORENCIA CLICKJECT) 125 mg/mL AtIn    triamcinolone acetonide injection 80 mg    methylPREDNISolone (MEDROL DOSEPACK) 4 mg tablet    Other Relevant Orders    C-Reactive Protein    Sedimentation rate    CBC Auto Differential    Comprehensive Metabolic Panel     Other Visit Diagnoses         Lumbar back pain        Relevant Orders    X-Ray Lumbar Spine Ap And Lateral      Idiopathic chronic gout of multiple sites without tophus        Relevant Orders    Uric Acid          RA: Flaring at this time since she missed last week's Orencia dose.   Resend script to [facility] to work on Prior Auth. Orencia 125mg weekly  Kenalog 80mg IM injection now for flare.  Send medrol dose pack, and start if pain continues.  Labs today.    Low back pain: Check lumbar spine Xray    RTO 3mos

## 2025-03-18 ENCOUNTER — RESULTS FOLLOW-UP (OUTPATIENT)
Dept: RHEUMATOLOGY | Facility: CLINIC | Age: 85
End: 2025-03-18

## 2025-03-18 ENCOUNTER — OFFICE VISIT (OUTPATIENT)
Dept: CARDIOLOGY | Facility: CLINIC | Age: 85
End: 2025-03-18
Payer: MEDICARE

## 2025-03-18 VITALS
DIASTOLIC BLOOD PRESSURE: 76 MMHG | SYSTOLIC BLOOD PRESSURE: 142 MMHG | OXYGEN SATURATION: 98 % | WEIGHT: 154.63 LBS | BODY MASS INDEX: 28.29 KG/M2 | HEART RATE: 90 BPM

## 2025-03-18 DIAGNOSIS — I50.30 DIASTOLIC CONGESTIVE HEART FAILURE, UNSPECIFIED HF CHRONICITY: Primary | ICD-10-CM

## 2025-03-18 DIAGNOSIS — E78.2 MIXED HYPERLIPIDEMIA: ICD-10-CM

## 2025-03-18 DIAGNOSIS — I10 ESSENTIAL HYPERTENSION: ICD-10-CM

## 2025-03-18 DIAGNOSIS — I65.29 ASYMPTOMATIC CAROTID ARTERY STENOSIS, UNSPECIFIED LATERALITY: ICD-10-CM

## 2025-03-18 PROCEDURE — 3288F FALL RISK ASSESSMENT DOCD: CPT | Mod: CPTII,S$GLB,, | Performed by: INTERNAL MEDICINE

## 2025-03-18 PROCEDURE — 3077F SYST BP >= 140 MM HG: CPT | Mod: CPTII,S$GLB,, | Performed by: INTERNAL MEDICINE

## 2025-03-18 PROCEDURE — 1101F PT FALLS ASSESS-DOCD LE1/YR: CPT | Mod: CPTII,S$GLB,, | Performed by: INTERNAL MEDICINE

## 2025-03-18 PROCEDURE — 99214 OFFICE O/P EST MOD 30 MIN: CPT | Mod: S$GLB,,, | Performed by: INTERNAL MEDICINE

## 2025-03-18 PROCEDURE — 3078F DIAST BP <80 MM HG: CPT | Mod: CPTII,S$GLB,, | Performed by: INTERNAL MEDICINE

## 2025-03-18 PROCEDURE — 1159F MED LIST DOCD IN RCRD: CPT | Mod: CPTII,S$GLB,, | Performed by: INTERNAL MEDICINE

## 2025-03-18 PROCEDURE — 99999 PR PBB SHADOW E&M-EST. PATIENT-LVL IV: CPT | Mod: PBBFAC,,, | Performed by: INTERNAL MEDICINE

## 2025-03-18 PROCEDURE — 1125F AMNT PAIN NOTED PAIN PRSNT: CPT | Mod: CPTII,S$GLB,, | Performed by: INTERNAL MEDICINE

## 2025-03-18 NOTE — PROGRESS NOTES
Cardiology    3/18/2025  1:56 PM    Problem list  Problem List[1]    CC:  Follow-up    HPI:  She has no cardiac complaints.  She complains of pain from fibromyalgia.  She has been on steroids.  Her pain has improved on steroids.  Her blood pressure was elevated yesterday with a 150s systolic.    Medications  Current Medications[2]   Prior to Admission medications    Medication Sig Start Date End Date Taking? Authorizing Provider   acetaminophen (TYLENOL) 500 MG tablet Take 1 tablet (500 mg total) by mouth every 6 (six) hours as needed for Pain. 6/3/24  Yes Melissa Galloway MD   albuterol (PROVENTIL) 2.5 mg /3 mL (0.083 %) nebulizer solution Take 2.5 mg by nebulization every 6 (six) hours as needed for Wheezing. Rescue   Yes Provider, Historical   albuterol (PROVENTIL/VENTOLIN HFA) 90 mcg/actuation inhaler Inhale 2 puffs into the lungs every 4 (four) hours as needed. 11/12/24  Yes Provider, Historical   alendronate (FOSAMAX) 70 MG tablet Take 1 tablet (70 mg total) by mouth every 7 days. 1/27/25  Yes Roger Shields MD   ammonium lactate (LAC-HYDRIN) 12 % lotion Apply topically 2 (two) times daily as needed for Dry Skin. 6/10/24  Yes Roger Shields MD   azelastine (ASTELIN) 137 mcg (0.1 %) nasal spray 1 spray by Nasal route 2 (two) times daily. 11/21/24  Yes Provider, Historical   betamethasone dipropionate 0.05 % cream APPLY THIN LAYER TOPICALLY TO THE AFFECTED AREA TWICE DAILY   Yes Provider, Historical   bismuth subsalicylate (PEPTO-BISMOL ORAL) Take by mouth.   Yes Provider, Historical   diclofenac sodium (VOLTAREN) 1 % Gel Apply 4 g topically 3 (three) times daily as needed (hip/leg pain).   Yes Provider, Historical   loratadine (CLARITIN) 10 mg tablet Take 10 mg by mouth every morning. 7/12/24  Yes Provider, Historical   abatacept (ORENCIA CLICKJECT) 125 mg/mL AtIn Inject 125 mg into the skin every 7 days.  Patient not taking: Reported on 3/18/2025 3/17/25   Nely Garcia,  [facility]   DULoxetine (CYMBALTA) 60 MG capsule Take 1 capsule (60 mg total) by mouth once daily.  Patient not taking: Reported on 3/17/2025 12/11/24   Melissa Galloway MD   ferrous sulfate 325 (65 FE) MG EC tablet Take 325 mg by mouth once daily.    Provider, Historical   fluticasone (FLONASE) 50 mcg/actuation nasal spray fluticasone 50 mcg/actuation nasal spray,suspension    Provider, Historical   gabapentin (NEURONTIN) 300 MG capsule Take 1 capsule (300 mg total) by mouth every evening. 2/3/25 2/3/26  Melissa Galloway MD   gemfibroziL (LOPID) 600 MG tablet Take 600 mg by mouth 2 (two) times daily.  Patient not taking: Reported on 3/17/2025 3/21/24   Provider, Historical   hydrOXYzine HCL (ATARAX) 10 MG Tab Take 1 tablet (10 mg total) by mouth 3 (three) times daily as needed (itchiness (can make you sleepy)).  Patient not taking: Reported on 3/17/2025 11/12/24   Huang Thomas MD   ipratropium (ATROVENT) 21 mcg (0.03 %) nasal spray 2 sprays by Each Nostril route 3 (three) times daily as needed (runny nose). 7/29/24   Royal Riggins PA-C   ipratropium/albuterol sulfate (IPRATROPIUM-ALBUTEROL INHL) Inhale into the lungs as needed.    Provider, Historical   levocetirizine (XYZAL) 5 MG tablet Take 5 mg by mouth once daily. 6/24/22   Provider, Historical   losartan (COZAAR) 100 MG tablet Take 1 tablet (100 mg total) by mouth once daily. 5/15/24 5/15/25  Imelda You MD   methylPREDNISolone (MEDROL DOSEPACK) 4 mg tablet use as directed 3/17/25   Nely Garcia, [facility]   montelukast (SINGULAIR) 10 mg tablet Take 10 mg by mouth every evening.    Provider, Historical   NIFEdipine (PROCARDIA-XL) 90 MG (OSM) 24 hr tablet Take 1 tablet (90 mg total) by mouth once daily. 12/3/24 12/3/25  Porter Thomas MD   olopatadine (PATANOL) 0.1 % ophthalmic solution Place 1 drop into both eyes 2 (two) times daily. 4/2/24   Neeru Max   prednisoLONE acetate (PRED FORTE) 1 % DrpS Place 1 drop into both  eyes 3 (three) times daily. 10/23/24 10/23/25  Dee Goff, CK   predniSONE (DELTASONE) 2.5 MG tablet Take 1 tablet (2.5 mg total) by mouth daily as needed (RA flare). 10/28/24   Roger Shields MD   SYMBICORT 160-4.5 mcg/actuation HFAA Inhale 2 puffs into the lungs 2 (two) times daily. 24   Provider, Historical   triamcinolone acetonide 0.1% (KENALOG) 0.1 % Lotn Apply topically 2 (two) times daily. 24   Huang Thomas MD   amLODIPine (NORVASC) 5 MG tablet Take 90 mg by mouth once daily. 12/4/24 3/18/25  Provider, Historical   cetirizine (ZYRTEC) 10 MG tablet Take 10 mg by mouth. 1/2/25 3/18/25  Provider, Historical         History  Past Medical History:   Diagnosis Date    Allergy     Arthritis     Cataract     CKD (chronic kidney disease) stage 3, GFR 30-59 ml/min     DVT (deep venous thrombosis) 2023    x2    Elevated C-reactive protein (CRP) 2017    Fibromyalgia     GERD (gastroesophageal reflux disease)     Hyperlipidemia     Hypertension     Polymyalgia rheumatica     RA (rheumatoid arthritis)      Past Surgical History:   Procedure Laterality Date    APPENDECTOMY      CATARACT EXTRACTION W/  INTRAOCULAR LENS IMPLANT Left     Dr. Cedeño     CATARACT EXTRACTION W/  INTRAOCULAR LENS IMPLANT Right 2017    Dr. Snea     SECTION      x2    CHOLECYSTECTOMY      COSMETIC SURGERY      Rhinoplasty    EPIDURAL STEROID INJECTION INTO CERVICAL SPINE Bilateral 12/10/2020    Procedure: CERVICAL  DORIS DIRECT REFERRAL;  Surgeon: Inga Blackburn MD;  Location: [facility];  Service: Pain Management;  Laterality: Bilateral;  NEEDS CONSENT    ESOPHAGEAL MANOMETRY WITH MEASUREMENT OF IMPEDANCE N/A 2019    Procedure: MANOMETRY, ESOPHAGUS, WITH IMPEDANCE MEASUREMENT;  Surgeon: Roger De Luna MD;  Location: [facility] ENDO [address]);  Service: Endoscopy;  Laterality: N/A;  Prep instructions mailed to Pt - ERW    ESOPHAGOGASTRODUODENOSCOPY N/A 2019    Procedure: EGD  (ESOPHAGOGASTRODUODENOSCOPY);  Surgeon: Carlos Rodgers MD;  Location: [facility] ([address] [place] [address]);  Service: Endoscopy;  Laterality: N/A;    EYE SURGERY      left eye Lens Placed    HYSTERECTOMY      RHINOPLASTY TIP  1980's    TONSILLECTOMY      TUBAL LIGATION       Social History[3]      Allergies  Review of patient's allergies indicates:   Allergen Reactions    Shellfish containing products Anaphylaxis    Sulfa (sulfonamide antibiotics) Swelling     swelling    Cabbage (brassica oleracea) Other (See Comments)    Chocolate flavor Other (See Comments)    Duckweed Other (See Comments)    Kale Swelling    Mustard Other (See Comments)    Orange Other (See Comments)    Statins-hmg-coa reductase inhibitors     Sulfacetamide sodium Swelling    Tomato (solanum lycopersicum)     Tree nut Other (See Comments)    Weed pollen Other (See Comments)     Patient reported allergy to weed pollen, grass, trees, duckweed, cockroaches         Review of Systems   Review of Systems   Constitutional: Negative for decreased appetite, fever and weight loss.   HENT:  Negative for congestion and nosebleeds.    Eyes:  Negative for double vision, vision loss in left eye, vision loss in right eye and visual disturbance.   Cardiovascular:  Negative for chest pain, claudication, cyanosis, dyspnea on exertion, irregular heartbeat, leg swelling, near-syncope, orthopnea, palpitations, paroxysmal nocturnal dyspnea and syncope.   Respiratory:  Negative for cough, hemoptysis, shortness of breath, sleep disturbances due to breathing, snoring, sputum production and wheezing.    Endocrine: Negative for cold intolerance and heat intolerance.   Skin:  Negative for nail changes and rash.   Musculoskeletal:  Negative for joint pain, muscle cramps, muscle weakness and myalgias.   Gastrointestinal:  Negative for change in bowel habit, heartburn, hematemesis, hematochezia, hemorrhoids and melena.   Neurological:  Negative for dizziness, focal weakness and headaches.          Physical Exam  Wt Readings from Last 1 Encounters:   03/17/25 70 kg (154 lb 5.2 oz)     BP Readings from Last 3 Encounters:   03/17/25 (!) 141/83   02/20/25 138/78   12/17/24 (!) 166/80     Pulse Readings from Last 1 Encounters:   03/17/25 99     There is no height or weight on file to calculate BMI.    Physical Exam  Constitutional:       Appearance: She is well-developed.   HENT:      Head: Atraumatic.   Eyes:      General: No scleral icterus.  Neck:      Vascular: Normal carotid pulses. No carotid bruit, hepatojugular reflux or JVD.   Cardiovascular:      Rate and Rhythm: Normal rate and regular rhythm.      Chest Wall: PMI is not displaced.      Pulses: Intact distal pulses.           Carotid pulses are 2+ on the right side and 2+ on the left side.       Radial pulses are 2+ on the right side and 2+ on the left side.        Dorsalis pedis pulses are 2+ on the right side and 2+ on the left side.      Heart sounds: Normal heart sounds, S1 normal and S2 normal. No murmur heard.     No friction rub.   Pulmonary:      Effort: Pulmonary effort is normal. No respiratory distress.      Breath sounds: Normal breath sounds. No stridor. No wheezing or rales.   Chest:      Chest wall: No tenderness.   Abdominal:      General: Bowel sounds are normal.      Palpations: Abdomen is soft.   Musculoskeletal:      Cervical back: Neck supple. No edema.   Skin:     General: Skin is warm and dry.      Nails: There is no clubbing.   Neurological:      Mental Status: She is alert and oriented to person, place, and time.   Psychiatric:         Behavior: Behavior normal.         Thought Content: Thought content normal.             Assessment  1. Diastolic congestive heart failure, unspecified HF chronicity (Primary)  stable    2. Mixed hyperlipidemia  stable    3. Asymptomatic carotid artery stenosis, unspecified laterality  unchanged    4. Essential hypertension  Improving, continue meds and monitor        Plan and  Discussion  Explained that her pain and being on steroid are contributing to her elevated blood pressure.  Will continue with current medications.    Follow Up  Six-month      Porter Thomas MD, F.A.C.C, F.S.C.A.I.      Total professional time spent for the encounter: 30 minutes  Time was spent preparing to see the patient, reviewing results of prior testing, obtaining and/or reviewing separately obtained history, performing a medically appropriate examination and interview, counseling and educating the patient/family, ordering medications/tests/procedures, referring and communicating with other health care professionals, documenting clinical information in the electronic health record, and independently interpreting results.    Disclaimer: This document was created using voice recognition software ([employer]). Although it may be edited, this document may contain errors related to incorrect recognition of the spoken word. Please call the physician if clarification is needed.          [1]   Patient Active Problem List  Diagnosis    Essential hypertension    Chronic allergic rhinitis    GERD (gastroesophageal reflux disease)    PMR (polymyalgia rheumatica)    Rheumatoid arthritis involving both hands with positive rheumatoid factor    Fatigue    Fibromyalgia    Nuclear sclerosis    Moderate persistent asthma without complication    Bulla of lung    Calcified lymph nodes    EMILIO (obstructive sleep apnea)    Dysphagia    Benign paroxysmal positional vertigo    Body mass index (BMI) of 25.0 to 29.9    Joint pain    Mixed hyperlipidemia    Reflux esophagitis    Shoulder pain    Extrinsic asthma    Chronic night sweats    Multiple thyroid nodules    Cervical radiculopathy    Immunosuppression    Cervical myelopathy    Stage 3b chronic kidney disease    Menopausal syndrome (hot flashes)    Recurrent sinusitis    History of cataract extraction    Assessment of barriers to meet care plan goals performed     Statin intolerance    Osteopenia of both hips    Carotid stenosis, asymptomatic    Dry skin dermatitis    Pruritic rash    Diastolic congestive heart failure, unspecified HF chronicity   [2]   Current Outpatient Medications   Medication Sig Dispense Refill    acetaminophen (TYLENOL) 500 MG tablet Take 1 tablet (500 mg total) by mouth every 6 (six) hours as needed for Pain.      albuterol (PROVENTIL) 2.5 mg /3 mL (0.083 %) nebulizer solution Take 2.5 mg by nebulization every 6 (six) hours as needed for Wheezing. Rescue      albuterol (PROVENTIL/VENTOLIN HFA) 90 mcg/actuation inhaler Inhale 2 puffs into the lungs every 4 (four) hours as needed.      alendronate (FOSAMAX) 70 MG tablet Take 1 tablet (70 mg total) by mouth every 7 days. 90 tablet 2    ammonium lactate (LAC-HYDRIN) 12 % lotion Apply topically 2 (two) times daily as needed for Dry Skin. 225 g 3    azelastine (ASTELIN) 137 mcg (0.1 %) nasal spray 1 spray by Nasal route 2 (two) times daily.      betamethasone dipropionate 0.05 % cream APPLY THIN LAYER TOPICALLY TO THE AFFECTED AREA TWICE DAILY      bismuth subsalicylate (PEPTO-BISMOL ORAL) Take by mouth.      diclofenac sodium (VOLTAREN) 1 % Gel Apply 4 g topically 3 (three) times daily as needed (hip/leg pain).      loratadine (CLARITIN) 10 mg tablet Take 10 mg by mouth every morning.      abatacept (ORENCIA CLICKJECT) 125 mg/mL AtIn Inject 125 mg into the skin every 7 days. (Patient not taking: Reported on 3/18/2025) 4 mL 2    DULoxetine (CYMBALTA) 60 MG capsule Take 1 capsule (60 mg total) by mouth once daily. (Patient not taking: Reported on 3/17/2025) 30 capsule 3    ferrous sulfate 325 (65 FE) MG EC tablet Take 325 mg by mouth once daily.      fluticasone (FLONASE) 50 mcg/actuation nasal spray fluticasone 50 mcg/actuation nasal spray,suspension      gabapentin (NEURONTIN) 300 MG capsule Take 1 capsule (300 mg total) by mouth every evening. 30 capsule 3    gemfibroziL (LOPID) 600 MG tablet Take 600 mg  by mouth 2 (two) times daily. (Patient not taking: Reported on 3/17/2025)      hydrOXYzine HCL (ATARAX) 10 MG Tab Take 1 tablet (10 mg total) by mouth 3 (three) times daily as needed (itchiness (can make you sleepy)). (Patient not taking: Reported on 3/17/2025) 30 tablet 2    ipratropium (ATROVENT) 21 mcg (0.03 %) nasal spray 2 sprays by Each Nostril route 3 (three) times daily as needed (runny nose). 30 mL 2    ipratropium/albuterol sulfate (IPRATROPIUM-ALBUTEROL INHL) Inhale into the lungs as needed.      levocetirizine (XYZAL) 5 MG tablet Take 5 mg by mouth once daily.      losartan (COZAAR) 100 MG tablet Take 1 tablet (100 mg total) by mouth once daily. 90 tablet 3    methylPREDNISolone (MEDROL DOSEPACK) 4 mg tablet use as directed 21 tablet 0    montelukast (SINGULAIR) 10 mg tablet Take 10 mg by mouth every evening.      NIFEdipine (PROCARDIA-XL) 90 MG (OSM) 24 hr tablet Take 1 tablet (90 mg total) by mouth once daily. 90 tablet 3    olopatadine (PATANOL) 0.1 % ophthalmic solution Place 1 drop into both eyes 2 (two) times daily.      prednisoLONE acetate (PRED FORTE) 1 % DrpS Place 1 drop into both eyes 3 (three) times daily. 5 mL 2    predniSONE (DELTASONE) 2.5 MG tablet Take 1 tablet (2.5 mg total) by mouth daily as needed (RA flare). 30 tablet 1    SYMBICORT 160-4.5 mcg/actuation HFAA Inhale 2 puffs into the lungs 2 (two) times daily.      triamcinolone acetonide 0.1% (KENALOG) 0.1 % Lotn Apply topically 2 (two) times daily. 60 mL 2     No current facility-administered medications for this visit.   [3]   Social History  Socioeconomic History    Marital status: Single   Tobacco Use    Smoking status: Former     Current packs/day: 0.00     Average packs/day: 1 pack/day for 29.0 years (29.0 ttl pk-yrs)     Types: Cigarettes     Start date: 1970     Quit date: 1999     Years since quittin.5    Smokeless tobacco: Never    Tobacco comments:     Retired ; ; 4 children healthy    Substance and Sexual Activity    Alcohol use: Yes     Alcohol/week: 1.0 standard drink of alcohol     Types: 1 Glasses of wine per week     Comment: social    Drug use: No    Sexual activity: Yes     Partners: Male     Social Drivers of Health     Financial Resource Strain: High Risk (5/28/2024)    Overall Financial Resource Strain (CARDIA)     Difficulty of Paying Living Expenses: Very hard   Food Insecurity: High Risk (1/30/2025)    Received from [facility] SDOH Screening     In the past year have you or any family members you live with been unable to get any of the following when it was really needed?  check all that apply: Foodutilities   Transportation Needs: High Risk (7/25/2024)    Received from [facility] SDOH Screening     Has lack of transportation kept you from medical appointments, meetings, work or from getting things needed for daily living? choose all that apply.: Yes, it has kept me from medical appointments or from getting my medications     Has lack of transportation kept you from medical appointments, meetings, work or from getting things needed for daily living? choose all that apply.: Yes, it has kept me from non-medical meetings, appointments, work or from getting things that i need   Physical Activity: Unknown (5/28/2024)    Exercise Vital Sign     Days of Exercise per Week: 3 days   Stress: No Stress Concern Present (5/28/2024)    [employer] [facility] [place] [employer] - Occupational Stress Questionnaire     Feeling of Stress : Not at all   Housing Stability: Unknown (5/28/2024)    Housing Stability Vital Sign     Unable to Pay for Housing in the Last Year: No

## 2025-03-31 ENCOUNTER — TELEPHONE (OUTPATIENT)
Dept: RHEUMATOLOGY | Facility: CLINIC | Age: 85
End: 2025-03-31
Payer: MEDICARE

## 2025-05-16 ENCOUNTER — OFFICE VISIT (OUTPATIENT)
Facility: CLINIC | Age: 85
End: 2025-05-16
Payer: MEDICARE

## 2025-05-16 VITALS
SYSTOLIC BLOOD PRESSURE: 134 MMHG | TEMPERATURE: 98 F | WEIGHT: 157.44 LBS | OXYGEN SATURATION: 96 % | DIASTOLIC BLOOD PRESSURE: 60 MMHG | HEIGHT: 62 IN | BODY MASS INDEX: 28.97 KG/M2 | HEART RATE: 94 BPM

## 2025-05-16 DIAGNOSIS — G25.81 RESTLESS LEG SYNDROME: ICD-10-CM

## 2025-05-16 DIAGNOSIS — R35.0 URINARY FREQUENCY: Primary | ICD-10-CM

## 2025-05-16 LAB
BILIRUB SERPL-MCNC: NEGATIVE MG/DL
BLOOD URINE, POC: NEGATIVE
COLOR, POC UA: YELLOW
GLUCOSE UR QL STRIP: NEGATIVE
KETONES UR QL STRIP: NEGATIVE
LEUKOCYTE ESTERASE URINE, POC: NEGATIVE
NITRITE, POC UA: NEGATIVE
PH, POC UA: 7
PROTEIN, POC: NEGATIVE
SPECIFIC GRAVITY, POC UA: 1.01
UROBILINOGEN, POC UA: 0.2

## 2025-05-16 PROCEDURE — 81001 URINALYSIS AUTO W/SCOPE: CPT | Mod: S$GLB,,, | Performed by: HOSPITALIST

## 2025-05-16 PROCEDURE — 1101F PT FALLS ASSESS-DOCD LE1/YR: CPT | Mod: CPTII,S$GLB,, | Performed by: HOSPITALIST

## 2025-05-16 PROCEDURE — 1159F MED LIST DOCD IN RCRD: CPT | Mod: CPTII,S$GLB,, | Performed by: HOSPITALIST

## 2025-05-16 PROCEDURE — 1125F AMNT PAIN NOTED PAIN PRSNT: CPT | Mod: CPTII,S$GLB,, | Performed by: HOSPITALIST

## 2025-05-16 PROCEDURE — 3075F SYST BP GE 130 - 139MM HG: CPT | Mod: CPTII,S$GLB,, | Performed by: HOSPITALIST

## 2025-05-16 PROCEDURE — 99999 PR PBB SHADOW E&M-EST. PATIENT-LVL V: CPT | Mod: PBBFAC,,, | Performed by: HOSPITALIST

## 2025-05-16 PROCEDURE — 3078F DIAST BP <80 MM HG: CPT | Mod: CPTII,S$GLB,, | Performed by: HOSPITALIST

## 2025-05-16 PROCEDURE — 99215 OFFICE O/P EST HI 40 MIN: CPT | Mod: S$GLB,,, | Performed by: HOSPITALIST

## 2025-05-16 PROCEDURE — 3288F FALL RISK ASSESSMENT DOCD: CPT | Mod: CPTII,S$GLB,, | Performed by: HOSPITALIST

## 2025-05-16 RX ORDER — ESTRADIOL 10 UG/1
1 TABLET, FILM COATED VAGINAL
Qty: 12 TABLET | Refills: 11 | Status: SHIPPED | OUTPATIENT
Start: 2025-05-16 | End: 2026-05-16

## 2025-05-16 RX ORDER — ROPINIROLE 0.25 MG/1
0.25 TABLET, FILM COATED ORAL NIGHTLY
Qty: 30 TABLET | Refills: 11 | Status: SHIPPED | OUTPATIENT
Start: 2025-05-16 | End: 2026-05-16

## 2025-05-16 RX ORDER — ROPINIROLE 0.25 MG/1
0.25 TABLET, FILM COATED ORAL NIGHTLY
Qty: 30 TABLET | Refills: 11 | Status: SHIPPED | OUTPATIENT
Start: 2025-05-16 | End: 2025-05-16

## 2025-05-16 NOTE — PROGRESS NOTES
"  Primary Care Provider Appointment - [facility] Shields MD      Subjective:      Patient ID: Betzaida Neumann is a 85 y.o. female with   Past Medical History:   Diagnosis Date    Allergy     Arthritis     Cataract     CKD (chronic kidney disease) stage 3, GFR 30-59 ml/min     DVT (deep venous thrombosis) 2023    x2    Elevated C-reactive protein (CRP) 03/24/2017    Fibromyalgia     GERD (gastroesophageal reflux disease)     Hyperlipidemia     Hypertension     Polymyalgia rheumatica     RA (rheumatoid arthritis)            Chief Complaint: Muscle Pain and Urinary Frequency    Prior to this visit, patient's last encounter with PCP was 2/20/2025.    Having poor sleep past 2 weeks due to urinary frequency through the night and day; having to go every couple of hours.  Also having bad leg cramps at night.  Moving improves sx.  No dysuria or F/C.  Also had some diarrhea recently after "eating something she shouldn't have."      2/20: Still having pain in hands.  Saw Rheumatology in Dec and was restarted on Orencia, which is helping but still having sx.  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  Experiencing trigger finger in 3rd and 4th digits of R hand.   Morning stiffness lasting 3-4 hours.  Cold temperatures are exacerbating sx.  Sometimes hands are so swollen she has difficulty with buttons.  Also saw dermatologist last month and prescribed emollients.  Still having itching but it helps.        12/11: Pt reporting pain and swelling in hands and neck; fingers locking up when flexing.  Was supposed to  the 10mg prednisone I Rx'd after discussing her sx with the rheumatologist and the Pain management Dr on 11/13; Dr. Galloway reached out to me concerned about her pain and we discussed with Dr. Hanley who will be her new rheumatologist; who recommended the 10mg prednisone daily and they were going to try to get her in sooner.  She might have " picked up the prednisone but thought it was her regular PRN Rx so hasn't taken it.  She missed call from Rheumatology to schedule a sooner appt so was unaware of them trying to reach out to her.  Pain has been keeping her up at night.    Saw Dr. TANJA Thomas 11/12 about pruritic rash on her back for several months, especially since running out of the Orencia.  Was Rx'd steroid cream and hydroxyzine but didn't get them.  She notes that her regular pharmacy is closing down at the end of this month.      4Ms for Medical Decision-Making in Older Adults    Last Completed EAWV: None    MOBILITY:  Get Up and Go:       No data to display              Activities of Daily Living:       No data to display              Whisper Test:       No data to display              Disability Status:      4/20/2017     9:00 AM   Disability Status   Are you deaf or do you have serious difficulty hearing? N   Are you blind or do you have serious difficulty seeing, even when wearing glasses? N   Because of a physical, mental, or emotional condition, do you have serious difficulty concentrating, remembering, or making decisions? N   Do you have serious difficulty walking or climbing stairs? N   Do you have difficulty dressing or bathing? N   Because of a physical, mental, or emotional condition, do you have difficulty doing errands alone such as visiting a doctor's office or shopping? N     Nutrition Screening:       No data to display             Screening Score: 0-7 Malnourished, 8-11 At Risk, 12-14 Normal    MENTATION:   Depression Patient Health Questionnaire:      2/20/2025     1:39 PM   Depression Patient Health Questionnaire   Over the last two weeks how often have you been bothered by little interest or pleasure in doing things Not at all   Over the last two weeks how often have you been bothered by feeling down, depressed or hopeless Not at all   PHQ-2 Total Score 0     Has Dementia Dx: No    Cognitive Function Screening:       No data to  "display              Cognitive Function Screening Total - Less than 4 = Abnormal,  Greater than or equal to 4 = Normal    MEDICATIONS:  High Risk Medications:  Total Active Medications: 2  DULoxetine - 60 MG  gabapentin - 300 MG    WHAT MATTERS MOST:  Advance Care Planning   ACP Status:   Patient has had an ACP conversation  Living Will: No  Power of : No  La[place]: No    What is most important right now is to focus on avoiding the hospital and remaining as independent as possible    Accordingly, we have decided that the best plan to meet the patient's goals includes continuing with treatment      What matters most to patient today is: getting established with a PCP who is accessible and will personalize care consistent with her values.             Date: 2024  Patient did not wish or was not able to name a surrogate decision maker or provide an Advance Care Plan. "Five Wishes" provided to patient to fill out and bring to return visit.      Social History     Socioeconomic History    Marital status: Single   Tobacco Use    Smoking status: Former     Current packs/day: 0.00     Average packs/day: 1 pack/day for 29.0 years (29.0 ttl pk-yrs)     Types: Cigarettes     Start date: 1970     Quit date: 1999     Years since quittin.7    Smokeless tobacco: Never    Tobacco comments:     Retired ; ; 4 children healthy   Substance and Sexual Activity    Alcohol use: Yes     Alcohol/week: 1.0 standard drink of alcohol     Types: 1 Glasses of wine per week     Comment: social    Drug use: No    Sexual activity: Yes     Partners: Male     Social Drivers of Health     Financial Resource Strain: High Risk (2024)    Overall Financial Resource Strain (CARDIA)     Difficulty of Paying Living Expenses: Very hard   Food Insecurity: High Risk (2025)    Received from [facility] SDOH Screening     In the past year have you or any family members you live with been " unable to get any of the following when it was really needed?  check all that apply: Foodutilities   Transportation Needs: High Risk (7/25/2024)    Received from [facility] SDOH Screening     Has lack of transportation kept you from medical appointments, meetings, work or from getting things needed for daily living? choose all that apply.: Yes, it has kept me from medical appointments or from getting my medications     Has lack of transportation kept you from medical appointments, meetings, work or from getting things needed for daily living? choose all that apply.: Yes, it has kept me from non-medical meetings, appointments, work or from getting things that i need   Physical Activity: Unknown (5/28/2024)    Exercise Vital Sign     Days of Exercise per Week: 3 days   Stress: No Stress Concern Present (5/28/2024)    [employer] - Occupational Stress Questionnaire     Feeling of Stress : Not at all   Housing Stability: High Risk (3/27/2025)    Received from [facility] SDOH Screening     In the past year, have you been unable to get any of the following when you really needed them? choose all that apply.: Utilities (electric, gas, and water)     What is your living situation today?: I have a place to live today, but i am worried about losing it in the future       Review of Systems   Constitutional:  Positive for diaphoresis and fatigue. Negative for activity change, appetite change, chills and fever.   HENT:  Positive for postnasal drip. Negative for sore throat.    Eyes:  Negative for photophobia and visual disturbance.   Respiratory:  Positive for cough. Negative for shortness of breath.    Cardiovascular:  Negative for chest pain and leg swelling.   Gastrointestinal:  Negative for abdominal pain, constipation, diarrhea, nausea and vomiting.   Genitourinary:  Positive for hot flashes. Negative for dysuria and hematuria.   Musculoskeletal:  Positive for joint  "swelling and leg pain. Negative for arthralgias and myalgias.   Integumentary:  Positive for rash. Negative for wound.   Neurological:  Negative for dizziness and weakness.        Objective:   Pulse 94   Temp 98.4 °F (36.9 °C) (Oral)   Ht 5' 2" (1.575 m)   Wt 71.4 kg (157 lb 6.5 oz)   SpO2 96%   BMI 28.79 kg/m²     Physical Exam  Vitals reviewed.   Constitutional:       General: She is not in acute distress.     Appearance: Normal appearance.   HENT:      Head: Normocephalic and atraumatic.      Right Ear: Tympanic membrane, ear canal and external ear normal.      Left Ear: Tympanic membrane, ear canal and external ear normal.      Nose: Nose normal.      Mouth/Throat:      Mouth: Mucous membranes are moist.      Pharynx: Oropharynx is clear.   Eyes:      General: No scleral icterus.     Conjunctiva/sclera: Conjunctivae normal.   Cardiovascular:      Rate and Rhythm: Normal rate and regular rhythm.      Pulses: Normal pulses.      Heart sounds: Normal heart sounds.   Pulmonary:      Effort: Pulmonary effort is normal. No respiratory distress.      Breath sounds: Normal breath sounds.   Abdominal:      General: There is no distension.      Palpations: Abdomen is soft.      Tenderness: There is no abdominal tenderness. There is no guarding.   Musculoskeletal:         General: Swelling (hands) and tenderness (hands) present.      Cervical back: Normal range of motion and neck supple.      Right lower leg: No edema.      Left lower leg: No edema.      Comments: Reproducible trigger finger of most digits   Lymphadenopathy:      Cervical: No cervical adenopathy.   Skin:     General: Skin is warm and dry.      Findings: Rash (extensive dermatosis papulosa nigra across back in "do tree" distribution; scattered hyperpigmented ring-like macules across lower back.) present.   Neurological:      General: No focal deficit present.      Mental Status: She is alert and oriented to person, place, and time.      "         Lab Results   Component Value Date    WBC 7.47 03/17/2025    HGB 11.2 (L) 03/17/2025    HCT 36.9 (L) 03/17/2025     03/17/2025    CHOL 254 (H) 03/18/2024    TRIG 58 03/18/2024    HDL 85 (H) 03/18/2024    ALT 15 03/17/2025    AST 21 03/17/2025     03/17/2025    K 4.2 03/17/2025     03/17/2025    CREATININE 1.2 03/17/2025    BUN 13 03/17/2025    CO2 26 03/17/2025    TSH 1.397 03/18/2024    INR 1.1 04/30/2023    HGBA1C 5.4 05/01/2023       Current Outpatient Medications on File Prior to Visit   Medication Sig Dispense Refill    abatacept (ORENCIA CLICKJECT) 125 mg/mL AtIn Inject 125 mg into the skin every 7 days. 4 mL 2    acetaminophen (TYLENOL) 500 MG tablet Take 1 tablet (500 mg total) by mouth every 6 (six) hours as needed for Pain.      albuterol (PROVENTIL) 2.5 mg /3 mL (0.083 %) nebulizer solution Take 2.5 mg by nebulization every 6 (six) hours as needed for Wheezing. Rescue      albuterol (PROVENTIL/VENTOLIN HFA) 90 mcg/actuation inhaler Inhale 2 puffs into the lungs every 4 (four) hours as needed.      alendronate (FOSAMAX) 70 MG tablet Take 1 tablet (70 mg total) by mouth every 7 days. 90 tablet 2    ammonium lactate (LAC-HYDRIN) 12 % lotion Apply topically 2 (two) times daily as needed for Dry Skin. 225 g 3    azelastine (ASTELIN) 137 mcg (0.1 %) nasal spray 1 spray by Nasal route 2 (two) times daily.      betamethasone dipropionate 0.05 % cream APPLY THIN LAYER TOPICALLY TO THE AFFECTED AREA TWICE DAILY      bismuth subsalicylate (PEPTO-BISMOL ORAL) Take by mouth.      diclofenac sodium (VOLTAREN) 1 % Gel Apply 4 g topically 3 (three) times daily as needed (hip/leg pain).      DULoxetine (CYMBALTA) 60 MG capsule Take 1 capsule (60 mg total) by mouth once daily. 30 capsule 3    ferrous sulfate 325 (65 FE) MG EC tablet Take 325 mg by mouth once daily.      fluticasone (FLONASE) 50 mcg/actuation nasal spray fluticasone 50 mcg/actuation nasal spray,suspension      gabapentin  (NEURONTIN) 300 MG capsule Take 1 capsule (300 mg total) by mouth every evening. 30 capsule 3    gemfibroziL (LOPID) 600 MG tablet Take 600 mg by mouth 2 (two) times daily.      ipratropium (ATROVENT) 21 mcg (0.03 %) nasal spray 2 sprays by Each Nostril route 3 (three) times daily as needed (runny nose). 30 mL 2    ipratropium/albuterol sulfate (IPRATROPIUM-ALBUTEROL INHL) Inhale into the lungs as needed.      levocetirizine (XYZAL) 5 MG tablet Take 5 mg by mouth once daily.      loratadine (CLARITIN) 10 mg tablet Take 10 mg by mouth every morning.      methylPREDNISolone (MEDROL DOSEPACK) 4 mg tablet use as directed 21 tablet 0    montelukast (SINGULAIR) 10 mg tablet Take 10 mg by mouth every evening.      NIFEdipine (PROCARDIA-XL) 90 MG (OSM) 24 hr tablet Take 1 tablet (90 mg total) by mouth once daily. 90 tablet 3    olopatadine (PATANOL) 0.1 % ophthalmic solution Place 1 drop into both eyes 2 (two) times daily.      prednisoLONE acetate (PRED FORTE) 1 % DrpS Place 1 drop into both eyes 3 (three) times daily. 5 mL 2    predniSONE (DELTASONE) 2.5 MG tablet Take 1 tablet (2.5 mg total) by mouth daily as needed (RA flare). 30 tablet 1    SYMBICORT 160-4.5 mcg/actuation HFAA Inhale 2 puffs into the lungs 2 (two) times daily.      triamcinolone acetonide 0.1% (KENALOG) 0.1 % Lotn Apply topically 2 (two) times daily. 60 mL 2    hydrOXYzine HCL (ATARAX) 10 MG Tab Take 1 tablet (10 mg total) by mouth 3 (three) times daily as needed (itchiness (can make you sleepy)). (Patient not taking: Reported on 5/16/2025) 30 tablet 2    losartan (COZAAR) 100 MG tablet Take 1 tablet (100 mg total) by mouth once daily. 90 tablet 3     No current facility-administered medications on file prior to visit.         Assessment:   85 y.o. female with multiple co-morbid illnesses here to follow-up for ongoing chronic disease management and preventive health maintenance.    Plan:     1. Urinary frequency  Assessment & Plan:  Dipstick UA  negative today in clinic.  Sx most like represent atrophic vaginitis especially considering she only discontinued HRT about 1.5 y/a.  Will Rx topical estrogen therapy.    Orders:  -     POCT urinalysis, dipstick or tablet reag    2. Restless leg syndrome  Assessment & Plan:  Will trial Ropinrole 0.25mg QHS.    Orders:  -     Discontinue: rOPINIRole (REQUIP) 0.25 MG tablet; Take 1 tablet (0.25 mg total) by mouth every evening.  Dispense: 30 tablet; Refill: 11  -     rOPINIRole (REQUIP) 0.25 MG tablet; Take 1 tablet (0.25 mg total) by mouth every evening.  Dispense: 30 tablet; Refill: 11    Other orders  -     estradioL (VAGIFEM) 10 mcg Tab; Place 1 tablet (10 mcg total) vaginally 3 (three) times a week.  Dispense: 12 tablet; Refill: 11        Health Maintenance         Date Due Completion Date    Aspirin/Antiplatelet Therapy Never done ---    RSV Vaccine (Age 60+ and Pregnant patients) (1 - 1-dose 75+ series) Never done ---    COVID-19 Vaccine (5 - 2024-25 season) 09/25/2025 (Originally 10/31/2024) 9/5/2024    Mammogram 11/20/2025 11/20/2024    DEXA Scan 12/26/2027 12/26/2024    Override on 5/3/2016: Declined    Lipid Panel 03/18/2029 3/18/2024    TETANUS VACCINE 08/26/2029 8/26/2019            Future Appointments   Date Time Provider Department Center   5/30/2025  4:00 PM Roger Shields MD [facility]   6/2/2025  1:30 PM Nely Garcia FNP-BC [facility] RHEUM [address]   6/18/2025  3:30 PM Imelda You MD [facility] NEPHRO [address]   9/9/2025  1:20 PM Porter Thomas MD [facility] CARDIO [facility]         Follow up in about 2 weeks (around 5/30/2025) for audio. Total clinical care time was 40 min.    Roger Shields MD  [facility]

## 2025-05-25 PROBLEM — G25.81 RESTLESS LEG SYNDROME: Status: ACTIVE | Noted: 2025-05-25

## 2025-05-25 PROBLEM — R35.0 URINARY FREQUENCY: Status: ACTIVE | Noted: 2025-05-25

## 2025-05-25 NOTE — ASSESSMENT & PLAN NOTE
Dipstick UA negative today in clinic.  Sx most like represent atrophic vaginitis especially considering she only discontinued HRT about 1.5 y/a.  Will Rx topical estrogen therapy.

## 2025-05-30 ENCOUNTER — OFFICE VISIT (OUTPATIENT)
Facility: CLINIC | Age: 85
End: 2025-05-30
Payer: MEDICARE

## 2025-05-30 DIAGNOSIS — N95.2 ATROPHIC VAGINITIS: Primary | ICD-10-CM

## 2025-05-30 PROCEDURE — 98013 SYNCH AUDIO-ONLY EST LOW 20: CPT | Mod: 93,,, | Performed by: HOSPITALIST

## 2025-06-02 DIAGNOSIS — M05.741 RHEUMATOID ARTHRITIS INVOLVING BOTH HANDS WITH POSITIVE RHEUMATOID FACTOR: ICD-10-CM

## 2025-06-02 DIAGNOSIS — M05.742 RHEUMATOID ARTHRITIS INVOLVING BOTH HANDS WITH POSITIVE RHEUMATOID FACTOR: ICD-10-CM

## 2025-06-02 RX ORDER — ABATACEPT 125 MG/ML
125 INJECTION, SOLUTION SUBCUTANEOUS
Qty: 4 ML | Refills: 2 | Status: ACTIVE | OUTPATIENT
Start: 2025-06-02

## 2025-06-09 ENCOUNTER — OFFICE VISIT (OUTPATIENT)
Dept: RHEUMATOLOGY | Facility: CLINIC | Age: 85
End: 2025-06-09
Payer: MEDICARE

## 2025-06-09 ENCOUNTER — LAB VISIT (OUTPATIENT)
Dept: LAB | Facility: HOSPITAL | Age: 85
End: 2025-06-09
Payer: MEDICARE

## 2025-06-09 VITALS
BODY MASS INDEX: 28.48 KG/M2 | WEIGHT: 154.75 LBS | DIASTOLIC BLOOD PRESSURE: 77 MMHG | HEIGHT: 62 IN | SYSTOLIC BLOOD PRESSURE: 144 MMHG | HEART RATE: 93 BPM

## 2025-06-09 DIAGNOSIS — M54.50 LUMBAR BACK PAIN: Primary | ICD-10-CM

## 2025-06-09 DIAGNOSIS — M05.741 RHEUMATOID ARTHRITIS INVOLVING BOTH HANDS WITH POSITIVE RHEUMATOID FACTOR: ICD-10-CM

## 2025-06-09 DIAGNOSIS — N18.32 STAGE 3B CHRONIC KIDNEY DISEASE: Primary | ICD-10-CM

## 2025-06-09 DIAGNOSIS — M05.742 RHEUMATOID ARTHRITIS INVOLVING BOTH HANDS WITH POSITIVE RHEUMATOID FACTOR: ICD-10-CM

## 2025-06-09 LAB
ABSOLUTE EOSINOPHIL (OHS): 0.25 K/UL
ABSOLUTE MONOCYTE (OHS): 0.46 K/UL (ref 0.3–1)
ABSOLUTE NEUTROPHIL COUNT (OHS): 3.04 K/UL (ref 1.8–7.7)
ALBUMIN SERPL BCP-MCNC: 4.1 G/DL (ref 3.5–5.2)
ALP SERPL-CCNC: 79 UNIT/L (ref 40–150)
ALT SERPL W/O P-5'-P-CCNC: 11 UNIT/L (ref 10–44)
ANION GAP (OHS): 10 MMOL/L (ref 8–16)
AST SERPL-CCNC: 24 UNIT/L (ref 11–45)
BASOPHILS # BLD AUTO: 0.04 K/UL
BASOPHILS NFR BLD AUTO: 0.6 %
BILIRUB SERPL-MCNC: 0.5 MG/DL (ref 0.1–1)
BUN SERPL-MCNC: 12 MG/DL (ref 8–23)
CALCIUM SERPL-MCNC: 9.4 MG/DL (ref 8.7–10.5)
CHLORIDE SERPL-SCNC: 105 MMOL/L (ref 95–110)
CO2 SERPL-SCNC: 24 MMOL/L (ref 23–29)
CREAT SERPL-MCNC: 1.3 MG/DL (ref 0.5–1.4)
CRP SERPL-MCNC: 19.5 MG/L
ERYTHROCYTE [DISTWIDTH] IN BLOOD BY AUTOMATED COUNT: 14.1 % (ref 11.5–14.5)
ERYTHROCYTE [SEDIMENTATION RATE] IN BLOOD BY PHOTOMETRIC METHOD: 60 MM/HR
GFR SERPLBLD CREATININE-BSD FMLA CKD-EPI: 41 ML/MIN/1.73/M2
GLUCOSE SERPL-MCNC: 87 MG/DL (ref 70–110)
HCT VFR BLD AUTO: 37.2 % (ref 37–48.5)
HGB BLD-MCNC: 11.4 GM/DL (ref 12–16)
IMM GRANULOCYTES # BLD AUTO: 0.02 K/UL (ref 0–0.04)
IMM GRANULOCYTES NFR BLD AUTO: 0.3 % (ref 0–0.5)
LYMPHOCYTES # BLD AUTO: 2.42 K/UL (ref 1–4.8)
MCH RBC QN AUTO: 30.2 PG (ref 27–31)
MCHC RBC AUTO-ENTMCNC: 30.6 G/DL (ref 32–36)
MCV RBC AUTO: 98 FL (ref 82–98)
NUCLEATED RBC (/100WBC) (OHS): 0 /100 WBC
PLATELET # BLD AUTO: 316 K/UL (ref 150–450)
PMV BLD AUTO: 9.5 FL (ref 9.2–12.9)
POTASSIUM SERPL-SCNC: 4.7 MMOL/L (ref 3.5–5.1)
PROT SERPL-MCNC: 7.6 GM/DL (ref 6–8.4)
RBC # BLD AUTO: 3.78 M/UL (ref 4–5.4)
RELATIVE EOSINOPHIL (OHS): 4 %
RELATIVE LYMPHOCYTE (OHS): 38.8 % (ref 18–48)
RELATIVE MONOCYTE (OHS): 7.4 % (ref 4–15)
RELATIVE NEUTROPHIL (OHS): 48.9 % (ref 38–73)
SODIUM SERPL-SCNC: 139 MMOL/L (ref 136–145)
WBC # BLD AUTO: 6.23 K/UL (ref 3.9–12.7)

## 2025-06-09 PROCEDURE — 86140 C-REACTIVE PROTEIN: CPT

## 2025-06-09 PROCEDURE — 1159F MED LIST DOCD IN RCRD: CPT | Mod: CPTII,S$GLB,,

## 2025-06-09 PROCEDURE — 3288F FALL RISK ASSESSMENT DOCD: CPT | Mod: CPTII,S$GLB,,

## 2025-06-09 PROCEDURE — 84075 ASSAY ALKALINE PHOSPHATASE: CPT

## 2025-06-09 PROCEDURE — 36415 COLL VENOUS BLD VENIPUNCTURE: CPT

## 2025-06-09 PROCEDURE — 99214 OFFICE O/P EST MOD 30 MIN: CPT | Mod: S$GLB,,,

## 2025-06-09 PROCEDURE — 1101F PT FALLS ASSESS-DOCD LE1/YR: CPT | Mod: CPTII,S$GLB,,

## 2025-06-09 PROCEDURE — 3077F SYST BP >= 140 MM HG: CPT | Mod: CPTII,S$GLB,,

## 2025-06-09 PROCEDURE — 99999 PR PBB SHADOW E&M-EST. PATIENT-LVL IV: CPT | Mod: PBBFAC,,,

## 2025-06-09 PROCEDURE — 85652 RBC SED RATE AUTOMATED: CPT

## 2025-06-09 PROCEDURE — 1125F AMNT PAIN NOTED PAIN PRSNT: CPT | Mod: CPTII,S$GLB,,

## 2025-06-09 PROCEDURE — 1160F RVW MEDS BY RX/DR IN RCRD: CPT | Mod: CPTII,S$GLB,,

## 2025-06-09 PROCEDURE — 3078F DIAST BP <80 MM HG: CPT | Mod: CPTII,S$GLB,,

## 2025-06-09 PROCEDURE — 85025 COMPLETE CBC W/AUTO DIFF WBC: CPT

## 2025-06-09 PROCEDURE — G2211 COMPLEX E/M VISIT ADD ON: HCPCS | Mod: S$GLB,,,

## 2025-06-09 RX ORDER — TIZANIDINE 2 MG/1
TABLET ORAL
Qty: 60 TABLET | Refills: 0 | Status: SHIPPED | OUTPATIENT
Start: 2025-06-09

## 2025-06-09 NOTE — PROGRESS NOTES
Subjective:      Patient ID: Betzaida Neumann is a 85 y.o. female.    Chief Complaint: Follow up on PMR, RA, fibromyalgia    HPI  Betzaida Neumann is an 84yofemale with PMH of seropositive RA ( +.4 and +RF 82 in 2016 )  HTN, CKD, fibromylagia and PMR who presented today for a second opinion from Dr. Moore who has been following the patient for complete body pain, PMR and h/o Ra.  Pt stated that she started to have the body pain from head to toe even making her hair hurt at times in 2008 when she moved back to [place] from [place] (pt had moved away from [place] during [place]).  Pt saw numerous rheumatologists in the past. At that time she saw Dr. Lerma who dx her with bursitis and then was seen Dr. Riddle who diagnosed her with rheumatoid arthritis and she was treated with humira and methotrexate for about 3 years but didn't notice any improvement in her pain.  She stated the Humira made her feel like a zombie.  Pt then was seen by Dr. Villafana and Dr. Reeves and was diagnosed with rheumatoid arthritis, fibromylagia and PMR.      Lost to follow up since Dr. SYED left.  She recalls taking methotrexate for about 4 years but it didn't help her.   Arava in the past caused GI upset.  Actemra in the past also did not improve her symptoms.     She does very well on Orencia if she is able to take it on time.     Uric acid level has recently stabilized.  Controlled with her diet (eliminating tomatoes, red meats, seldomly drinks alcohol).  She can differentiate the gout pain vs inflammatory arthritis, and she states she hasn't had gout pains for a couple of years.  No longer taking allopurinol or colchicine.     Pt also with a h/o fibromyalgia: already failed gabapentin, lyrica, duloxetine.     Recent DEXA Dec 2024 with osteopenia. She already takes daily calcium and vitamin D supplement.    Today:  Pain is 8/10, mostly in L hip. No recent trauma or falls.  Recent Xray of L spine with OA.     Has been able to  "restart Orencia weekly and RA is much better than last visit    Review of Systems   Respiratory:  Negative for cough and shortness of breath.    Cardiovascular:  Negative for chest pain.   Musculoskeletal:  Positive for back pain. Negative for joint swelling.   Skin:  Negative for rash.   Psychiatric/Behavioral:  Negative for confusion.         Objective:   BP (!) 144/77 (Patient Position: Sitting)   Pulse 93   Ht 5' 2" (1.575 m)   Wt 70.2 kg (154 lb 12.2 oz)   BMI 28.31 kg/m²   Physical Exam   Constitutional: She is oriented to person, place, and time. normal appearance.   Cardiovascular: Normal rate, regular rhythm, normal heart sounds and normal pulses.   Pulmonary/Chest: Effort normal and breath sounds normal.   Musculoskeletal:         General: Normal range of motion.      Cervical back: Normal range of motion and neck supple.   Neurological: She is alert and oriented to person, place, and time.   Skin: Skin is warm and dry.   Psychiatric: Her behavior is normal. Mood normal.        1/18/2023 3/17/2025   Tender (ARENAS-28) 0 / 28  2 / 28    Swollen (ARENAS-28) 0 / 28  2 / 28    Provider Global -- 5 / 100   Patient Global -- 100 / 100   ESR -- 36 mm/hr   CRP -- 15.2 mg/L   ARENAS-28 (ESR) -- 5.1 (Moderate disease activity)   ARENAS-28 (CRP) -- 4.55 (Moderate disease activity)   CDAI Score -- 14.5         Assessment:     1. Lumbar back pain    2. Rheumatoid arthritis involving both hands with positive rheumatoid factor          Plan:     Problem List Items Addressed This Visit       Rheumatoid arthritis involving both hands with positive rheumatoid factor     Other Visit Diagnoses         Lumbar back pain    -  Primary    Relevant Medications    tiZANidine (ZANAFLEX) 2 MG tablet          RA is now well controlled since restarting Orencia.  Continue Orencia 125mg SQ weekly     Recent lumbar spine Xray with degenerative changes.  Start tizanidine 2mg 1-2 tablets HS PRN for back pain     RTO 4mos  "

## 2025-06-12 DIAGNOSIS — M79.7 FIBROMYALGIA SYNDROME: ICD-10-CM

## 2025-06-12 DIAGNOSIS — M54.2 CHRONIC NECK PAIN: ICD-10-CM

## 2025-06-12 DIAGNOSIS — G89.29 CHRONIC NECK PAIN: ICD-10-CM

## 2025-06-13 ENCOUNTER — RESULTS FOLLOW-UP (OUTPATIENT)
Dept: RHEUMATOLOGY | Facility: CLINIC | Age: 85
End: 2025-06-13

## 2025-06-13 RX ORDER — GABAPENTIN 300 MG/1
300 CAPSULE ORAL NIGHTLY
Qty: 90 CAPSULE | Refills: 3 | Status: SHIPPED | OUTPATIENT
Start: 2025-06-13 | End: 2026-06-13

## 2025-06-18 ENCOUNTER — TELEPHONE (OUTPATIENT)
Dept: NEPHROLOGY | Facility: CLINIC | Age: 85
End: 2025-06-18
Payer: MEDICARE

## 2025-06-18 ENCOUNTER — LAB VISIT (OUTPATIENT)
Dept: LAB | Facility: HOSPITAL | Age: 85
End: 2025-06-18
Attending: INTERNAL MEDICINE
Payer: MEDICARE

## 2025-06-18 DIAGNOSIS — N18.32 STAGE 3B CHRONIC KIDNEY DISEASE: ICD-10-CM

## 2025-06-18 LAB
ABSOLUTE EOSINOPHIL (OHS): 0.23 K/UL
ABSOLUTE MONOCYTE (OHS): 0.45 K/UL (ref 0.3–1)
ABSOLUTE NEUTROPHIL COUNT (OHS): 3.59 K/UL (ref 1.8–7.7)
ALBUMIN SERPL BCP-MCNC: 4.2 G/DL (ref 3.5–5.2)
ANION GAP (OHS): 9 MMOL/L (ref 8–16)
BASOPHILS # BLD AUTO: 0.03 K/UL
BASOPHILS NFR BLD AUTO: 0.4 %
BILIRUB UR QL STRIP.AUTO: NEGATIVE
BUN SERPL-MCNC: 11 MG/DL (ref 8–23)
CALCIUM SERPL-MCNC: 9.7 MG/DL (ref 8.7–10.5)
CHLORIDE SERPL-SCNC: 103 MMOL/L (ref 95–110)
CLARITY UR: CLEAR
CO2 SERPL-SCNC: 26 MMOL/L (ref 23–29)
COLOR UR AUTO: YELLOW
CREAT SERPL-MCNC: 1.2 MG/DL (ref 0.5–1.4)
CREAT UR-MCNC: 32 MG/DL (ref 15–325)
ERYTHROCYTE [DISTWIDTH] IN BLOOD BY AUTOMATED COUNT: 13.6 % (ref 11.5–14.5)
GFR SERPLBLD CREATININE-BSD FMLA CKD-EPI: 45 ML/MIN/1.73/M2
GLUCOSE SERPL-MCNC: 100 MG/DL (ref 70–110)
GLUCOSE UR QL STRIP: NEGATIVE
HCT VFR BLD AUTO: 36.1 % (ref 37–48.5)
HGB BLD-MCNC: 10.9 GM/DL (ref 12–16)
HGB UR QL STRIP: NEGATIVE
IMM GRANULOCYTES # BLD AUTO: 0.01 K/UL (ref 0–0.04)
IMM GRANULOCYTES NFR BLD AUTO: 0.1 % (ref 0–0.5)
KETONES UR QL STRIP: NEGATIVE
LEUKOCYTE ESTERASE UR QL STRIP: NEGATIVE
LYMPHOCYTES # BLD AUTO: 2.74 K/UL (ref 1–4.8)
MCH RBC QN AUTO: 29.5 PG (ref 27–31)
MCHC RBC AUTO-ENTMCNC: 30.2 G/DL (ref 32–36)
MCV RBC AUTO: 98 FL (ref 82–98)
NITRITE UR QL STRIP: NEGATIVE
NUCLEATED RBC (/100WBC) (OHS): 0 /100 WBC
PH UR STRIP: 7 [PH]
PHOSPHATE SERPL-MCNC: 2.8 MG/DL (ref 2.7–4.5)
PLATELET # BLD AUTO: 285 K/UL (ref 150–450)
PMV BLD AUTO: 9.6 FL (ref 9.2–12.9)
POTASSIUM SERPL-SCNC: 4.6 MMOL/L (ref 3.5–5.1)
PROT UR QL STRIP: NEGATIVE
PROT UR-MCNC: <7 MG/DL
PROT/CREAT UR: NORMAL MG/G{CREAT}
RBC # BLD AUTO: 3.7 M/UL (ref 4–5.4)
RELATIVE EOSINOPHIL (OHS): 3.3 %
RELATIVE LYMPHOCYTE (OHS): 38.9 % (ref 18–48)
RELATIVE MONOCYTE (OHS): 6.4 % (ref 4–15)
RELATIVE NEUTROPHIL (OHS): 50.9 % (ref 38–73)
SODIUM SERPL-SCNC: 138 MMOL/L (ref 136–145)
SP GR UR STRIP: 1
UROBILINOGEN UR STRIP-ACNC: NEGATIVE EU/DL
WBC # BLD AUTO: 7.05 K/UL (ref 3.9–12.7)

## 2025-06-18 PROCEDURE — 85025 COMPLETE CBC W/AUTO DIFF WBC: CPT

## 2025-06-18 PROCEDURE — 80069 RENAL FUNCTION PANEL: CPT

## 2025-06-18 PROCEDURE — 84156 ASSAY OF PROTEIN URINE: CPT

## 2025-06-18 PROCEDURE — 36415 COLL VENOUS BLD VENIPUNCTURE: CPT

## 2025-06-18 PROCEDURE — 81003 URINALYSIS AUTO W/O SCOPE: CPT

## 2025-07-14 ENCOUNTER — TELEPHONE (OUTPATIENT)
Dept: RHEUMATOLOGY | Facility: CLINIC | Age: 85
End: 2025-07-14
Payer: MEDICARE

## 2025-07-15 DIAGNOSIS — M54.50 LUMBAR BACK PAIN: ICD-10-CM

## 2025-07-15 RX ORDER — TIZANIDINE 2 MG/1
TABLET ORAL
Qty: 60 TABLET | Refills: 0 | Status: SHIPPED | OUTPATIENT
Start: 2025-07-15

## 2025-08-11 DIAGNOSIS — M05.742 RHEUMATOID ARTHRITIS INVOLVING BOTH HANDS WITH POSITIVE RHEUMATOID FACTOR: ICD-10-CM

## 2025-08-11 DIAGNOSIS — M05.741 RHEUMATOID ARTHRITIS INVOLVING BOTH HANDS WITH POSITIVE RHEUMATOID FACTOR: ICD-10-CM

## 2025-08-11 RX ORDER — ABATACEPT 125 MG/ML
125 INJECTION, SOLUTION SUBCUTANEOUS
Qty: 4 ML | Refills: 2 | Status: SHIPPED | OUTPATIENT
Start: 2025-08-11 | End: 2025-08-11 | Stop reason: SDUPTHER

## 2025-08-12 RX ORDER — ABATACEPT 125 MG/ML
125 INJECTION, SOLUTION SUBCUTANEOUS
Qty: 4 ML | Refills: 2 | Status: SHIPPED | OUTPATIENT
Start: 2025-08-12

## (undated) DEVICE — Device

## (undated) DEVICE — GLOVE BIOGEL SKINSENSE PI 6.5

## (undated) DEVICE — DRESSING LEUKOPLAST FLEX 1X3IN

## (undated) DEVICE — SYR SLIP TIP 1CC

## (undated) DEVICE — SOL WATER STRL IRR 1000ML

## (undated) DEVICE — GLOVE BIOGEL SKINSENSE PI 7.5

## (undated) DEVICE — CASSETTE INFINITI

## (undated) DEVICE — SOL BETADINE 5%